# Patient Record
Sex: MALE | Race: WHITE | Employment: OTHER | URBAN - METROPOLITAN AREA
[De-identification: names, ages, dates, MRNs, and addresses within clinical notes are randomized per-mention and may not be internally consistent; named-entity substitution may affect disease eponyms.]

---

## 2017-01-03 ENCOUNTER — TELEPHONE (OUTPATIENT)
Dept: CARDIOLOGY CLINIC | Age: 62
End: 2017-01-03

## 2017-01-27 ENCOUNTER — TELEPHONE (OUTPATIENT)
Dept: CARDIOLOGY CLINIC | Age: 62
End: 2017-01-27

## 2017-07-21 ENCOUNTER — TELEPHONE (OUTPATIENT)
Dept: CARDIOLOGY CLINIC | Age: 62
End: 2017-07-21

## 2017-08-30 ENCOUNTER — OFFICE VISIT (OUTPATIENT)
Dept: CARDIOLOGY CLINIC | Age: 62
End: 2017-08-30

## 2017-08-30 VITALS
DIASTOLIC BLOOD PRESSURE: 90 MMHG | HEART RATE: 80 BPM | SYSTOLIC BLOOD PRESSURE: 148 MMHG | BODY MASS INDEX: 32.52 KG/M2 | HEIGHT: 74 IN | WEIGHT: 253.4 LBS

## 2017-08-30 DIAGNOSIS — I42.9 PRIMARY CARDIOMYOPATHY (HCC): ICD-10-CM

## 2017-08-30 DIAGNOSIS — I10 ESSENTIAL HYPERTENSION, BENIGN: ICD-10-CM

## 2017-08-30 DIAGNOSIS — R06.09 DOE (DYSPNEA ON EXERTION): ICD-10-CM

## 2017-08-30 DIAGNOSIS — I25.10 ATHEROSCLEROSIS OF NATIVE CORONARY ARTERY OF NATIVE HEART WITHOUT ANGINA PECTORIS: Primary | ICD-10-CM

## 2017-08-30 DIAGNOSIS — E78.5 OTHER AND UNSPECIFIED HYPERLIPIDEMIA: ICD-10-CM

## 2017-08-30 PROCEDURE — 99214 OFFICE O/P EST MOD 30 MIN: CPT | Performed by: INTERNAL MEDICINE

## 2017-08-30 RX ORDER — ATORVASTATIN CALCIUM 40 MG/1
40 TABLET, FILM COATED ORAL DAILY
Qty: 30 TABLET | Refills: 11 | Status: SHIPPED | OUTPATIENT
Start: 2017-08-30 | End: 2018-08-24 | Stop reason: SDUPTHER

## 2017-08-30 RX ORDER — NITROGLYCERIN 0.4 MG/1
0.4 TABLET SUBLINGUAL EVERY 5 MIN PRN
Status: ON HOLD | COMMUNITY
End: 2020-08-14 | Stop reason: HOSPADM

## 2017-08-30 RX ORDER — ASPIRIN 325 MG
325 TABLET ORAL DAILY
Status: ON HOLD | COMMUNITY
End: 2020-08-14 | Stop reason: HOSPADM

## 2017-08-30 RX ORDER — AMLODIPINE BESYLATE 10 MG/1
10 TABLET ORAL DAILY
Qty: 30 TABLET | Refills: 11 | Status: SHIPPED | OUTPATIENT
Start: 2017-08-30 | End: 2018-08-24 | Stop reason: SDUPTHER

## 2017-08-30 RX ORDER — CARVEDILOL 6.25 MG/1
6.25 TABLET ORAL 2 TIMES DAILY WITH MEALS
Qty: 60 TABLET | Refills: 11 | Status: SHIPPED | OUTPATIENT
Start: 2017-08-30 | End: 2018-08-24 | Stop reason: SDUPTHER

## 2017-08-30 RX ORDER — OLMESARTAN MEDOXOMIL 40 MG/1
40 TABLET ORAL DAILY
Qty: 30 TABLET | Refills: 11 | Status: SHIPPED | OUTPATIENT
Start: 2017-08-30 | End: 2018-08-24 | Stop reason: SDUPTHER

## 2017-09-25 ENCOUNTER — TELEPHONE (OUTPATIENT)
Dept: CARDIOLOGY CLINIC | Age: 62
End: 2017-09-25

## 2017-09-25 NOTE — TELEPHONE ENCOUNTER
Returning call to Colby Lowe MA his date of injury was 10/28/2003, the codes that were used was 410.20 acute MI and 414.9 coronary heart disease.  [facility]

## 2017-09-25 NOTE — TELEPHONE ENCOUNTER
For workers comp claims, the patient's condition has to be directly linked to the patient's original injury. Does anyone know what his original injury was? I can send in a C-9 to the [employer] today, but I really need to know why it needs to be claimed through workers comp and not his commercial insurance.

## 2017-09-26 NOTE — TELEPHONE ENCOUNTER
I sent a C-9 in to his [employer] today. It isn't a matter of us just calling to get one. Each time a C-9 has to be submitted with the clinical notes. It can take up to two weeks for them to either authorize or deny it.

## 2017-10-04 ENCOUNTER — HOSPITAL ENCOUNTER (OUTPATIENT)
Dept: NON INVASIVE DIAGNOSTICS | Age: 62
Discharge: HOME | End: 2017-10-04
Attending: INTERNAL MEDICINE | Admitting: INTERNAL MEDICINE

## 2017-10-04 DIAGNOSIS — I25.10 ATHEROSCLEROTIC HEART DISEASE OF NATIVE CORONARY ARTERY WITHOUT ANGINA PECTORIS: ICD-10-CM

## 2017-10-06 ENCOUNTER — TELEPHONE (OUTPATIENT)
Dept: CARDIOLOGY CLINIC | Age: 62
End: 2017-10-06

## 2017-10-09 ENCOUNTER — TELEPHONE (OUTPATIENT)
Dept: CARDIOLOGY CLINIC | Age: 62
End: 2017-10-09

## 2017-10-10 ENCOUNTER — TELEPHONE (OUTPATIENT)
Dept: CARDIOLOGY CLINIC | Age: 62
End: 2017-10-10

## 2018-02-06 ENCOUNTER — TELEPHONE (OUTPATIENT)
Dept: CARDIOLOGY CLINIC | Age: 63
End: 2018-02-06

## 2018-02-09 ENCOUNTER — TELEPHONE (OUTPATIENT)
Dept: CARDIOLOGY CLINIC | Age: 63
End: 2018-02-09

## 2018-02-09 NOTE — TELEPHONE ENCOUNTER
Pt calling stating workers comp form was filled out incorrectly and need it to be done the correct way. States dx was wrong and the date. Please call to advise and refax ,Thank You!

## 2018-02-19 ENCOUNTER — OFFICE VISIT (OUTPATIENT)
Dept: CARDIOLOGY CLINIC | Age: 63
End: 2018-02-19

## 2018-02-19 VITALS
SYSTOLIC BLOOD PRESSURE: 134 MMHG | WEIGHT: 267 LBS | DIASTOLIC BLOOD PRESSURE: 96 MMHG | BODY MASS INDEX: 34.27 KG/M2 | HEART RATE: 88 BPM | HEIGHT: 74 IN | OXYGEN SATURATION: 96 %

## 2018-02-19 DIAGNOSIS — E78.49 OTHER HYPERLIPIDEMIA: ICD-10-CM

## 2018-02-19 DIAGNOSIS — I35.1 NONRHEUMATIC AORTIC VALVE INSUFFICIENCY: Primary | ICD-10-CM

## 2018-02-19 DIAGNOSIS — I10 ESSENTIAL HYPERTENSION, BENIGN: ICD-10-CM

## 2018-02-19 DIAGNOSIS — I25.10 ATHEROSCLEROSIS OF NATIVE CORONARY ARTERY OF NATIVE HEART WITHOUT ANGINA PECTORIS: ICD-10-CM

## 2018-02-19 PROCEDURE — 99214 OFFICE O/P EST MOD 30 MIN: CPT | Performed by: INTERNAL MEDICINE

## 2018-02-19 NOTE — LETTER
· Eyes: No visual changes or diplopia. No scleral icterus. · ENT: No Headaches, hearing loss or vertigo. No mouth sores or sore throat. · Cardiovascular: Reviewed in HPI  · Respiratory: No cough or wheezing, no sputum production. No hematemesis. · Gastrointestinal: No abdominal pain, appetite loss, blood in stools. No change in bowel or bladder habits. · Genitourinary: No dysuria, trouble voiding, or hematuria. · Musculoskeletal:  No gait disturbance, weakness or joint complaints. · Integumentary: No rash or pruritis. · Neurological: No headache, diplopia, change in muscle strength, numbness or tingling. No change in gait, balance, coordination, mood, affect, memory, mentation, behavior. · Psychiatric: No anxiety, no depression. · Endocrine: No malaise, fatigue or temperature intolerance. No excessive thirst, fluid intake, or urination. No tremor. · Hematologic/Lymphatic: No abnormal bruising or bleeding, blood clots or swollen lymph nodes. · Allergic/Immunologic: No nasal congestion or hives. Physical Examination:    Vitals:    02/19/18 1315   BP: (!) 134/96   Pulse:    SpO2:         Constitutional and General Appearance: NAD  Skin:good turgor,intact without lesions  HEENT: EOMI ,normal  Neck:no JVD  Respiratory:  · Normal excursion and expansion without use of accessory muscles  · Resp Auscultation: Normal breath sounds without dullness  Cardiovascular:  · The apical impulses not displaced  · Heart tones are crisp and normal  · Cervical veins are not engorged  · The carotid upstroke is normal in amplitude and contour without delay or bruit  · Peripheral pulses are symmetrical and full  · There is no clubbing, cyanosis of the extremities.   · No edema  · Femoral Arteries: 2+ and equal  · Pedal Pulses: 2+ and equal   Abdomen:  · No masses or tenderness  · Liver/Spleen: No Abnormalities Noted  Neurological/Psychiatric:  · Alert and oriented in all spheres  · Moves all extremities well · Exhibits normal gait balance and coordination  · No abnormalities of mood, affect, memory, mentation, or behavior are noted    Assessment/Plan:    1. Coronary artery Disease: Stable, no anginal symptoms. Some SOB with activity at times, chronic.  --10/28/2003 Cath/PCI ([facility])> 85% LAD, 70% LCX, 30% RCA-PCI to LAD with 3.5x18mm Cypher TEJAS.   --1/02/2009 Cath/PCI> Stents in LAD and LCX patent. 85% pRCA-PCI 3.0x12mm Xience TEJAS.   --1/25/2011 Cath> Stents in LAD, LCX, and RCA are all patent. LVEF 40%. --Stress echo 10/4/17> Normal stress. -Global left ventricular function is borderline with ejection fraction estimated at 50 %.  -No obvious resting wall motion abnormalities noted.   -The aortic root is mildly dilated.   -The right ventricle is mildly dilated. Normal RV systolic function. TAPSE is estimated at 2.0 cm.   -The left atrium is mildly dilated.   -Aortic valve appears sclerotic but opens adequately.   -Mild-to-moderate aortic regurgitation is present.  Tereasa Gulshan thickened mitral valve without evidence of stenosis. Trace mitral regurgitation.    2. Essential hypertension, benign: DBP slightly elevated. Will continue to monitor. Encouraged weight loss. BP (!) 134/96   Pulse 88   Ht 6' 2\" (1.88 m)   Wt 267 lb (121.1 kg)   SpO2 96%   BMI 34.28 kg/m²     3. Other and unspecified hyperlipidemia: Managed per PCP. July 2017 labs very stable. 4. Other primary cardiomyopathies: Stable, no SOB/edema. 02/12/2015 Echo ([facility])> EF 45%, mild global hypokinesis, mild MR   5. Diabetes mellitus: PCP follows. Recent A1c 7.8. Mr. Amina Green has a stable cardiac status. 1. No med changes. 2. Will continue with risk factor modifications including weight loss measures. 3. Return for regular follow up in 6 months with ECHO same day (PCP will do labs). He states his meds are covered through worker's comp. I appreciate the opportunity of cooperating in the care of this individual.    Lauro Nunn.  Jenny Nunn M.D., [facility] If you have questions, please do not hesitate to call me. I look forward to following [employer]man along with you.     Sincerely,        Mook Rosas MD

## 2018-02-19 NOTE — PROGRESS NOTES
pruritis. · Neurological: No headache, diplopia, change in muscle strength, numbness or tingling. No change in gait, balance, coordination, mood, affect, memory, mentation, behavior. · Psychiatric: No anxiety, no depression. · Endocrine: No malaise, fatigue or temperature intolerance. No excessive thirst, fluid intake, or urination. No tremor. · Hematologic/Lymphatic: No abnormal bruising or bleeding, blood clots or swollen lymph nodes. · Allergic/Immunologic: No nasal congestion or hives. Physical Examination:    Vitals:    02/19/18 1315   BP: (!) 134/96   Pulse:    SpO2:         Constitutional and General Appearance: NAD  Skin:good turgor,intact without lesions  HEENT: EOMI ,normal  Neck:no JVD  Respiratory:  · Normal excursion and expansion without use of accessory muscles  · Resp Auscultation: Normal breath sounds without dullness  Cardiovascular:  · The apical impulses not displaced  · Heart tones are crisp and normal  · Cervical veins are not engorged  · The carotid upstroke is normal in amplitude and contour without delay or bruit  · Peripheral pulses are symmetrical and full  · There is no clubbing, cyanosis of the extremities. · No edema  · Femoral Arteries: 2+ and equal  · Pedal Pulses: 2+ and equal   Abdomen:  · No masses or tenderness  · Liver/Spleen: No Abnormalities Noted  Neurological/Psychiatric:  · Alert and oriented in all spheres  · Moves all extremities well  · Exhibits normal gait balance and coordination  · No abnormalities of mood, affect, memory, mentation, or behavior are noted    Assessment/Plan:    1. Coronary artery Disease: Stable, no anginal symptoms. Some SOB with activity at times, chronic.  --10/28/2003 Cath/PCI ([facility])> 85% LAD, 70% LCX, 30% RCA-PCI to LAD with 3.5x18mm Cypher TEJAS.   --1/02/2009 Cath/PCI> Stents in LAD and LCX patent. 85% pRCA-PCI 3.0x12mm Xience TEJAS.   --1/25/2011 Cath> Stents in LAD, LCX, and RCA are all patent. LVEF 40%.   --Stress echo 10/4/17>

## 2018-03-07 NOTE — COMMUNICATION BODY
[address]   Cardiac Consultation    Referring Provider:  Lolly Wright CNP     Chief Complaint   Patient presents with    6 Month Follow-Up     no cardiac complaints at this time    Coronary Artery Disease    Hypertension    Hyperlipidemia    Cardiomyopathy      History of Present Illness:   Mr. Minnie Adorno is here for a routine follow-up visit; he has a history of CAD, HTN, Hyperlipidemia, and Cardiomyopathy. He comes from [facility] for his OV (was seeing cardiologist at [facility]). He states that over he feels well, has no complaints other than chronic back issues. He does have CARR at times with exertional activities that is unchanged. He is frustrated by recent weight gains. He denies exertional chest pain, PND, palpitations, light-headedness, edema. His wife is with him for the visit. Past Medical History:   has a past medical history of Blood circulation, collateral; CAD (coronary artery disease); Chronic kidney disease; Diabetes mellitus ([address] Enlarged prostate; Hyperlipidemia; and Hypertension. Surgical History:   has a past surgical history that includes back surgery; Cardiac surgery; Tonsillectomy; Coronary angioplasty with stent (10/2003); Total knee arthroplasty (Right, 2014); and joint replacement. Social History:   reports that he quit smoking about 34 years ago. He smoked 1.00 pack per day. He has never used smokeless tobacco. He reports that he does not drink alcohol or use drugs. Family History:  family history includes Birth Defects in his brother; Diabetes in his mother; Heart Attack in his father; Heart Disease in his father and sister; High Blood Pressure in his brother, brother, father, sister, sister, and sister; High Cholesterol in his father. Home Medications:  Prior to Admission medications    Medication Sig Start Date End Date Taking? Authorizing Provider   Multiple Vitamins-Minerals (CENTRUM SILVER) TABS Take 1 tablet by mouth daily.      Yes Historical Provider, MD   zolpidem (AMBIEN) 5 MG tablet Take 5 mg by mouth nightly as needed. Yes Historical Provider, MD   meloxicam (MOBIC) 15 MG tablet Take 15 mg by mouth daily. Yes Historical Provider, MD   olmesartan (BENICAR) 40 MG tablet Take 40 mg by mouth daily. Yes Historical Provider, MD   metformin (GLUCOPHAGE) 1000 MG tablet Take 1,000 mg by mouth 2 times daily. Yes Historical Provider, MD   amlodipine (NORVASC) 10 MG tablet Take 1 tablet by mouth daily. 7/28/11 7/27/12 Yes Angel Peres MD   atorvastatin (LIPITOR) 40 MG tablet Take 1 tablet by mouth nightly. 5/27/11 5/26/12 Yes Angel Peres MD   aspirin 325 MG EC tablet Take 1 tablet by mouth daily. 1/25/11 1/25/12 Yes Angel Peres MD   carvedilol (COREG) 6.25 MG tablet Take 1 tablet by mouth 2 times daily (with meals). 1/25/11 1/25/12 Yes Angel Peres MD   nitroGLYCERIN (NITROSTAT) 0.4 MG SL tablet Place 1 tablet under the tongue every 5 minutes as needed for Chest pain. 1/25/11 1/25/12 Yes Angel Peres MD   tamsulosin (FLOMAX) 0.4 MG capsule Take 1 capsule by mouth daily. 1/25/11 1/25/12 Yes Angel Peres MD   lansoprazole (PREVACID) 30 MG capsule Take 1 capsule by mouth daily. 1/25/11 1/25/12 Yes Angel Peres MD      Allergies:  Penicillins and Codeine     Review of Systems:   · Constitutional: there has been no unanticipated weight loss. There's been no change in energy level, sleep pattern, or activity level. · Eyes: No visual changes or diplopia. No scleral icterus. · ENT: No Headaches, hearing loss or vertigo. No mouth sores or sore throat. · Cardiovascular: Reviewed in HPI  · Respiratory: No cough or wheezing, no sputum production. No hematemesis. · Gastrointestinal: No abdominal pain, appetite loss, blood in stools. No change in bowel or bladder habits. · Genitourinary: No dysuria, trouble voiding, or hematuria. · Musculoskeletal:  No gait disturbance, weakness or joint complaints.   · Integumentary: No rash or Normal stress. -Global left ventricular function is borderline with ejection fraction estimated at 50 %.  -No obvious resting wall motion abnormalities noted.   -The aortic root is mildly dilated.   -The right ventricle is mildly dilated. Normal RV systolic function. TAPSE is estimated at 2.0 cm.   -The left atrium is mildly dilated.   -Aortic valve appears sclerotic but opens adequately.   -Mild-to-moderate aortic regurgitation is present.  Delma Montague thickened mitral valve without evidence of stenosis. Trace mitral regurgitation.    2. Essential hypertension, benign: DBP slightly elevated. Will continue to monitor. Encouraged weight loss. BP (!) 134/96   Pulse 88   Ht 6' 2\" (1.88 m)   Wt 267 lb (121.1 kg)   SpO2 96%   BMI 34.28 kg/m²     3. Other and unspecified hyperlipidemia: Managed per PCP. July 2017 labs very stable. 4. Other primary cardiomyopathies: Stable, no SOB/edema. 02/12/2015 Echo ([facility])> EF 45%, mild global hypokinesis, mild MR   5. Diabetes mellitus: PCP follows. Recent A1c 7.8. Mr. Nilson Burton has a stable cardiac status. 1. No med changes. 2. Will continue with risk factor modifications including weight loss measures. 3. Return for regular follow up in 6 months with ECHO same day (PCP will do labs). He states his meds are covered through worker's comp. I appreciate the opportunity of cooperating in the care of this individual.    Zachery Barrera.  Fab Urrutia M.D., [facility]

## 2018-08-23 ENCOUNTER — TELEPHONE (OUTPATIENT)
Dept: CARDIOLOGY CLINIC | Age: 63
End: 2018-08-23

## 2018-08-24 ENCOUNTER — OFFICE VISIT (OUTPATIENT)
Dept: CARDIOLOGY CLINIC | Age: 63
End: 2018-08-24

## 2018-08-24 VITALS
HEIGHT: 74 IN | SYSTOLIC BLOOD PRESSURE: 144 MMHG | BODY MASS INDEX: 32.14 KG/M2 | DIASTOLIC BLOOD PRESSURE: 82 MMHG | OXYGEN SATURATION: 96 % | WEIGHT: 250.4 LBS | HEART RATE: 84 BPM

## 2018-08-24 DIAGNOSIS — I42.9 PRIMARY CARDIOMYOPATHY (HCC): ICD-10-CM

## 2018-08-24 DIAGNOSIS — E78.49 OTHER HYPERLIPIDEMIA: ICD-10-CM

## 2018-08-24 DIAGNOSIS — I10 ESSENTIAL HYPERTENSION, BENIGN: Primary | ICD-10-CM

## 2018-08-24 DIAGNOSIS — I25.10 ATHEROSCLEROSIS OF NATIVE CORONARY ARTERY OF NATIVE HEART WITHOUT ANGINA PECTORIS: ICD-10-CM

## 2018-08-24 PROCEDURE — 99214 OFFICE O/P EST MOD 30 MIN: CPT | Performed by: INTERNAL MEDICINE

## 2018-08-24 RX ORDER — AMLODIPINE BESYLATE 10 MG/1
10 TABLET ORAL DAILY
Qty: 90 TABLET | Refills: 11 | Status: SHIPPED | OUTPATIENT
Start: 2018-08-24 | End: 2019-09-05 | Stop reason: SDUPTHER

## 2018-08-24 RX ORDER — OLMESARTAN MEDOXOMIL 40 MG/1
40 TABLET ORAL DAILY
Qty: 90 TABLET | Refills: 3 | Status: SHIPPED | OUTPATIENT
Start: 2018-08-24 | End: 2019-09-05 | Stop reason: SDUPTHER

## 2018-08-24 RX ORDER — ATORVASTATIN CALCIUM 40 MG/1
40 TABLET, FILM COATED ORAL DAILY
Qty: 90 TABLET | Refills: 3 | Status: SHIPPED | OUTPATIENT
Start: 2018-08-24 | End: 2019-07-15 | Stop reason: SDUPTHER

## 2018-08-24 RX ORDER — CARVEDILOL 6.25 MG/1
6.25 TABLET ORAL 2 TIMES DAILY WITH MEALS
Qty: 180 TABLET | Refills: 3 | Status: SHIPPED | OUTPATIENT
Start: 2018-08-24 | End: 2019-07-15 | Stop reason: SDUPTHER

## 2018-08-24 NOTE — PROGRESS NOTES
fraction estimated at 50 %.  -No obvious resting wall motion abnormalities noted.   -The aortic root is mildly dilated.   -The right ventricle is mildly dilated. Normal RV systolic function. TAPSE is estimated at 2.0 cm.   -The left atrium is mildly dilated.   -Aortic valve appears sclerotic but opens adequately.   -Mild-to-moderate aortic regurgitation is present.  Sophia April thickened mitral valve without evidence of stenosis. Trace mitral regurgitation.    2. Essential hypertension, benign: DBP slightly elevated. Will continue to monitor. Encouraged weight loss. BP (!) 144/82 (Site: Right Arm, Position: Sitting, Cuff Size: Large Adult)   Pulse 84   Ht 6' 2\" (1.88 m)   Wt 250 lb 6.4 oz (113.6 kg)   SpO2 96%   BMI 32.15 kg/m²    On recheck by me 144/78   3. Other and unspecified hyperlipidemia: Managed per PCP. July 2017 HDL 45 LDL 84   4. Other primary cardiomyopathies: Stable, no SOB/edema. 02/12/2015 Echo ([facility])> EF 45%, mild global hypokinesis, mild MR   5. Diabetes mellitus: PCP follows. Recent A1c 7.8. Mr. Kristi Aquino has a stable cardiac status. 1. No med changes. 2. Will continue with risk factor modifications including weight loss measures. 3. Echo same day as next appointment. All tests go through workers comp. A c9 form will need to completed  4. Return for regular follow up in 6 months     He states his meds are covered through worker's comp. I appreciate the opportunity of cooperating in the care of this individual.    Terrie Carrel. Anuj Cantu M.D., [address] [place] [address]: This note was scribed in the presence of Dr. Anuj Cantu MD by SRINI Gorman. The scribe's documentation has been prepared under my direction and personally reviewed by me in its entirety. I confirm that the note above accurately reflects all work, treatment, procedures, and medical decision making performed by me.

## 2018-08-27 ENCOUNTER — TELEPHONE (OUTPATIENT)
Dept: CARDIOLOGY CLINIC | Age: 63
End: 2018-08-27

## 2018-08-29 ENCOUNTER — TELEPHONE (OUTPATIENT)
Dept: CARDIOLOGY CLINIC | Age: 63
End: 2018-08-29

## 2018-08-29 NOTE — TELEPHONE ENCOUNTER
Arash Barroso was calling to say we need C-9 to pre-cert for Echo that was done 8/24/18. I spoke with Tatum Ruiz and she said she would take care of it.

## 2018-09-10 RX ORDER — OLMESARTAN MEDOXOMIL 40 MG/1
TABLET ORAL
Qty: 30 TABLET | Refills: 11 | OUTPATIENT
Start: 2018-09-10

## 2018-09-24 ENCOUNTER — TELEPHONE (OUTPATIENT)
Dept: CARDIOLOGY CLINIC | Age: 63
End: 2018-09-24

## 2018-09-24 NOTE — TELEPHONE ENCOUNTER
Pt calling and said that worker comp said they approved the Echo stress test and pt could have it at the same time he comes back in February for his f/u. Said that [facility]  office needs to call and reschedule the appt through workers comp. Please call to advise.  Thank youl

## 2018-09-24 NOTE — TELEPHONE ENCOUNTER
Message added to recall reminding to put ov and echo thru W/C.  Tried to call patient but no answer and no VM

## 2018-12-05 ENCOUNTER — TELEPHONE (OUTPATIENT)
Dept: CARDIOLOGY CLINIC | Age: 63
End: 2018-12-05

## 2018-12-17 ENCOUNTER — HOSPITAL ENCOUNTER (OUTPATIENT)
Age: 63
Setting detail: OBSERVATION
Discharge: HOME | End: 2018-12-18
Attending: INTERNAL MEDICINE | Admitting: INTERNAL MEDICINE
Payer: COMMERCIAL

## 2018-12-17 ENCOUNTER — APPOINTMENT (OUTPATIENT)
Dept: ULTRASOUND IMAGING | Age: 63
End: 2018-12-17
Attending: INTERNAL MEDICINE
Payer: COMMERCIAL

## 2018-12-17 PROBLEM — K21.00 GERD WITH ESOPHAGITIS: Status: ACTIVE | Noted: 2018-12-17

## 2018-12-17 PROBLEM — N40.0 BPH (BENIGN PROSTATIC HYPERPLASIA): Status: ACTIVE | Noted: 2018-12-17

## 2018-12-17 PROBLEM — E66.9 OBESITY (BMI 30-39.9): Status: ACTIVE | Noted: 2018-12-17

## 2018-12-17 PROBLEM — N18.30 CKD (CHRONIC KIDNEY DISEASE) STAGE 3, GFR 30-59 ML/MIN (HCC): Status: ACTIVE | Noted: 2018-12-17

## 2018-12-17 PROBLEM — E11.9 DM2 (DIABETES MELLITUS, TYPE 2) (HCC): Status: ACTIVE | Noted: 2018-12-17

## 2018-12-17 LAB
ANION GAP SERPL CALCULATED.3IONS-SCNC: 10 MMOL/L (ref 3–16)
BASOPHILS ABSOLUTE: 0 K/UL (ref 0–0.2)
BASOPHILS RELATIVE PERCENT: 0.4 %
BUN BLDV-MCNC: 22 MG/DL (ref 7–20)
CALCIUM SERPL-MCNC: 9.4 MG/DL (ref 8.3–10.6)
CHLORIDE BLD-SCNC: 105 MMOL/L (ref 99–110)
CO2: 27 MMOL/L (ref 21–32)
CREAT SERPL-MCNC: 1.2 MG/DL (ref 0.8–1.3)
EOSINOPHILS ABSOLUTE: 0.1 K/UL (ref 0–0.6)
EOSINOPHILS RELATIVE PERCENT: 1.9 %
GFR AFRICAN AMERICAN: >60
GFR NON-AFRICAN AMERICAN: >60
GLUCOSE BLD-MCNC: 119 MG/DL (ref 70–99)
GLUCOSE BLD-MCNC: 125 MG/DL (ref 70–99)
GLUCOSE BLD-MCNC: 127 MG/DL (ref 70–99)
GLUCOSE BLD-MCNC: 160 MG/DL (ref 70–99)
HCT VFR BLD CALC: 39.7 % (ref 40.5–52.5)
HEMOGLOBIN: 13.4 G/DL (ref 13.5–17.5)
LYMPHOCYTES ABSOLUTE: 1.6 K/UL (ref 1–5.1)
LYMPHOCYTES RELATIVE PERCENT: 24.3 %
MCH RBC QN AUTO: 30.7 PG (ref 26–34)
MCHC RBC AUTO-ENTMCNC: 33.7 G/DL (ref 31–36)
MCV RBC AUTO: 91.3 FL (ref 80–100)
MONOCYTES ABSOLUTE: 0.5 K/UL (ref 0–1.3)
MONOCYTES RELATIVE PERCENT: 8.2 %
NEUTROPHILS ABSOLUTE: 4.3 K/UL (ref 1.7–7.7)
NEUTROPHILS RELATIVE PERCENT: 65.2 %
PDW BLD-RTO: 14.3 % (ref 12.4–15.4)
PERFORMED ON: ABNORMAL
PLATELET # BLD: 204 K/UL (ref 135–450)
PMV BLD AUTO: 7.8 FL (ref 5–10.5)
POTASSIUM REFLEX MAGNESIUM: 4.3 MMOL/L (ref 3.5–5.1)
RBC # BLD: 4.34 M/UL (ref 4.2–5.9)
SODIUM BLD-SCNC: 142 MMOL/L (ref 136–145)
TROPONIN: <0.01 NG/ML
WBC # BLD: 6.6 K/UL (ref 4–11)

## 2018-12-17 PROCEDURE — 85025 COMPLETE CBC W/AUTO DIFF WBC: CPT

## 2018-12-17 PROCEDURE — 76705 ECHO EXAM OF ABDOMEN: CPT

## 2018-12-17 PROCEDURE — 83036 HEMOGLOBIN GLYCOSYLATED A1C: CPT

## 2018-12-17 PROCEDURE — 80048 BASIC METABOLIC PNL TOTAL CA: CPT

## 2018-12-17 PROCEDURE — 2580000003 HC RX 258: Performed by: INTERNAL MEDICINE

## 2018-12-17 PROCEDURE — G0379 DIRECT REFER HOSPITAL OBSERV: HCPCS

## 2018-12-17 PROCEDURE — 99245 OFF/OP CONSLTJ NEW/EST HI 55: CPT | Performed by: INTERNAL MEDICINE

## 2018-12-17 PROCEDURE — 36415 COLL VENOUS BLD VENIPUNCTURE: CPT

## 2018-12-17 PROCEDURE — 84484 ASSAY OF TROPONIN QUANT: CPT

## 2018-12-17 PROCEDURE — 6360000002 HC RX W HCPCS: Performed by: INTERNAL MEDICINE

## 2018-12-17 PROCEDURE — G0378 HOSPITAL OBSERVATION PER HR: HCPCS

## 2018-12-17 PROCEDURE — 6370000000 HC RX 637 (ALT 250 FOR IP): Performed by: INTERNAL MEDICINE

## 2018-12-17 PROCEDURE — 96374 THER/PROPH/DIAG INJ IV PUSH: CPT

## 2018-12-17 RX ORDER — KETOROLAC TROMETHAMINE 15 MG/ML
15 INJECTION, SOLUTION INTRAMUSCULAR; INTRAVENOUS ONCE
Status: COMPLETED | OUTPATIENT
Start: 2018-12-17 | End: 2018-12-17

## 2018-12-17 RX ORDER — ACETAMINOPHEN 325 MG/1
650 TABLET ORAL EVERY 4 HOURS PRN
Status: DISCONTINUED | OUTPATIENT
Start: 2018-12-17 | End: 2018-12-18 | Stop reason: HOSPADM

## 2018-12-17 RX ORDER — ASPIRIN 325 MG
325 TABLET ORAL DAILY
Status: DISCONTINUED | OUTPATIENT
Start: 2018-12-17 | End: 2018-12-18 | Stop reason: HOSPADM

## 2018-12-17 RX ORDER — CARVEDILOL 6.25 MG/1
6.25 TABLET ORAL 2 TIMES DAILY WITH MEALS
Status: DISCONTINUED | OUTPATIENT
Start: 2018-12-17 | End: 2018-12-18 | Stop reason: HOSPADM

## 2018-12-17 RX ORDER — MORPHINE SULFATE 2 MG/ML
2 INJECTION, SOLUTION INTRAMUSCULAR; INTRAVENOUS EVERY 4 HOURS PRN
Status: DISCONTINUED | OUTPATIENT
Start: 2018-12-17 | End: 2018-12-18 | Stop reason: HOSPADM

## 2018-12-17 RX ORDER — ONDANSETRON 2 MG/ML
4 INJECTION INTRAMUSCULAR; INTRAVENOUS EVERY 6 HOURS PRN
Status: DISCONTINUED | OUTPATIENT
Start: 2018-12-17 | End: 2018-12-18 | Stop reason: HOSPADM

## 2018-12-17 RX ORDER — SODIUM CHLORIDE 0.9 % (FLUSH) 0.9 %
10 SYRINGE (ML) INJECTION EVERY 12 HOURS SCHEDULED
Status: DISCONTINUED | OUTPATIENT
Start: 2018-12-17 | End: 2018-12-18 | Stop reason: HOSPADM

## 2018-12-17 RX ORDER — SODIUM CHLORIDE 9 MG/ML
INJECTION, SOLUTION INTRAVENOUS CONTINUOUS
Status: DISPENSED | OUTPATIENT
Start: 2018-12-17 | End: 2018-12-17

## 2018-12-17 RX ORDER — DEXTROSE MONOHYDRATE 50 MG/ML
100 INJECTION, SOLUTION INTRAVENOUS PRN
Status: DISCONTINUED | OUTPATIENT
Start: 2018-12-17 | End: 2018-12-18 | Stop reason: HOSPADM

## 2018-12-17 RX ORDER — SODIUM CHLORIDE 0.9 % (FLUSH) 0.9 %
10 SYRINGE (ML) INJECTION PRN
Status: DISCONTINUED | OUTPATIENT
Start: 2018-12-17 | End: 2018-12-18 | Stop reason: HOSPADM

## 2018-12-17 RX ORDER — TAMSULOSIN HYDROCHLORIDE 0.4 MG/1
0.8 CAPSULE ORAL NIGHTLY
Status: DISCONTINUED | OUTPATIENT
Start: 2018-12-17 | End: 2018-12-18 | Stop reason: HOSPADM

## 2018-12-17 RX ORDER — LOSARTAN POTASSIUM 100 MG/1
100 TABLET ORAL DAILY
Status: DISCONTINUED | OUTPATIENT
Start: 2018-12-17 | End: 2018-12-18 | Stop reason: HOSPADM

## 2018-12-17 RX ORDER — ATORVASTATIN CALCIUM 40 MG/1
40 TABLET, FILM COATED ORAL NIGHTLY
Status: DISCONTINUED | OUTPATIENT
Start: 2018-12-17 | End: 2018-12-18 | Stop reason: HOSPADM

## 2018-12-17 RX ORDER — NITROGLYCERIN 0.4 MG/1
0.4 TABLET SUBLINGUAL EVERY 5 MIN PRN
Status: DISCONTINUED | OUTPATIENT
Start: 2018-12-17 | End: 2018-12-18 | Stop reason: HOSPADM

## 2018-12-17 RX ORDER — NICOTINE POLACRILEX 4 MG
15 LOZENGE BUCCAL PRN
Status: DISCONTINUED | OUTPATIENT
Start: 2018-12-17 | End: 2018-12-18 | Stop reason: HOSPADM

## 2018-12-17 RX ORDER — PANTOPRAZOLE SODIUM 40 MG/1
40 TABLET, DELAYED RELEASE ORAL
Status: DISCONTINUED | OUTPATIENT
Start: 2018-12-17 | End: 2018-12-18 | Stop reason: HOSPADM

## 2018-12-17 RX ORDER — NITROGLYCERIN 0.4 MG/1
0.4 TABLET SUBLINGUAL EVERY 5 MIN PRN
Status: DISCONTINUED | OUTPATIENT
Start: 2018-12-17 | End: 2018-12-17 | Stop reason: SDUPTHER

## 2018-12-17 RX ORDER — DEXTROSE MONOHYDRATE 25 G/50ML
12.5 INJECTION, SOLUTION INTRAVENOUS PRN
Status: DISCONTINUED | OUTPATIENT
Start: 2018-12-17 | End: 2018-12-18 | Stop reason: HOSPADM

## 2018-12-17 RX ORDER — AMLODIPINE BESYLATE 5 MG/1
10 TABLET ORAL DAILY
Status: DISCONTINUED | OUTPATIENT
Start: 2018-12-17 | End: 2018-12-18 | Stop reason: HOSPADM

## 2018-12-17 RX ORDER — SUCRALFATE 1 G/1
1 TABLET ORAL EVERY 6 HOURS SCHEDULED
Status: DISCONTINUED | OUTPATIENT
Start: 2018-12-17 | End: 2018-12-18 | Stop reason: HOSPADM

## 2018-12-17 RX ORDER — M-VIT,TX,IRON,MINS/CALC/FOLIC 27MG-0.4MG
1 TABLET ORAL
Status: DISCONTINUED | OUTPATIENT
Start: 2018-12-17 | End: 2018-12-18 | Stop reason: HOSPADM

## 2018-12-17 RX ADMIN — TAMSULOSIN HYDROCHLORIDE 0.8 MG: 0.4 CAPSULE ORAL at 21:03

## 2018-12-17 RX ADMIN — Medication 10 ML: at 12:45

## 2018-12-17 RX ADMIN — MULTIPLE VITAMINS W/ MINERALS TAB 1 TABLET: TAB at 12:44

## 2018-12-17 RX ADMIN — CARVEDILOL 6.25 MG: 6.25 TABLET, FILM COATED ORAL at 17:26

## 2018-12-17 RX ADMIN — AMLODIPINE BESYLATE 10 MG: 5 TABLET ORAL at 12:44

## 2018-12-17 RX ADMIN — LOSARTAN POTASSIUM 100 MG: 100 TABLET, FILM COATED ORAL at 12:44

## 2018-12-17 RX ADMIN — METFORMIN HYDROCHLORIDE 1000 MG: 500 TABLET ORAL at 21:04

## 2018-12-17 RX ADMIN — DESMOPRESSIN ACETATE 40 MG: 0.2 TABLET ORAL at 21:04

## 2018-12-17 RX ADMIN — SODIUM CHLORIDE: 9 INJECTION, SOLUTION INTRAVENOUS at 12:49

## 2018-12-17 RX ADMIN — SUCRALFATE 1 G: 1 TABLET ORAL at 12:44

## 2018-12-17 RX ADMIN — INSULIN LISPRO 2 UNITS: 100 INJECTION, SOLUTION INTRAVENOUS; SUBCUTANEOUS at 18:07

## 2018-12-17 RX ADMIN — SUCRALFATE 1 G: 1 TABLET ORAL at 17:26

## 2018-12-17 RX ADMIN — CARVEDILOL 6.25 MG: 6.25 TABLET, FILM COATED ORAL at 12:44

## 2018-12-17 RX ADMIN — PANTOPRAZOLE SODIUM 40 MG: 40 TABLET, DELAYED RELEASE ORAL at 17:26

## 2018-12-17 RX ADMIN — ASPIRIN 325 MG: 325 TABLET ORAL at 12:44

## 2018-12-17 RX ADMIN — KETOROLAC TROMETHAMINE 15 MG: 15 INJECTION, SOLUTION INTRAMUSCULAR; INTRAVENOUS at 12:46

## 2018-12-17 ASSESSMENT — PAIN DESCRIPTION - FREQUENCY: FREQUENCY: CONTINUOUS

## 2018-12-17 ASSESSMENT — PAIN DESCRIPTION - PAIN TYPE
TYPE: ACUTE PAIN

## 2018-12-17 ASSESSMENT — PAIN SCALES - GENERAL
PAINLEVEL_OUTOF10: 0
PAINLEVEL_OUTOF10: 0
PAINLEVEL_OUTOF10: 2
PAINLEVEL_OUTOF10: 0
PAINLEVEL_OUTOF10: 2
PAINLEVEL_OUTOF10: 2
PAINLEVEL_OUTOF10: 0

## 2018-12-17 ASSESSMENT — PAIN DESCRIPTION - DESCRIPTORS: DESCRIPTORS: DISCOMFORT;DULL

## 2018-12-17 ASSESSMENT — PAIN DESCRIPTION - DIRECTION: RADIATING_TOWARDS: LEFT BREAST

## 2018-12-17 ASSESSMENT — PAIN DESCRIPTION - LOCATION
LOCATION: CHEST
LOCATION: CHEST

## 2018-12-17 ASSESSMENT — PAIN DESCRIPTION - ORIENTATION: ORIENTATION: MID

## 2018-12-18 VITALS
DIASTOLIC BLOOD PRESSURE: 86 MMHG | RESPIRATION RATE: 16 BRPM | HEART RATE: 84 BPM | TEMPERATURE: 97 F | SYSTOLIC BLOOD PRESSURE: 137 MMHG | BODY MASS INDEX: 31.57 KG/M2 | HEIGHT: 74 IN | OXYGEN SATURATION: 97 % | WEIGHT: 246 LBS

## 2018-12-18 LAB
ANION GAP SERPL CALCULATED.3IONS-SCNC: 10 MMOL/L (ref 3–16)
BASOPHILS ABSOLUTE: 0 K/UL (ref 0–0.2)
BASOPHILS RELATIVE PERCENT: 0.6 %
BUN BLDV-MCNC: 21 MG/DL (ref 7–20)
CALCIUM SERPL-MCNC: 9.1 MG/DL (ref 8.3–10.6)
CHLORIDE BLD-SCNC: 103 MMOL/L (ref 99–110)
CO2: 25 MMOL/L (ref 21–32)
CREAT SERPL-MCNC: 1.1 MG/DL (ref 0.8–1.3)
EOSINOPHILS ABSOLUTE: 0.2 K/UL (ref 0–0.6)
EOSINOPHILS RELATIVE PERCENT: 2.7 %
ESTIMATED AVERAGE GLUCOSE: 159.9 MG/DL
GFR AFRICAN AMERICAN: >60
GFR NON-AFRICAN AMERICAN: >60
GLUCOSE BLD-MCNC: 122 MG/DL (ref 70–99)
GLUCOSE BLD-MCNC: 132 MG/DL (ref 70–99)
GLUCOSE BLD-MCNC: 135 MG/DL (ref 70–99)
HBA1C MFR BLD: 7.2 %
HCT VFR BLD CALC: 39.9 % (ref 40.5–52.5)
HEMOGLOBIN: 13.3 G/DL (ref 13.5–17.5)
LEFT VENTRICULAR EJECTION FRACTION HIGH VALUE: 55 %
LEFT VENTRICULAR EJECTION FRACTION MODE: NORMAL
LYMPHOCYTES ABSOLUTE: 1.6 K/UL (ref 1–5.1)
LYMPHOCYTES RELATIVE PERCENT: 23.2 %
MCH RBC QN AUTO: 30.5 PG (ref 26–34)
MCHC RBC AUTO-ENTMCNC: 33.3 G/DL (ref 31–36)
MCV RBC AUTO: 91.7 FL (ref 80–100)
MONOCYTES ABSOLUTE: 0.7 K/UL (ref 0–1.3)
MONOCYTES RELATIVE PERCENT: 9.8 %
NEUTROPHILS ABSOLUTE: 4.4 K/UL (ref 1.7–7.7)
NEUTROPHILS RELATIVE PERCENT: 63.7 %
PDW BLD-RTO: 14.8 % (ref 12.4–15.4)
PERFORMED ON: ABNORMAL
PERFORMED ON: ABNORMAL
PLATELET # BLD: 191 K/UL (ref 135–450)
PMV BLD AUTO: 7.6 FL (ref 5–10.5)
POTASSIUM REFLEX MAGNESIUM: 4.5 MMOL/L (ref 3.5–5.1)
RBC # BLD: 4.35 M/UL (ref 4.2–5.9)
SODIUM BLD-SCNC: 138 MMOL/L (ref 136–145)
WBC # BLD: 7 K/UL (ref 4–11)

## 2018-12-18 PROCEDURE — 80048 BASIC METABOLIC PNL TOTAL CA: CPT

## 2018-12-18 PROCEDURE — C1760 CLOSURE DEV, VASC: HCPCS

## 2018-12-18 PROCEDURE — 2500000003 HC RX 250 WO HCPCS

## 2018-12-18 PROCEDURE — 36415 COLL VENOUS BLD VENIPUNCTURE: CPT

## 2018-12-18 PROCEDURE — G0378 HOSPITAL OBSERVATION PER HR: HCPCS

## 2018-12-18 PROCEDURE — 6360000004 HC RX CONTRAST MEDICATION: Performed by: INTERNAL MEDICINE

## 2018-12-18 PROCEDURE — 6360000002 HC RX W HCPCS

## 2018-12-18 PROCEDURE — C1894 INTRO/SHEATH, NON-LASER: HCPCS

## 2018-12-18 PROCEDURE — 2580000003 HC RX 258: Performed by: INTERNAL MEDICINE

## 2018-12-18 PROCEDURE — 99152 MOD SED SAME PHYS/QHP 5/>YRS: CPT | Performed by: INTERNAL MEDICINE

## 2018-12-18 PROCEDURE — 2580000003 HC RX 258

## 2018-12-18 PROCEDURE — 99153 MOD SED SAME PHYS/QHP EA: CPT | Performed by: INTERNAL MEDICINE

## 2018-12-18 PROCEDURE — 2709999900 HC NON-CHARGEABLE SUPPLY

## 2018-12-18 PROCEDURE — 85025 COMPLETE CBC W/AUTO DIFF WBC: CPT

## 2018-12-18 PROCEDURE — C1769 GUIDE WIRE: HCPCS

## 2018-12-18 PROCEDURE — 6370000000 HC RX 637 (ALT 250 FOR IP): Performed by: INTERNAL MEDICINE

## 2018-12-18 PROCEDURE — 93458 L HRT ARTERY/VENTRICLE ANGIO: CPT | Performed by: INTERNAL MEDICINE

## 2018-12-18 RX ORDER — SUCRALFATE 1 G/1
1 TABLET ORAL 4 TIMES DAILY
Qty: 28 TABLET | Refills: 0 | Status: SHIPPED | OUTPATIENT
Start: 2018-12-18 | End: 2018-12-28

## 2018-12-18 RX ORDER — PANTOPRAZOLE SODIUM 40 MG/1
40 TABLET, DELAYED RELEASE ORAL
Qty: 60 TABLET | Refills: 0 | Status: SHIPPED | OUTPATIENT
Start: 2018-12-18 | End: 2020-05-22 | Stop reason: CLARIF

## 2018-12-18 RX ADMIN — SUCRALFATE 1 G: 1 TABLET ORAL at 15:08

## 2018-12-18 RX ADMIN — AMLODIPINE BESYLATE 10 MG: 5 TABLET ORAL at 08:51

## 2018-12-18 RX ADMIN — PANTOPRAZOLE SODIUM 40 MG: 40 TABLET, DELAYED RELEASE ORAL at 15:08

## 2018-12-18 RX ADMIN — Medication 10 ML: at 08:52

## 2018-12-18 RX ADMIN — SUCRALFATE 1 G: 1 TABLET ORAL at 05:34

## 2018-12-18 RX ADMIN — CARVEDILOL 6.25 MG: 6.25 TABLET, FILM COATED ORAL at 08:51

## 2018-12-18 RX ADMIN — IOPAMIDOL 77 ML: 755 INJECTION, SOLUTION INTRAVENOUS at 13:19

## 2018-12-18 RX ADMIN — MULTIPLE VITAMINS W/ MINERALS TAB 1 TABLET: TAB at 15:08

## 2018-12-18 RX ADMIN — ASPIRIN 325 MG: 325 TABLET ORAL at 08:51

## 2018-12-18 RX ADMIN — PANTOPRAZOLE SODIUM 40 MG: 40 TABLET, DELAYED RELEASE ORAL at 05:34

## 2018-12-18 RX ADMIN — SUCRALFATE 1 G: 1 TABLET ORAL at 00:25

## 2018-12-18 RX ADMIN — LOSARTAN POTASSIUM 100 MG: 100 TABLET, FILM COATED ORAL at 08:51

## 2018-12-18 ASSESSMENT — PAIN DESCRIPTION - LOCATION
LOCATION: CHEST

## 2018-12-18 ASSESSMENT — PAIN DESCRIPTION - PAIN TYPE
TYPE: ACUTE PAIN

## 2018-12-18 ASSESSMENT — PAIN SCALES - GENERAL
PAINLEVEL_OUTOF10: 2
PAINLEVEL_OUTOF10: 2
PAINLEVEL_OUTOF10: 0
PAINLEVEL_OUTOF10: 2
PAINLEVEL_OUTOF10: 0
PAINLEVEL_OUTOF10: 2
PAINLEVEL_OUTOF10: 0

## 2018-12-28 ENCOUNTER — OFFICE VISIT (OUTPATIENT)
Dept: CARDIOLOGY CLINIC | Age: 63
End: 2018-12-28
Payer: COMMERCIAL

## 2018-12-28 VITALS
WEIGHT: 251.9 LBS | SYSTOLIC BLOOD PRESSURE: 142 MMHG | DIASTOLIC BLOOD PRESSURE: 90 MMHG | HEIGHT: 74 IN | BODY MASS INDEX: 32.33 KG/M2 | HEART RATE: 92 BPM

## 2018-12-28 DIAGNOSIS — I25.119 ATHEROSCLEROSIS OF NATIVE CORONARY ARTERY OF NATIVE HEART WITH ANGINA PECTORIS (HCC): ICD-10-CM

## 2018-12-28 DIAGNOSIS — I25.118 ATHEROSCLEROSIS OF NATIVE CORONARY ARTERY OF NATIVE HEART WITH STABLE ANGINA PECTORIS (HCC): ICD-10-CM

## 2018-12-28 DIAGNOSIS — E78.49 OTHER HYPERLIPIDEMIA: Primary | ICD-10-CM

## 2018-12-28 DIAGNOSIS — I42.9 PRIMARY CARDIOMYOPATHY (HCC): ICD-10-CM

## 2018-12-28 DIAGNOSIS — E11.9 TYPE 2 DIABETES MELLITUS WITHOUT COMPLICATION, WITHOUT LONG-TERM CURRENT USE OF INSULIN (HCC): ICD-10-CM

## 2018-12-28 DIAGNOSIS — I10 ESSENTIAL HYPERTENSION, BENIGN: ICD-10-CM

## 2018-12-28 PROCEDURE — 99214 OFFICE O/P EST MOD 30 MIN: CPT | Performed by: INTERNAL MEDICINE

## 2018-12-28 RX ORDER — ISOSORBIDE MONONITRATE 30 MG/1
30 TABLET, EXTENDED RELEASE ORAL DAILY
Qty: 30 TABLET | Refills: 0 | Status: SHIPPED | OUTPATIENT
Start: 2018-12-28 | End: 2019-01-22 | Stop reason: SDUPTHER

## 2019-01-22 ENCOUNTER — TELEPHONE (OUTPATIENT)
Dept: CARDIOLOGY CLINIC | Age: 64
End: 2019-01-22

## 2019-01-22 DIAGNOSIS — I25.119 ATHEROSCLEROSIS OF NATIVE CORONARY ARTERY OF NATIVE HEART WITH ANGINA PECTORIS (HCC): ICD-10-CM

## 2019-01-22 RX ORDER — ISOSORBIDE MONONITRATE 30 MG/1
30 TABLET, EXTENDED RELEASE ORAL DAILY
Qty: 90 TABLET | Refills: 3 | Status: SHIPPED | OUTPATIENT
Start: 2019-01-22 | End: 2019-09-05 | Stop reason: SDUPTHER

## 2019-02-05 ENCOUNTER — APPOINTMENT (OUTPATIENT)
Dept: NON INVASIVE DIAGNOSTICS | Age: 64
End: 2019-02-05
Attending: INTERNAL MEDICINE
Payer: COMMERCIAL

## 2019-02-15 ENCOUNTER — OFFICE VISIT (OUTPATIENT)
Dept: CARDIOLOGY CLINIC | Age: 64
End: 2019-02-15
Payer: MEDICARE

## 2019-02-15 VITALS
HEIGHT: 74 IN | WEIGHT: 256.5 LBS | BODY MASS INDEX: 32.92 KG/M2 | SYSTOLIC BLOOD PRESSURE: 126 MMHG | DIASTOLIC BLOOD PRESSURE: 80 MMHG

## 2019-02-15 DIAGNOSIS — I42.9 PRIMARY CARDIOMYOPATHY (HCC): ICD-10-CM

## 2019-02-15 DIAGNOSIS — E78.49 OTHER HYPERLIPIDEMIA: ICD-10-CM

## 2019-02-15 DIAGNOSIS — I25.118 ATHEROSCLEROSIS OF NATIVE CORONARY ARTERY OF NATIVE HEART WITH STABLE ANGINA PECTORIS (HCC): ICD-10-CM

## 2019-02-15 DIAGNOSIS — I25.119 ATHEROSCLEROSIS OF NATIVE CORONARY ARTERY OF NATIVE HEART WITH ANGINA PECTORIS (HCC): ICD-10-CM

## 2019-02-15 DIAGNOSIS — I10 ESSENTIAL HYPERTENSION, BENIGN: Primary | ICD-10-CM

## 2019-02-15 PROCEDURE — G8427 DOCREV CUR MEDS BY ELIG CLIN: HCPCS | Performed by: INTERNAL MEDICINE

## 2019-02-15 PROCEDURE — 4040F PNEUMOC VAC/ADMIN/RCVD: CPT | Performed by: INTERNAL MEDICINE

## 2019-02-15 PROCEDURE — 1036F TOBACCO NON-USER: CPT | Performed by: INTERNAL MEDICINE

## 2019-02-15 PROCEDURE — 99213 OFFICE O/P EST LOW 20 MIN: CPT | Performed by: INTERNAL MEDICINE

## 2019-02-15 PROCEDURE — 1123F ACP DISCUSS/DSCN MKR DOCD: CPT | Performed by: INTERNAL MEDICINE

## 2019-02-15 PROCEDURE — G8598 ASA/ANTIPLAT THER USED: HCPCS | Performed by: INTERNAL MEDICINE

## 2019-02-15 PROCEDURE — 3017F COLORECTAL CA SCREEN DOC REV: CPT | Performed by: INTERNAL MEDICINE

## 2019-02-15 PROCEDURE — 93000 ELECTROCARDIOGRAM COMPLETE: CPT | Performed by: INTERNAL MEDICINE

## 2019-02-15 PROCEDURE — 1101F PT FALLS ASSESS-DOCD LE1/YR: CPT | Performed by: INTERNAL MEDICINE

## 2019-02-15 PROCEDURE — G8484 FLU IMMUNIZE NO ADMIN: HCPCS | Performed by: INTERNAL MEDICINE

## 2019-02-15 PROCEDURE — G8417 CALC BMI ABV UP PARAM F/U: HCPCS | Performed by: INTERNAL MEDICINE

## 2019-02-15 RX ORDER — AMLODIPINE BESYLATE 10 MG/1
10 TABLET ORAL DAILY
Qty: 90 TABLET | Refills: 11 | Status: CANCELLED | OUTPATIENT
Start: 2019-02-15 | End: 2020-02-15

## 2019-02-15 RX ORDER — ATORVASTATIN CALCIUM 40 MG/1
40 TABLET, FILM COATED ORAL DAILY
Qty: 90 TABLET | Refills: 3 | Status: CANCELLED | OUTPATIENT
Start: 2019-02-15

## 2019-02-15 RX ORDER — CARVEDILOL 6.25 MG/1
6.25 TABLET ORAL 2 TIMES DAILY WITH MEALS
Qty: 180 TABLET | Refills: 3 | Status: CANCELLED | OUTPATIENT
Start: 2019-02-15

## 2019-02-15 RX ORDER — ISOSORBIDE MONONITRATE 30 MG/1
30 TABLET, EXTENDED RELEASE ORAL DAILY
Qty: 90 TABLET | Refills: 3 | Status: CANCELLED | OUTPATIENT
Start: 2019-02-15

## 2019-02-15 RX ORDER — OLMESARTAN MEDOXOMIL 40 MG/1
40 TABLET ORAL DAILY
Qty: 90 TABLET | Refills: 3 | Status: CANCELLED | OUTPATIENT
Start: 2019-02-15

## 2019-02-19 ENCOUNTER — TELEPHONE (OUTPATIENT)
Dept: CARDIOLOGY CLINIC | Age: 64
End: 2019-02-19

## 2019-04-03 ENCOUNTER — TELEPHONE (OUTPATIENT)
Dept: CARDIOLOGY CLINIC | Age: 64
End: 2019-04-03

## 2019-04-03 NOTE — TELEPHONE ENCOUNTER
Asking if pt can hold 325 aspirin 3-5 prior to prostate sx and if he is ok to have sx due to stents. They are trying to schedule sx this Monday 4/8/19. Please fax to [phone] or call [phone] for any questions.

## 2019-04-03 NOTE — LETTER
The [address] [place] [address] Cardiology - [place]  [address] [facility]  [address] [place] [address] [address] [id number]  Phone: [phone]  Fax: [phone]    Karen Goodson MD        April 3, 2019     Patient: Jocelynn Starkey   YOB: 1955   Date of Visit: 4/3/2019       To Whom It May Concern: It is my medical opinion that Merlin Matte can be cleared for surgery and ok to hold plavix 5 days prior to procedure. He is stable for this procedure. There are no apparent cardiac contraindications to the proposed procedure using standard anesthetic technique. There are no apparent interventions to ameliorate the cardiac risk. This clinical assessment assumes a full Anesthesia evaluation for overall risk of airway management, type and route of anesthetic, and other relevant anesthesia-specific considerations. If you have any questions or concerns, please don't hesitate to call.     Sincerely,        Karen Goodson MD

## 2019-06-27 ENCOUNTER — OFFICE VISIT (OUTPATIENT)
Dept: ORTHOPEDIC SURGERY | Age: 64
End: 2019-06-27
Payer: MEDICARE

## 2019-06-27 VITALS
DIASTOLIC BLOOD PRESSURE: 86 MMHG | WEIGHT: 256.39 LBS | SYSTOLIC BLOOD PRESSURE: 149 MMHG | HEIGHT: 74 IN | HEART RATE: 74 BPM | BODY MASS INDEX: 32.91 KG/M2

## 2019-06-27 DIAGNOSIS — M54.16 LUMBAR RADICULITIS: ICD-10-CM

## 2019-06-27 DIAGNOSIS — Z98.1 HISTORY OF LUMBAR FUSION: ICD-10-CM

## 2019-06-27 DIAGNOSIS — M54.5 LOW BACK PAIN, UNSPECIFIED BACK PAIN LATERALITY, UNSPECIFIED CHRONICITY, WITH SCIATICA PRESENCE UNSPECIFIED: Primary | ICD-10-CM

## 2019-06-27 DIAGNOSIS — M25.552 LEFT HIP PAIN: ICD-10-CM

## 2019-06-27 PROCEDURE — 4040F PNEUMOC VAC/ADMIN/RCVD: CPT | Performed by: PHYSICIAN ASSISTANT

## 2019-06-27 PROCEDURE — G8427 DOCREV CUR MEDS BY ELIG CLIN: HCPCS | Performed by: PHYSICIAN ASSISTANT

## 2019-06-27 PROCEDURE — G8417 CALC BMI ABV UP PARAM F/U: HCPCS | Performed by: PHYSICIAN ASSISTANT

## 2019-06-27 PROCEDURE — G8598 ASA/ANTIPLAT THER USED: HCPCS | Performed by: PHYSICIAN ASSISTANT

## 2019-06-27 PROCEDURE — 3017F COLORECTAL CA SCREEN DOC REV: CPT | Performed by: PHYSICIAN ASSISTANT

## 2019-06-27 PROCEDURE — 1123F ACP DISCUSS/DSCN MKR DOCD: CPT | Performed by: PHYSICIAN ASSISTANT

## 2019-06-27 PROCEDURE — 99203 OFFICE O/P NEW LOW 30 MIN: CPT | Performed by: PHYSICIAN ASSISTANT

## 2019-06-27 PROCEDURE — 1036F TOBACCO NON-USER: CPT | Performed by: PHYSICIAN ASSISTANT

## 2019-06-27 RX ORDER — METHYLPREDNISOLONE 4 MG/1
TABLET ORAL
Qty: 1 KIT | Refills: 0 | Status: ON HOLD | OUTPATIENT
Start: 2019-06-27 | End: 2019-07-23 | Stop reason: ALTCHOICE

## 2019-06-27 NOTE — PROGRESS NOTES
New Patient: SPINE    CHIEF COMPLAINT:    Chief Complaint   Patient presents with    Back Pain       HISTORY OF PRESENT ILLNESS:                The patient is a 75 y.o. male history of posterior fusion L4-S1 with Dr. Ralf Arora last seen in 2016 here for a 6-month history of worsening chronic aching left low back pain at times extending into the left buttock and groin. His back pain is most bothersome. Symptoms are increased with any bending lifting or transition. Relief with rest and ice. Conservative care includes Tylenol, chiropractics, NSAIDs, prior PT. He did have a left L3-4 TX REENA in 2016 notes dictated report 85% improvement with this injection. He denies any distal radiating pain, no progressive numbness tingling weakness. No recent trauma. No recent fevers chills infections. Pain Assessment  Location of Pain: Back  Severity of Pain: 3  Quality of Pain: Sharp, Dull, Aching  Duration of Pain: Persistent  Frequency of Pain: Constant  Aggravating Factors: Stairs, Walking, Standing, Squatting, Kneeling, Exercise, Straightening, Stretching, Bending  Limiting Behavior: Yes  Relieving Factors: Rest  Result of Injury: No  Work-Related Injury: No  Are there other pain locations you wish to document?: No    The pain assessment was noted & reviewed in the medical record today.      Current/Past Treatment:   · Physical Therapy: yes  · Chiropractic:   yes  · Injection:   10/4/16: Lt L3-4 TX REENA--85% relief  Medications:            NSAIDS: Mobic            Muscle relaxer:              Steriods:              Neuropathic medications:              Opioids:            Other:   · Surgery/Consult: S/p L4S1 PSF Dr. Ralf Arora    Past Medical History: Medical history form was reviewed today & scanned into the media tab  Past Medical History:   Diagnosis Date    Blood circulation, collateral     CAD (coronary artery disease)     Chronic kidney disease     [facility] mellitus [address] [address]     Enlarged prostate     Hyperlipidemia  Hypertension       Past Surgical History:     Past Surgical History:   Procedure Laterality Date    BACK SURGERY      laminectomy & rods    CARDIAC SURGERY      stents    CORONARY ANGIOPLASTY WITH STENT PLACEMENT  10/2003    JOINT REPLACEMENT      Repaired torn MCL TOTAL RIGHT    TONSILLECTOMY      TOTAL KNEE ARTHROPLASTY Right 2014     Current Medications:     Current Outpatient Medications:     isosorbide mononitrate (IMDUR) 30 MG extended release tablet, Take 1 tablet by mouth daily, Disp: 90 tablet, Rfl: 3    atorvastatin (LIPITOR) 40 MG tablet, Take 1 tablet by mouth daily, Disp: 90 tablet, Rfl: 3    amLODIPine (NORVASC) 10 MG tablet, Take 1 tablet by mouth daily, Disp: 90 tablet, Rfl: 11    carvedilol (COREG) 6.25 MG tablet, Take 1 tablet by mouth 2 times daily (with meals), Disp: 180 tablet, Rfl: 3    olmesartan (BENICAR) 40 MG tablet, Take 1 tablet by mouth daily, Disp: 90 tablet, Rfl: 3    aspirin 325 MG tablet, Take 325 mg by mouth daily, Disp: , Rfl:     nitroGLYCERIN (NITROSTAT) 0.4 MG SL tablet, Place 0.4 mg under the tongue every 5 minutes as needed for Chest pain up to max of 3 total doses. If no relief after 1 dose, call 911., Disp: , Rfl:     tamsulosin (FLOMAX) 0.4 MG capsule, Take 0.8 mg by mouth nightly, Disp: , Rfl:     Multiple Vitamins-Minerals (CENTRUM SILVER) TABS, Take 1 tablet by mouth daily. , Disp: , Rfl:     meloxicam (MOBIC) 15 MG tablet, Take 15 mg by mouth daily. , Disp: , Rfl:     metformin (GLUCOPHAGE) 1000 MG tablet, Take 1,000 mg by mouth 2 times daily. , Disp: , Rfl:     pantoprazole (PROTONIX) 40 MG tablet, Take 1 tablet by mouth 2 times daily (before meals), Disp: 60 tablet, Rfl: 0  Allergies:  Penicillins and Codeine  Social History:    reports that he quit smoking about 35 years ago. He smoked 1.00 pack per day. He has never used smokeless tobacco. He reports that he does not drink alcohol or use drugs.   Family History:   Family History   Problem crossed SLR negative. Leg length and pelvis level.  0 out of 5 Raul's signs. · Skin: There are no rashes, ulcerations or lesions. · Reflexes: Reflexes are symmetrically 1-2+ at the patellar and ankle tendons. Clonus absent bilaterally at the feet. · Gait & station: Slightly forward flexed unassisted  · Additional Examinations: Left hip: He has some left back and buttock pain with external rotation but no groin pain  · RIGHT LOWER EXTREMITY: Inspection/examination of the right lower extremity does not show any tenderness, deformity or injury. Range of motion is full. There is no gross instability. There are no rashes, ulcerations or lesions. Strength and tone are normal.  ·   · LEFT LOWER EXTREMITY:  Inspection/examination of the left lower extremity does not show any tenderness, deformity or injury. Range of motion is full. There is no gross instability. There are no rashes, ulcerations or lesions. Strength and tone are normal.    Diagnostic Testin views left hip 2019 mild to moderate hip OA    4 views lumbar spine 2018 show posterior spinal fusion L4-S1 with severe DDD L3-4 with anterior spurring. No high-grade instability on flexion-extension. Mild scoliosis    2016 MRI films and report reviewed show solid fusion with hardware posteriorly L4-S1; moderate left L3 foraminal stenosis from disc osteophyte complex        Impression:  1) Chronic left LB/buttock/groin pain--worse x6mo  2) L4-S1 PSF, moderate left L3 foraminal stenosis, severe DDD L3-4  3) H/o REENA w/benefit   4) DM--well controlled      Plan:   1) MDP  2) Updated L MRI W & WO  3) F/u to review. If no improvement left L3-4 TFESI #1 new series.  Should his groin/hip symptoms progress discussed referral for hip        [facility]

## 2019-07-03 ENCOUNTER — HOSPITAL ENCOUNTER (OUTPATIENT)
Dept: MRI IMAGING | Age: 64
Discharge: HOME | End: 2019-07-03
Payer: MEDICARE

## 2019-07-03 ENCOUNTER — HOSPITAL ENCOUNTER (OUTPATIENT)
Age: 64
Discharge: HOME | End: 2019-07-03
Payer: MEDICARE

## 2019-07-03 DIAGNOSIS — M25.552 LEFT HIP PAIN: ICD-10-CM

## 2019-07-03 DIAGNOSIS — M54.5 LOW BACK PAIN, UNSPECIFIED BACK PAIN LATERALITY, UNSPECIFIED CHRONICITY, WITH SCIATICA PRESENCE UNSPECIFIED: ICD-10-CM

## 2019-07-03 DIAGNOSIS — M54.16 LUMBAR RADICULITIS: ICD-10-CM

## 2019-07-03 DIAGNOSIS — Z98.1 HISTORY OF LUMBAR FUSION: ICD-10-CM

## 2019-07-03 LAB
CREAT SERPL-MCNC: 1.3 MG/DL (ref 0.8–1.3)
GFR AFRICAN AMERICAN: >60
GFR NON-AFRICAN AMERICAN: 55

## 2019-07-03 PROCEDURE — 82565 ASSAY OF CREATININE: CPT

## 2019-07-03 PROCEDURE — 72158 MRI LUMBAR SPINE W/O & W/DYE: CPT

## 2019-07-03 PROCEDURE — 6360000004 HC RX CONTRAST MEDICATION: Performed by: PHYSICIAN ASSISTANT

## 2019-07-03 PROCEDURE — 36415 COLL VENOUS BLD VENIPUNCTURE: CPT

## 2019-07-03 PROCEDURE — A9579 GAD-BASE MR CONTRAST NOS,1ML: HCPCS | Performed by: PHYSICIAN ASSISTANT

## 2019-07-03 RX ADMIN — GADOTERIDOL 23 ML: 279.3 INJECTION, SOLUTION INTRAVENOUS at 15:56

## 2019-07-12 ENCOUNTER — OFFICE VISIT (OUTPATIENT)
Dept: ORTHOPEDIC SURGERY | Age: 64
End: 2019-07-12
Payer: MEDICARE

## 2019-07-12 VITALS
HEIGHT: 74 IN | HEART RATE: 85 BPM | BODY MASS INDEX: 32.91 KG/M2 | SYSTOLIC BLOOD PRESSURE: 128 MMHG | DIASTOLIC BLOOD PRESSURE: 82 MMHG | WEIGHT: 256.39 LBS

## 2019-07-12 DIAGNOSIS — M51.36 DDD (DEGENERATIVE DISC DISEASE), LUMBAR: ICD-10-CM

## 2019-07-12 DIAGNOSIS — M54.16 LUMBAR RADICULITIS: ICD-10-CM

## 2019-07-12 DIAGNOSIS — Z98.1 HISTORY OF LUMBAR FUSION: Primary | ICD-10-CM

## 2019-07-12 PROCEDURE — G8417 CALC BMI ABV UP PARAM F/U: HCPCS | Performed by: PHYSICIAN ASSISTANT

## 2019-07-12 PROCEDURE — 99214 OFFICE O/P EST MOD 30 MIN: CPT | Performed by: PHYSICIAN ASSISTANT

## 2019-07-12 PROCEDURE — G8598 ASA/ANTIPLAT THER USED: HCPCS | Performed by: PHYSICIAN ASSISTANT

## 2019-07-12 PROCEDURE — 4040F PNEUMOC VAC/ADMIN/RCVD: CPT | Performed by: PHYSICIAN ASSISTANT

## 2019-07-12 PROCEDURE — 1036F TOBACCO NON-USER: CPT | Performed by: PHYSICIAN ASSISTANT

## 2019-07-12 PROCEDURE — 1123F ACP DISCUSS/DSCN MKR DOCD: CPT | Performed by: PHYSICIAN ASSISTANT

## 2019-07-12 PROCEDURE — 3017F COLORECTAL CA SCREEN DOC REV: CPT | Performed by: PHYSICIAN ASSISTANT

## 2019-07-12 PROCEDURE — G8427 DOCREV CUR MEDS BY ELIG CLIN: HCPCS | Performed by: PHYSICIAN ASSISTANT

## 2019-07-12 NOTE — PROGRESS NOTES
Enlarged prostate     Hyperlipidemia     Hypertension       Past Surgical History:     Past Surgical History:   Procedure Laterality Date    BACK SURGERY      laminectomy & rods    CARDIAC SURGERY      stents    CORONARY ANGIOPLASTY WITH STENT PLACEMENT  10/2003    JOINT REPLACEMENT      Repaired torn MCL TOTAL RIGHT    TONSILLECTOMY      TOTAL KNEE ARTHROPLASTY Right 2014     Current Medications:     Current Outpatient Medications:     methylPREDNISolone (MEDROL, CEZAR,) 4 MG tablet, Take by mouth., Disp: 1 kit, Rfl: 0    isosorbide mononitrate (IMDUR) 30 MG extended release tablet, Take 1 tablet by mouth daily, Disp: 90 tablet, Rfl: 3    atorvastatin (LIPITOR) 40 MG tablet, Take 1 tablet by mouth daily, Disp: 90 tablet, Rfl: 3    amLODIPine (NORVASC) 10 MG tablet, Take 1 tablet by mouth daily, Disp: 90 tablet, Rfl: 11    carvedilol (COREG) 6.25 MG tablet, Take 1 tablet by mouth 2 times daily (with meals), Disp: 180 tablet, Rfl: 3    olmesartan (BENICAR) 40 MG tablet, Take 1 tablet by mouth daily, Disp: 90 tablet, Rfl: 3    aspirin 325 MG tablet, Take 325 mg by mouth daily, Disp: , Rfl:     nitroGLYCERIN (NITROSTAT) 0.4 MG SL tablet, Place 0.4 mg under the tongue every 5 minutes as needed for Chest pain up to max of 3 total doses. If no relief after 1 dose, call 911., Disp: , Rfl:     tamsulosin (FLOMAX) 0.4 MG capsule, Take 0.8 mg by mouth nightly, Disp: , Rfl:     Multiple Vitamins-Minerals (CENTRUM SILVER) TABS, Take 1 tablet by mouth daily. , Disp: , Rfl:     meloxicam (MOBIC) 15 MG tablet, Take 15 mg by mouth daily. , Disp: , Rfl:     metformin (GLUCOPHAGE) 1000 MG tablet, Take 1,000 mg by mouth 2 times daily. , Disp: , Rfl:     pantoprazole (PROTONIX) 40 MG tablet, Take 1 tablet by mouth 2 times daily (before meals), Disp: 60 tablet, Rfl: 0  Allergies:  Penicillins and Codeine  Social History:    reports that he quit smoking about 35 years ago. He smoked 1.00 pack per day.  He has never station: Slightly forward flexed unassisted  · Additional Examinations: Left hip: He has some left back and buttock pain with external rotation but no groin pain  · RIGHT LOWER EXTREMITY: Inspection/examination of the right lower extremity does not show any tenderness, deformity or injury. Range of motion is full. There is no gross instability. There are no rashes, ulcerations or lesions. Strength and tone are normal.  ·   · LEFT LOWER EXTREMITY:  Inspection/examination of the left lower extremity does not show any tenderness, deformity or injury. Range of motion is full. There is no gross instability. There are no rashes, ulcerations or lesions. Strength and tone are normal.    Diagnostic Testing:    Lumbar MRI scan report independently reviewed 7/3/2019 showing L4-S1 posterior fusion, disc bulging with mild to moderate left foraminal stenosis L3-4, multilevel DDD/spondylosis    2 views left hip 6/27/2019 mild to moderate hip OA    4 views lumbar spine 6/27/2018 show posterior spinal fusion L4-S1 with severe DDD L3-4 with anterior spurring. No high-grade instability on flexion-extension. Mild scoliosis    January 2016 MRI films and report reviewed show solid fusion with hardware posteriorly L4-S1; moderate left L3 foraminal stenosis from disc osteophyte complex        Impression:  1) Chronic left LB/buttock/groin pain--worse x6-7mo  2) L4-S1 PSF, moderate left L3 foraminal stenosis, severe DDD  3) H/o REENA w/benefit 2016  4) DM--well controlled      Plan:   1) We reviewed his updated lumbar MRI. He wishes to proceed with repeat left L3-4 TX REENA #1 new series. Procedure risk and benefits discussed. 2) F/u after REENA. We discussed at some point he may benefit from seeing a hip specialist.  Currently his back pain is most bothersome.       [facility]

## 2019-07-12 NOTE — LETTER
[address] [facility] [address] and Sports Medicine    Please Schedule the following with: Dr. Amalia Reyes    Date:  7/12/19     Patient: Milka García     YOB: 1955    Patient Home Phone: [phone] (home)    Diagnosis: Posterior fusion L4-S1, left lumbar radiculitis, left disc protrusion with foraminal stenosis L3-4    [x]LT     []RT     []HEATHER     []Midline    Levels: L3-4 #1 new series    []Cervical REENA 07022, 73248  []L-MBB 23278, 37940  []SI Joint 94517   []C-FACET 02655, 14067, 29018  []L-FACET 89819, 87083  []Interlaminar REENA 68698     []HIP 34300    []C-MBB  [x]Transforaminal REENA 84338  []Neurotomy 60787, 54283, 09134    Attending Physician: Tahira Du    Injection Schedule for:  7/23/19 AT 2PM     At: [facility]    First Insurance:MEDICARE                                    Pre-cert #: Marisabel Wolf  Second Insurance:                 Pre-cert #:    Comments:    10/4/16: Lt L3-4 TX REENA--85% relief      [x] Blood Thinner: ASA                [x]Diabetic           []Antibiotic:               []Glaucoma:    [] Pacemaker/defib       [] Current Open Wounds, Lacerations or Sores     Allergies: Allergies   Allergen Reactions    Penicillins     Codeine Nausea And Vomiting       Past Medical History:   Diagnosis Date    Blood circulation, collateral     CAD (coronary artery disease)     Chronic kidney disease     Diabetes mellitus (HCC)     Enlarged prostate     Hyperlipidemia     Hypertension         Current Outpatient Medications   Medication Sig Dispense Refill    methylPREDNISolone (MEDROL, CEZAR,) 4 MG tablet Take by mouth.  1 kit 0    isosorbide mononitrate (IMDUR) 30 MG extended release tablet Take 1 tablet by mouth daily 90 tablet 3    atorvastatin (LIPITOR) 40 MG tablet Take 1 tablet by mouth daily 90 tablet 3    amLODIPine (NORVASC) 10 MG tablet Take 1 tablet by mouth daily 90 tablet 11    carvedilol (COREG) 6.25 MG tablet Take 1 tablet by mouth 2 times daily

## 2019-07-15 RX ORDER — CARVEDILOL 6.25 MG/1
6.25 TABLET ORAL 2 TIMES DAILY WITH MEALS
Qty: 180 TABLET | Refills: 1 | Status: SHIPPED | OUTPATIENT
Start: 2019-07-15 | End: 2020-01-02

## 2019-07-15 RX ORDER — ATORVASTATIN CALCIUM 40 MG/1
40 TABLET, FILM COATED ORAL DAILY
Qty: 90 TABLET | Refills: 1 | Status: SHIPPED | OUTPATIENT
Start: 2019-07-15 | End: 2019-12-31

## 2019-07-17 ENCOUNTER — TELEPHONE (OUTPATIENT)
Dept: ORTHOPEDIC SURGERY | Age: 64
End: 2019-07-17

## 2019-07-22 NOTE — H&P
HISTORY AND PHYSICAL/PRE-SEDATION ASSESSMENT    Patient:  Walter Abreu   :  1955  Medical Record No.:  [id number]   Date:  19  Physician:  Monica Hernandez M.D. Facility: [facility]     Nursing History and Physical reviewed and agreed upon. Additional findings:    Allergies:  Penicillins and Codeine    Home Medications:    Prior to Admission medications    Medication Sig Start Date End Date Taking? Authorizing Provider   atorvastatin (LIPITOR) 40 MG tablet Take 1 tablet by mouth daily 7/15/19   Kasie Gallardo MD   carvedilol (COREG) 6.25 MG tablet Take 1 tablet by mouth 2 times daily (with meals) 7/15/19   Kasie Gallardo MD   methylPREDNISolone (MEDROL, CEZAR,) 4 MG tablet Take by mouth. 19   Noris Benton PA-C   isosorbide mononitrate (IMDUR) 30 MG extended release tablet Take 1 tablet by mouth daily 19   Kasie Gallardo MD   pantoprazole (PROTONIX) 40 MG tablet Take 1 tablet by mouth 2 times daily (before meals) 18  Dai Mcpherson MD   amLODIPine (NORVASC) 10 MG tablet Take 1 tablet by mouth daily 18  Kasie Gallardo MD   olmesartan (BENICAR) 40 MG tablet Take 1 tablet by mouth daily 18   Kasie Gallardo MD   aspirin 325 MG tablet Take 325 mg by mouth daily    Historical Provider, MD   nitroGLYCERIN (NITROSTAT) 0.4 MG SL tablet Place 0.4 mg under the tongue every 5 minutes as needed for Chest pain up to max of 3 total doses. If no relief after 1 dose, call 911. Historical Provider, MD   tamsulosin (FLOMAX) 0.4 MG capsule Take 0.8 mg by mouth nightly    Historical Provider, MD   Multiple Vitamins-Minerals (CENTRUM SILVER) TABS Take 1 tablet by mouth daily. Historical Provider, MD   meloxicam (MOBIC) 15 MG tablet Take 15 mg by mouth daily. Historical Provider, MD   metformin (GLUCOPHAGE) 1000 MG tablet Take 1,000 mg by mouth 2 times daily.       Historical Provider, MD       Vitals: Stable     PHYSICAL EXAM:  HENT: Airway patent and reviewed  Cardiovascular: Normal rate, regular rhythm, normal heart sounds. Pulmonary/Chest: No wheezes. No rhonchi. No rales. Abdominal: Soft. Bowel sounds are normal. No distension. MALLAMPATI:           []   I. Complete visualization of the soft palate           [x]   II. Complete visualization of the uvula            []   III. Visualization of only the base of the uvula           []   IV. Soft palate is not visible     ASA CLASS:         []   I. Normal, healthy adult           [x]   II.  Mild systemic disease            []   III. Severe systemic disease      Sedation plan:   [x]  Local              []  Minimal                  []  General anesthesia    Patient's condition acceptable for planned procedure/sedation. Post Procedure Plan   Return to same level of care   ______________________     The risks and benefits as well as alternatives to the procedure have been discussed with the patient and or family. The patient and or next of kin understands and agrees to proceed.     Elen Ackerman M.D.

## 2019-07-23 ENCOUNTER — HOSPITAL ENCOUNTER (OUTPATIENT)
Age: 64
Setting detail: OUTPATIENT SURGERY
Discharge: HOME | End: 2019-07-23
Attending: PHYSICAL MEDICINE & REHABILITATION | Admitting: PHYSICAL MEDICINE & REHABILITATION
Payer: MEDICARE

## 2019-07-23 VITALS
BODY MASS INDEX: 32.08 KG/M2 | TEMPERATURE: 98.2 F | RESPIRATION RATE: 16 BRPM | WEIGHT: 250 LBS | OXYGEN SATURATION: 96 % | DIASTOLIC BLOOD PRESSURE: 87 MMHG | SYSTOLIC BLOOD PRESSURE: 147 MMHG | HEART RATE: 76 BPM | HEIGHT: 74 IN

## 2019-07-23 LAB
GLUCOSE BLD-MCNC: 122 MG/DL (ref 70–99)
PERFORMED ON: ABNORMAL

## 2019-07-23 PROCEDURE — 3600000002 HC SURGERY LEVEL 2 BASE: Performed by: PHYSICAL MEDICINE & REHABILITATION

## 2019-07-23 PROCEDURE — 7100000010 HC PHASE II RECOVERY - FIRST 15 MIN: Performed by: PHYSICAL MEDICINE & REHABILITATION

## 2019-07-23 PROCEDURE — 6360000002 HC RX W HCPCS: Performed by: PHYSICAL MEDICINE & REHABILITATION

## 2019-07-23 PROCEDURE — 2500000003 HC RX 250 WO HCPCS: Performed by: PHYSICAL MEDICINE & REHABILITATION

## 2019-07-23 PROCEDURE — 2709999900 HC NON-CHARGEABLE SUPPLY: Performed by: PHYSICAL MEDICINE & REHABILITATION

## 2019-07-23 PROCEDURE — 7100000011 HC PHASE II RECOVERY - ADDTL 15 MIN: Performed by: PHYSICAL MEDICINE & REHABILITATION

## 2019-07-23 PROCEDURE — 6360000004 HC RX CONTRAST MEDICATION: Performed by: PHYSICAL MEDICINE & REHABILITATION

## 2019-07-23 RX ORDER — LIDOCAINE HYDROCHLORIDE 10 MG/ML
INJECTION, SOLUTION EPIDURAL; INFILTRATION; INTRACAUDAL; PERINEURAL PRN
Status: DISCONTINUED | OUTPATIENT
Start: 2019-07-23 | End: 2019-07-23 | Stop reason: ALTCHOICE

## 2019-07-23 RX ORDER — DEXAMETHASONE SODIUM PHOSPHATE 10 MG/ML
INJECTION, SOLUTION INTRAMUSCULAR; INTRAVENOUS PRN
Status: DISCONTINUED | OUTPATIENT
Start: 2019-07-23 | End: 2019-07-23 | Stop reason: ALTCHOICE

## 2019-07-23 ASSESSMENT — PAIN SCALES - GENERAL
PAINLEVEL_OUTOF10: 2
PAINLEVEL_OUTOF10: 2

## 2019-07-23 ASSESSMENT — PAIN - FUNCTIONAL ASSESSMENT
PAIN_FUNCTIONAL_ASSESSMENT: PREVENTS OR INTERFERES SOME ACTIVE ACTIVITIES AND ADLS
PAIN_FUNCTIONAL_ASSESSMENT: 0-10

## 2019-07-23 ASSESSMENT — PAIN DESCRIPTION - DESCRIPTORS
DESCRIPTORS: SHARP;ACHING
DESCRIPTORS: ACHING

## 2019-07-23 ASSESSMENT — PAIN DESCRIPTION - PAIN TYPE: TYPE: CHRONIC PAIN

## 2019-07-23 NOTE — PROGRESS NOTES
Patient arrived in Post op phase 2 on cart. Report from Jessica Berumen RN. On arrival reports feeling nauseated after the injection. Cool cloths provided to neck and vitals assessed. Already feeling better within first 5 minutes of arrival. Site of injection without redness, drainage or bleeding. Patient denies numbness, tingling or weakness. Moves extremities x 4. Beverage provided and allowed to rest. VSS throughout.

## 2019-07-23 NOTE — PROGRESS NOTES
Ambulated slowly (\"I always walk this way\")to discharge area with wife driving. Was strong on transfers. Denied lightheadedness. This RN informed wife of his nausea that was experienced immediately for a brief period after the procedure. Advised to be slow with position transitions and to continue to monitor for any subsequent experiences of this nature as this would not be normal and would require her to obtain medical attention.

## 2019-07-24 NOTE — OP NOTE
Patient:  Dev Traylor  Record #:  [id number]   Date:   7-23-19  Physician:  Sheila Brooks M.D. Facility: [facility]       Pre-op diagnosis: Lumbar radiculitis, lumbar spondylosis, lumbar fusion  Post-op diagnosis:  same  Procedure: Left L3-4 transforaminal epidural injection #1 with flouroscopic guidance    Procedure Note:    The patient was admitted through pre-op and written consent was obtained. The patient was advised of the risks and benefits of the procedure, including but not limited to the following: bleeding, pain, infection, temporary paralysis, nerve damage and spinal headache. The patient was given the opportunity to ask questions. There were no contraindications for this procedure. The appropriate area was prepped and draped in a sterile fashion. Landmarks were identified and marked. A 23G spinal needle was advanced to the left L3 neural foramen using fluoroscopic guidance with ideal needle tip placement confirmed by multiple views. Injection of contrast showed epidural flow. There were no signs of intravascular or intrathecal injection. 10 mg Dexamethasone and 2 mL lidocaine were then injected. There were no complications and the patient tolerated the procedure well. The patient was transferred to the recovery area and monitored. Discharge instructions were given. The patient is to contact me for any post-procedure concerns. The patient is to follow up as scheduled.     Sheila Brooks MD

## 2019-08-08 ENCOUNTER — OFFICE VISIT (OUTPATIENT)
Dept: ORTHOPEDIC SURGERY | Age: 64
End: 2019-08-08
Payer: MEDICARE

## 2019-08-08 VITALS
WEIGHT: 250 LBS | BODY MASS INDEX: 32.08 KG/M2 | SYSTOLIC BLOOD PRESSURE: 142 MMHG | DIASTOLIC BLOOD PRESSURE: 89 MMHG | HEIGHT: 74 IN | HEART RATE: 95 BPM

## 2019-08-08 DIAGNOSIS — M54.16 LUMBAR RADICULITIS: ICD-10-CM

## 2019-08-08 DIAGNOSIS — Z98.1 HISTORY OF LUMBAR FUSION: Primary | ICD-10-CM

## 2019-08-08 DIAGNOSIS — M51.36 DDD (DEGENERATIVE DISC DISEASE), LUMBAR: ICD-10-CM

## 2019-08-08 PROCEDURE — 3017F COLORECTAL CA SCREEN DOC REV: CPT | Performed by: PHYSICIAN ASSISTANT

## 2019-08-08 PROCEDURE — 1036F TOBACCO NON-USER: CPT | Performed by: PHYSICIAN ASSISTANT

## 2019-08-08 PROCEDURE — 4040F PNEUMOC VAC/ADMIN/RCVD: CPT | Performed by: PHYSICIAN ASSISTANT

## 2019-08-08 PROCEDURE — 1123F ACP DISCUSS/DSCN MKR DOCD: CPT | Performed by: PHYSICIAN ASSISTANT

## 2019-08-08 PROCEDURE — G8427 DOCREV CUR MEDS BY ELIG CLIN: HCPCS | Performed by: PHYSICIAN ASSISTANT

## 2019-08-08 PROCEDURE — 99214 OFFICE O/P EST MOD 30 MIN: CPT | Performed by: PHYSICIAN ASSISTANT

## 2019-08-08 PROCEDURE — G8598 ASA/ANTIPLAT THER USED: HCPCS | Performed by: PHYSICIAN ASSISTANT

## 2019-08-08 PROCEDURE — G8417 CALC BMI ABV UP PARAM F/U: HCPCS | Performed by: PHYSICIAN ASSISTANT

## 2019-08-08 RX ORDER — CYCLOBENZAPRINE HCL 10 MG
TABLET ORAL
Qty: 60 TABLET | Refills: 0 | Status: SHIPPED | OUTPATIENT
Start: 2019-08-08 | End: 2020-03-12

## 2019-08-08 RX ORDER — CYCLOBENZAPRINE HCL 10 MG
10 TABLET ORAL 3 TIMES DAILY PRN
COMMUNITY
End: 2020-02-03

## 2019-09-05 ENCOUNTER — OFFICE VISIT (OUTPATIENT)
Dept: ORTHOPEDIC SURGERY | Age: 64
End: 2019-09-05
Payer: MEDICARE

## 2019-09-05 ENCOUNTER — OFFICE VISIT (OUTPATIENT)
Dept: CARDIOLOGY CLINIC | Age: 64
End: 2019-09-05
Payer: MEDICARE

## 2019-09-05 VITALS
HEART RATE: 101 BPM | WEIGHT: 252.65 LBS | SYSTOLIC BLOOD PRESSURE: 140 MMHG | HEIGHT: 74 IN | BODY MASS INDEX: 32.42 KG/M2 | DIASTOLIC BLOOD PRESSURE: 86 MMHG

## 2019-09-05 VITALS
HEIGHT: 74 IN | BODY MASS INDEX: 32.42 KG/M2 | HEART RATE: 92 BPM | WEIGHT: 252.6 LBS | SYSTOLIC BLOOD PRESSURE: 146 MMHG | DIASTOLIC BLOOD PRESSURE: 78 MMHG

## 2019-09-05 DIAGNOSIS — E78.5 HYPERLIPIDEMIA, UNSPECIFIED HYPERLIPIDEMIA TYPE: ICD-10-CM

## 2019-09-05 DIAGNOSIS — I42.9 PRIMARY CARDIOMYOPATHY (HCC): ICD-10-CM

## 2019-09-05 DIAGNOSIS — I25.119 ATHEROSCLEROSIS OF NATIVE CORONARY ARTERY OF NATIVE HEART WITH ANGINA PECTORIS (HCC): ICD-10-CM

## 2019-09-05 DIAGNOSIS — M51.36 DDD (DEGENERATIVE DISC DISEASE), LUMBAR: Primary | ICD-10-CM

## 2019-09-05 DIAGNOSIS — I25.10 CORONARY ARTERY DISEASE INVOLVING NATIVE CORONARY ARTERY OF NATIVE HEART WITHOUT ANGINA PECTORIS: Primary | ICD-10-CM

## 2019-09-05 DIAGNOSIS — I10 ESSENTIAL HYPERTENSION: ICD-10-CM

## 2019-09-05 PROCEDURE — G8427 DOCREV CUR MEDS BY ELIG CLIN: HCPCS | Performed by: INTERNAL MEDICINE

## 2019-09-05 PROCEDURE — 99214 OFFICE O/P EST MOD 30 MIN: CPT | Performed by: INTERNAL MEDICINE

## 2019-09-05 PROCEDURE — 1036F TOBACCO NON-USER: CPT | Performed by: PHYSICAL MEDICINE & REHABILITATION

## 2019-09-05 PROCEDURE — G8427 DOCREV CUR MEDS BY ELIG CLIN: HCPCS | Performed by: PHYSICAL MEDICINE & REHABILITATION

## 2019-09-05 PROCEDURE — 4040F PNEUMOC VAC/ADMIN/RCVD: CPT | Performed by: PHYSICAL MEDICINE & REHABILITATION

## 2019-09-05 PROCEDURE — 3017F COLORECTAL CA SCREEN DOC REV: CPT | Performed by: PHYSICAL MEDICINE & REHABILITATION

## 2019-09-05 PROCEDURE — 4040F PNEUMOC VAC/ADMIN/RCVD: CPT | Performed by: INTERNAL MEDICINE

## 2019-09-05 PROCEDURE — 3017F COLORECTAL CA SCREEN DOC REV: CPT | Performed by: INTERNAL MEDICINE

## 2019-09-05 PROCEDURE — G8417 CALC BMI ABV UP PARAM F/U: HCPCS | Performed by: PHYSICAL MEDICINE & REHABILITATION

## 2019-09-05 PROCEDURE — G8417 CALC BMI ABV UP PARAM F/U: HCPCS | Performed by: INTERNAL MEDICINE

## 2019-09-05 PROCEDURE — 99214 OFFICE O/P EST MOD 30 MIN: CPT | Performed by: PHYSICAL MEDICINE & REHABILITATION

## 2019-09-05 PROCEDURE — 1123F ACP DISCUSS/DSCN MKR DOCD: CPT | Performed by: PHYSICAL MEDICINE & REHABILITATION

## 2019-09-05 PROCEDURE — G8598 ASA/ANTIPLAT THER USED: HCPCS | Performed by: INTERNAL MEDICINE

## 2019-09-05 PROCEDURE — 1123F ACP DISCUSS/DSCN MKR DOCD: CPT | Performed by: INTERNAL MEDICINE

## 2019-09-05 PROCEDURE — G8598 ASA/ANTIPLAT THER USED: HCPCS | Performed by: PHYSICAL MEDICINE & REHABILITATION

## 2019-09-05 PROCEDURE — 1036F TOBACCO NON-USER: CPT | Performed by: INTERNAL MEDICINE

## 2019-09-05 RX ORDER — OLMESARTAN MEDOXOMIL 40 MG/1
40 TABLET ORAL DAILY
Qty: 90 TABLET | Refills: 3 | Status: ON HOLD | OUTPATIENT
Start: 2019-09-05 | End: 2020-08-14 | Stop reason: HOSPADM

## 2019-09-05 RX ORDER — ISOSORBIDE MONONITRATE 30 MG/1
30 TABLET, EXTENDED RELEASE ORAL DAILY
Qty: 90 TABLET | Refills: 3 | Status: SHIPPED | OUTPATIENT
Start: 2019-09-05 | End: 2020-01-06 | Stop reason: SDUPTHER

## 2019-09-05 RX ORDER — TRAZODONE HYDROCHLORIDE 50 MG/1
150 TABLET ORAL NIGHTLY
COMMUNITY

## 2019-09-05 RX ORDER — CARVEDILOL 6.25 MG/1
6.25 TABLET ORAL 2 TIMES DAILY WITH MEALS
Qty: 180 TABLET | Refills: 3 | Status: CANCELLED | OUTPATIENT
Start: 2019-09-05

## 2019-09-05 RX ORDER — ATORVASTATIN CALCIUM 40 MG/1
40 TABLET, FILM COATED ORAL DAILY
Qty: 90 TABLET | Refills: 3 | Status: CANCELLED | OUTPATIENT
Start: 2019-09-05

## 2019-09-05 RX ORDER — CYCLOBENZAPRINE HCL 10 MG
10 TABLET ORAL 2 TIMES DAILY
Qty: 60 TABLET | Refills: 0 | Status: SHIPPED | OUTPATIENT
Start: 2019-09-05 | End: 2019-10-05

## 2019-09-05 RX ORDER — AMLODIPINE BESYLATE 10 MG/1
10 TABLET ORAL DAILY
Qty: 90 TABLET | Refills: 3 | Status: ON HOLD | OUTPATIENT
Start: 2019-09-05 | End: 2020-08-14 | Stop reason: HOSPADM

## 2019-09-05 NOTE — LETTER
[address] [facility]  Surgery Precert & Billing Form:    DEMOGRAPHICS:                                                                                                       Patient Name:  Kaushik Lyle  Patient :  1955   Patient SS#:      Patient Phone:  [phone] (home)  Alt.  Patient Phone:    Patient Address:  [place] [address]    PCP:  ETTA Cazares CNP  Insurance: MEDICARE    DIAGNOSIS & PROCEDURE:                                                                                      Diagnosis: M46.1  Operation: left SIJ INJ     SURGERY  INFORMATION  Date of Surgery:   9/10/19  Location:   [facility]  Type:    OUTPATIENT  23 hour hold:  NO  Surgeon:          Sammy Valdes MD  19     BILLING INFORMATION:                                                                                                Physician Procedure                                            CPT Codes        Left sacroiliac joint injection     47981                  PA, or Fellow Procedure                                      CPT Codes
 cyclobenzaprine (FLEXERIL) 10 MG tablet Take 10 mg by mouth 3 times daily as needed for Muscle spasms      cyclobenzaprine (FLEXERIL) 10 MG tablet I po BID PRN 60 tablet 0    atorvastatin (LIPITOR) 40 MG tablet Take 1 tablet by mouth daily 90 tablet 1    carvedilol (COREG) 6.25 MG tablet Take 1 tablet by mouth 2 times daily (with meals) 180 tablet 1    aspirin 325 MG tablet Take 325 mg by mouth daily      nitroGLYCERIN (NITROSTAT) 0.4 MG SL tablet Place 0.4 mg under the tongue every 5 minutes as needed for Chest pain up to max of 3 total doses. If no relief after 1 dose, call 911.  tamsulosin (FLOMAX) 0.4 MG capsule Take 0.8 mg by mouth nightly      Multiple Vitamins-Minerals (CENTRUM SILVER) TABS Take 1 tablet by mouth daily.  meloxicam (MOBIC) 15 MG tablet Take 15 mg by mouth daily.  metformin (GLUCOPHAGE) 1000 MG tablet Take 1,000 mg by mouth 2 times daily.  pantoprazole (PROTONIX) 40 MG tablet Take 1 tablet by mouth 2 times daily (before meals) 60 tablet 0     No current facility-administered medications for this visit. [address]                     ______________________________________________________________________      [address] [facility] [place]      1. Admit to preop. 2. Start IV 1000 ml LR at [facility] or _____ml/hr for planned conscious sedation     3. May inject 1 % Lidocaine 0.1 ml Intradermal to numb IV site     4. Protime/INR if patient is on Coumadin     5. Urine Pregnancy Test (females only) - 12 -50 years     6. Accu Check Glucose if diabetic. Notify physician if <80 or >250.      7. Sedate all neurotomies          ______________________________________________________________________    POST-OPERATIVE ORDERS - DR. SHERWOOD      1. Admit to Post Op Phase 2     2. Implement Standards of Care for Phase 2 Post Op     3.  Check Site - May discharge when site is free of bleeding

## 2019-09-05 NOTE — PROGRESS NOTES
smoking about 35 years ago. He smoked 1.00 pack per day. He has never used smokeless tobacco. He reports that he does not drink alcohol or use drugs. Family History:   Family History   Problem Relation Age of Onset    Diabetes Mother     Heart Disease Father     High Blood Pressure Father     High Cholesterol Father     Heart Attack Father     High Blood Pressure Sister     High Blood Pressure Brother     Birth Defects Brother     High Blood Pressure Brother     Heart Disease Sister     High Blood Pressure Sister     High Blood Pressure Sister        REVIEW OF SYSTEMS: Full ROS noted & scanned   CONSTITUTIONAL: Denies unexplained weight loss, fevers, chills or fatigue  NEUROLOGICAL: Denies unsteady gait or progressive weakness       PHYSICAL EXAM:    Vitals: Blood pressure (!) 140/86, pulse 101, height 6' 2.02\" (1.88 m), weight 252 lb 10.4 oz (114.6 kg). GENERAL EXAM:  · General Apparence: Patient is adequately groomed with no evidence of malnutrition. · Orientation: The patient is oriented to time, place and person. · Mood & Affect:The patient's mood and affect are appropriate   · Lymphatic: The lymphatic examination bilaterally reveals all areas to be without enlargement or induration  · Sensation: Sensation is intact without deficit    LUMBAR/SACRAL EXAMINATION:  · Inspection: Local inspection shows no step-off or bruising. · Palpation: Tender over the left sacral leg joint range of Motion: 30 degrees of flexion, 10 degrees extension more pain with extension  · Strength:   Strength testing is 5/5 in all muscle groups tested. · Special Tests:   Straight leg raise and crossed SLR negative. Leg length and pelvis level.  0 out of 5 Raul's signs. Jay's test positive on the left        · Skin: There are no rashes, ulcerations or lesions. · Reflexes: Reflexes are symmetrically 1-2+ at the patellar and ankle tendons. Clonus absent bilaterally at the feet.   · Gait & station: Slightly forward flexed unassisted  · Additional Examinations: Left hip: No pain with left hip range of motion RIGHT LOWER EXTREMITY: Inspection/examination of the right lower extremity does not show any tenderness, deformity or injury. Range of motion is full. There is no gross instability. There are no rashes, ulcerations or lesions. Strength and tone are normal.  ·   · LEFT LOWER EXTREMITY:  Inspection/examination of the left lower extremity does not show any tenderness, deformity or injury. Range of motion is full. There is no gross instability. There are no rashes, ulcerations or lesions. Strength and tone are normal.    Diagnostic Testin/3/2019  3:06 PM - [facility] Incoming Lab Results From Soft (Epic Adt)     Component Value Ref Range & Units Status Collected Lab   CREATININE 1.3  0.8 - 1.3 mg/dL Final 2019  2:50 PM [address] [place] [address] Lab   GFR Non- 55Abnormal   >60 Final 2019  2:50 PM [address] [place] [address] Lab         Lumbar MRI scan report independently reviewed 7/3/2019 showing L4-S1 posterior fusion, disc bulging with mild to moderate left foraminal stenosis L3-4, multilevel DDD/spondylosis    2 views left hip 2019 mild to moderate metric hip OA. Reviewed today    4 views lumbar spine 2018 panel reviewed today show posterior spinal fusion L4-S1 with severe DDD L3-4 with anterior spurring. No high-grade instability on flexion-extension.   Mild scoliosis, diffuse symmetric sacroiliac arthritis    2016 MRI films and report reviewed show solid fusion with hardware posteriorly L4-S1; moderate left L3 foraminal stenosis from disc osteophyte complex        Impression: Clinical course not improved  1) Chronic left LB/buttock pain--worse times a month  2) L4-S1 PSF, moderate left L3 foraminal stenosis, no relief with left L3 transforaminal epidural   3) symptomatic left sacroiliac pain  4) DM--well controlled        Plan:     Pain appears to be generating more from

## 2019-09-06 ENCOUNTER — TELEPHONE (OUTPATIENT)
Dept: ORTHOPEDIC SURGERY | Age: 64
End: 2019-09-06

## 2019-09-10 ENCOUNTER — HOSPITAL ENCOUNTER (OUTPATIENT)
Age: 64
Setting detail: OUTPATIENT SURGERY
Discharge: HOME | End: 2019-09-10
Attending: PHYSICAL MEDICINE & REHABILITATION | Admitting: PHYSICAL MEDICINE & REHABILITATION
Payer: MEDICARE

## 2019-09-10 VITALS
TEMPERATURE: 98 F | SYSTOLIC BLOOD PRESSURE: 148 MMHG | HEIGHT: 74 IN | WEIGHT: 260 LBS | DIASTOLIC BLOOD PRESSURE: 89 MMHG | RESPIRATION RATE: 48 BRPM | OXYGEN SATURATION: 95 % | HEART RATE: 100 BPM | BODY MASS INDEX: 33.37 KG/M2

## 2019-09-10 LAB
GLUCOSE BLD-MCNC: 138 MG/DL (ref 70–99)
PERFORMED ON: ABNORMAL

## 2019-09-10 PROCEDURE — 6360000004 HC RX CONTRAST MEDICATION: Performed by: PHYSICAL MEDICINE & REHABILITATION

## 2019-09-10 PROCEDURE — 2500000003 HC RX 250 WO HCPCS: Performed by: PHYSICAL MEDICINE & REHABILITATION

## 2019-09-10 PROCEDURE — 7100000010 HC PHASE II RECOVERY - FIRST 15 MIN: Performed by: PHYSICAL MEDICINE & REHABILITATION

## 2019-09-10 PROCEDURE — 3600000002 HC SURGERY LEVEL 2 BASE: Performed by: PHYSICAL MEDICINE & REHABILITATION

## 2019-09-10 PROCEDURE — 6360000002 HC RX W HCPCS: Performed by: PHYSICAL MEDICINE & REHABILITATION

## 2019-09-10 PROCEDURE — 2709999900 HC NON-CHARGEABLE SUPPLY: Performed by: PHYSICAL MEDICINE & REHABILITATION

## 2019-09-10 RX ORDER — LIDOCAINE HYDROCHLORIDE 10 MG/ML
INJECTION, SOLUTION EPIDURAL; INFILTRATION; INTRACAUDAL; PERINEURAL PRN
Status: DISCONTINUED | OUTPATIENT
Start: 2019-09-10 | End: 2019-09-10 | Stop reason: ALTCHOICE

## 2019-09-10 ASSESSMENT — PAIN - FUNCTIONAL ASSESSMENT
PAIN_FUNCTIONAL_ASSESSMENT: PREVENTS OR INTERFERES WITH MANY ACTIVE NOT PASSIVE ACTIVITIES
PAIN_FUNCTIONAL_ASSESSMENT: 0-10

## 2019-09-10 ASSESSMENT — PAIN DESCRIPTION - DESCRIPTORS: DESCRIPTORS: ACHING;SPASM;STABBING

## 2019-09-10 ASSESSMENT — PAIN SCALES - GENERAL: PAINLEVEL_OUTOF10: 0

## 2019-09-10 NOTE — H&P
Vitamins-Minerals (CENTRUM SILVER) TABS Take 1 tablet by mouth daily. Historical Provider, MD   meloxicam (MOBIC) 15 MG tablet Take 15 mg by mouth daily. Historical Provider, MD   metformin (GLUCOPHAGE) 1000 MG tablet Take 1,000 mg by mouth 2 times daily. Historical Provider, MD       Vitals: Stable     PHYSICAL EXAM:  HENT: Airway patent and reviewed  Cardiovascular: Normal rate, regular rhythm, normal heart sounds. Pulmonary/Chest: No wheezes. No rhonchi. No rales. Abdominal: Soft. Bowel sounds are normal. No distension. MALLAMPATI:           []   I. Complete visualization of the soft palate           [x]   II. Complete visualization of the uvula            []   III. Visualization of only the base of the uvula           []   IV. Soft palate is not visible     ASA CLASS:         []   I. Normal, healthy adult           [x]   II.  Mild systemic disease            []   III. Severe systemic disease      Sedation plan:   [x]  Local              []  Minimal                  []  General anesthesia    Patient's condition acceptable for planned procedure/sedation. Post Procedure Plan   Return to same level of care   ______________________     The risks and benefits as well as alternatives to the procedure have been discussed with the patient and or family. The patient and or next of kin understands and agrees to proceed.     MariaE Eduardo M.D.

## 2019-09-11 NOTE — OP NOTE
Patient:  Good Mcdermott Record #:  [id number]   Date:  9-10-19  Physician:  Jose Antonio Ramires M.D. Facility: [facility]     Pre-op diagnosis: Left sacroiliitis, sacroiliac synovitis  Post-op diagnosis:  same  Procedure: Left sacroilitic intra articular injection #1 with fluorscopic guidance  Anesthesia: Local  Procedure Note:    The patient was admitted through pre-op and written consent was obtained. The patient was advised of the risks and benefits of the procedure, including but not limited to the following: bleeding, pain, infection, temporary paralysis, nerve damage and spinal headache. The patient was given the opportunity to ask questions. There were no contraindications for this procedure. The appropriate area was prepped and draped in a sterile fashion. Landmarks were identified and marked. The skin and soft tissues were anesthetized with 1% lidocaine. A 23G spinal needle was advanced to the left sacroiliac joint using fluoroscopic guidance with ideal needle tip confirmed by multiple views. Injection of contrast showed appropriate needle placement. There were no signs of intravascular or intrathecal injection. 80mg Kenalog and  1cc 1% lidocaine were then injected. There were no complications and the patient tolerated the procedure well. The patient was transferred to the recovery area and monitored. Discharge instructions were given. The patient is to contact me for any post-procedure concerns. The patient is to follow up as scheduled.     Estimated blood loss: none    F Lisa Love MD

## 2019-09-17 RX ORDER — OLMESARTAN MEDOXOMIL 20 MG/1
TABLET ORAL
Qty: 60 TABLET | Refills: 6 | OUTPATIENT
Start: 2019-09-17

## 2019-09-26 ENCOUNTER — OFFICE VISIT (OUTPATIENT)
Dept: ORTHOPEDIC SURGERY | Age: 64
End: 2019-09-26
Payer: MEDICARE

## 2019-09-26 VITALS — BODY MASS INDEX: 33.36 KG/M2 | WEIGHT: 259.92 LBS | HEIGHT: 74 IN

## 2019-09-26 DIAGNOSIS — M54.16 LUMBAR RADICULITIS: ICD-10-CM

## 2019-09-26 DIAGNOSIS — M51.36 DDD (DEGENERATIVE DISC DISEASE), LUMBAR: Primary | ICD-10-CM

## 2019-09-26 PROCEDURE — G8427 DOCREV CUR MEDS BY ELIG CLIN: HCPCS | Performed by: PHYSICAL MEDICINE & REHABILITATION

## 2019-09-26 PROCEDURE — 99213 OFFICE O/P EST LOW 20 MIN: CPT | Performed by: PHYSICAL MEDICINE & REHABILITATION

## 2019-09-26 PROCEDURE — G8417 CALC BMI ABV UP PARAM F/U: HCPCS | Performed by: PHYSICAL MEDICINE & REHABILITATION

## 2019-09-26 PROCEDURE — G8598 ASA/ANTIPLAT THER USED: HCPCS | Performed by: PHYSICAL MEDICINE & REHABILITATION

## 2019-09-26 PROCEDURE — 4040F PNEUMOC VAC/ADMIN/RCVD: CPT | Performed by: PHYSICAL MEDICINE & REHABILITATION

## 2019-09-26 PROCEDURE — 3017F COLORECTAL CA SCREEN DOC REV: CPT | Performed by: PHYSICAL MEDICINE & REHABILITATION

## 2019-09-26 PROCEDURE — 1123F ACP DISCUSS/DSCN MKR DOCD: CPT | Performed by: PHYSICAL MEDICINE & REHABILITATION

## 2019-09-26 PROCEDURE — 1036F TOBACCO NON-USER: CPT | Performed by: PHYSICAL MEDICINE & REHABILITATION

## 2019-09-26 RX ORDER — GABAPENTIN 300 MG/1
300 CAPSULE ORAL 3 TIMES DAILY
Qty: 90 CAPSULE | Refills: 2 | Status: SHIPPED | OUTPATIENT
Start: 2019-09-26 | End: 2020-02-03

## 2019-09-26 RX ORDER — METHOCARBAMOL 750 MG/1
750 TABLET, FILM COATED ORAL 3 TIMES DAILY
Qty: 60 TABLET | Refills: 0 | Status: SHIPPED | OUTPATIENT
Start: 2019-09-26 | End: 2019-10-26

## 2019-09-26 NOTE — PROGRESS NOTES
laminectomy & rods    CARDIAC SURGERY      stents    CORONARY ANGIOPLASTY WITH STENT PLACEMENT  10/2003    EPIDURAL STEROID INJECTION Left 7/23/2019    LEFT LUMBAR THREE LUMBAR FOUR EPIDURAL STEROID INJECTION SITE CONFIRMED BY FLUOROSCOPY performed by Elen Ackerman MD at [facility] Left 9/10/2019    LEFT SACROILIAC JOINT INJECTION SITE CONFIRMED BY FLUOROSCOPY performed by Elen Ackerman MD at [facility] 173      Repaired torn MCL TOTAL RIGHT    TONSILLECTOMY      TOTAL KNEE ARTHROPLASTY Right 2014     Current Medications:     Current Outpatient Medications:     traZODone (DESYREL) 50 MG tablet, Take 50 mg by mouth nightly, Disp: , Rfl:     isosorbide mononitrate (IMDUR) 30 MG extended release tablet, Take 1 tablet by mouth daily, Disp: 90 tablet, Rfl: 3    amLODIPine (NORVASC) 10 MG tablet, Take 1 tablet by mouth daily, Disp: 90 tablet, Rfl: 3    olmesartan (BENICAR) 40 MG tablet, Take 1 tablet by mouth daily, Disp: 90 tablet, Rfl: 3    cyclobenzaprine (FLEXERIL) 10 MG tablet, Take 1 tablet by mouth 2 times daily, Disp: 60 tablet, Rfl: 0    cyclobenzaprine (FLEXERIL) 10 MG tablet, Take 10 mg by mouth 3 times daily as needed for Muscle spasms, Disp: , Rfl:     cyclobenzaprine (FLEXERIL) 10 MG tablet, I po BID PRN, Disp: 60 tablet, Rfl: 0    atorvastatin (LIPITOR) 40 MG tablet, Take 1 tablet by mouth daily, Disp: 90 tablet, Rfl: 1    carvedilol (COREG) 6.25 MG tablet, Take 1 tablet by mouth 2 times daily (with meals), Disp: 180 tablet, Rfl: 1    aspirin 325 MG tablet, Take 325 mg by mouth daily, Disp: , Rfl:     nitroGLYCERIN (NITROSTAT) 0.4 MG SL tablet, Place 0.4 mg under the tongue every 5 minutes as needed for Chest pain up to max of 3 total doses.  If no relief after 1 dose, call 911., Disp: , Rfl:     tamsulosin (FLOMAX) 0.4 MG capsule, Take 0.8 mg by mouth nightly, Disp: , Rfl:     Multiple Vitamins-Minerals (CENTRUM

## 2019-10-17 ENCOUNTER — OFFICE VISIT (OUTPATIENT)
Dept: ORTHOPEDIC SURGERY | Age: 64
End: 2019-10-17
Payer: MEDICARE

## 2019-10-17 DIAGNOSIS — M54.16 LUMBAR RADICULOPATHY: ICD-10-CM

## 2019-10-17 DIAGNOSIS — M51.36 DDD (DEGENERATIVE DISC DISEASE), LUMBAR: Primary | ICD-10-CM

## 2019-10-17 PROCEDURE — 95908 NRV CNDJ TST 3-4 STUDIES: CPT | Performed by: PHYSICAL MEDICINE & REHABILITATION

## 2019-10-17 PROCEDURE — 95886 MUSC TEST DONE W/N TEST COMP: CPT | Performed by: PHYSICAL MEDICINE & REHABILITATION

## 2019-10-17 RX ORDER — GABAPENTIN 300 MG/1
CAPSULE ORAL
Qty: 240 CAPSULE | Refills: 2 | Status: SHIPPED | OUTPATIENT
Start: 2019-10-17 | End: 2019-11-15

## 2019-10-22 ENCOUNTER — TELEPHONE (OUTPATIENT)
Dept: CARDIOLOGY CLINIC | Age: 64
End: 2019-10-22

## 2019-12-12 ENCOUNTER — TELEPHONE (OUTPATIENT)
Dept: CARDIOLOGY CLINIC | Age: 64
End: 2019-12-12

## 2019-12-31 RX ORDER — ATORVASTATIN CALCIUM 40 MG/1
TABLET, FILM COATED ORAL
Qty: 90 TABLET | Refills: 1 | Status: SHIPPED | OUTPATIENT
Start: 2019-12-31 | End: 2020-07-07

## 2020-01-02 RX ORDER — CARVEDILOL 6.25 MG/1
TABLET ORAL
Qty: 180 TABLET | Refills: 1 | Status: SHIPPED | OUTPATIENT
Start: 2020-01-02 | End: 2020-07-07

## 2020-01-06 ENCOUNTER — TELEPHONE (OUTPATIENT)
Dept: CARDIOLOGY CLINIC | Age: 65
End: 2020-01-06

## 2020-01-06 RX ORDER — ISOSORBIDE MONONITRATE 30 MG/1
30 TABLET, EXTENDED RELEASE ORAL DAILY
Qty: 90 TABLET | Refills: 1 | Status: SHIPPED | OUTPATIENT
Start: 2020-01-06 | End: 2020-07-07

## 2020-01-06 NOTE — TELEPHONE ENCOUNTER
Wants bella to know that GHASSAN Cao is faxing her papers to get PA on his carvedilol and isosorbide .

## 2020-01-06 NOTE — TELEPHONE ENCOUNTER
RX APPROVAL:      Refill:   Requested Prescriptions      No prescriptions requested or ordered in this encounter      Last OV: 9/5/2019   Last EKG:   Last Labs:   Lab Results   Component Value Date    GLUCOSE 132 12/18/2018    BUN 21 12/18/2018    CREATININE 1.3 07/03/2019    LABGLOM 55 07/03/2019     12/18/2018    K 4.5 12/18/2018     12/18/2018    CO2 25 12/18/2018    CALCIUM 9.1 12/18/2018     Lab Results   Component Value Date     12/18/2018     12/18/2018    CO2 25 12/18/2018    ANIONGAP 10 12/18/2018    GLUCOSE 132 12/18/2018    BUN 21 12/18/2018    CREATININE 1.3 07/03/2019    LABGLOM 55 07/03/2019    GFRAA >60 07/03/2019    GFRAA >60 01/25/2011    CALCIUM 9.1 12/18/2018     No results found for: ALT, AST  Lab Results   Component Value Date    K 4.5 12/18/2018       Plan and labs reviewed

## 2020-01-13 ENCOUNTER — TELEPHONE (OUTPATIENT)
Dept: CARDIOLOGY CLINIC | Age: 65
End: 2020-01-13

## 2020-01-13 NOTE — TELEPHONE ENCOUNTER
Jeana Sites returning  Kimberly's call - the number to the PA for [facility] is [phone]. Thank you.

## 2020-01-15 ENCOUNTER — TELEPHONE (OUTPATIENT)
Dept: ORTHOPEDIC SURGERY | Age: 65
End: 2020-01-15

## 2020-02-03 ENCOUNTER — OFFICE VISIT (OUTPATIENT)
Dept: ORTHOPEDIC SURGERY | Age: 65
End: 2020-02-03
Payer: MEDICARE

## 2020-02-03 VITALS — WEIGHT: 259.92 LBS | HEIGHT: 74 IN | BODY MASS INDEX: 33.36 KG/M2

## 2020-02-03 PROCEDURE — 1123F ACP DISCUSS/DSCN MKR DOCD: CPT | Performed by: PHYSICAL MEDICINE & REHABILITATION

## 2020-02-03 PROCEDURE — 1036F TOBACCO NON-USER: CPT | Performed by: PHYSICAL MEDICINE & REHABILITATION

## 2020-02-03 PROCEDURE — 4040F PNEUMOC VAC/ADMIN/RCVD: CPT | Performed by: PHYSICAL MEDICINE & REHABILITATION

## 2020-02-03 PROCEDURE — 99214 OFFICE O/P EST MOD 30 MIN: CPT | Performed by: PHYSICAL MEDICINE & REHABILITATION

## 2020-02-03 PROCEDURE — G8427 DOCREV CUR MEDS BY ELIG CLIN: HCPCS | Performed by: PHYSICAL MEDICINE & REHABILITATION

## 2020-02-03 PROCEDURE — 96372 THER/PROPH/DIAG INJ SC/IM: CPT | Performed by: PHYSICAL MEDICINE & REHABILITATION

## 2020-02-03 PROCEDURE — 3017F COLORECTAL CA SCREEN DOC REV: CPT | Performed by: PHYSICAL MEDICINE & REHABILITATION

## 2020-02-03 PROCEDURE — G8417 CALC BMI ABV UP PARAM F/U: HCPCS | Performed by: PHYSICAL MEDICINE & REHABILITATION

## 2020-02-03 PROCEDURE — G8484 FLU IMMUNIZE NO ADMIN: HCPCS | Performed by: PHYSICAL MEDICINE & REHABILITATION

## 2020-02-03 RX ORDER — GLIPIZIDE 5 MG/1
TABLET ORAL
Status: ON HOLD | COMMUNITY
Start: 2020-01-15 | End: 2020-08-17 | Stop reason: HOSPADM

## 2020-02-03 RX ORDER — GABAPENTIN 300 MG/1
CAPSULE ORAL
COMMUNITY
Start: 2019-12-16 | End: 2020-03-12

## 2020-02-03 RX ORDER — KETOROLAC TROMETHAMINE 30 MG/ML
60 INJECTION, SOLUTION INTRAMUSCULAR; INTRAVENOUS ONCE
Status: COMPLETED | OUTPATIENT
Start: 2020-02-03 | End: 2020-02-03

## 2020-02-03 RX ORDER — OXYCODONE HYDROCHLORIDE AND ACETAMINOPHEN 5; 325 MG/1; MG/1
1 TABLET ORAL 4 TIMES DAILY PRN
Qty: 28 TABLET | Refills: 0 | Status: SHIPPED | OUTPATIENT
Start: 2020-02-03 | End: 2020-02-10

## 2020-02-03 RX ADMIN — KETOROLAC TROMETHAMINE 60 MG: 30 INJECTION, SOLUTION INTRAMUSCULAR; INTRAVENOUS at 14:50

## 2020-02-03 NOTE — PROGRESS NOTES
Enlarged prostate     Hyperlipidemia     Hypertension       Past Surgical History:     Past Surgical History:   Procedure Laterality Date    BACK SURGERY      laminectomy & rods    CARDIAC SURGERY      stents    CORONARY ANGIOPLASTY WITH STENT PLACEMENT  10/2003    EPIDURAL STEROID INJECTION Left 7/23/2019    LEFT LUMBAR THREE LUMBAR FOUR EPIDURAL STEROID INJECTION SITE CONFIRMED BY FLUOROSCOPY performed by Sarthak Goldsmith MD at [facility] Left 9/10/2019    LEFT SACROILIAC JOINT INJECTION SITE CONFIRMED BY FLUOROSCOPY performed by Sarthak Goldsmith MD at [facility]      Repaired torn MCL TOTAL RIGHT    TONSILLECTOMY      TOTAL KNEE ARTHROPLASTY Right 2014     Current Medications:     Current Outpatient Medications:     gabapentin (NEURONTIN) 300 MG capsule, TK 1 TO 2 CS PO QID, Disp: , Rfl:     glipiZIDE (GLUCOTROL) 5 MG tablet, , Disp: , Rfl:     isosorbide mononitrate (IMDUR) 30 MG extended release tablet, Take 1 tablet by mouth daily, Disp: 90 tablet, Rfl: 1    carvedilol (COREG) 6.25 MG tablet, take 1 tablet by mouth twice a day with meals, Disp: 180 tablet, Rfl: 1    atorvastatin (LIPITOR) 40 MG tablet, take 1 tablet by mouth once daily, Disp: 90 tablet, Rfl: 1    traZODone (DESYREL) 50 MG tablet, Take 50 mg by mouth nightly, Disp: , Rfl:     amLODIPine (NORVASC) 10 MG tablet, Take 1 tablet by mouth daily, Disp: 90 tablet, Rfl: 3    olmesartan (BENICAR) 40 MG tablet, Take 1 tablet by mouth daily, Disp: 90 tablet, Rfl: 3    cyclobenzaprine (FLEXERIL) 10 MG tablet, I po BID PRN, Disp: 60 tablet, Rfl: 0    pantoprazole (PROTONIX) 40 MG tablet, Take 1 tablet by mouth 2 times daily (before meals), Disp: 60 tablet, Rfl: 0    aspirin 325 MG tablet, Take 325 mg by mouth daily, Disp: , Rfl:     nitroGLYCERIN (NITROSTAT) 0.4 MG SL tablet, Place 0.4 mg under the tongue every 5 minutes as needed for Chest pain up to max of 3 total Strength testing is 4+/5 in all muscle groups tested. · Special Tests:   Straight leg raise and crossed SLR negative. Leg length and pelvis level.  0 out of 5 Raul's signs. Jay's test positive on the left        · Skin: There are no rashes, ulcerations or lesions. · Reflexes: Reflexes are symmetrically 1-2+ at the patellar and ankle tendons. Clonus absent bilaterally at the feet. · Gait & station: He is in a wheelchair. He has pain with any attempts at transferring additional Examinations: Left hip: No pain with left hip range of motion   · RIGHT LOWER EXTREMITY: Inspection/examination of the right lower extremity does not show any tenderness, deformity or injury. Range of motion is full. There is no gross instability. There are no rashes, ulcerations or lesions. Strength and tone are normal.  ·   · LEFT LOWER EXTREMITY:  Inspection/examination of the left lower extremity does not show any tenderness, deformity or injury. Range of motion is full. There is no gross instability. There are no rashes, ulcerations or lesions. Strength and tone are normal.    Diagnostic Testing:      Last hemoglobin A1c was 8    7/3/2019  3:06 PM - [facility] Incoming Lab Results From Soft (Epic Adt)     Component Value Ref Range & Units Status Collected Lab   CREATININE 1.3  0.8 - 1.3 mg/dL Final 07/03/2019  2:50 PM [address] [place] [address] Lab   GFR Non- 55Abnormal   >60 Final 07/03/2019  2:50 PM [address] [place] [address] Lab         Lumbar MRI scan report independently reviewed 7/3/2019 showing L4-S1 posterior fusion, disc bulging with mild to moderate left foraminal stenosis L3-4, multilevel DDD/spondylosis    2 views left hip 6/27/2019 mild to moderate metric hip OA. Reviewed today    4 views lumbar spine 6/27/2018 panel reviewed today show posterior spinal fusion L4-S1 with severe DDD L3-4 with anterior spurring. No high-grade instability on flexion-extension.   Mild scoliosis, diffuse symmetric sacroiliac

## 2020-02-06 ENCOUNTER — HOSPITAL ENCOUNTER (OUTPATIENT)
Age: 65
Discharge: HOME | End: 2020-02-06
Payer: MEDICARE

## 2020-02-06 ENCOUNTER — OFFICE VISIT (OUTPATIENT)
Dept: ORTHOPEDIC SURGERY | Age: 65
End: 2020-02-06
Payer: MEDICARE

## 2020-02-06 VITALS — BODY MASS INDEX: 33.36 KG/M2 | WEIGHT: 259.92 LBS | HEIGHT: 74 IN

## 2020-02-06 PROCEDURE — 85652 RBC SED RATE AUTOMATED: CPT

## 2020-02-06 PROCEDURE — 85025 COMPLETE CBC W/AUTO DIFF WBC: CPT

## 2020-02-06 PROCEDURE — 36415 COLL VENOUS BLD VENIPUNCTURE: CPT

## 2020-02-06 PROCEDURE — G8417 CALC BMI ABV UP PARAM F/U: HCPCS | Performed by: PHYSICIAN ASSISTANT

## 2020-02-06 PROCEDURE — 99213 OFFICE O/P EST LOW 20 MIN: CPT | Performed by: PHYSICIAN ASSISTANT

## 2020-02-06 PROCEDURE — G8484 FLU IMMUNIZE NO ADMIN: HCPCS | Performed by: PHYSICIAN ASSISTANT

## 2020-02-06 PROCEDURE — G8427 DOCREV CUR MEDS BY ELIG CLIN: HCPCS | Performed by: PHYSICIAN ASSISTANT

## 2020-02-06 RX ORDER — PREDNISONE 10 MG/1
TABLET ORAL
COMMUNITY
Start: 2020-01-28 | End: 2020-03-12 | Stop reason: ALTCHOICE

## 2020-02-06 RX ORDER — ALPRAZOLAM 1 MG/1
TABLET ORAL
Qty: 2 TABLET | Refills: 0 | Status: SHIPPED | OUTPATIENT
Start: 2020-02-06 | End: 2020-02-13

## 2020-02-06 NOTE — PROGRESS NOTES
examination:  Mr. Hansel Jiang's most recent vitals:  Vitals  Height: 6' 2.02\" (188 cm)  Weight: 259 lb 14.8 oz (117.9 kg)  Body mass index is 33.36 kg/m². General exam:  He is well-developed and well-nourished, is in obvious pain and alert and oriented to person, place, and time. He demonstrates appropriate mood and affect. His skin is warm and dry. Patient arrives today in a wheelchair. He was only able to ambulate 1-2 steps for me with rolling walker due to severe back pain. Back:  He stands with slight lumbar flexion. His lumbar flexion, extension and lateral bending are moderately reduced with pain. He has moderate tenderness over his lumbar spine without obvious muscle spasm. The skin over his lumbar spine is normal without a surgical scar. Lower extremities:  He has 4/5 bilateral hip flexors, otherwise 5/5 motor strength of bilateral lower extremities. He has a negative straight leg raise, bilaterally. Deep tendon reflexes at knees and achilles are 1+. Sensation is intact to light touch L3 to S1 bilaterally. He has no clonus. However, testing for clonus resulted in severe low back pain. Hip range of motion painless. Imaging:  I reviewed MRI images of his lumbar spine from 7/3/19. They note s/p L4-S1 posterior instrumented fusion with severe DDD L3-4. I reviewed AP and lateral xray images of his lumbar spine from 2/3/20. They show post surgical changes L4-S1 with moderate to severe DDD L3-4 and L1-2, similar to xray images from 2019. Assessment:  S/P L4-S1 posterior fusion 2011    Plan:  I recommend he have a new MRI of his lumbar spine with and without gadolinium, as well as ESR and CBC. His pain is out of proportion to his previous MRI from last year. We will call him with his results. Note dictated by Nicolette Woodard PA-C, patient also seen and examined by Dr. Reynaldo Chen.

## 2020-02-07 ENCOUNTER — TELEPHONE (OUTPATIENT)
Dept: ORTHOPEDIC SURGERY | Age: 65
End: 2020-02-07

## 2020-02-07 LAB
BASOPHILS ABSOLUTE: 0.1 K/UL (ref 0–0.2)
BASOPHILS RELATIVE PERCENT: 0.8 %
EOSINOPHILS ABSOLUTE: 0.2 K/UL (ref 0–0.6)
EOSINOPHILS RELATIVE PERCENT: 2.8 %
HCT VFR BLD CALC: 45 % (ref 40.5–52.5)
HEMOGLOBIN: 15.1 G/DL (ref 13.5–17.5)
LYMPHOCYTES ABSOLUTE: 1.6 K/UL (ref 1–5.1)
LYMPHOCYTES RELATIVE PERCENT: 21.5 %
MCH RBC QN AUTO: 31 PG (ref 26–34)
MCHC RBC AUTO-ENTMCNC: 33.4 G/DL (ref 31–36)
MCV RBC AUTO: 92.6 FL (ref 80–100)
MONOCYTES ABSOLUTE: 0.8 K/UL (ref 0–1.3)
MONOCYTES RELATIVE PERCENT: 10.8 %
NEUTROPHILS ABSOLUTE: 4.6 K/UL (ref 1.7–7.7)
NEUTROPHILS RELATIVE PERCENT: 64.1 %
PDW BLD-RTO: 13.4 % (ref 12.4–15.4)
PLATELET # BLD: 235 K/UL (ref 135–450)
PMV BLD AUTO: 8.2 FL (ref 5–10.5)
RBC # BLD: 4.86 M/UL (ref 4.2–5.9)
SEDIMENTATION RATE, ERYTHROCYTE: 15 MM/HR (ref 0–20)
WBC # BLD: 7.2 K/UL (ref 4–11)

## 2020-02-20 ENCOUNTER — TELEPHONE (OUTPATIENT)
Dept: ORTHOPEDIC SURGERY | Age: 65
End: 2020-02-20

## 2020-02-21 NOTE — TELEPHONE ENCOUNTER
Spoke with patient and reviewed results. Patient would like to come in and discuss options. Scheduled patient to follow up.

## 2020-02-21 NOTE — TELEPHONE ENCOUNTER
Dr. Antony Plasencia reviewed MRI and said infection seems unlikely. Overall looks similar to MRI from last year. He said he could come back in to the office to reassess and discuss possible surgical options.

## 2020-02-25 ENCOUNTER — OFFICE VISIT (OUTPATIENT)
Dept: ORTHOPEDIC SURGERY | Age: 65
End: 2020-02-25
Payer: MEDICARE

## 2020-02-25 VITALS — HEIGHT: 74 IN | BODY MASS INDEX: 33.36 KG/M2 | WEIGHT: 259.92 LBS

## 2020-02-25 PROCEDURE — 99213 OFFICE O/P EST LOW 20 MIN: CPT | Performed by: ORTHOPAEDIC SURGERY

## 2020-02-25 PROCEDURE — G8417 CALC BMI ABV UP PARAM F/U: HCPCS | Performed by: ORTHOPAEDIC SURGERY

## 2020-02-25 PROCEDURE — 1036F TOBACCO NON-USER: CPT | Performed by: ORTHOPAEDIC SURGERY

## 2020-02-25 PROCEDURE — G8427 DOCREV CUR MEDS BY ELIG CLIN: HCPCS | Performed by: ORTHOPAEDIC SURGERY

## 2020-02-25 PROCEDURE — 3017F COLORECTAL CA SCREEN DOC REV: CPT | Performed by: ORTHOPAEDIC SURGERY

## 2020-02-25 PROCEDURE — 1123F ACP DISCUSS/DSCN MKR DOCD: CPT | Performed by: ORTHOPAEDIC SURGERY

## 2020-02-25 PROCEDURE — 4040F PNEUMOC VAC/ADMIN/RCVD: CPT | Performed by: ORTHOPAEDIC SURGERY

## 2020-02-25 PROCEDURE — G8484 FLU IMMUNIZE NO ADMIN: HCPCS | Performed by: ORTHOPAEDIC SURGERY

## 2020-02-25 NOTE — PROGRESS NOTES
examination:  Mr. Jaylen Jiang's most recent vitals:  Vitals  Height: 6' 2.02\" (188 cm)  Weight: 259 lb 14.8 oz (117.9 kg)  Body mass index is 33.36 kg/m². General exam:  He is well-developed and well-nourished, is in obvious pain and alert and oriented to person, place, and time. He demonstrates appropriate mood and affect. His skin is warm and dry. Patient arrives today in a wheelchair. He was only able to ambulate 1-2 steps for me with rolling walker due to severe back pain. Back:  He stands with slight lumbar flexion. His lumbar flexion, extension and lateral bending are moderately reduced with pain. He has moderate tenderness over his lumbar spine without obvious muscle spasm. The skin over his lumbar spine is normal without a surgical scar. Lower extremities:  He has 4/5 bilateral hip flexors, otherwise 5/5 motor strength of bilateral lower extremities. He has a negative straight leg raise, bilaterally. Deep tendon reflexes at knees and achilles are 1+. Sensation is intact to light touch L3 to S1 bilaterally. He has no clonus. However, testing for clonus resulted in severe low back pain. Hip range of motion painless. Imaging:  I reviewed MRI images of his lumbar spine from 7/3/19 and 2/2020. They note s/p L4-S1 posterior instrumented fusion with severe DDD L3-4 and with multilevel degenerative scoliosis. I reviewed AP and lateral xray images of his lumbar spine from 2/3/20. They show post surgical changes L4-S1 with moderate to severe DDD L3-4 and L1-2, similar to xray images from 2019. ESR 15  WBC 7.2    Assessment:  S/P L4-S1 posterior fusion 2011    Plan:  We discussed treatment options including observation, injections, chiropractor, brace and multilevel spinal fusion. I recommended he see Dr. Pelon Lopez for possible medial branch blocks or dorsal column stimulator trial.  Spinal fusion would likely require extension into the thoracic spine.

## 2020-03-03 ENCOUNTER — TELEPHONE (OUTPATIENT)
Dept: ORTHOPEDIC SURGERY | Age: 65
End: 2020-03-03

## 2020-03-03 ENCOUNTER — OFFICE VISIT (OUTPATIENT)
Dept: ORTHOPEDIC SURGERY | Age: 65
End: 2020-03-03
Payer: MEDICARE

## 2020-03-03 VITALS
WEIGHT: 259.92 LBS | SYSTOLIC BLOOD PRESSURE: 156 MMHG | DIASTOLIC BLOOD PRESSURE: 97 MMHG | HEIGHT: 74 IN | BODY MASS INDEX: 33.36 KG/M2 | HEART RATE: 103 BPM

## 2020-03-03 PROCEDURE — 1036F TOBACCO NON-USER: CPT | Performed by: PHYSICAL MEDICINE & REHABILITATION

## 2020-03-03 PROCEDURE — G8417 CALC BMI ABV UP PARAM F/U: HCPCS | Performed by: PHYSICAL MEDICINE & REHABILITATION

## 2020-03-03 PROCEDURE — 1123F ACP DISCUSS/DSCN MKR DOCD: CPT | Performed by: PHYSICAL MEDICINE & REHABILITATION

## 2020-03-03 PROCEDURE — 4040F PNEUMOC VAC/ADMIN/RCVD: CPT | Performed by: PHYSICAL MEDICINE & REHABILITATION

## 2020-03-03 PROCEDURE — 99214 OFFICE O/P EST MOD 30 MIN: CPT | Performed by: PHYSICAL MEDICINE & REHABILITATION

## 2020-03-03 PROCEDURE — G8484 FLU IMMUNIZE NO ADMIN: HCPCS | Performed by: PHYSICAL MEDICINE & REHABILITATION

## 2020-03-03 PROCEDURE — G8427 DOCREV CUR MEDS BY ELIG CLIN: HCPCS | Performed by: PHYSICAL MEDICINE & REHABILITATION

## 2020-03-03 PROCEDURE — 3017F COLORECTAL CA SCREEN DOC REV: CPT | Performed by: PHYSICAL MEDICINE & REHABILITATION

## 2020-03-03 RX ORDER — DIAZEPAM 5 MG/1
5 TABLET ORAL ONCE
Qty: 1 TABLET | Refills: 0 | Status: SHIPPED | OUTPATIENT
Start: 2020-03-03 | End: 2020-03-03

## 2020-03-03 NOTE — TELEPHONE ENCOUNTER
Patient needs scheduled for an injection. Pre-Procedure sheet was given to the patient. x1 (SCS TRIAL W/ [employer])    Diabetes: YES  Glaucoma: NO  Blood Thinner: ASA  Bleeding D/O: NO  Current infx/antibiotic: NO  MRSA/MSSA/VRE: NO    MRI TSP ordered today in the office. Patient completed SCS Fran Tapia in the office today as well. He is aware he will be contacted to schedule once approved.

## 2020-03-03 NOTE — PROGRESS NOTES
be pushed in a wheelchair to the exam room today as his legs gave out on him attempting to stand up and walk. Pain Assessment  Location of Pain: Back(LSP)  Location Modifiers: Left, Posterior(Hip/Leg)  Severity of Pain: 8  Quality of Pain: Sharp, Aching, Other (Comment)(Stabbing)  Duration of Pain: Persistent  Frequency of Pain: Constant  Aggravating Factors: Walking, Bending, Standing, Other (Comment)(Sitting to standing transition)  Limiting Behavior: Yes  Relieving Factors: Rest, Other (Comment)(Lying down)  Result of Injury: No  Work-Related Injury: No  Are there other pain locations you wish to document?: No      Associated signs and symptoms:   Neurogenic bowel or bladder symptoms:  no   Perceived weakness:  yes   Difficulty walking:  yes    Recent Imaging (within past one year)   Xrays: yes   MRI or CT of spine: yes    Current/Past Treatment:   · Physical Therapy:  yes  · Chiropractic:  yes  · Injection:  yes  · Medications:   NSAIDS:  yes   Muscle relaxer:  yes   Steriods:  yes   Neuropathic medications:  Gabapentin   Opioids:  Percocet  · Previous surgery:  yes  · Previous surgical consult:  yes  · Other:  · Infection control  · Tested positive for MRSA in past 12 months:  no  · Tested positive for MSSA \"staph infection\" in past 12 months: no  · Tested positive for VRE (Vancomycin Resistant Enterococci) in past 12 months:   no  · Currently on any antibiotics for an infection: no  · Anticoagulants:  · On a blood thinner:  yes ASA  · Any history of bleeding disorder: no   · MRI Contraindication: no   · Previous Pain Management: yes   · Goal for treatment : Return to normal ADL's and fishing  · How long can you stand? 15 - 20 minutes      Sit?  2 hours       Walk?   Short distances                  Past Medical History:   Past Medical History:   Diagnosis Date    Blood circulation, collateral     CAD (coronary artery disease)     Chronic kidney disease     Diabetes mellitus ([facility] [address]     Enlarged prostate of immune deficiency or immunomodulating drugs         PHYSICAL EXAM:    Vitals: Blood pressure (!) 156/97, pulse 103, height 6' 2.02\" (1.88 m), weight 259 lb 14.8 oz (117.9 kg). GENERAL EXAM:  · General Apparence: Patient is adequately groomed with no evidence of malnutrition. · Psychiatric: Orientation: The patient is oriented to time, place and person. The patient's mood and affect are appropriate   · Vascular: Examination reveals no swelling and palpation reveals no tenderness in upper or lower extremities. Good capillary refill. · The lymphatic examination of the neck, axillae and groin reveals all areas to be without enlargement or induration   Sensation is intact without deficit in the upper and lower extremities to light touch and pinprick  · Coordination of the upper and lower extremities are normal.    CERVICAL EXAMINATION:  · Inspection: Local inspection shows no step-off or bruising. Cervical alignment is normal. No instability is noted. · Palpation and Percussion: No evidence of tenderness at the midline. Paraspinal tenderness is not present. There is no paraspinal spasm. · Range of Motion:  limited by 25% in all planes due to pain   · Strength: 5/5 bilateral upper extremities  · Special Tests:   Spurling's and Evans's are negative bilaterally. Pozo and Impingement tests are negative bilaterally. · Skin:There are no rashes, ulcerations or lesions. · Reflexes: Bilaterally triceps, biceps and brachioradialis are 2+. Clonus absent bilaterally at the feet. No pathological reflexes are noted. · Gait & station:  in a Wheelchair today,   · Additional Examinations:  · RIGHT UPPER EXTREMITY:  Inspection/examination of the right upper extremity does not show any tenderness, deformity or injury. Range of motion is normal and pain-free. There is no gross instability. There are no rashes, ulcerations or lesions. Strength and tone are normal. No atrophy or abnormal movements are noted.   · LEFT yourself having a poor appetite or overeating?  Not at all  6: How often do you find yourself feeling bad about yourself, or that you are a failure, or that you have let yourself or your family down?  Not at all  7: How often do you have trouble concentrating on things, such as reading or watching television?  Not at all  8: How often do you find yourself either moving so slowly that other people have noticed, or being so fidgety or restless that you have been moving around a lot more than usual?   Not at all  9: How often do you find yourself thinking that you would be better off dead or of hurting yourself in some way (please alert your doctor today if you feel that this is an issue for you)?  Not at all    PSEQ  1: I can enjoy things even though I have pain:   Mostly True  2: I can do most of the household chores despite my pain:   Mostly False  3: I can socialize with my friends or family members as often as I used to do, despite my pain:   Mostly True  4: I can cope with my pain in most situations:   Mostly True  5: I can do some form of productive work or household chores despite my pain:   Mostly False  6: I can still do many of the things I enjoy doing, such as hobbies or leisure activity, despite pain:   Completely False  7: I can cope with my pain without medication:   Fairly True  8: I can still accomplish most of my goals in life despite my pain:   Slightly False  9: I can live a normal lifestyle, despite my pain:   Mostly True  10: I can gradually become more active over the course of the day despite my pain:   Mostly False    Oswestry  1: Part 1: What is you typical pain intensity?  The pain is moderate at the moment  2: Part 2: What about how you are able to care for yourself?  I need some help but manage most of my personal care  3: Part 3: How well do you lift objects?  I cannot lift or carry anything at all  4: Part 4: How well can you walk?    I can only walk using a cane or crutches. 5: Part 5: How well can you sit?  Pain prevents me from sitting more than one hour. 6: Part 6: How well can you stand?  Pain prevents me from standing for more than 1 hour. 7: Part 7: How well can you sleep?  My sleep is occasionally disturbed by pain. 8: Part 8: How is your social life affected?  Pain has no significant effect on my social life, but limits more energetic interests (eg sports). 9: Part 9: How is you ability to travel?  Pain is bad but I manage journeys over two hours. Diagnostic Testing:    Xrays:   I personally reviewed images of the AP and lateral of the lumbar spine from February 3, 2020 showing L4-S1 fusion and upper lumbar scoliosis  MRI or CT:  MR Lumbar spine -    1. L2-3 shallow central and left lateral disc protrusion with superior migration; annular    fissure; encroaching upon left ventral dural sac and left L3 nerve root.  Mild left lateral    recess stenosis. 2. L3-4 moderate disc bulge; annular fissure; disc contacts foraminal left L3 nerve root.  Mild    left foraminal narrowing. 3. L4-5 laminectomy and posterior fusion; fusion hardware.  Central disc protrusion deforming    ventral dural sac near to left greater than right L5 nerve roots.  Contacting foraminal L4    nerve roots. 4.  L5-S1 laminectomy, posterior fusion; fusion hardware.  Low-grade retrolisthesis; disc bulge;    near to S1 nerve roots; contacts foraminal L5 nerve roots.         EMG:  None  Results for orders placed or performed during the hospital encounter of 02/06/20   CBC WITH AUTO DIFFERENTIAL   Result Value Ref Range    WBC 7.2 4.0 - 11.0 K/uL    RBC 4.86 4.20 - 5.90 M/uL    Hemoglobin 15.1 13.5 - 17.5 g/dL    Hematocrit 45.0 40.5 - 52.5 %    MCV 92.6 80.0 - 100.0 fL    MCH 31.0 26.0 - 34.0 pg    MCHC 33.4 31.0 - 36.0 g/dL    RDW 13.4 12.4 - 15.4 %    Platelets 274 679 - 011 K/uL    MPV 8.2 5.0 - 10.5 fL    Neutrophils % 64.1 %    Lymphocytes % 21.5 %    Monocytes % 10.8 % Eosinophils % 2.8 %    Basophils % 0.8 %    Neutrophils Absolute 4.6 1.7 - 7.7 K/uL    Lymphocytes Absolute 1.6 1.0 - 5.1 K/uL    Monocytes Absolute 0.8 0.0 - 1.3 K/uL    Eosinophils Absolute 0.2 0.0 - 0.6 K/uL    Basophils Absolute 0.1 0.0 - 0.2 K/uL   SEDIMENTATION RATE   Result Value Ref Range    Sed Rate 15 0 - 20 mm/Hr       Impression (Medical Decision Making):       1. Lumbar post-laminectomy syndrome    2. Lumbar radiculopathy    3. Chronic pain syndrome    4. Claustrophobia        Plan (Medical Decision Making):    I discussed the diagnosis and the treatment options with Aileen King today. In Summary:  The various treatment options were outlined and discussed with Aileen King including:  Conservative care options: physical therapy, ice, medications, bracing, and activity modification. The indications for therapeutic injections. The indications for additional imaging/laboratory studies. The indications for (possible future) interventions. After considering the various options discussed, Aileen King elected to pursue a course of treatment that includes the followin. Medications: Valium 5 mg po prior to MRI. OARRS/BURTON reviewed    2. PT:  Encouraged to continue with Home exercise program.    3. Further studies: MR Thoracic spine to evaluate for thoracic stenosis      4. Interventional:  We have discussed risks benefits alternatives of a spinal cord stimulator trial.  Risks include but limited to bleeding, infection, epidural hematoma, epidural abscess, spinal cord injury, nerve injury, increasing pain, lack of pain relief. This does not include all possible risks. The patient verbalized understanding and would like to proceed.     5. Healthy Lifestyle Measures:  Patient education material reviewing the following was distributed to Aileen King  Anatomic drawings  Healthy lifestyle education  Osteoporosis prevention,   Back and neck pain educational information   Advanced

## 2020-03-05 ENCOUNTER — HOSPITAL ENCOUNTER (OUTPATIENT)
Dept: MRI IMAGING | Age: 65
Discharge: HOME | End: 2020-03-05
Payer: MEDICARE

## 2020-03-05 PROCEDURE — 72146 MRI CHEST SPINE W/O DYE: CPT

## 2020-03-06 ENCOUNTER — TELEPHONE (OUTPATIENT)
Dept: ORTHOPEDIC SURGERY | Age: 65
End: 2020-03-06

## 2020-03-10 ENCOUNTER — TELEPHONE (OUTPATIENT)
Dept: ORTHOPEDIC SURGERY | Age: 65
End: 2020-03-10

## 2020-03-12 ENCOUNTER — OFFICE VISIT (OUTPATIENT)
Dept: CARDIOLOGY CLINIC | Age: 65
End: 2020-03-12
Payer: MEDICARE

## 2020-03-12 VITALS
HEART RATE: 92 BPM | SYSTOLIC BLOOD PRESSURE: 138 MMHG | HEIGHT: 74 IN | DIASTOLIC BLOOD PRESSURE: 80 MMHG | WEIGHT: 264.4 LBS | BODY MASS INDEX: 33.93 KG/M2

## 2020-03-12 PROBLEM — M54.9 CHRONIC BACK PAIN: Status: ACTIVE | Noted: 2020-03-12

## 2020-03-12 PROBLEM — G89.29 CHRONIC BACK PAIN: Status: ACTIVE | Noted: 2020-03-12

## 2020-03-12 PROCEDURE — 99214 OFFICE O/P EST MOD 30 MIN: CPT | Performed by: INTERNAL MEDICINE

## 2020-03-12 NOTE — PROGRESS NOTES
[facility]   Cardiac Follow up    Referring Provider:  ETTA Alexandre CNP     Chief Complaint   Patient presents with    Coronary Artery Disease    Hypertension      History of Present Illness:  Mr. Alfa Brizuela is a 75 y.o. male here today for ongoing follow up. He was a direct admit on 12/17/18 from [facility] with chest pain and fatigue. He is a retired  who is on medical disability for his coronary artery disease. He first presented to our care in 2003 with an acute myocardial infarction. At that time, he had PCI to LAD and left circumflex with Cypher drug-eluting stents. He has had a subsequent PCI of his right coronary artery with a Xience drug-eluting stent in 2009. Last cardiac catheterization in 2011, he had ejection fraction of 40%, stents were patent. Stress echo was normal in 10/2017 with mild left ventricular dysfunction, EF 50%. LHC done 12/18/18 showed patent LAD,CFX stent,RCA stent. 40% prox CFX in stent 50% RCA before and after stent, Normal EF 60%, LVEDP at 20. Today, he continues struggling with back pains he is in a wheelchair due to distance from the parking lot. He states he is to have a new nerve stimulater soon. He denies exertional chest pain, CARR/PND, palpitations, light-headedness, edema. His wife is with him for the visit. Past Medical History:   has a past medical history of Blood circulation, collateral, CAD (coronary artery disease), Chronic kidney disease, Diabetes mellitus ([address] Enlarged prostate, Hyperlipidemia, and Hypertension. Surgical History:   has a past surgical history that includes back surgery; Cardiac surgery; Tonsillectomy; Coronary angioplasty with stent (10/2003); Total knee arthroplasty (Right, 2014); joint replacement; epidural steroid injection (Left, 7/23/2019); and epidural steroid injection (Left, 9/10/2019). Social History:   reports that he quit smoking about 36 years ago. He smoked 1.00 pack per day.  He has never used smokeless tobacco. He reports that he does not drink alcohol or use drugs. Family History:  family history includes Birth Defects in his brother; Diabetes in his mother; Heart Attack in his father; Heart Disease in his father and sister; High Blood Pressure in his brother, brother, father, sister, sister, and sister; High Cholesterol in his father. Home Medications:  Prior to Admission medications    Medication Sig Start Date End Date Taking? Authorizing Provider   Multiple Vitamins-Minerals (CENTRUM SILVER) TABS Take 1 tablet by mouth daily. Yes Historical Provider, MD   zolpidem (AMBIEN) 5 MG tablet Take 5 mg by mouth nightly as needed. Yes Historical Provider, MD   meloxicam (MOBIC) 15 MG tablet Take 15 mg by mouth daily. Yes Historical Provider, MD   olmesartan (BENICAR) 40 MG tablet Take 40 mg by mouth daily. Yes Historical Provider, MD   metformin (GLUCOPHAGE) 1000 MG tablet Take 1,000 mg by mouth 2 times daily. Yes Historical Provider, MD   amlodipine (NORVASC) 10 MG tablet Take 1 tablet by mouth daily. 7/28/11 7/27/12 Yes Preston Longoria MD   atorvastatin (LIPITOR) 40 MG tablet Take 1 tablet by mouth nightly. 5/27/11 5/26/12 Yes Preston Longoria MD   aspirin 325 MG EC tablet Take 1 tablet by mouth daily. 1/25/11 1/25/12 Yes Preston Longoria MD   carvedilol (COREG) 6.25 MG tablet Take 1 tablet by mouth 2 times daily (with meals). 1/25/11 1/25/12 Yes Preston Longoria MD   nitroGLYCERIN (NITROSTAT) 0.4 MG SL tablet Place 1 tablet under the tongue every 5 minutes as needed for Chest pain. 1/25/11 1/25/12 Yes Preston Longoria MD   tamsulosin (FLOMAX) 0.4 MG capsule Take 1 capsule by mouth daily. 1/25/11 1/25/12 Yes Preston Longoria MD   lansoprazole (PREVACID) 30 MG capsule Take 1 capsule by mouth daily. 1/25/11 1/25/12 Yes Preston Longoria MD      Allergies:  Penicillins;  Percocet [oxycodone-acetaminophen]; and Codeine     Review of Systems:   · Constitutional: there has been no unanticipated weight loss. There's been no change in energy level, sleep pattern, or activity level. · Eyes: No visual changes or diplopia. No scleral icterus. · ENT: No Headaches, hearing loss or vertigo. No mouth sores or sore throat. · Cardiovascular: Reviewed in HPI  · Respiratory: No cough or wheezing, no sputum production. No hematemesis. · Gastrointestinal: No abdominal pain, appetite loss, blood in stools. No change in bowel or bladder habits. · Genitourinary: No dysuria, trouble voiding, or hematuria. · Musculoskeletal:  No gait disturbance, weakness or joint complaints. · Integumentary: No rash or pruritis. · Neurological: No headache, diplopia, change in muscle strength, numbness or tingling. No change in gait, balance, coordination, mood, affect, memory, mentation, behavior. · Psychiatric: No anxiety, no depression. · Endocrine: No malaise, fatigue or temperature intolerance. No excessive thirst, fluid intake, or urination. No tremor. · Hematologic/Lymphatic: No abnormal bruising or bleeding, blood clots or swollen lymph nodes. · Allergic/Immunologic: No nasal congestion or hives. Physical Examination:    Blood pressure 138/80, pulse 92, height 6' 2\" (1.88 m), weight 264 lb 6.4 oz (119.9 kg). Constitutional and General Appearance: NAD  Skin:good turgor,intact without lesions  HEENT: EOMI ,normal  Neck:no JVD  Respiratory:  · Normal excursion and expansion without use of accessory muscles  · Resp Auscultation: Normal breath sounds without dullness  Cardiovascular:  · The apical impulses not displaced  · Heart tones are crisp and normal  · Cervical veins are not engorged  · The carotid upstroke is normal in amplitude and contour without delay or bruit  · Peripheral pulses are symmetrical and full  · There is no clubbing, cyanosis of the extremities.   · No edema  · Femoral Arteries: 2+ and equal  · Pedal Pulses: 2+ and equal   Abdomen:  · No masses or tenderness  · Liver/Spleen: No Abnormalities Noted  Neurological/Psychiatric:  · Alert and oriented in all spheres  · Moves all extremities well  · +back pain, wheelchair due to distance   · No abnormalities of mood, affect, memory, mentation, or behavior are noted    Stress echo 10/2017  Normal stress ECHO.   Normal EKG response to exercise. -Global left ventricular function is borderline with ejection fraction estimated at 50 %.  -No obvious resting wall motion abnormalities noted.   -The aortic root is mildly dilated.   -The right ventricle is mildly dilated. Normal RV systolic function. TAPSE is estimated at 2.0 cm.   -The left atrium is mildly dilated.   -Aortic valve appears sclerotic but opens adequately.   -Mild-to-moderate aortic regurgitation is present.  Errol Hailstone thickened mitral valve without evidence of stenosis. Trace mitral regurgitation. [facility] 12/18/18  Cath with patent LAD,CFX stent,RCA stent. 40% prox CFX in stent 50% RCA before and after stent  Normal EF 60%, LVEDP at 20     Assessment:    1. Coronary artery Disease: Has chronic angina. -EKG today 2/15/19> NSR 87  -[facility] 12/18/18 showed mild disease, normal EF.   --10/28/2003 Cath/PCI ([facility])> 85% LAD, 70% LCX, 30% RCA-PCI to LAD with 3.5x18mm Cypher TEJAS.   --1/02/2009 Cath/PCI> Stents in LAD and LCX patent. 85% pRCA-PCI 3.0x12mm Xience TEJAS.   --1/25/2011 Cath> Stents in LAD, LCX, and RCA are all patent. LVEF 40%. --Stress echo 10/4/17> Normal stress. -Global left ventricular function is borderline with ejection fraction estimated at 50 %.  -No obvious resting wall motion abnormalities noted.   -The aortic root is mildly dilated.   -The right ventricle is mildly dilated. Normal RV systolic function. TAPSE is estimated at 2.0 cm.   -The left atrium is mildly dilated.   -Aortic valve appears sclerotic but opens adequately.   -Mild-to-moderate aortic regurgitation is present.  Errol Hailstone thickened mitral valve without evidence of stenosis.  Trace mitral regurgitation.    2. Essential hypertension, benign: Stable, he has not taken his medication this AM Recheck by me 146/78  /80 (Site: Right Upper Arm, Position: Sitting, Cuff Size: Large Adult)   Pulse 92   Ht 6' 2\" (1.88 m)   Wt 264 lb 6.4 oz (119.9 kg)   BMI 33.95 kg/m²     3. Other and unspecified hyperlipidemia: Managed per PCP. Takes Lipitor 40mg  1/3/19> , , HDL 41, LDL 69.   4. Other primary cardiomyopathies: Stable  02/12/2015 Echo ([facility])> EF 45%, mild global hypokinesis, mild MR  EF 55% per [facility] 12/18/18   5. Diabetes mellitus: PCP follows. Recent A1c 7.5.  6.   Chronic back pain- follows with Dr. Quentin Hodgson   Plan:  Dannie Ruiz has a stable cardiac status. Cardiac test and lab results personally reviewed by me during this office visit and discussed. Labs to be scanned to chart. No med changes. Continue risk factor modifications. Call for any change in symptoms. Return for regular follow up in 6 months. He states his meds are covered through worker's comp. I appreciate the opportunity of cooperating in the care of this individual.    Santi Castro. Mally Black M.D., [facility]    Patient's problem list, medications, allergies, past medical, surgical, social and family histories were reviewed and updated as appropriate. Scribe's attestation: This note was scribed in the presence of Dr. Mally Black MD, by Stefanie Cantrell RN. The scribe's documentation has been prepared under my direction and personally reviewed by me in its entirety. I confirm that the note above accurately reflects all work, treatment, procedures, and medical decision making performed by me.

## 2020-03-16 ENCOUNTER — TELEPHONE (OUTPATIENT)
Dept: ORTHOPEDIC SURGERY | Age: 65
End: 2020-03-16

## 2020-03-16 NOTE — TELEPHONE ENCOUNTER
S/w patient.   He wishes to proceed with SCS trial at [facility] for 3/18/20 @ 3:45pm.  He is aware to keep his appointment for 3/17/20 to go over the rest of the instructions for SCS trial.

## 2020-03-16 NOTE — TELEPHONE ENCOUNTER
DOS   03/23/2020  CPT   72044   08481.59     DX   M96.1   M54.16   G89.4  OP SX AUTH  NPR    2 LEAD SPINAL CORD STIMULATOR TRIAL    [address] [place] [address],[address]:   MEDICARE    CPT  93726    274126.64  [id number]   NPR  BENEFITS:  DED $1408.00 - $1408.00 REMAINING / $350 COPAY  /  2NDARY INSURANCE MAY  MEDICARE BALANCE

## 2020-03-17 ENCOUNTER — TELEPHONE (OUTPATIENT)
Dept: ORTHOPEDIC SURGERY | Age: 65
End: 2020-03-17

## 2020-03-17 ENCOUNTER — OFFICE VISIT (OUTPATIENT)
Dept: ORTHOPEDIC SURGERY | Age: 65
End: 2020-03-17
Payer: MEDICARE

## 2020-03-17 VITALS
WEIGHT: 264.33 LBS | DIASTOLIC BLOOD PRESSURE: 84 MMHG | SYSTOLIC BLOOD PRESSURE: 130 MMHG | HEART RATE: 76 BPM | BODY MASS INDEX: 33.92 KG/M2 | HEIGHT: 74 IN

## 2020-03-17 PROCEDURE — G8484 FLU IMMUNIZE NO ADMIN: HCPCS | Performed by: PHYSICAL MEDICINE & REHABILITATION

## 2020-03-17 PROCEDURE — G8417 CALC BMI ABV UP PARAM F/U: HCPCS | Performed by: PHYSICAL MEDICINE & REHABILITATION

## 2020-03-17 PROCEDURE — 1036F TOBACCO NON-USER: CPT | Performed by: PHYSICAL MEDICINE & REHABILITATION

## 2020-03-17 PROCEDURE — 3017F COLORECTAL CA SCREEN DOC REV: CPT | Performed by: PHYSICAL MEDICINE & REHABILITATION

## 2020-03-17 PROCEDURE — 4040F PNEUMOC VAC/ADMIN/RCVD: CPT | Performed by: PHYSICAL MEDICINE & REHABILITATION

## 2020-03-17 PROCEDURE — 1123F ACP DISCUSS/DSCN MKR DOCD: CPT | Performed by: PHYSICAL MEDICINE & REHABILITATION

## 2020-03-17 PROCEDURE — G8427 DOCREV CUR MEDS BY ELIG CLIN: HCPCS | Performed by: PHYSICAL MEDICINE & REHABILITATION

## 2020-03-17 PROCEDURE — 99213 OFFICE O/P EST LOW 20 MIN: CPT | Performed by: PHYSICAL MEDICINE & REHABILITATION

## 2020-03-17 RX ORDER — CLINDAMYCIN HYDROCHLORIDE 300 MG/1
300 CAPSULE ORAL 3 TIMES DAILY
Qty: 15 CAPSULE | Refills: 0 | Status: CANCELLED | OUTPATIENT
Start: 2020-03-18 | End: 2020-03-23

## 2020-03-17 NOTE — TELEPHONE ENCOUNTER
Patient scheduled yesterday for SCS trial to be completed on 3/18/20. Patient presents today in the office and reports he takes aspirin daily. Per Dr. Cm Jim, due to aspirin use, patient's SCS trial for 3/18/20 will need to be cancelled at this time.

## 2020-03-17 NOTE — PROGRESS NOTES
Hyperlipidemia     Hypertension       Past Surgical History:     Past Surgical History:   Procedure Laterality Date    BACK SURGERY      laminectomy & rods    CARDIAC SURGERY      stents    CORONARY ANGIOPLASTY WITH STENT PLACEMENT  10/2003    EPIDURAL STEROID INJECTION Left 7/23/2019    LEFT LUMBAR THREE LUMBAR FOUR EPIDURAL STEROID INJECTION SITE CONFIRMED BY FLUOROSCOPY performed by Lacey Jones MD at [facility] Left 9/10/2019    LEFT SACROILIAC JOINT INJECTION SITE CONFIRMED BY FLUOROSCOPY performed by Lacey Jones MD at [facility]      Repaired torn MCL TOTAL RIGHT    TONSILLECTOMY      TOTAL KNEE ARTHROPLASTY Right 2014     Current Medications:     Current Outpatient Medications:     glipiZIDE (GLUCOTROL) 5 MG tablet, , Disp: , Rfl:     isosorbide mononitrate (IMDUR) 30 MG extended release tablet, Take 1 tablet by mouth daily, Disp: 90 tablet, Rfl: 1    carvedilol (COREG) 6.25 MG tablet, take 1 tablet by mouth twice a day with meals, Disp: 180 tablet, Rfl: 1    atorvastatin (LIPITOR) 40 MG tablet, take 1 tablet by mouth once daily, Disp: 90 tablet, Rfl: 1    traZODone (DESYREL) 50 MG tablet, Take 50 mg by mouth nightly, Disp: , Rfl:     amLODIPine (NORVASC) 10 MG tablet, Take 1 tablet by mouth daily, Disp: 90 tablet, Rfl: 3    olmesartan (BENICAR) 40 MG tablet, Take 1 tablet by mouth daily, Disp: 90 tablet, Rfl: 3    aspirin 325 MG tablet, Take 325 mg by mouth daily, Disp: , Rfl:     nitroGLYCERIN (NITROSTAT) 0.4 MG SL tablet, Place 0.4 mg under the tongue every 5 minutes as needed for Chest pain up to max of 3 total doses. If no relief after 1 dose, call 911., Disp: , Rfl:     tamsulosin (FLOMAX) 0.4 MG capsule, Take 0.8 mg by mouth nightly, Disp: , Rfl:     Multiple Vitamins-Minerals (CENTRUM SILVER) TABS, Take 1 tablet by mouth daily.   , Disp: , Rfl:     meloxicam (MOBIC) 15 MG tablet, Take 15 mg by mouth daily.  , Disp: , Rfl:     metformin (GLUCOPHAGE) 1000 MG tablet, Take 1,000 mg by mouth 2 times daily. , Disp: , Rfl:     pantoprazole (PROTONIX) 40 MG tablet, Take 1 tablet by mouth 2 times daily (before meals), Disp: 60 tablet, Rfl: 0  Allergies:  Penicillins; Percocet [oxycodone-acetaminophen]; and Codeine  Social History:    reports that he quit smoking about 36 years ago. He smoked 1.00 pack per day. He has never used smokeless tobacco. He reports that he does not drink alcohol or use drugs. Family History:   Family History   Problem Relation Age of Onset    Diabetes Mother     Heart Disease Father     High Blood Pressure Father     High Cholesterol Father     Heart Attack Father     High Blood Pressure Sister     High Blood Pressure Brother     Birth Defects Brother     High Blood Pressure Brother     Heart Disease Sister     High Blood Pressure Sister     High Blood Pressure Sister        REVIEW OF SYSTEMS:   CONSTITUTIONAL: Denies unexplained weight loss, fevers, chills or fatigue  NEUROLOGICAL: Denies unsteady gait or progressive weakness  MUSCULOSKELETAL: Denies joint swelling or redness  GI: Denies nausea, vomiting, diarrhea   : Denies bowel or bladder issues       PHYSICAL EXAM:    Vitals: Blood pressure 130/84, pulse 76, height 6' 2.02\" (1.88 m), weight 264 lb 5.3 oz (119.9 kg). GENERAL EXAM:  · General Apparence: Patient is adequately groomed with no evidence of malnutrition. · Psychiatric: Orientation: The patient is oriented to time, place and person. The patient's mood and affect are appropriate   · Vascular: Examination reveals no swelling and palpation reveals no tenderness in upper or lower extremities. Good capillary refill.    · The lymphatic examination of the neck, axillae and groin reveals all areas to be without enlargement or induration  · Sensation is intact without deficit in the upper and lower extremities to light touch and pinprick  · Coordination of the upper and lower extremities are normal  · Additional Examinations:  · RIGHT UPPER EXTREMITY:  Inspection/examination of the right upper extremity does not show any tenderness, deformity or injury. Range of motion is unremarkable and pain-free. There is no gross instability. There are no rashes, ulcerations or lesions. Strength and tone are normal. No atrophy or abnormal movements are noted. · LEFT UPPER EXTREMITY: Inspection/examination of the left upper extremity does not show any tenderness, deformity or injury. Range of motion is unremarkable and pain-free. There is no gross instability. There are no rashes, ulcerations or lesions. Strength and tone are normal. No atrophy or abnormal movements are noted. LUMBAR/SACRAL EXAMINATION:  · Inspection: Local inspection shows no step-off or bruising. Lumbar alignment is normal. No instability is noted. · Palpation:   No evidence of tenderness at the midline. Lumbar paraspinal tenderness: Mild L4/5 and L5/S1 tenderness  Bursal tenderness No tenderness bilaterally  There is no paraspinal spasm. · Range of Motion: very limited due to pain  · Strength:   Strength testing is 4/5 in all muscle groups tested. · Special Tests:   Straight leg raise and crossed SLR negative. · Skin: There are no rashes, ulcerations or lesions. · Reflexes: Reflexes are symmetrically 1+ at the patellar and ankle tendons. Clonus absent bilaterally at the feet. · Gait & station: ambulates with a walker and no ataxia  · Additional Examinations:  · RIGHT LOWER EXTREMITY: Inspection/examination of the right lower extremity does not show any tenderness, deformity or injury. Range of motion is normal and pain-free. There is no gross instability. There are no rashes, ulcerations or lesions. Strength and tone are normal. No atrophy or abnormal movements are noted. · LEFT LOWER EXTREMITY:  Inspection/examination of the left lower extremity does not show any tenderness, deformity or injury.  Range of motion is normal and pain-free. There is no gross instability. There are no rashes, ulcerations or lesions. Strength and tone are normal. No atrophy or abnormal movements are noted. Diagnostic Testing:    MR Thoracic spine -    1. No acute abnormality identified of the unenhanced thoracic spine. 2. Multilevel degenerative changes of the thoracic spine predominately   contributing to neural foraminal narrowing as above. 3. No significant spinal canal stenosis. Results for orders placed or performed during the hospital encounter of 02/06/20   CBC WITH AUTO DIFFERENTIAL   Result Value Ref Range    WBC 7.2 4.0 - 11.0 K/uL    RBC 4.86 4.20 - 5.90 M/uL    Hemoglobin 15.1 13.5 - 17.5 g/dL    Hematocrit 45.0 40.5 - 52.5 %    MCV 92.6 80.0 - 100.0 fL    MCH 31.0 26.0 - 34.0 pg    MCHC 33.4 31.0 - 36.0 g/dL    RDW 13.4 12.4 - 15.4 %    Platelets 657 494 - 631 K/uL    MPV 8.2 5.0 - 10.5 fL    Neutrophils % 64.1 %    Lymphocytes % 21.5 %    Monocytes % 10.8 %    Eosinophils % 2.8 %    Basophils % 0.8 %    Neutrophils Absolute 4.6 1.7 - 7.7 K/uL    Lymphocytes Absolute 1.6 1.0 - 5.1 K/uL    Monocytes Absolute 0.8 0.0 - 1.3 K/uL    Eosinophils Absolute 0.2 0.0 - 0.6 K/uL    Basophils Absolute 0.1 0.0 - 0.2 K/uL   SEDIMENTATION RATE   Result Value Ref Range    Sed Rate 15 0 - 20 mm/Hr     Impression:       1. Lumbar post-laminectomy syndrome    2. Lumbar radiculopathy    3. Chronic pain syndrome        Plan:  Clinical Course: Above diagnoses are worsening    I discussed the diagnosis and the treatment options with Mariely Dsouza today. In Summary:  The various treatment options were outlined and discussed with Mariely Dsouza including:  Conservative care options: physical therapy, ice, medications, bracing, and activity modification. The indications for therapeutic injections. The indications for additional imaging/laboratory studies. The indications for (possible future) interventions.      After considering the various options discussed, Kj Winchester elected to pursue a course of treatment that includes the followin. Medications:  No further recommendations for new medications. 2. PT:  Encouraged to continue with Home exercise program.    3. Further studies: None at this time      4. Interventional:  We have discussed risks benefits alternatives of a spinal cord stimulator trial.  Risks include but limited to bleeding, infection, epidural hematoma, epidural abscess, spinal cord injury, nerve injury, increasing pain, lack of pain relief. This does not include all possible risks. The patient verbalized understanding and would like to proceed. He will be setup as soon as the [facility] ([facility]) mandate is lifted. 5. Follow up:  4-6 weeks      Kj Winchester was instructed to call the office if his symptoms worsen or if new symptoms appear prior to the next scheduled visit. He is specifically instructed to contact the office between now & his scheduled appointment if he has concerns related to his condition or if he needs assistance in scheduling the above tests. He is welcome to call for an appointment sooner if he has any additional concerns or questions. Juancarlos Reyes ATC, am scribing for and in the presence of Dr. Mary Duncan.   20 10:56 AM Lilliana Lincoln ATC    The physical examination was performed between the patient and Dr. Mary Duncan. All counseling during the appointment was performed between the patient and provider. I, Dr. Micki Martinez. Katia, personally performed the services described in this documentation as scribed by DEIRDRE Betts in my presence and it is both accurate and complete. Angel Ramirez.  Rolando Perdomo MD, SELAM, [facility]  Board Certified in [address] [place] [address]  Certified and Fellowship Trained in [facility] ([facility])             This dictation was performed with a verbal recognition program [facility]) and it was checked for errors. It is possible that there are still dictated errors within this office note. If so, please bring any errors to my attention for an addendum. All efforts were made to ensure that this office note is accurate.

## 2020-04-13 ENCOUNTER — TELEPHONE (OUTPATIENT)
Dept: CARDIOLOGY CLINIC | Age: 65
End: 2020-04-13

## 2020-04-13 NOTE — TELEPHONE ENCOUNTER
His pharmacy told him he needs a PA for his carvedilol before they can fill rx . Please call patient to let him know this has been done .

## 2020-05-11 ENCOUNTER — TELEPHONE (OUTPATIENT)
Dept: ORTHOPEDIC SURGERY | Age: 65
End: 2020-05-11

## 2020-05-11 RX ORDER — CLINDAMYCIN HYDROCHLORIDE 300 MG/1
300 CAPSULE ORAL 3 TIMES DAILY
Qty: 15 CAPSULE | Refills: 0 | Status: SHIPPED | OUTPATIENT
Start: 2020-05-18 | End: 2020-05-23

## 2020-05-13 ENCOUNTER — OFFICE VISIT (OUTPATIENT)
Dept: PRIMARY CARE CLINIC | Age: 65
End: 2020-05-13

## 2020-05-14 ENCOUNTER — VIRTUAL VISIT (OUTPATIENT)
Dept: ORTHOPEDIC SURGERY | Age: 65
End: 2020-05-14
Payer: MEDICARE

## 2020-05-14 VITALS — WEIGHT: 264.99 LBS | BODY MASS INDEX: 34.01 KG/M2 | HEIGHT: 74 IN

## 2020-05-14 LAB — SARS-COV-2: NOT DETECTED

## 2020-05-14 PROCEDURE — 99213 OFFICE O/P EST LOW 20 MIN: CPT | Performed by: PHYSICAL MEDICINE & REHABILITATION

## 2020-05-14 PROCEDURE — 1123F ACP DISCUSS/DSCN MKR DOCD: CPT | Performed by: PHYSICAL MEDICINE & REHABILITATION

## 2020-05-14 PROCEDURE — G8427 DOCREV CUR MEDS BY ELIG CLIN: HCPCS | Performed by: PHYSICAL MEDICINE & REHABILITATION

## 2020-05-14 PROCEDURE — 3017F COLORECTAL CA SCREEN DOC REV: CPT | Performed by: PHYSICAL MEDICINE & REHABILITATION

## 2020-05-14 PROCEDURE — 4040F PNEUMOC VAC/ADMIN/RCVD: CPT | Performed by: PHYSICAL MEDICINE & REHABILITATION

## 2020-05-14 NOTE — RESULT ENCOUNTER NOTE
Please contact patient with their testing results: Your test for COVID-19, also known as novel coronavirus, came back negative. No virus was detected from the sample collected. Until your symptoms are fully resolved, you may still be contagious. We recommend that you remain isolated for 7 days minimum or 72 hours after your symptoms have completely resolved, whichever is longer. Continually monitor symptoms. Contact a medical provider if symptoms are worsening. If you have any additional questions, contact your PCP.     For additional information, please visit the Centers for Disease Control and Prevention ([email or web address]

## 2020-05-14 NOTE — PROGRESS NOTES
like to proceed. 5. Follow up:  1-2 weeks      Areli Covarrubias was instructed to call the office if his symptoms worsen or if new symptoms appear prior to the next scheduled visit. He is specifically instructed to contact the office between now & his scheduled appointment if he has concerns related to his condition or if he needs assistance in scheduling the above tests. He is welcome to call for an appointment sooner if he has any additional concerns or questions. Galdino Hernandez. Loly Jay MD, SELAM, [facility]  Board Certified in [address] [place] [address] Certified and Fellowship Trained in [facility] ([facility])             This dictation was performed with a verbal recognition program [facility]) and it was checked for errors. It is possible that there are still dictated errors within this office note. If so, please bring any errors to my attention for an addendum. All efforts were made to ensure that this office note is accurate. Patient has verbally accepted the following: We confirm that, for purposes of billing, this is a virtual visit with the provider for which we will submit a claim for reimbursement with the insurance company. The patient accepts responsibility for any co-pays, coinsurance amounts or other amounts not covered by the insurance company. Patient location: Home  Physician location: Home   Time spent: 15 min  Present on the call: myself and patient    Pursuant to the emergency declaration under the [facility] waiver authority and the [employer] Act, this Virtual  Visit was conducted, with patient's consent, to reduce the patient's risk of exposure to COVID-19 and provide continuity of care for an established patient.     Services were provided through a synchronous discussion virtually to substitute for in-person clinic

## 2020-05-18 ENCOUNTER — APPOINTMENT (OUTPATIENT)
Dept: GENERAL RADIOLOGY | Age: 65
End: 2020-05-18
Attending: PHYSICAL MEDICINE & REHABILITATION
Payer: MEDICARE

## 2020-05-18 ENCOUNTER — HOSPITAL ENCOUNTER (OUTPATIENT)
Age: 65
Setting detail: OUTPATIENT SURGERY
Discharge: HOME | End: 2020-05-18
Attending: PHYSICAL MEDICINE & REHABILITATION | Admitting: PHYSICAL MEDICINE & REHABILITATION
Payer: MEDICARE

## 2020-05-18 VITALS
HEIGHT: 74 IN | BODY MASS INDEX: 34.01 KG/M2 | WEIGHT: 265 LBS | RESPIRATION RATE: 14 BRPM | DIASTOLIC BLOOD PRESSURE: 89 MMHG | TEMPERATURE: 98.2 F | SYSTOLIC BLOOD PRESSURE: 131 MMHG | HEART RATE: 88 BPM | OXYGEN SATURATION: 96 %

## 2020-05-18 LAB
APTT: 36.8 SEC (ref 24.2–36.2)
GLUCOSE BLD-MCNC: 96 MG/DL (ref 70–99)
HCT VFR BLD CALC: 44.9 % (ref 40.5–52.5)
HEMOGLOBIN: 14.8 G/DL (ref 13.5–17.5)
INR BLD: 0.92 (ref 0.86–1.14)
MCH RBC QN AUTO: 30.4 PG (ref 26–34)
MCHC RBC AUTO-ENTMCNC: 32.9 G/DL (ref 31–36)
MCV RBC AUTO: 92.2 FL (ref 80–100)
PDW BLD-RTO: 14.7 % (ref 12.4–15.4)
PERFORMED ON: NORMAL
PLATELET # BLD: 228 K/UL (ref 135–450)
PMV BLD AUTO: 8.3 FL (ref 5–10.5)
PROTHROMBIN TIME: 10.7 SEC (ref 10–13.2)
RBC # BLD: 4.86 M/UL (ref 4.2–5.9)
WBC # BLD: 7 K/UL (ref 4–11)

## 2020-05-18 PROCEDURE — 85027 COMPLETE CBC AUTOMATED: CPT

## 2020-05-18 PROCEDURE — 3610000058 HC PAIN LEVEL 5 BASE (NON-OR): Performed by: PHYSICAL MEDICINE & REHABILITATION

## 2020-05-18 PROCEDURE — 3209999900 FLUORO FOR SURGICAL PROCEDURES

## 2020-05-18 PROCEDURE — 2580000003 HC RX 258: Performed by: PHYSICAL MEDICINE & REHABILITATION

## 2020-05-18 PROCEDURE — 2500000003 HC RX 250 WO HCPCS: Performed by: PHYSICAL MEDICINE & REHABILITATION

## 2020-05-18 PROCEDURE — 72070 X-RAY EXAM THORAC SPINE 2VWS: CPT

## 2020-05-18 PROCEDURE — 2709999900 HC NON-CHARGEABLE SUPPLY: Performed by: PHYSICAL MEDICINE & REHABILITATION

## 2020-05-18 PROCEDURE — C1778 LEAD, NEUROSTIMULATOR: HCPCS | Performed by: PHYSICAL MEDICINE & REHABILITATION

## 2020-05-18 PROCEDURE — 6360000002 HC RX W HCPCS: Performed by: PHYSICAL MEDICINE & REHABILITATION

## 2020-05-18 PROCEDURE — 2580000003 HC RX 258: Performed by: PHYSICIAN ASSISTANT

## 2020-05-18 PROCEDURE — 36415 COLL VENOUS BLD VENIPUNCTURE: CPT

## 2020-05-18 PROCEDURE — 85610 PROTHROMBIN TIME: CPT

## 2020-05-18 PROCEDURE — 2500000003 HC RX 250 WO HCPCS: Performed by: PHYSICIAN ASSISTANT

## 2020-05-18 PROCEDURE — C1820 GENERATOR NEURO RECHG BAT SY: HCPCS | Performed by: PHYSICAL MEDICINE & REHABILITATION

## 2020-05-18 PROCEDURE — 99153 MOD SED SAME PHYS/QHP EA: CPT | Performed by: PHYSICAL MEDICINE & REHABILITATION

## 2020-05-18 PROCEDURE — 99152 MOD SED SAME PHYS/QHP 5/>YRS: CPT | Performed by: PHYSICAL MEDICINE & REHABILITATION

## 2020-05-18 PROCEDURE — 85730 THROMBOPLASTIN TIME PARTIAL: CPT

## 2020-05-18 PROCEDURE — 3610000059 HC PAIN LEVEL 5 ADDL 15 MIN (NON-OR): Performed by: PHYSICAL MEDICINE & REHABILITATION

## 2020-05-18 DEVICE — 50CM 16 CONTACT TRIAL LEAD KIT
Type: IMPLANTABLE DEVICE | Status: FUNCTIONAL
Brand: INFINION™  16

## 2020-05-18 RX ORDER — GABAPENTIN 300 MG/1
600 CAPSULE ORAL 2 TIMES DAILY
COMMUNITY

## 2020-05-18 RX ORDER — SODIUM CHLORIDE 9 MG/ML
INJECTION INTRAVENOUS
Status: COMPLETED | OUTPATIENT
Start: 2020-05-18 | End: 2020-05-18

## 2020-05-18 RX ORDER — MIDAZOLAM HYDROCHLORIDE 1 MG/ML
INJECTION INTRAMUSCULAR; INTRAVENOUS
Status: COMPLETED | OUTPATIENT
Start: 2020-05-18 | End: 2020-05-18

## 2020-05-18 RX ORDER — LIDOCAINE HYDROCHLORIDE 10 MG/ML
INJECTION, SOLUTION EPIDURAL; INFILTRATION; INTRACAUDAL; PERINEURAL
Status: COMPLETED | OUTPATIENT
Start: 2020-05-18 | End: 2020-05-18

## 2020-05-18 RX ORDER — CLINDAMYCIN PHOSPHATE 900 MG/50ML
900 INJECTION INTRAVENOUS ONCE
Status: COMPLETED | OUTPATIENT
Start: 2020-05-18 | End: 2020-05-18

## 2020-05-18 RX ORDER — SODIUM CHLORIDE 9 MG/ML
INJECTION, SOLUTION INTRAVENOUS CONTINUOUS
Status: DISCONTINUED | OUTPATIENT
Start: 2020-05-18 | End: 2020-05-18 | Stop reason: HOSPADM

## 2020-05-18 RX ADMIN — CLINDAMYCIN PHOSPHATE 900 MG: 900 INJECTION, SOLUTION INTRAVENOUS at 12:22

## 2020-05-18 RX ADMIN — SODIUM CHLORIDE: 9 INJECTION, SOLUTION INTRAVENOUS at 12:22

## 2020-05-18 ASSESSMENT — PAIN - FUNCTIONAL ASSESSMENT
PAIN_FUNCTIONAL_ASSESSMENT: 0-10
PAIN_FUNCTIONAL_ASSESSMENT: PREVENTS OR INTERFERES SOME ACTIVE ACTIVITIES AND ADLS

## 2020-05-18 ASSESSMENT — PAIN DESCRIPTION - DESCRIPTORS: DESCRIPTORS: ACHING;DULL

## 2020-05-18 ASSESSMENT — PAIN SCALES - GENERAL
PAINLEVEL_OUTOF10: 0
PAINLEVEL_OUTOF10: 0

## 2020-05-18 NOTE — H&P
Take 1 tablet by mouth daily 1/6/20  Yes Brooklynn Kearns MD   carvedilol (COREG) 6.25 MG tablet take 1 tablet by mouth twice a day with meals 1/2/20  Yes Brooklynn Kearns MD   atorvastatin (LIPITOR) 40 MG tablet take 1 tablet by mouth once daily 12/31/19  Yes Brooklynn Kaerns MD   traZODone (DESYREL) 50 MG tablet Take 150 mg by mouth nightly    Yes Historical Provider, MD   amLODIPine (NORVASC) 10 MG tablet Take 1 tablet by mouth daily 9/5/19 9/16/20 Yes Brooklynn Kearns MD   olmesartan (BENICAR) 40 MG tablet Take 1 tablet by mouth daily 9/5/19  Yes Brooklynn Kearns MD   aspirin 325 MG tablet Take 325 mg by mouth daily   Yes Historical Provider, MD   tamsulosin (FLOMAX) 0.4 MG capsule Take 0.8 mg by mouth nightly   Yes Historical Provider, MD   Multiple Vitamins-Minerals (CENTRUM SILVER) TABS Take 1 tablet by mouth daily. Yes Historical Provider, MD   meloxicam (MOBIC) 15 MG tablet Take 15 mg by mouth daily. Yes Historical Provider, MD   metformin (GLUCOPHAGE) 1000 MG tablet Take 1,000 mg by mouth 2 times daily. Yes Historical Provider, MD   clindamycin (CLEOCIN) 300 MG capsule Take 1 capsule by mouth 3 times daily for 5 days DO NOT BEGIN MEDICATION UNTIL 5/18/20 5/18/20 5/23/20  ROGELIO Perry   pantoprazole (PROTONIX) 40 MG tablet Take 1 tablet by mouth 2 times daily (before meals) 12/18/18 1/17/19  Nishant Byrd MD   nitroGLYCERIN (NITROSTAT) 0.4 MG SL tablet Place 0.4 mg under the tongue every 5 minutes as needed for Chest pain up to max of 3 total doses. If no relief after 1 dose, call 911. Historical Provider, MD     Allergies:  Penicillins; Percocet [oxycodone-acetaminophen]; and Codeine  Social History:    reports that he quit smoking about 36 years ago. He smoked 1.00 pack per day. He has never used smokeless tobacco. He reports that he does not drink alcohol or use drugs.   Family History:   Family History   Problem Relation Age of Onset    Diabetes Mother     Heart Disease Father     High Blood Pressure Father     High Cholesterol Father     Heart Attack Father     High Blood Pressure Sister     High Blood Pressure Brother     Birth Defects Brother     High Blood Pressure Brother     Heart Disease Sister     High Blood Pressure Sister     High Blood Pressure Sister        Vitals: Blood pressure (!) 144/89, pulse 98, temperature 98.2 °F (36.8 °C), temperature source Temporal, resp. rate 16, height 6' 2\" (1.88 m), weight 265 lb (120.2 kg), SpO2 97 %. PHYSICAL EXAM:including affected areas  HENT: Airway patent and reviewed  Cardiovascular: Normal rate, regular rhythm, normal heart sounds. Pulmonary/Chest: No wheezes. No rhonchi. No rales. Abdominal: Soft. Bowel sounds are normal. No distension. Extremities: Moves all extremities equally  Cervical and Lumbar Spine: Painful range of motion, no midline tenderness       Diagnosis:Lumbar post laminectomy syndrome; Lumbar radiculopathy  M96.1  M54.16  G89.4    Plan: Proceed with planned procedure      ASA CLASS:         []   I. Normal, healthy adult           [x]   II.  Mild systemic disease            []   III. Severe systemic disease      Mallampati: Mallampati Class II - (soft palate, fauces & uvula are visible)      Sedation plan:   [x]  Local              []  Minimal                  []  General anesthesia    Patient's condition acceptable for planned procedure/sedation. Post Procedure Plan   Return to same level of care   ______________________     The patient was counseled at length about the risks of neeraj Covid-19 in the joe-operative and post-operative states including the recovery window of their procedure. The patient was made aware that neeraj Covid-19 after a surgical procedure may worsen their prognosis for recovering from the virus and lend to a higher morbidity and or mortality risk. The patient was given the options of postponing their procedure. All of the risks, benefits, and alternatives were discussed.

## 2020-05-19 ENCOUNTER — TELEPHONE (OUTPATIENT)
Dept: ORTHOPEDIC SURGERY | Age: 65
End: 2020-05-19

## 2020-05-19 NOTE — TELEPHONE ENCOUNTER
Patient underwent SCS trial on 5/18/20. Today the patient reports low back pain level to be 1-2/10 and bilateral leg pain level to be 1-2/10. The patient reports that since the SCS trial, they are able to walk better. They are also able to stand better, along with sitting better. The patient notes that sleeping better since the trial began. The patient denies any new symptoms such as leg weakness/numbness/tingling. The patient also denies any fevers or chills. The patient reports taking antibiotic 3x daily since procedure. Overall, as of day 2, the patient notes 80% overall improvement.

## 2020-05-21 NOTE — TELEPHONE ENCOUNTER
Patient underwent SCS trial on 5/18/20. Today the patient reports low back pain level to be 2-3/10 and bilateral leg pain level to be 1/10. The patient reports that since the SCS trial, they are able to walk better. They are also able to stand better, along with sitting better. The patient notes that sleeping unchanged since the trial began. The patient denies any new symptoms such as leg weakness/numbness/tingling. The patient also taking any fevers or chills. The patient reports taking antibiotic 3x daily since procedure. Overall, as of day four, the patient notes 75-80% overall improvement. Comments: Patient was able to grill today which he was not able to previously do, his pain increased slightly to a 4/10. He was able to walk around [address] and was able to walk with no problem. The patient would like to proceed with the permanent implant at this time.

## 2020-05-22 ENCOUNTER — OFFICE VISIT (OUTPATIENT)
Dept: ORTHOPEDIC SURGERY | Age: 65
End: 2020-05-22

## 2020-05-22 VITALS
BODY MASS INDEX: 34.01 KG/M2 | SYSTOLIC BLOOD PRESSURE: 151 MMHG | DIASTOLIC BLOOD PRESSURE: 90 MMHG | HEIGHT: 74 IN | TEMPERATURE: 98.4 F | HEART RATE: 88 BPM | WEIGHT: 264.99 LBS

## 2020-05-22 PROCEDURE — 99024 POSTOP FOLLOW-UP VISIT: CPT | Performed by: PHYSICIAN ASSISTANT

## 2020-05-22 NOTE — PROGRESS NOTES
 GERD (gastroesophageal reflux disease)     Hyperlipidemia     Hypertension     Medical history reviewed with no changes       Past Surgical History:     Past Surgical History:   Procedure Laterality Date    BACK SURGERY      laminectomy & rods    CARDIAC SURGERY      stents    CORONARY ANGIOPLASTY WITH STENT PLACEMENT  10/2003    EPIDURAL STEROID INJECTION Left 7/23/2019    LEFT LUMBAR THREE LUMBAR FOUR EPIDURAL STEROID INJECTION SITE CONFIRMED BY FLUOROSCOPY performed by Wilfrid Serrano MD at [facility] Left 9/10/2019    LEFT SACROILIAC JOINT INJECTION SITE CONFIRMED BY FLUOROSCOPY performed by Wilfrid Serrano MD at [facility] 173      Repaired torn MCL TOTAL RIGHT   [facility] N/A 5/18/2020    SPINAL CORD STIMULATOR TRIAL WITH FLUOROSCOPY (53944, 18380, 52479) - [employer] performed by Yoanna Manzo MD at [address] Right 2014     Current Medications:     Current Outpatient Medications:     gabapentin (NEURONTIN) 300 MG capsule, Take 300 mg by mouth daily. , Disp: , Rfl:     clindamycin (CLEOCIN) 300 MG capsule, Take 1 capsule by mouth 3 times daily for 5 days DO NOT BEGIN MEDICATION UNTIL 5/18/20, Disp: 15 capsule, Rfl: 0    glipiZIDE (GLUCOTROL) 5 MG tablet, , Disp: , Rfl:     isosorbide mononitrate (IMDUR) 30 MG extended release tablet, Take 1 tablet by mouth daily, Disp: 90 tablet, Rfl: 1    carvedilol (COREG) 6.25 MG tablet, take 1 tablet by mouth twice a day with meals, Disp: 180 tablet, Rfl: 1    atorvastatin (LIPITOR) 40 MG tablet, take 1 tablet by mouth once daily, Disp: 90 tablet, Rfl: 1    traZODone (DESYREL) 50 MG tablet, Take 150 mg by mouth nightly , Disp: , Rfl:     amLODIPine (NORVASC) 10 MG tablet, Take 1 tablet by mouth daily, Disp: 90 tablet, Rfl: 3    olmesartan (BENICAR) 40 MG tablet, Take 1 tablet by mouth daily, Disp: 90 tablet, Rfl: 3    aspirin 325 MG tablet, Take 325 mg by mouth daily, Disp: , Rfl:     nitroGLYCERIN (NITROSTAT) 0.4 MG SL tablet, Place 0.4 mg under the tongue every 5 minutes as needed for Chest pain up to max of 3 total doses. If no relief after 1 dose, call 911., Disp: , Rfl:     tamsulosin (FLOMAX) 0.4 MG capsule, Take 0.8 mg by mouth nightly, Disp: , Rfl:     Multiple Vitamins-Minerals (CENTRUM SILVER) TABS, Take 1 tablet by mouth daily. , Disp: , Rfl:     meloxicam (MOBIC) 15 MG tablet, Take 15 mg by mouth daily. , Disp: , Rfl:     metformin (GLUCOPHAGE) 1000 MG tablet, Take 1,000 mg by mouth 2 times daily. , Disp: , Rfl:   Allergies:  Penicillins; Percocet [oxycodone-acetaminophen]; and Codeine  Social History:    reports that he quit smoking about 36 years ago. He smoked 1.00 pack per day. He has never used smokeless tobacco. He reports that he does not drink alcohol or use drugs. Family History:   Family History   Problem Relation Age of Onset    Diabetes Mother     Heart Disease Father     High Blood Pressure Father     High Cholesterol Father     Heart Attack Father     High Blood Pressure Sister     High Blood Pressure Brother     Birth Defects Brother     High Blood Pressure Brother     Heart Disease Sister     High Blood Pressure Sister     High Blood Pressure Sister        REVIEW OF SYSTEMS:   CONSTITUTIONAL: Denies unexplained weight loss, fevers, chills or fatigue  NEUROLOGICAL: Denies unsteady gait or progressive weakness  MUSCULOSKELETAL: Denies joint swelling or redness  GI: Denies nausea, vomiting, diarrhea   : Denies bowel or bladder issues       PHYSICAL EXAM:    Vitals: Blood pressure (!) 151/90, pulse 88, temperature 98.4 °F (36.9 °C), temperature source Temporal, height 6' 2.02\" (1.88 m), weight 264 lb 15.9 oz (120.2 kg). Patient was placed in the prone position. 2 leads were removed intact. There were no signs of any erythema warmth or drainage.   [employer]

## 2020-06-24 ENCOUNTER — TELEPHONE (OUTPATIENT)
Dept: CARDIOLOGY CLINIC | Age: 65
End: 2020-06-24

## 2020-06-24 NOTE — TELEPHONE ENCOUNTER
Called and spoke to the pt wife to let them know I have faxed over the information needed below. Conformation was successful.

## 2020-07-07 RX ORDER — CARVEDILOL 6.25 MG/1
TABLET ORAL
Qty: 180 TABLET | Refills: 1 | Status: ON HOLD | OUTPATIENT
Start: 2020-07-07 | End: 2020-08-17 | Stop reason: HOSPADM

## 2020-07-07 RX ORDER — ATORVASTATIN CALCIUM 40 MG/1
TABLET, FILM COATED ORAL
Qty: 90 TABLET | Refills: 3 | Status: SHIPPED | OUTPATIENT
Start: 2020-07-07 | End: 2020-11-12 | Stop reason: SDUPTHER

## 2020-07-07 RX ORDER — ISOSORBIDE MONONITRATE 30 MG/1
TABLET, EXTENDED RELEASE ORAL
Qty: 90 TABLET | Refills: 3 | Status: ON HOLD | OUTPATIENT
Start: 2020-07-07 | End: 2020-08-14 | Stop reason: HOSPADM

## 2020-07-07 NOTE — TELEPHONE ENCOUNTER
Requested Prescriptions     Pending Prescriptions Disp Refills    atorvastatin (LIPITOR) 40 MG tablet [Pharmacy Med Name: ATORVASTATIN 40 MG TABLET] 90 tablet 1     Sig: take 1 tablet by mouth once daily    carvedilol (COREG) 6.25 MG tablet [Pharmacy Med Name: CARVEDILOL 6.25 MG TABLET] 180 tablet 1     Sig: take 1 tablet by mouth twice a day with meals    isosorbide mononitrate (IMDUR) 30 MG extended release tablet [Pharmacy Med Name: ISOSORBIDE MONONIT ER 30 MG TB] 90 tablet 1     Sig: take 1 tablet by mouth once daily      Lipitor    Number: 90    Refills: 3    Last Office Visit: 3/12/2020     Next Office Visit: 9/14/2020     Coreg    Number: 180    Refills: 1    Last Office Visit: 3/12/2020     Next Office Visit: 9/14/2020     Isosorbide    Number: 90    Refills: 3    Last Office Visit: 3/12/2020     Next Office Visit: 9/14/2020

## 2020-08-04 ENCOUNTER — HOSPITAL ENCOUNTER (INPATIENT)
Age: 65
LOS: 13 days | Discharge: HOME HEALTH | DRG: 234 | End: 2020-08-17
Attending: FAMILY MEDICINE | Admitting: INTERNAL MEDICINE
Payer: MEDICARE

## 2020-08-04 ENCOUNTER — TELEPHONE (OUTPATIENT)
Dept: CARDIOLOGY CLINIC | Age: 65
End: 2020-08-04

## 2020-08-04 PROBLEM — I47.20 VENTRICULAR TACHYCARDIA (HCC): Status: ACTIVE | Noted: 2020-08-04

## 2020-08-04 LAB
A/G RATIO: 1.5 (ref 1.1–2.2)
ALBUMIN SERPL-MCNC: 4.8 G/DL (ref 3.4–5)
ALP BLD-CCNC: 127 U/L (ref 40–129)
ALT SERPL-CCNC: 15 U/L (ref 10–40)
ANION GAP SERPL CALCULATED.3IONS-SCNC: 13 MMOL/L (ref 3–16)
AST SERPL-CCNC: 11 U/L (ref 15–37)
BILIRUB SERPL-MCNC: 0.3 MG/DL (ref 0–1)
BUN BLDV-MCNC: 20 MG/DL (ref 7–20)
CALCIUM SERPL-MCNC: 9.9 MG/DL (ref 8.3–10.6)
CHLORIDE BLD-SCNC: 100 MMOL/L (ref 99–110)
CO2: 25 MMOL/L (ref 21–32)
CREAT SERPL-MCNC: 1.2 MG/DL (ref 0.8–1.3)
GFR AFRICAN AMERICAN: >60
GFR NON-AFRICAN AMERICAN: >60
GLOBULIN: 3.2 G/DL
GLUCOSE BLD-MCNC: 162 MG/DL (ref 70–99)
MAGNESIUM: 2 MG/DL (ref 1.8–2.4)
PHOSPHORUS: 4.9 MG/DL (ref 2.5–4.9)
POTASSIUM SERPL-SCNC: 4.2 MMOL/L (ref 3.5–5.1)
SODIUM BLD-SCNC: 138 MMOL/L (ref 136–145)
TOTAL PROTEIN: 8 G/DL (ref 6.4–8.2)
TROPONIN: <0.01 NG/ML

## 2020-08-04 PROCEDURE — 83735 ASSAY OF MAGNESIUM: CPT

## 2020-08-04 PROCEDURE — 80053 COMPREHEN METABOLIC PANEL: CPT

## 2020-08-04 PROCEDURE — 84443 ASSAY THYROID STIM HORMONE: CPT

## 2020-08-04 PROCEDURE — 6370000000 HC RX 637 (ALT 250 FOR IP): Performed by: INTERNAL MEDICINE

## 2020-08-04 PROCEDURE — 84100 ASSAY OF PHOSPHORUS: CPT

## 2020-08-04 PROCEDURE — 84484 ASSAY OF TROPONIN QUANT: CPT

## 2020-08-04 PROCEDURE — 2000000000 HC ICU R&B

## 2020-08-04 PROCEDURE — 6360000002 HC RX W HCPCS: Performed by: INTERNAL MEDICINE

## 2020-08-04 PROCEDURE — 2580000003 HC RX 258: Performed by: INTERNAL MEDICINE

## 2020-08-04 RX ORDER — NITROGLYCERIN 0.4 MG/1
0.4 TABLET SUBLINGUAL EVERY 5 MIN PRN
Status: DISCONTINUED | OUTPATIENT
Start: 2020-08-04 | End: 2020-08-12

## 2020-08-04 RX ORDER — LOSARTAN POTASSIUM 100 MG/1
100 TABLET ORAL DAILY
Status: DISCONTINUED | OUTPATIENT
Start: 2020-08-05 | End: 2020-08-11

## 2020-08-04 RX ORDER — GABAPENTIN 300 MG/1
600 CAPSULE ORAL 2 TIMES DAILY
Status: DISCONTINUED | OUTPATIENT
Start: 2020-08-04 | End: 2020-08-17 | Stop reason: HOSPADM

## 2020-08-04 RX ORDER — ONDANSETRON 2 MG/ML
4 INJECTION INTRAMUSCULAR; INTRAVENOUS EVERY 6 HOURS PRN
Status: DISCONTINUED | OUTPATIENT
Start: 2020-08-04 | End: 2020-08-06 | Stop reason: SDUPTHER

## 2020-08-04 RX ORDER — OLMESARTAN MEDOXOMIL 20 MG/1
40 TABLET ORAL DAILY
Status: DISCONTINUED | OUTPATIENT
Start: 2020-08-05 | End: 2020-08-04 | Stop reason: CLARIF

## 2020-08-04 RX ORDER — MAGNESIUM SULFATE 1 G/100ML
1 INJECTION INTRAVENOUS PRN
Status: DISCONTINUED | OUTPATIENT
Start: 2020-08-04 | End: 2020-08-12

## 2020-08-04 RX ORDER — ACETAMINOPHEN 325 MG/1
650 TABLET ORAL EVERY 6 HOURS PRN
Status: DISCONTINUED | OUTPATIENT
Start: 2020-08-04 | End: 2020-08-06 | Stop reason: DRUGHIGH

## 2020-08-04 RX ORDER — ISOSORBIDE MONONITRATE 30 MG/1
30 TABLET, EXTENDED RELEASE ORAL DAILY
Status: DISCONTINUED | OUTPATIENT
Start: 2020-08-05 | End: 2020-08-12

## 2020-08-04 RX ORDER — TRAZODONE HYDROCHLORIDE 50 MG/1
150 TABLET ORAL NIGHTLY
Status: DISCONTINUED | OUTPATIENT
Start: 2020-08-04 | End: 2020-08-07

## 2020-08-04 RX ORDER — TRAMADOL HYDROCHLORIDE 50 MG/1
50 TABLET ORAL EVERY 6 HOURS PRN
Status: DISCONTINUED | OUTPATIENT
Start: 2020-08-04 | End: 2020-08-12

## 2020-08-04 RX ORDER — POTASSIUM CHLORIDE 7.45 MG/ML
10 INJECTION INTRAVENOUS PRN
Status: DISCONTINUED | OUTPATIENT
Start: 2020-08-04 | End: 2020-08-12

## 2020-08-04 RX ORDER — PROMETHAZINE HYDROCHLORIDE 25 MG/1
12.5 TABLET ORAL EVERY 6 HOURS PRN
Status: DISCONTINUED | OUTPATIENT
Start: 2020-08-04 | End: 2020-08-12

## 2020-08-04 RX ORDER — SODIUM CHLORIDE 0.9 % (FLUSH) 0.9 %
10 SYRINGE (ML) INJECTION EVERY 12 HOURS SCHEDULED
Status: DISCONTINUED | OUTPATIENT
Start: 2020-08-04 | End: 2020-08-10 | Stop reason: SDUPTHER

## 2020-08-04 RX ORDER — CYCLOBENZAPRINE HCL 10 MG
10 TABLET ORAL 3 TIMES DAILY PRN
Status: DISCONTINUED | OUTPATIENT
Start: 2020-08-04 | End: 2020-08-04 | Stop reason: SDUPTHER

## 2020-08-04 RX ORDER — POLYETHYLENE GLYCOL 3350 17 G/17G
17 POWDER, FOR SOLUTION ORAL DAILY PRN
Status: DISCONTINUED | OUTPATIENT
Start: 2020-08-04 | End: 2020-08-12

## 2020-08-04 RX ORDER — CYCLOBENZAPRINE HCL 10 MG
10 TABLET ORAL 3 TIMES DAILY PRN
COMMUNITY

## 2020-08-04 RX ORDER — ACETAMINOPHEN 650 MG/1
650 SUPPOSITORY RECTAL EVERY 6 HOURS PRN
Status: DISCONTINUED | OUTPATIENT
Start: 2020-08-04 | End: 2020-08-06 | Stop reason: DRUGHIGH

## 2020-08-04 RX ORDER — GABAPENTIN 300 MG/1
300 CAPSULE ORAL DAILY
Status: DISCONTINUED | OUTPATIENT
Start: 2020-08-05 | End: 2020-08-04

## 2020-08-04 RX ORDER — M-VIT,TX,IRON,MINS/CALC/FOLIC 27MG-0.4MG
1 TABLET ORAL DAILY
Status: DISCONTINUED | OUTPATIENT
Start: 2020-08-05 | End: 2020-08-17 | Stop reason: HOSPADM

## 2020-08-04 RX ORDER — POTASSIUM CHLORIDE 20 MEQ/1
40 TABLET, EXTENDED RELEASE ORAL PRN
Status: DISCONTINUED | OUTPATIENT
Start: 2020-08-04 | End: 2020-08-12

## 2020-08-04 RX ORDER — CYCLOBENZAPRINE HCL 10 MG
10 TABLET ORAL 3 TIMES DAILY PRN
Status: DISCONTINUED | OUTPATIENT
Start: 2020-08-04 | End: 2020-08-08

## 2020-08-04 RX ORDER — AMLODIPINE BESYLATE 5 MG/1
10 TABLET ORAL DAILY
Status: DISCONTINUED | OUTPATIENT
Start: 2020-08-05 | End: 2020-08-12

## 2020-08-04 RX ORDER — ASPIRIN 325 MG
325 TABLET ORAL DAILY
Status: DISCONTINUED | OUTPATIENT
Start: 2020-08-05 | End: 2020-08-12

## 2020-08-04 RX ORDER — CARVEDILOL 6.25 MG/1
6.25 TABLET ORAL 2 TIMES DAILY WITH MEALS
Status: DISCONTINUED | OUTPATIENT
Start: 2020-08-04 | End: 2020-08-05

## 2020-08-04 RX ORDER — METOPROLOL TARTRATE 5 MG/5ML
5 INJECTION INTRAVENOUS EVERY 6 HOURS PRN
Status: DISCONTINUED | OUTPATIENT
Start: 2020-08-04 | End: 2020-08-12

## 2020-08-04 RX ORDER — ATORVASTATIN CALCIUM 40 MG/1
40 TABLET, FILM COATED ORAL NIGHTLY
Status: DISCONTINUED | OUTPATIENT
Start: 2020-08-04 | End: 2020-08-08

## 2020-08-04 RX ORDER — TAMSULOSIN HYDROCHLORIDE 0.4 MG/1
0.8 CAPSULE ORAL NIGHTLY
Status: DISCONTINUED | OUTPATIENT
Start: 2020-08-04 | End: 2020-08-17 | Stop reason: HOSPADM

## 2020-08-04 RX ORDER — SODIUM CHLORIDE 0.9 % (FLUSH) 0.9 %
10 SYRINGE (ML) INJECTION PRN
Status: DISCONTINUED | OUTPATIENT
Start: 2020-08-04 | End: 2020-08-10 | Stop reason: SDUPTHER

## 2020-08-04 RX ADMIN — Medication 10 ML: at 22:46

## 2020-08-04 RX ADMIN — TAMSULOSIN HYDROCHLORIDE 0.8 MG: 0.4 CAPSULE ORAL at 22:45

## 2020-08-04 RX ADMIN — GABAPENTIN 600 MG: 300 CAPSULE ORAL at 22:54

## 2020-08-04 RX ADMIN — CARVEDILOL 6.25 MG: 6.25 TABLET, FILM COATED ORAL at 22:44

## 2020-08-04 RX ADMIN — TRAZODONE HYDROCHLORIDE 150 MG: 50 TABLET ORAL at 22:50

## 2020-08-04 RX ADMIN — DESMOPRESSIN ACETATE 40 MG: 0.2 TABLET ORAL at 22:44

## 2020-08-04 RX ADMIN — AMIODARONE HYDROCHLORIDE 1 MG/MIN: 50 INJECTION, SOLUTION INTRAVENOUS at 22:30

## 2020-08-04 ASSESSMENT — PAIN DESCRIPTION - DESCRIPTORS: DESCRIPTORS: CONSTANT

## 2020-08-04 ASSESSMENT — PAIN SCALES - GENERAL: PAINLEVEL_OUTOF10: 4

## 2020-08-04 ASSESSMENT — PAIN DESCRIPTION - LOCATION: LOCATION: BACK

## 2020-08-04 ASSESSMENT — PAIN DESCRIPTION - PAIN TYPE: TYPE: CHRONIC PAIN

## 2020-08-04 NOTE — TELEPHONE ENCOUNTER
Patient urologist, Dr Ricardo Shearer, called stating was in his office with a heart rate of 222bpm. I advised Dr Ricardo Shearer that patient should be evaluated in an ER.

## 2020-08-05 LAB
A/G RATIO: 1.5 (ref 1.1–2.2)
ABO/RH: NORMAL
ALBUMIN SERPL-MCNC: 3.8 G/DL (ref 3.4–5)
ALP BLD-CCNC: 108 U/L (ref 40–129)
ALT SERPL-CCNC: 11 U/L (ref 10–40)
ANION GAP SERPL CALCULATED.3IONS-SCNC: 11 MMOL/L (ref 3–16)
ANTIBODY SCREEN: NORMAL
APTT: 203.7 SEC (ref 24.2–36.2)
AST SERPL-CCNC: 9 U/L (ref 15–37)
BASOPHILS ABSOLUTE: 0 K/UL (ref 0–0.2)
BASOPHILS ABSOLUTE: 0.1 K/UL (ref 0–0.2)
BASOPHILS RELATIVE PERCENT: 0.4 %
BASOPHILS RELATIVE PERCENT: 1.7 %
BILIRUB SERPL-MCNC: 0.6 MG/DL (ref 0–1)
BUN BLDV-MCNC: 19 MG/DL (ref 7–20)
CALCIUM SERPL-MCNC: 8.9 MG/DL (ref 8.3–10.6)
CHLORIDE BLD-SCNC: 102 MMOL/L (ref 99–110)
CO2: 23 MMOL/L (ref 21–32)
CREAT SERPL-MCNC: 1 MG/DL (ref 0.8–1.3)
EOSINOPHILS ABSOLUTE: 0.2 K/UL (ref 0–0.6)
EOSINOPHILS ABSOLUTE: 0.2 K/UL (ref 0–0.6)
EOSINOPHILS RELATIVE PERCENT: 2.1 %
EOSINOPHILS RELATIVE PERCENT: 2.5 %
GFR AFRICAN AMERICAN: >60
GFR NON-AFRICAN AMERICAN: >60
GLOBULIN: 2.6 G/DL
GLUCOSE BLD-MCNC: 158 MG/DL (ref 70–99)
HCT VFR BLD CALC: 39.8 % (ref 40.5–52.5)
HCT VFR BLD CALC: 40.4 % (ref 40.5–52.5)
HEMOGLOBIN: 13 G/DL (ref 13.5–17.5)
HEMOGLOBIN: 13.4 G/DL (ref 13.5–17.5)
INR BLD: 1.09 (ref 0.86–1.14)
LEFT VENTRICULAR EJECTION FRACTION MODE: NORMAL
LEFT VENTRICULAR EJECTION FRACTION MODE: NORMAL
LV EF: 45 %
LV EF: 45 %
LV EF: 60 %
LVEF MODALITY: NORMAL
LYMPHOCYTES ABSOLUTE: 1.2 K/UL (ref 1–5.1)
LYMPHOCYTES ABSOLUTE: 1.5 K/UL (ref 1–5.1)
LYMPHOCYTES RELATIVE PERCENT: 14.9 %
LYMPHOCYTES RELATIVE PERCENT: 20.8 %
MCH RBC QN AUTO: 29.9 PG (ref 26–34)
MCH RBC QN AUTO: 30.3 PG (ref 26–34)
MCHC RBC AUTO-ENTMCNC: 32.7 G/DL (ref 31–36)
MCHC RBC AUTO-ENTMCNC: 33.2 G/DL (ref 31–36)
MCV RBC AUTO: 91.3 FL (ref 80–100)
MCV RBC AUTO: 91.4 FL (ref 80–100)
MONOCYTES ABSOLUTE: 0.6 K/UL (ref 0–1.3)
MONOCYTES ABSOLUTE: 0.6 K/UL (ref 0–1.3)
MONOCYTES RELATIVE PERCENT: 7.2 %
MONOCYTES RELATIVE PERCENT: 8 %
NEUTROPHILS ABSOLUTE: 4.9 K/UL (ref 1.7–7.7)
NEUTROPHILS ABSOLUTE: 6.1 K/UL (ref 1.7–7.7)
NEUTROPHILS RELATIVE PERCENT: 68.3 %
NEUTROPHILS RELATIVE PERCENT: 74.1 %
PDW BLD-RTO: 14.4 % (ref 12.4–15.4)
PDW BLD-RTO: 14.4 % (ref 12.4–15.4)
PLATELET # BLD: 202 K/UL (ref 135–450)
PLATELET # BLD: 204 K/UL (ref 135–450)
PMV BLD AUTO: 7.9 FL (ref 5–10.5)
PMV BLD AUTO: 7.9 FL (ref 5–10.5)
POC ACT LR: 219 SEC
POTASSIUM SERPL-SCNC: 3.9 MMOL/L (ref 3.5–5.1)
PROTHROMBIN TIME: 12.6 SEC (ref 10–13.2)
RBC # BLD: 4.36 M/UL (ref 4.2–5.9)
RBC # BLD: 4.43 M/UL (ref 4.2–5.9)
SODIUM BLD-SCNC: 136 MMOL/L (ref 136–145)
TOTAL PROTEIN: 6.4 G/DL (ref 6.4–8.2)
TROPONIN: <0.01 NG/ML
TROPONIN: <0.01 NG/ML
TSH REFLEX: 2.22 UIU/ML (ref 0.27–4.2)
WBC # BLD: 7.2 K/UL (ref 4–11)
WBC # BLD: 8.3 K/UL (ref 4–11)

## 2020-08-05 PROCEDURE — 2580000003 HC RX 258: Performed by: INTERNAL MEDICINE

## 2020-08-05 PROCEDURE — 6370000000 HC RX 637 (ALT 250 FOR IP): Performed by: INTERNAL MEDICINE

## 2020-08-05 PROCEDURE — 93458 L HRT ARTERY/VENTRICLE ANGIO: CPT | Performed by: INTERNAL MEDICINE

## 2020-08-05 PROCEDURE — 2500000003 HC RX 250 WO HCPCS

## 2020-08-05 PROCEDURE — 92978 ENDOLUMINL IVUS OCT C 1ST: CPT

## 2020-08-05 PROCEDURE — B2151ZZ FLUOROSCOPY OF LEFT HEART USING LOW OSMOLAR CONTRAST: ICD-10-PCS | Performed by: INTERNAL MEDICINE

## 2020-08-05 PROCEDURE — 99223 1ST HOSP IP/OBS HIGH 75: CPT | Performed by: INTERNAL MEDICINE

## 2020-08-05 PROCEDURE — 85730 THROMBOPLASTIN TIME PARTIAL: CPT

## 2020-08-05 PROCEDURE — C1887 CATHETER, GUIDING: HCPCS

## 2020-08-05 PROCEDURE — 83036 HEMOGLOBIN GLYCOSYLATED A1C: CPT

## 2020-08-05 PROCEDURE — 86900 BLOOD TYPING SEROLOGIC ABO: CPT

## 2020-08-05 PROCEDURE — 2709999900 HC NON-CHARGEABLE SUPPLY

## 2020-08-05 PROCEDURE — 93458 L HRT ARTERY/VENTRICLE ANGIO: CPT

## 2020-08-05 PROCEDURE — 85025 COMPLETE CBC W/AUTO DIFF WBC: CPT

## 2020-08-05 PROCEDURE — 86901 BLOOD TYPING SEROLOGIC RH(D): CPT

## 2020-08-05 PROCEDURE — C1894 INTRO/SHEATH, NON-LASER: HCPCS

## 2020-08-05 PROCEDURE — C1753 CATH, INTRAVAS ULTRASOUND: HCPCS

## 2020-08-05 PROCEDURE — 84484 ASSAY OF TROPONIN QUANT: CPT

## 2020-08-05 PROCEDURE — 85347 COAGULATION TIME ACTIVATED: CPT

## 2020-08-05 PROCEDURE — 2000000000 HC ICU R&B

## 2020-08-05 PROCEDURE — 86850 RBC ANTIBODY SCREEN: CPT

## 2020-08-05 PROCEDURE — 2580000003 HC RX 258

## 2020-08-05 PROCEDURE — 80061 LIPID PANEL: CPT

## 2020-08-05 PROCEDURE — B2111ZZ FLUOROSCOPY OF MULTIPLE CORONARY ARTERIES USING LOW OSMOLAR CONTRAST: ICD-10-PCS | Performed by: INTERNAL MEDICINE

## 2020-08-05 PROCEDURE — 99153 MOD SED SAME PHYS/QHP EA: CPT

## 2020-08-05 PROCEDURE — 80053 COMPREHEN METABOLIC PANEL: CPT

## 2020-08-05 PROCEDURE — 4A023N7 MEASUREMENT OF CARDIAC SAMPLING AND PRESSURE, LEFT HEART, PERCUTANEOUS APPROACH: ICD-10-PCS | Performed by: INTERNAL MEDICINE

## 2020-08-05 PROCEDURE — 6360000002 HC RX W HCPCS: Performed by: INTERNAL MEDICINE

## 2020-08-05 PROCEDURE — 6360000002 HC RX W HCPCS

## 2020-08-05 PROCEDURE — 6360000004 HC RX CONTRAST MEDICATION: Performed by: INTERNAL MEDICINE

## 2020-08-05 PROCEDURE — C1769 GUIDE WIRE: HCPCS

## 2020-08-05 PROCEDURE — 85610 PROTHROMBIN TIME: CPT

## 2020-08-05 PROCEDURE — 36415 COLL VENOUS BLD VENIPUNCTURE: CPT

## 2020-08-05 PROCEDURE — B240ZZ3 ULTRASONOGRAPHY OF SINGLE CORONARY ARTERY, INTRAVASCULAR: ICD-10-PCS | Performed by: INTERNAL MEDICINE

## 2020-08-05 PROCEDURE — 99152 MOD SED SAME PHYS/QHP 5/>YRS: CPT

## 2020-08-05 PROCEDURE — 92978 ENDOLUMINL IVUS OCT C 1ST: CPT | Performed by: INTERNAL MEDICINE

## 2020-08-05 PROCEDURE — 93306 TTE W/DOPPLER COMPLETE: CPT

## 2020-08-05 RX ORDER — ONDANSETRON 2 MG/ML
4 INJECTION INTRAMUSCULAR; INTRAVENOUS EVERY 6 HOURS PRN
Status: DISCONTINUED | OUTPATIENT
Start: 2020-08-05 | End: 2020-08-12

## 2020-08-05 RX ORDER — OXYCODONE HYDROCHLORIDE AND ACETAMINOPHEN 5; 325 MG/1; MG/1
2 TABLET ORAL EVERY 4 HOURS PRN
Status: DISCONTINUED | OUTPATIENT
Start: 2020-08-05 | End: 2020-08-12

## 2020-08-05 RX ORDER — SODIUM CHLORIDE 0.9 % (FLUSH) 0.9 %
10 SYRINGE (ML) INJECTION EVERY 12 HOURS SCHEDULED
Status: DISCONTINUED | OUTPATIENT
Start: 2020-08-05 | End: 2020-08-12

## 2020-08-05 RX ORDER — CARVEDILOL 6.25 MG/1
12.5 TABLET ORAL 2 TIMES DAILY WITH MEALS
Status: DISCONTINUED | OUTPATIENT
Start: 2020-08-05 | End: 2020-08-07

## 2020-08-05 RX ORDER — OXYCODONE HYDROCHLORIDE AND ACETAMINOPHEN 5; 325 MG/1; MG/1
1 TABLET ORAL EVERY 4 HOURS PRN
Status: DISCONTINUED | OUTPATIENT
Start: 2020-08-05 | End: 2020-08-12

## 2020-08-05 RX ORDER — ACETAMINOPHEN 325 MG/1
650 TABLET ORAL EVERY 4 HOURS PRN
Status: DISCONTINUED | OUTPATIENT
Start: 2020-08-05 | End: 2020-08-12

## 2020-08-05 RX ORDER — SODIUM CHLORIDE 9 MG/ML
INJECTION, SOLUTION INTRAVENOUS CONTINUOUS
Status: ACTIVE | OUTPATIENT
Start: 2020-08-05 | End: 2020-08-06

## 2020-08-05 RX ORDER — SODIUM CHLORIDE 0.9 % (FLUSH) 0.9 %
10 SYRINGE (ML) INJECTION PRN
Status: DISCONTINUED | OUTPATIENT
Start: 2020-08-05 | End: 2020-08-12

## 2020-08-05 RX ADMIN — MULTIPLE VITAMINS W/ MINERALS TAB 1 TABLET: TAB at 08:46

## 2020-08-05 RX ADMIN — IOPAMIDOL 60 ML: 755 INJECTION, SOLUTION INTRAVENOUS at 18:14

## 2020-08-05 RX ADMIN — ASPIRIN 325 MG ORAL TABLET 325 MG: 325 PILL ORAL at 08:45

## 2020-08-05 RX ADMIN — SODIUM CHLORIDE: 9 INJECTION, SOLUTION INTRAVENOUS at 19:05

## 2020-08-05 RX ADMIN — AMLODIPINE BESYLATE 10 MG: 5 TABLET ORAL at 08:45

## 2020-08-05 RX ADMIN — AMIODARONE HYDROCHLORIDE 0.5 MG/MIN: 50 INJECTION, SOLUTION INTRAVENOUS at 07:19

## 2020-08-05 RX ADMIN — GABAPENTIN 600 MG: 300 CAPSULE ORAL at 20:20

## 2020-08-05 RX ADMIN — TAMSULOSIN HYDROCHLORIDE 0.8 MG: 0.4 CAPSULE ORAL at 20:20

## 2020-08-05 RX ADMIN — DESMOPRESSIN ACETATE 40 MG: 0.2 TABLET ORAL at 20:20

## 2020-08-05 RX ADMIN — LOSARTAN POTASSIUM 100 MG: 100 TABLET, FILM COATED ORAL at 08:46

## 2020-08-05 RX ADMIN — ISOSORBIDE MONONITRATE 30 MG: 30 TABLET, EXTENDED RELEASE ORAL at 08:46

## 2020-08-05 RX ADMIN — GABAPENTIN 600 MG: 300 CAPSULE ORAL at 08:46

## 2020-08-05 RX ADMIN — TRAZODONE HYDROCHLORIDE 150 MG: 50 TABLET ORAL at 20:20

## 2020-08-05 RX ADMIN — ENOXAPARIN SODIUM 40 MG: 40 INJECTION SUBCUTANEOUS at 08:46

## 2020-08-05 RX ADMIN — AMIODARONE HYDROCHLORIDE 0.5 MG/MIN: 50 INJECTION, SOLUTION INTRAVENOUS at 04:32

## 2020-08-05 RX ADMIN — TRAMADOL HYDROCHLORIDE 50 MG: 50 TABLET, FILM COATED ORAL at 06:31

## 2020-08-05 RX ADMIN — Medication 10 ML: at 08:46

## 2020-08-05 RX ADMIN — CARVEDILOL 12.5 MG: 6.25 TABLET, FILM COATED ORAL at 16:25

## 2020-08-05 RX ADMIN — CYCLOBENZAPRINE 10 MG: 10 TABLET, FILM COATED ORAL at 06:08

## 2020-08-05 RX ADMIN — CARVEDILOL 6.25 MG: 6.25 TABLET, FILM COATED ORAL at 08:45

## 2020-08-05 RX ADMIN — TRAMADOL HYDROCHLORIDE 50 MG: 50 TABLET, FILM COATED ORAL at 18:32

## 2020-08-05 ASSESSMENT — PAIN DESCRIPTION - LOCATION
LOCATION: BACK

## 2020-08-05 ASSESSMENT — PAIN SCALES - GENERAL
PAINLEVEL_OUTOF10: 4
PAINLEVEL_OUTOF10: 5
PAINLEVEL_OUTOF10: 0
PAINLEVEL_OUTOF10: 7
PAINLEVEL_OUTOF10: 3
PAINLEVEL_OUTOF10: 2

## 2020-08-05 ASSESSMENT — PAIN DESCRIPTION - PAIN TYPE
TYPE: CHRONIC PAIN

## 2020-08-05 ASSESSMENT — PAIN DESCRIPTION - DIRECTION
RADIATING_TOWARDS: LEGS

## 2020-08-05 ASSESSMENT — PAIN DESCRIPTION - ONSET
ONSET: ON-GOING

## 2020-08-05 ASSESSMENT — PAIN DESCRIPTION - FREQUENCY
FREQUENCY: CONTINUOUS

## 2020-08-05 ASSESSMENT — PAIN DESCRIPTION - ORIENTATION
ORIENTATION: LEFT;LOWER

## 2020-08-05 ASSESSMENT — PAIN DESCRIPTION - DESCRIPTORS
DESCRIPTORS: CONSTANT

## 2020-08-05 NOTE — FLOWSHEET NOTE
Pt remains in NSR, low ST, with no c/o CP, SOB or dizziness. Amiodarone gtt stopped as ordered per Dr. Meenakshi Goldstein. Will continue to monitor.    Electronically signed by Cesar Kingsley RN on 8/5/2020 at 10:53 AM

## 2020-08-05 NOTE — H&P
for cardiology evaluation. Past Medical History:      Diagnosis Date    Back pain     Blood circulation, collateral     CAD (coronary artery disease)     Chronic kidney disease     Diabetes mellitus ([facility] [address]     Enlarged prostate     GERD (gastroesophageal reflux disease)     Hyperlipidemia     Hypertension     Medical history reviewed with no changes        Past Surgical History:      Procedure Laterality Date    BACK SURGERY      laminectomy & rods    CARDIAC SURGERY      stents    CORONARY ANGIOPLASTY WITH STENT PLACEMENT  10/2003    EPIDURAL STEROID INJECTION Left 7/23/2019    LEFT LUMBAR THREE LUMBAR FOUR EPIDURAL STEROID INJECTION SITE CONFIRMED BY FLUOROSCOPY performed by Matthew Rodriguez MD at [facility] Left 9/10/2019    LEFT SACROILIAC JOINT INJECTION SITE CONFIRMED BY FLUOROSCOPY performed by Matthew Rodriguez MD at [facility] 173      Repaired torn MCL TOTAL RIGHT    [address] N/A 5/18/2020    [address] ([address], [address], [address]) - [employer] performed by Jaun Sims MD at [address] Right 2014       Medications (prior to admission):  Prior to Admission medications    Medication Sig Start Date End Date Taking? Authorizing Provider   gabapentin (NEURONTIN) 300 MG capsule Take 600 mg by mouth 2 times daily.     Yes Historical Provider, MD   atorvastatin (LIPITOR) 40 MG tablet take 1 tablet by mouth once daily 7/7/20   Jamie Galarza MD   carvedilol (COREG) 6.25 MG tablet take 1 tablet by mouth twice a day with meals 7/7/20   Jamie Galarza MD   isosorbide mononitrate (IMDUR) 30 MG extended release tablet take 1 tablet by mouth once daily 7/7/20   Jamie Galarza MD   glipiZIDE (GLUCOTROL) 5 MG tablet  1/15/20   Historical Provider, MD   traZODone (DESYREL) 50 MG tablet Take 150 mg by mouth nightly     Historical Provider, MD   amLODIPine (NORVASC) 10 MG tablet Take 1 tablet by mouth daily 9/5/19 9/16/20  Elina Cole MD   olmesartan [facility]) 40 MG tablet Take 1 tablet by mouth daily 9/5/19   Elina Cole MD   aspirin 325 MG tablet Take 325 mg by mouth daily    Historical Provider, MD   nitroGLYCERIN (NITROSTAT) 0.4 MG SL tablet Place 0.4 mg under the tongue every 5 minutes as needed for Chest pain up to max of 3 total doses. If no relief after 1 dose, call 911. Historical Provider, MD   tamsulosin (FLOMAX) 0.4 MG capsule Take 0.8 mg by mouth nightly    Historical Provider, MD   Multiple Vitamins-Minerals (CENTRUM SILVER) TABS Take 1 tablet by mouth daily. Historical Provider, MD   metformin (GLUCOPHAGE) 1000 MG tablet Take 1,000 mg by mouth 2 times daily. Historical Provider, MD       Allergy(ies):  Penicillins; Percocet [oxycodone-acetaminophen]; and Codeine    Social History:  TOBACCO:  reports that he quit smoking about 36 years ago. He smoked 1.00 pack per day. He has never used smokeless tobacco.  ETOH:  reports no history of alcohol use. Family History:      Problem Relation Age of Onset    Diabetes Mother     Heart Disease Father     High Blood Pressure Father     High Cholesterol Father     Heart Attack Father     High Blood Pressure Sister     High Blood Pressure Brother     Birth Defects Brother     High Blood Pressure Brother     Heart Disease Sister     High Blood Pressure Sister     High Blood Pressure Sister        Review of Systems:  Pertinent positives are listed in HPI. At least 10-point ROS reviewed and were negative. Vitals and physical examination:  BP (!) 157/99   Pulse 105   Temp 97.5 °F (36.4 °C) (Temporal)   Resp 16   Ht 6' 2\" (1.88 m)   Wt 270 lb 1 oz (122.5 kg)   SpO2 98%   BMI 34.67 kg/m²   Gen/overall appearance: Not in acute distress. Alert. Oriented x3.   Head: Normocephalic, atraumatic  Eyes: EOMI, good acuity  ENT: Oral mucosa moist  Neck: No JVD, thyromegaly  CVS: Nml S1S2, no MRG, RRR  Pulm: Clear bilaterally. No crackles/wheezes  Gastrointestinal: Soft, NT/ND, +BS  Musculoskeletal: No edema. Warm  Neuro: No focal deficit. Moves extremity spontaneously. Psychiatry: Appropriate affect. Not agitated. Skin: Warm, dry with normal turgor. No rash  Capillary refill: Brisk,< 3 seconds   Peripheral Pulses: +2 palpable, equal bilaterally       Labs/imaging/EKG:  Routine labs currently pending. EKG: Sinus tachycardia, rate 116 beats per minutes. No acute ST/T changes. I reviewed EKG. Discussed with ER provider.       Thank you ETTA Torres CNP for the opportunity to be involved in this patient's care.    -----------------------------  Keven Busch MD  [facility] hospitalist

## 2020-08-05 NOTE — PRE SEDATION
Brief Pre-Op Note/Sedation Assessment      Tenzin Sharma  1955  [id number]      [id number]  4:23 PM    Planned Procedure: Cardiac Catheterization Procedure    Post Procedure Plan: Return to same level of care    Consent: I have discussed with the patient and/or the patient representative the indication, alternatives, and the possible risks and/or complications of the planned procedure and the anesthesia methods. The patient and/or patient representative appear to understand and agree to proceed. Chief Complaint: Chest Pain/Pressure      Indications for Cath Procedure:  Cardiac Arrythmia and Cardiomyopathy  Anginal Classification within 2 weeks:  CCS II - Slight limitation, with angina only during vigorous physical activity  NYHA Heart Failure Class within 2 weeks: No symptoms  Is Cath Lab Visit Valve-related?: No  Surgical Risk: Intermediate  Functional Type: >= 4 METS with symptoms    Anti- Anginal Meds within 2 weeks:   Yes: Beta Blockers and Aspirin    Stress or Imaging Studies Performed:  None     Vital Signs:  BP (!) 151/90   Pulse 93   Temp 96.8 °F (36 °C) (Temporal)   Resp 18   Ht 6' 2\" (1.88 m)   Wt 270 lb 4.5 oz (122.6 kg)   SpO2 96%   BMI 34.70 kg/m²     Allergies:   Allergies   Allergen Reactions    Penicillins     Percocet [Oxycodone-Acetaminophen]     Codeine Nausea And Vomiting       Past Medical History:  Past Medical History:   Diagnosis Date    Back pain     Blood circulation, collateral     CAD (coronary artery disease)     Chronic kidney disease     Diabetes mellitus (HCC)     Enlarged prostate     GERD (gastroesophageal reflux disease)     Hyperlipidemia     Hypertension     Medical history reviewed with no changes          Surgical History:  Past Surgical History:   Procedure Laterality Date    BACK SURGERY      laminectomy & rods    CARDIAC SURGERY      stents    CORONARY ANGIOPLASTY WITH STENT PLACEMENT  10/2003    EPIDURAL STEROID INJECTION Left 7/23/2019 LEFT LUMBAR THREE LUMBAR FOUR EPIDURAL STEROID INJECTION SITE CONFIRMED BY FLUOROSCOPY [facility]10[facility]    LEFT SACROILIAC JOINT INJECTION SITE CONFIRMED BY FLUOROSCOPY performed by Matthew Rodriguez MD at [facility] 173      Repaired torn MCL TOTAL RIGHT    [address] N/A 5/18/2020    SPINAL CORD STIMULATOR TRIAL WITH FLUOROSCOPY (30887, 15161, 15003) - [employer] performed by Jaun Sims MD at [address] Right 2014         Medications:  Current Facility-Administered Medications   Medication Dose Route Frequency Provider Last Rate Last Dose    carvedilol (COREG) tablet 12.5 mg  12.5 mg Oral BID  Sincere Rodriguez MD        sodium chloride flush 0.9 % injection 10 mL  10 mL Intravenous 2 times per day Braeden Daly MD   10 mL at 08/05/20 0846    sodium chloride flush 0.9 % injection 10 mL  10 mL Intravenous PRN Braeden Daly MD        acetaminophen (TYLENOL) tablet 650 mg  650 mg Oral Q6H PRN Braeden Daly MD        Or    acetaminophen (TYLENOL) suppository 650 mg  650 mg Rectal Q6H PRN Braeden Daly MD        polyethylene glycol (GLYCOLAX) packet 17 g  17 g Oral Daily PRN Braeden Daly MD        promethazine (PHENERGAN) tablet 12.5 mg  12.5 mg Oral Q6H PRN Braeden Daly MD        Or    ondansetron (ZOFRAN) injection 4 mg  4 mg Intravenous Q6H PRN Braeden Daly MD        enoxaparin (LOVENOX) injection 40 mg  40 mg Subcutaneous Daily Braeden Daly MD   40 mg at 08/05/20 0846    potassium chloride (KLOR-CON M) extended release tablet 40 mEq  40 mEq Oral PRN Braeden Dayl MD        Or    potassium bicarb-citric acid (EFFER-K) effervescent tablet 40 mEq  40 mEq Oral PRN Braeden Daly MD        Or    potassium chloride 10 mEq/100 mL IVPB (Peripheral Line)  10 mEq Intravenous PRN Piedad Dougherty MD        magnesium sulfate 1 g in dextrose 5% 100 mL IVPB  1 g Intravenous PRN Piedad Dougherty MD        sodium phosphate 19.17 mmol in dextrose 5 % 250 mL IVPB  0.16 mmol/kg Intravenous PRN Piedad Dougherty MD        Or    sodium phosphate 38.34 mmol in dextrose 5 % 250 mL IVPB  0.32 mmol/kg Intravenous PRN Piedad Dougherty MD        amLODIPine (NORVASC) tablet 10 mg  10 mg Oral Daily Piedad Dougherty MD   10 mg at 08/05/20 0845    aspirin tablet 325 mg  325 mg Oral Daily Piedad Dougherty MD   325 mg at 08/05/20 0845    atorvastatin (LIPITOR) tablet 40 mg  40 mg Oral Nightly Piedad Dougherty MD   40 mg at 08/04/20 2244    isosorbide mononitrate (IMDUR) extended release tablet 30 mg  30 mg Oral Daily Piedad Dougherty MD   30 mg at 08/05/20 0846    therapeutic multivitamin-minerals 1 tablet  1 tablet Oral Daily Piedad Dougherty MD   1 tablet at 08/05/20 0846    nitroGLYCERIN (NITROSTAT) SL tablet 0.4 mg  0.4 mg Sublingual Q5 Min PRN Piedad Dougherty MD        tamsulosin (FLOMAX) capsule 0.8 mg  0.8 mg Oral Nightly Mira Dennis MD   0.8 mg at 08/04/20 2245    traZODone (DESYREL) tablet 150 mg  150 mg Oral Nightly Piedad Dougherty MD   150 mg at 08/04/20 2250    traMADol (ULTRAM) tablet 50 mg  50 mg Oral Q6H PRN Piedad Dougherty MD   50 mg at 08/05/20 0631    gabapentin (NEURONTIN) capsule 600 mg  600 mg Oral BID Piedad Dougherty MD   600 mg at 08/05/20 0846    cyclobenzaprine (FLEXERIL) tablet 10 mg  10 mg Oral TID PRN Piedad Dougherty MD   10 mg at 08/05/20 0608    metoprolol (LOPRESSOR) injection 5 mg  5 mg Intravenous Q6H PRN ETTA Fleming - CNP        losartan (COZAAR) tablet 100 mg  100 mg Oral Daily Piedad Dougherty MD   100 mg at 08/05/20 0846           Pre-Sedation:    Pre-Sedation Documentation and Exam:  I have assessed the patient and agree with the H&P present on the chart.     Prior History of Anesthesia Complications:   none    Modified Mallampati:  II (soft palate, uvula, fauces visible)    ASA Classification:  Class 2 - A normal healthy patient with mild systemic disease      Bertha Scale: Activity:  2 - Able to move 4 extremities voluntarily on command  Respiration:  2 - Able to breathe deeply and cough freely  Circulation:  2 - BP+/- 20mmHg of normal  Consciousness:  2 - Fully awake  Oxygen Saturation (color):  2 - Able to maintain oxygen saturation >92% on room air    Sedation/Anesthesia Plan:  Guard the patient's safety and welfare. Minimize physical discomfort and pain. Minimize negative psychological responses to treatment by providing sedation and analgesia and maximize the potential amnesia. Patient to meet pre-procedure discharge plan.     Medication Planned:  midazolam intravenously and fentanyl intravenously    Patient is an appropriate candidate for plan of sedation: yes      Electronically signed by Carlos Herrera MD on 8/5/2020 at 4:23 PM

## 2020-08-05 NOTE — PROGRESS NOTES
Patient's heart rate seen to jump up into the 120's (ST) when standing at bedside to use the urinal.  Will continue to monitor.

## 2020-08-05 NOTE — FLOWSHEET NOTE
Pt transferred back to CVU room 2917 from cath lab. Report taken from [facility]. Pt a/o. Rt radial TR band in place. Site free from redness, swelling and hematoma. Pt reconnected to CVU monitoring. VSS. Will continue to monitor.  Electronically signed by Maria E Wyatt RN on 8/5/2020 at 6:16 PM

## 2020-08-05 NOTE — PLAN OF CARE
Problem: Falls - Risk of:  Goal: Will remain free from falls  Description: Will remain free from falls  Outcome: Ongoing  Note: Pt free from falls this shift, bed side table next to bed, call light in reach, bed in lowest position, wheels locked. Encouraged to call for any assistance      Problem: Pain:  Goal: Control of chronic pain  Description: Control of chronic pain  Outcome: Ongoing  Note: Pt alert and oriented. Pt able to communicate present pain and use the pain scale appropriately. Nonpharmacological pain reducers and pain medication offered as needed. Will cont to monitor.

## 2020-08-05 NOTE — PROGRESS NOTES
Pt JITENDRA from [facility]. Arrived to CVU 15 @ 7799 with transport.  Admitting called to place into system

## 2020-08-05 NOTE — OP NOTE
Patient:  Rainer Pang   :   1955    Procedural Summary  ~Consent:   Obtained written and verbal consent      Risks/benefits explained in detail  ~Procedure:    Left Heart Catheterization  ~Medications:    Procedural sedation with minimal conscious sedation  ~Complications:   None  ~Blood Loss:    <10cc  ~Specimens:    None obtained  ~Pre-sedation re-evaluation: Performed immediately prior to procedure. Medication and Procedural Reconciliation:  An independent trained observer pushed medications at my direction. We monitored the patient's level of consciousness and vital signs/physiologic status throughout the procedure duration (see start and stop times below). Sedation: 5 mg Versed, 100 mcg Fentanyl  Sedation start:   Sedation stop:     Cardiac Cath IVUS, LVG:  Anatomy:   LM-distal 60%   LAD-prox stent patent, distal 50%  Cx-Normal  OM- normal  RCA-mid 80% diffuse  RPDA- Normal  LVEF- 60%  LVG- normal  LVEDP- 11    IVUS LM: MLA 6.3mm2    Contrast: 94  Flouro Time: 9.3  Access: R radial    Impression  ~Coronary Angiography w/ Severe LM, RCA stenosis  ~LVG with LVEF of 60 and no regional wall motion abnormalities    Recommendation  ~Aggressive medical treatment and risk factor modification  ~CABG consult.           Feliz Trinidad MD 2020 6:09 PM

## 2020-08-05 NOTE — PROGRESS NOTES
4 Eyes Skin Assessment     The patient is being assess for  Admission    I agree that 2 RN's have performed a thorough Head to Toe Skin Assessment on the patient. ALL assessment sites listed below have been assessed. Areas assessed by both nurses:   [x]   Head, Face, and Ears   [x]   Shoulders, Back, and Chest  [x]   Arms, Elbows, and Hands   [x]   Coccyx, Sacrum, and IschIum  [x]   Legs, Feet, and Heels        Does the Patient have Skin Breakdown?   No         Joey Prevention initiated:  No   Wound Care Orders initiated:  No      [facility] nurse consulted for Pressure Injury (Stage 3,4, Unstageable, DTI, NWPT, and Complex wounds), New and Established Ostomies:  No      Nurse 1 eSignature: Electronically signed by Romain Suárez RN on 8/5/20 at 12:27 AM EDT    **SHARE this note so that the co-signing nurse is able to place an eSignature**    Nurse 2 eSignature: Electronically signed by Robert Bonner RN on 8/5/20 at 1:29 AM EDT

## 2020-08-05 NOTE — PROGRESS NOTES
Second PIV started per orders from Hospitalist. PIV Amio ordered to restart. Blood drawn, labs sent to laboratory. Pt oriented to room. Home medication list given to primary RN.  Pt resting in bed

## 2020-08-05 NOTE — FLOWSHEET NOTE
Left heart cath consent form reviewed with and signed by pt. Pt has been NPO since admission.  Electronically signed by Praneeth Arroyo RN on 8/5/2020 at 4:31 PM

## 2020-08-05 NOTE — CONSULTS
[address]   Electrophysiology Consultation   Date: 8/5/2020  Reason for Consultation: VT  Consult Requesting Physician: Aurelia Gracia MD     CC: Back pain   HPI: Rosa Soto is a 77 y.o. male history of CAD s/p PCI, DM, HLD, HTN, Obesity, CKD stage III, HFrEF who has been referred to hospital with tachycardia. Patient was reportedly in urology office because he had to strain to urinate and urinary retention. He also had some flank pain and dysuria. He then had a transient tachycardia in urology office with heart rates 200s. He was sent to the [facility]. On arrival to ER, patient was reportedly asymptomatic and had no chest pain and SOB. He was in sinus tachycardia. Later while on telemetry he noted to have wide complex tachycardia with heart rates up to 220 bpm. He spontaneously converted back to sinus tachycardia without any intervention. He has been loaded with amiodarone and has been transferred to [facility].      Patient has history of back pain and has a stimulator. Patient had to have back pain yesterday in the urology office on the time of this incident. Denies having any syncope in the past.  Denies having palpitation. No prior history of atrial arrhythmia.     Cardiac history:   10/2003: PCI: 85% LAD, 70% RCA, PCI to LAD         Past Medical History:   Diagnosis Date    Back pain     Blood circulation, collateral     CAD (coronary artery disease)     Chronic kidney disease     Diabetes mellitus ([facility] [address]     Enlarged prostate     GERD (gastroesophageal reflux disease)     Hyperlipidemia     Hypertension     Medical history reviewed with no changes         Past Surgical History:   Procedure Laterality Date    BACK SURGERY      laminectomy & rods    CARDIAC SURGERY      stents    CORONARY ANGIOPLASTY WITH STENT PLACEMENT  10/2003    EPIDURAL STEROID INJECTION Left 7/23/2019    LEFT LUMBAR THREE LUMBAR FOUR EPIDURAL STEROID INJECTION SITE CONFIRMED BY FLUOROSCOPY performed by Loreto Valiente MD at [facility] Left 9/10/2019    LEFT SACROILIAC JOINT INJECTION SITE CONFIRMED BY FLUOROSCOPY performed by L[facility]to Valiente MD at [facility]      Repaired torn MCL TOTAL RIGHT    SPINAL CORD STIMULATOR SURGERY N/A 5/18/2020    SPINAL CORD STIMULATOR TRIAL WITH FLUOROSCOPY (71372, 41508, [id number]) - [facility] performed by Luis Cai MD at [address] Right 2014       Allergies   Allergen Reactions    Penicillins     Percocet [Oxycodone-Acetaminophen]     Codeine Nausea And Vomiting       Social History:  Reviewed. reports that he quit smoking about 36 years ago. He smoked 1.00 pack per day. He has never used smokeless tobacco. He reports that he does not drink alcohol or use drugs. Family History:  Reviewed. family history includes Birth Defects in his brother; Diabetes in his mother; Heart Attack in his father; Heart Disease in his father and sister; High Blood Pressure in his brother, brother, father, sister, sister, and sister; High Cholesterol in his father. Review of System:  All other systems reviewed except for that noted above. Pertinent negatives and positives are:       · General: negative for fever, chills   · Ophthalmic ROS: negative for - eye pain or loss of vision  · ENT ROS: negative for - headaches, sore throat   · Respiratory: negative for - cough, sputum  · Cardiovascular: Reviewed in HPI  · Gastrointestinal: negative for - abdominal pain, diarrhea, N/V  · Hematology: negative for - bleeding, blood clots, bruising or jaundice  · Genito-Urinary:  negative for - Dysuria or incontinence  · Musculoskeletal: negative for - Joint swelling, muscle pain  · Neurological: negative for - confusion, dizziness, headaches   · Psychiatric: No anxiety, no depression.   · Dermatological: negative for - rash    Physical Examination:  Vitals:    20 0747   BP: (!) 152/87   Pulse: 106   Resp: 18   Temp: 97.4 °F (36.3 °C)   SpO2: 94%      In: 697.2 [P.O.:480; I.V.:217.2]  Out: 550    Wt Readings from Last 3 Encounters:   20 270 lb 4.5 oz (122.6 kg)   20 264 lb 15.9 oz (120.2 kg)   20 265 lb (120.2 kg)     Temp  Av.5 °F (36.4 °C)  Min: 97.4 °F (36.3 °C)  Max: 97.6 °F (36.4 °C)  Pulse  Av.6  Min: 94  Max: 111  BP  Min: 136/78  Max: 157/99  SpO2  Av.8 %  Min: 92 %  Max: 98 %    Intake/Output Summary (Last 24 hours) at 2020 0803  Last data filed at 2020 0610  Gross per 24 hour   Intake 697.2 ml   Output 550 ml   Net 147.2 ml       · Telemetry: Sinus rhythm   · Constitutional: Oriented. No distress. · Head: Normocephalic and atraumatic. · Mouth/Throat: Oropharynx is clear and moist.   · Eyes: Conjunctivae normal. EOM are normal.   · Neck: Neck supple. No rigidity. No JVD present. · Cardiovascular: Normal rate, regular rhythm, S1&S2. · Pulmonary/Chest: Bilateral respiratory sounds. No wheezes, No rhonchi. · Abdominal: Soft. Bowel sounds present. No distension, No tenderness. · Musculoskeletal: No tenderness. No edema    · Lymphadenopathy: Has no cervical adenopathy. · Neurological: Alert and oriented. Cranial nerve appears intact, No Gross deficit   · Skin: Skin is warm and dry. No rash noted. · Psychiatric: Has a normal behavior     Labs, diagnostic and imaging results reviewed. Reviewed.    Recent Labs     20  2220      K 4.2      CO2 25   PHOS 4.9   BUN 20   CREATININE 1.2     Recent Labs     20  0600   WBC 8.3   HGB 13.4*   HCT 40.4*   MCV 91.3        Lab Results   Component Value Date    CKTOTAL 99 2011    CKMB 0.29 2011    CKMBINDEX 0.3 2011    TROPONINI <0.01 2020     No results found for: BNP  Lab Results   Component Value Date    PROTIME 10.7 2020    PROTIME 11.4 2011    INR 0.92 2020    INR 1.04 01/25/2011     Lab Results   Component Value Date    CHOL 169 01/25/2011    HDL 29 01/25/2011    TRIG 233 01/25/2011       ECG: Sinus rhythm   Echo: 2017:   Normal stress ECHO. Normal EKG response to exercise .     Cath: 12/2018:   Non-obstructive CAD involving Lcx and RCA     Scheduled Meds:   sodium chloride flush  10 mL Intravenous 2 times per day    enoxaparin  40 mg Subcutaneous Daily    amLODIPine  10 mg Oral Daily    aspirin  325 mg Oral Daily    atorvastatin  40 mg Oral Nightly    carvedilol  6.25 mg Oral BID WC    isosorbide mononitrate  30 mg Oral Daily    therapeutic multivitamin-minerals  1 tablet Oral Daily    tamsulosin  0.8 mg Oral Nightly    traZODone  150 mg Oral Nightly    gabapentin  600 mg Oral BID    losartan  100 mg Oral Daily     Continuous Infusions:   amiodarone 0.5 mg/min (08/05/20 0719)     PRN Meds:.sodium chloride flush, acetaminophen **OR** acetaminophen, polyethylene glycol, promethazine **OR** ondansetron, potassium chloride **OR** potassium alternative oral replacement **OR** potassium chloride, magnesium sulfate, sodium phosphate IVPB **OR** sodium phosphate IVPB, nitroGLYCERIN, traMADol, cyclobenzaprine, metoprolol     Patient Active Problem List    Diagnosis Date Noted    Ventricular tachycardia ([facility] 08/04/2020    Chronic back pain 03/12/2020    GERD with esophagitis 12/17/2018    Obesity (BMI 30-39.9) 12/17/2018    DM2 (diabetes mellitus, type 2) ([facility] 12/17/2018    BPH (benign prostatic hyperplasia) 12/17/2018    CKD (chronic kidney disease) stage 3, GFR 30-59 ml/min ([facility] 12/17/2018    Other hyperlipidemia 08/09/2011    Essential hypertension, benign 08/09/2011    Atherosclerosis of native coronary artery with stable angina pectoris ([facility] 08/09/2011    Primary cardiomyopathy ([facility] 08/09/2011    Chest pain 08/09/2011    Dizziness and giddiness 08/09/2011    Palpitations 08/09/2011      Active Hospital Problems    Diagnosis Date Noted    Ventricular tachycardia ([facility] [address] [I47.2] 08/04/2020       Assessment:     - Wide complex tachycardia with heart rates 220 bpm on telemetry, spontaneously converted. No 12 lead ECG is available, however, telemetry with wide-complex tachycardia appears to be right bundle pattern. Differential diagnosis include ventricular tachycardia, however atrial flutter with one-to-one conduction cannot be excluded. Of note patient was asymptomatic during this episode, did not have any chest pain or shortness of breath or dizziness. Patient is morbidly obese, has AYAZ which is untreated and is not using his CPAP. I discussed diagnostic and treatment options with him in details. Since he has coronary artery disease he needs cardiac catheterization first to rule out ischemia and significant stenosis. If there is no revascularization, then will proceed with EP study to assess for the cause of his tachycardia. Discussed risk-benefit and alternative of EP study and potential ablation for atrial arrhythmia versus implantation of AICD if he has inducible monomorphic ventricular tachycardia with him and his wife. I discussed risk-benefit of implantation of AICD if we induce ventricular tachycardia. Will discontinue amiodarone drip for now    Continue with aggressive medical therapy with aspirin, atorvastatin, and carvedilol. Increase carvedilol dose since at baseline his heart rates is 80s and his blood pressure can tolerate it. He is not on any ACE inhibitor or ARB due to chronic kidney disease     - Echo. -Coronary artery disease status post prior PCI's. On medical therapy. Interventional cardiology for potential cardiac catheterization. Thank you for allowing me to participate in the care of Selin Zavaleta     All questions and concerns were addressed to the patient/family. Alternatives to my treatment were discussed.  I have discussed the above stated plan and the patient verbalized understanding and agreed with the plan. NOTE: This report was transcribed using voice recognition software. Every effort was made to ensure accuracy, however, inadvertent computerized transcription errors may be present.      Abby Sullivan MD, MPH  [address] [facility]   Office: [phone]

## 2020-08-06 ENCOUNTER — APPOINTMENT (OUTPATIENT)
Dept: GENERAL RADIOLOGY | Age: 65
DRG: 234 | End: 2020-08-06
Attending: FAMILY MEDICINE
Payer: MEDICARE

## 2020-08-06 ENCOUNTER — APPOINTMENT (OUTPATIENT)
Dept: CT IMAGING | Age: 65
DRG: 234 | End: 2020-08-06
Attending: FAMILY MEDICINE
Payer: MEDICARE

## 2020-08-06 LAB
ALBUMIN SERPL-MCNC: 3.8 G/DL (ref 3.4–5)
ALP BLD-CCNC: 107 U/L (ref 40–129)
ALT SERPL-CCNC: 12 U/L (ref 10–40)
ANION GAP SERPL CALCULATED.3IONS-SCNC: 11 MMOL/L (ref 3–16)
APTT: 37.2 SEC (ref 24.2–36.2)
AST SERPL-CCNC: 13 U/L (ref 15–37)
BASE EXCESS ARTERIAL: -0.2 MMOL/L (ref -3–3)
BILIRUB SERPL-MCNC: 0.3 MG/DL (ref 0–1)
BILIRUBIN DIRECT: <0.2 MG/DL (ref 0–0.3)
BILIRUBIN URINE: NEGATIVE
BILIRUBIN, INDIRECT: ABNORMAL MG/DL (ref 0–1)
BLOOD, URINE: NEGATIVE
BUN BLDV-MCNC: 18 MG/DL (ref 7–20)
CALCIUM SERPL-MCNC: 8.7 MG/DL (ref 8.3–10.6)
CARBOXYHEMOGLOBIN ARTERIAL: 0.8 % (ref 0–1.5)
CHLORIDE BLD-SCNC: 102 MMOL/L (ref 99–110)
CHOLESTEROL, TOTAL: 119 MG/DL (ref 0–199)
CLARITY: CLEAR
CO2: 23 MMOL/L (ref 21–32)
COLOR: YELLOW
CREAT SERPL-MCNC: 1 MG/DL (ref 0.8–1.3)
ESTIMATED AVERAGE GLUCOSE: 174.3 MG/DL
GFR AFRICAN AMERICAN: >60
GFR NON-AFRICAN AMERICAN: >60
GLUCOSE BLD-MCNC: 194 MG/DL (ref 70–99)
GLUCOSE URINE: 100 MG/DL
HBA1C MFR BLD: 7.7 %
HCO3 ARTERIAL: 25.4 MMOL/L (ref 21–29)
HDLC SERPL-MCNC: 30 MG/DL (ref 40–60)
HEMOGLOBIN, ART, EXTENDED: 13.6 G/DL (ref 13.5–17.5)
INR BLD: 1.01 (ref 0.86–1.14)
KETONES, URINE: NEGATIVE MG/DL
LDL CHOLESTEROL CALCULATED: 63 MG/DL
LEUKOCYTE ESTERASE, URINE: NEGATIVE
METHEMOGLOBIN ARTERIAL: 0.5 %
MICROSCOPIC EXAMINATION: ABNORMAL
NITRITE, URINE: NEGATIVE
O2 CONTENT ARTERIAL: 18 ML/DL
O2 SAT, ARTERIAL: 94.7 %
O2 THERAPY: ABNORMAL
PCO2 ARTERIAL: 44 MMHG (ref 35–45)
PH ARTERIAL: 7.37 (ref 7.35–7.45)
PH UA: 5.5 (ref 5–8)
PO2 ARTERIAL: 72.9 MMHG (ref 75–108)
POTASSIUM SERPL-SCNC: 4.3 MMOL/L (ref 3.5–5.1)
PROTEIN UA: NEGATIVE MG/DL
PROTHROMBIN TIME: 11.7 SEC (ref 10–13.2)
SARS-COV-2, NAAT: NOT DETECTED
SODIUM BLD-SCNC: 136 MMOL/L (ref 136–145)
SPECIFIC GRAVITY UA: 1.02 (ref 1–1.03)
TCO2 ARTERIAL: 60 MMOL/L
TOTAL PROTEIN: 6.3 G/DL (ref 6.4–8.2)
TRIGL SERPL-MCNC: 132 MG/DL (ref 0–150)
URINE TYPE: ABNORMAL
UROBILINOGEN, URINE: 0.2 E.U./DL
VLDLC SERPL CALC-MCNC: 26 MG/DL

## 2020-08-06 PROCEDURE — 99233 SBSQ HOSP IP/OBS HIGH 50: CPT | Performed by: INTERNAL MEDICINE

## 2020-08-06 PROCEDURE — 81003 URINALYSIS AUTO W/O SCOPE: CPT

## 2020-08-06 PROCEDURE — U0002 COVID-19 LAB TEST NON-CDC: HCPCS

## 2020-08-06 PROCEDURE — 74176 CT ABD & PELVIS W/O CONTRAST: CPT

## 2020-08-06 PROCEDURE — 6360000002 HC RX W HCPCS: Performed by: INTERNAL MEDICINE

## 2020-08-06 PROCEDURE — 2580000003 HC RX 258: Performed by: INTERNAL MEDICINE

## 2020-08-06 PROCEDURE — 93880 EXTRACRANIAL BILAT STUDY: CPT

## 2020-08-06 PROCEDURE — 82803 BLOOD GASES ANY COMBINATION: CPT

## 2020-08-06 PROCEDURE — 2000000000 HC ICU R&B

## 2020-08-06 PROCEDURE — 97530 THERAPEUTIC ACTIVITIES: CPT

## 2020-08-06 PROCEDURE — 71045 X-RAY EXAM CHEST 1 VIEW: CPT

## 2020-08-06 PROCEDURE — 85730 THROMBOPLASTIN TIME PARTIAL: CPT

## 2020-08-06 PROCEDURE — 85610 PROTHROMBIN TIME: CPT

## 2020-08-06 PROCEDURE — 93971 EXTREMITY STUDY: CPT

## 2020-08-06 PROCEDURE — 94010 BREATHING CAPACITY TEST: CPT

## 2020-08-06 PROCEDURE — 99233 SBSQ HOSP IP/OBS HIGH 50: CPT | Performed by: NURSE PRACTITIONER

## 2020-08-06 PROCEDURE — 80048 BASIC METABOLIC PNL TOTAL CA: CPT

## 2020-08-06 PROCEDURE — 97165 OT EVAL LOW COMPLEX 30 MIN: CPT

## 2020-08-06 PROCEDURE — 97161 PT EVAL LOW COMPLEX 20 MIN: CPT

## 2020-08-06 PROCEDURE — 6370000000 HC RX 637 (ALT 250 FOR IP): Performed by: INTERNAL MEDICINE

## 2020-08-06 PROCEDURE — 80076 HEPATIC FUNCTION PANEL: CPT

## 2020-08-06 PROCEDURE — 99222 1ST HOSP IP/OBS MODERATE 55: CPT | Performed by: THORACIC SURGERY (CARDIOTHORACIC VASCULAR SURGERY)

## 2020-08-06 PROCEDURE — 97116 GAIT TRAINING THERAPY: CPT

## 2020-08-06 RX ADMIN — Medication 10 ML: at 09:46

## 2020-08-06 RX ADMIN — DESMOPRESSIN ACETATE 40 MG: 0.2 TABLET ORAL at 19:58

## 2020-08-06 RX ADMIN — GABAPENTIN 600 MG: 300 CAPSULE ORAL at 19:58

## 2020-08-06 RX ADMIN — TRAMADOL HYDROCHLORIDE 50 MG: 50 TABLET, FILM COATED ORAL at 02:44

## 2020-08-06 RX ADMIN — CYCLOBENZAPRINE 10 MG: 10 TABLET, FILM COATED ORAL at 02:44

## 2020-08-06 RX ADMIN — TAMSULOSIN HYDROCHLORIDE 0.8 MG: 0.4 CAPSULE ORAL at 19:58

## 2020-08-06 RX ADMIN — ASPIRIN 325 MG ORAL TABLET 325 MG: 325 PILL ORAL at 09:45

## 2020-08-06 RX ADMIN — CARVEDILOL 12.5 MG: 6.25 TABLET, FILM COATED ORAL at 19:13

## 2020-08-06 RX ADMIN — GABAPENTIN 600 MG: 300 CAPSULE ORAL at 09:45

## 2020-08-06 RX ADMIN — ISOSORBIDE MONONITRATE 30 MG: 30 TABLET, EXTENDED RELEASE ORAL at 09:45

## 2020-08-06 RX ADMIN — CARVEDILOL 12.5 MG: 6.25 TABLET, FILM COATED ORAL at 09:49

## 2020-08-06 RX ADMIN — AMLODIPINE BESYLATE 10 MG: 5 TABLET ORAL at 09:45

## 2020-08-06 RX ADMIN — ENOXAPARIN SODIUM 40 MG: 40 INJECTION SUBCUTANEOUS at 09:46

## 2020-08-06 RX ADMIN — Medication 10 ML: at 20:00

## 2020-08-06 RX ADMIN — MULTIPLE VITAMINS W/ MINERALS TAB 1 TABLET: TAB at 09:45

## 2020-08-06 RX ADMIN — LOSARTAN POTASSIUM 100 MG: 100 TABLET, FILM COATED ORAL at 09:46

## 2020-08-06 RX ADMIN — TRAZODONE HYDROCHLORIDE 150 MG: 50 TABLET ORAL at 19:58

## 2020-08-06 ASSESSMENT — PAIN DESCRIPTION - DESCRIPTORS
DESCRIPTORS: CONSTANT
DESCRIPTORS: CONSTANT
DESCRIPTORS: ACHING;CONSTANT
DESCRIPTORS: ACHING;CONSTANT
DESCRIPTORS: ACHING;CONSTANT;DISCOMFORT

## 2020-08-06 ASSESSMENT — PAIN DESCRIPTION - LOCATION
LOCATION: BACK

## 2020-08-06 ASSESSMENT — PAIN DESCRIPTION - FREQUENCY
FREQUENCY: CONTINUOUS

## 2020-08-06 ASSESSMENT — PAIN DESCRIPTION - PAIN TYPE
TYPE: CHRONIC PAIN

## 2020-08-06 ASSESSMENT — PAIN DESCRIPTION - ONSET
ONSET: ON-GOING
ONSET: AWAKENED FROM SLEEP

## 2020-08-06 ASSESSMENT — PAIN DESCRIPTION - DIRECTION
RADIATING_TOWARDS: LEGS

## 2020-08-06 ASSESSMENT — PAIN SCALES - GENERAL
PAINLEVEL_OUTOF10: 8
PAINLEVEL_OUTOF10: 3
PAINLEVEL_OUTOF10: 0
PAINLEVEL_OUTOF10: 2
PAINLEVEL_OUTOF10: 4
PAINLEVEL_OUTOF10: 4
PAINLEVEL_OUTOF10: 3
PAINLEVEL_OUTOF10: 3

## 2020-08-06 ASSESSMENT — PAIN DESCRIPTION - ORIENTATION
ORIENTATION: RIGHT;LOWER
ORIENTATION: LEFT;LOWER
ORIENTATION: RIGHT;LOWER
ORIENTATION: LEFT;LOWER
ORIENTATION: LEFT;LOWER

## 2020-08-06 ASSESSMENT — PAIN DESCRIPTION - PROGRESSION: CLINICAL_PROGRESSION: NOT CHANGED

## 2020-08-06 NOTE — PROGRESS NOTES
D:  Report received from previous shift RNElizabet. See nursing flow sheet for assessment and document flow sheet for vital signs/hemodynamic status. A:  Instructed patient on plan of care and goals for the day. R: Patient verbalized understanding.

## 2020-08-06 NOTE — PROGRESS NOTES
Physical Therapy    Facility/Department: [facility]  Initial Assessment/Discharge Summary    NAME: Rosa Soto  : 1955  MRN: [id number]    Date of Service: 2020    Discharge Recommendations: Rosa Soto scored a 24/24 on the AM-PAC short mobility form. At this time, no further PT is recommended upon discharge due to pt being at baseline functional mobility. Recommend patient returns to prior setting with prior services. PT Equipment Recommendations  Equipment Needed: No    Assessment   Assessment: Pt is presenting at baseline functional mobility and does not require skilled PT services at this time. As such, pt is being discharged from acute PT caseload. Prognosis: Excellent  Decision Making: Low Complexity  Clinical Presentation: stable  PT Education: PT Role;General Safety  Patient Education: pt educated on anticipated sternal precautions, d/c recommendations--pt verbalized understanding  Barriers to Learning: hearig  REQUIRES PT FOLLOW UP: Yes  Activity Tolerance  Activity Tolerance: Patient Tolerated treatment well       Patient Diagnosis(es): There were no encounter diagnoses. has a past medical history of Back pain, Blood circulation, collateral, CAD (coronary artery disease), Chronic kidney disease, Diabetes mellitus ([facility] [address] Enlarged prostate, GERD (gastroesophageal reflux disease), Hyperlipidemia, Hypertension, and Medical history reviewed with no changes. has a past surgical history that includes back surgery; Cardiac surgery; Tonsillectomy; Coronary angioplasty with stent (10/2003); Total knee arthroplasty (Right, ); epidural steroid injection (Left, 2019); epidural steroid injection (Left, 9/10/2019); joint replacement; and Spinal Cord Stimulator Surgery (N/A, 2020).     Restrictions  Restrictions/Precautions  Restrictions/Precautions: Fall Risk(HIGH FALL RISK)  Required Braces or Orthoses?: No  Position Activity Restriction  Other position/activity assistance  Dressing: Minimal assistance  Grooming: Independent  Feeding: Independent  Toileting: Independent  Homemaking Assistance: (cooks on occasion)  Homemaking Responsibilities: No  Ambulation Assistance: Independent(with SPC)  Transfer Assistance: Independent(with increased time secondary to back pain)  Active : Yes  Occupation: Retired  Type of occupation: fire department  Leisure & Hobbies: use to fish and hunt, limited d/t back pain; puzzles  Additional Comments: pt reports no recent falls, prior stimulator pt experienced LEs \"giving out\"      Objective  AROM RLE (degrees)  RLE AROM: WFL  AROM LLE (degrees)  LLE AROM : WFL  Strength RLE  Strength RLE: WFL  Strength LLE  Strength LLE: WFL  Tone RLE  RLE Tone: Normotonic  Tone LLE  LLE Tone: Normotonic  Motor Control  Gross Motor?: WFL  Sensation  Overall Sensation Status: WFL  Bed mobility  Comment: Not addressed, pt seated in chair at beginning and end of session. Transfers  Sit to Stand: Modified independent  Stand to sit: Modified independent  Comment: Pt performed 3-4 stands from chair and toilet practicing with sternal precautions and able to maintain simulated precautions with min VC. No LOB with transfers. Ambulation  Ambulation?: Yes  More Ambulation?: Yes  Ambulation 1  Surface: level tile  Device: No Device  Assistance: Independent  Quality of Gait: slightly widened Mg, slightly increased sway noted  Distance: 15' in room  Comments: Pt able to negotiate obstacles without assist. No LOB. Ambulation 2  Surface - 2: level tile  Device 2: Single point cane  Assistance 2: Modified Independent  Quality of Gait 2: same as above  Distance: 80'  Comments: same as above  Stairs/Curb  Stairs?: Yes  Stairs  # Steps : 3  Rails: None  Device: Single pt cane  Assistance: Modified independent   Comment: Negotiated with step-to pattern, no LOB.      Balance  Posture: Good  Sitting - Static: Good  Sitting - Dynamic: Good  Standing - Static: Good;-  Standing - Dynamic: -        Plan   Plan  Times per week: discharge  Safety Devices  Type of devices:  All fall risk precautions in place, Call light within reach, Gait belt, Left in chair, Nurse notified(RN verbalizing pt OK to be in chair with alarm off)  Restraints  Initially in place: No    G-Code       OutComes Score        AM-PAC Score  AM-PAC Inpatient Mobility Raw Score : 24 (08/06/20 1604)  AM-PAC Inpatient T-Scale Score : 61.14 (08/06/20 1604)  Mobility Inpatient CMS 0-100% Score: 0 (08/06/20 1604)  Mobility Inpatient CMS G-Code Modifier : [address] (08/06/20 1604)          Goals  Short term goals  Time Frame for Short term goals: none, discharge from PT caseload       Therapy Time   Individual Concurrent Group Co-treatment   Time In 1422         Time Out 1445         Minutes 23              Timed Code Treatment Minutes:   8    Total Treatment Minutes:  1200 [employer] [facility], [address] [place] [address] [id number]

## 2020-08-06 NOTE — CONSULTS
Department of Cardiovascular & Thoracic Surgery  History and Physical / Consultation          PCP: ETTA Art - CNP    Referring Physician: Dr. Drew Monae    DIAGNOSIS:  CAD     CHIEF COMPLAINT:  VTACH    History Obtained From:  Patient and electronic medical record     HISTORY OF PRESENT ILLNESS:      The patient is a 77 y.o. male with significant past medical history of CAD s/p PCI, DM, HLD, HTN, Obesity, CKD stage III, HFrEF who has been referred to hospital with tachycardia. Patient underwent a cardiac angiogram showed multi vessel disease. We were consulted to evaluate the patient for CABG. At the present the patient is in stable condition.      Past Medical History:        Diagnosis Date    Back pain     Blood circulation, collateral     CAD (coronary artery disease)     Chronic kidney disease     Diabetes mellitus ([address]     Enlarged prostate     GERD (gastroesophageal reflux disease)     Hyperlipidemia     Hypertension     Medical history reviewed with no changes        Past Surgical History:        Procedure Laterality Date    BACK SURGERY      laminectomy & rods    CARDIAC SURGERY      stents    CORONARY ANGIOPLASTY WITH STENT PLACEMENT  10/2003    EPIDURAL STEROID INJECTION Left 7/23/2019    LEFT LUMBAR THREE LUMBAR FOUR EPIDURAL STEROID INJECTION SITE CONFIRMED BY FLUOROSCOPY performed by Aristides Navarro MD at [facility] Left 9/10/2019    LEFT SACROILIAC JOINT INJECTION SITE CONFIRMED BY FLUOROSCOPY performed by Aristides Navarro MD at [facility] 173      Repaired torn MCL TOTAL RIGHT    R Daniele Nielsen 105 N/A 5/18/2020    [address] ([id number], [id number]) - [employer] performed by Anne Marie Miner MD at [address] Right 2014       Medications:   Current Facility-Administered Medications   Medication Dose Route Frequency Provider Last Rate Last Dose    carvedilol (COREG) tablet 12.5 mg  12.5 mg Oral BID WC Jojo Nolan MD   12.5 mg at 08/06/20 0949    sodium chloride flush 0.9 % injection 10 mL  10 mL Intravenous 2 times per day Marisela Hector MD   10 mL at 08/06/20 0946    sodium chloride flush 0.9 % injection 10 mL  10 mL Intravenous PRN Marisela Hector MD        acetaminophen (TYLENOL) tablet 650 mg  650 mg Oral Q4H PRN Marisela Hector MD        oxyCODONE-acetaminophen (PERCOCET) 5-325 MG per tablet 1 tablet  1 tablet Oral Q4H PRN Marisela Hector MD        Or    oxyCODONE-acetaminophen (PERCOCET) 5-325 MG per tablet 2 tablet  2 tablet Oral Q4H PRN Marisela Hector MD        [facility]ron [facility] injection 4 mg  4 mg Intravenous Q6H PRN Marisela Hector MD        sodium chloride flush 0.9 % injection 10 mL  10 mL Intravenous 2 times per day Sobia Díaz MD   10 mL at 08/05/20 0846    sodium chloride flush 0.9 % injection 10 mL  10 mL Intravenous PRN Sobia Díaz MD        polyethylene glycol (GLYCOLAX) packet 17 g  17 g Oral Daily PRN Sobia Díaz MD        promethazine (PHENERGAN) tablet 12.5 mg  12.5 mg Oral Q6H PRN Sobia Díaz MD        enoxaparin (LOVENOX) injection 40 mg  40 mg Subcutaneous Daily Sobia Díaz MD   40 mg at 08/06/20 0946    potassium chloride (KLOR-CON M) extended release tablet 40 mEq  40 mEq Oral PRN Sobia Díaz MD        Or    potassium bicarb-citric acid (EFFER-K) effervescent tablet 40 mEq  40 mEq Oral PRN Sobia Díaz MD        Or    potassium chloride 10 mEq/100 mL IVPB (Peripheral Line)  10 mEq Intravenous PRN Sobia Díaz MD        magnesium sulfate 1 g in dextrose 5% 100 mL IVPB  1 g Intravenous PRN Sobia Díaz MD        sodium phosphate 19.17 mmol in dextrose 5 % 250 mL IVPB  0.16 mmol/kg Intravenous PRN Sobia Díaz MD        Or    sodium phosphate 38.34 mmol in dextrose 5 % 250 mL IVPB  0.32 mmol/kg Intravenous PRN Sobia Díaz MD  amLODIPine (NORVASC) tablet 10 mg  10 mg Oral Daily Fabiola Acevedo MD   10 mg at 08/06/20 0945    aspirin tablet 325 mg  325 mg Oral Daily Fabiola Acevedo MD   325 mg at 08/06/20 0945    atorvastatin (LIPITOR) tablet 40 mg  40 mg Oral Nightly Fabiola Acevedo MD   40 mg at 08/05/20 2020    isosorbide mononitrate (IMDUR) extended release tablet 30 mg  30 mg Oral Daily Fabiola Acevedo MD   30 mg at 08/06/20 0945    therapeutic multivitamin-minerals 1 tablet  1 tablet Oral Daily Fabiola Acevedo MD   1 tablet at 08/06/20 0945    nitroGLYCERIN (NITROSTAT) SL tablet 0.4 mg  0.4 mg Sublingual Q5 Min PRN Fabiola Acevedo MD        tamsulosin (FLOMAX) capsule 0.8 mg  0.8 mg Oral Nightly Fabiola Acevedo MD   0.8 mg at 08/05/20 2020    traZODone (DESYREL) tablet 150 mg  150 mg Oral Nightly Fabiola Acevedo MD   150 mg at 08/05/20 2020    traMADol (ULTRAM) tablet 50 mg  50 mg Oral Q6H PRN Fabiola Acevedo MD   50 mg at 08/06/20 0244    gabapentin (NEURONTIN) capsule 600 mg  600 mg Oral BID Fabiola Acevedo MD   600 mg at 08/06/20 0945    cyclobenzaprine (FLEXERIL) tablet 10 mg  10 mg Oral TID PRN Fabiola Acevedo MD   10 mg at 08/06/20 0244    metoprolol (LOPRESSOR) injection 5 mg  5 mg Intravenous Q6H PRN [facility], APRN - [facility]        losartan (COZAAR) tablet 100 mg  100 mg Oral Daily Fabiola Acevedo MD   100 mg at 08/06/20 0946       Allergies:  Penicillins;  Percocet [oxycodone-acetaminophen]; and Codeine    Social History:    TOBACCO: former smoker 15 years ago, 0.5 PPD  ETOH:  no  DRUGS:  no  LIFESTYLE: sedentary  OCCUPATION:  Retired     Family History:        Problem Relation Age of Onset    Diabetes Mother     Heart Disease Father     High Blood Pressure Father     High Cholesterol Father     Heart Attack Father     High Blood Pressure Sister     High Blood Pressure Brother     Birth Defects Brother     High Blood Pressure Brother     Heart Disease Sister     High Blood Pressure Sister     High Blood Pressure Sister        REVIEW OF SYSTEMS:    CONSTITUTIONAL:  Alert and oriented 77year old male   DERMATOLOGICAL: negative  NEUROLOGICAL:  negative  EYES:  negative  HEENT:  negative  RESPIRATORY:  negative  CARDIOVASCULAR:  negative  GASTROINTESTINAL:  negative  GENITO-URINARY: no dysuria, trouble voiding, or hematuria  ENDOCRINE: negative  MUSCULOSKELETAL:  negative  HEMATOLOGICAL AND LYMPHATIC: negative  IMMUNOLOGICAL: negative  PSYCHOLOGICAL: negative    PHYSICAL EXAM:    VITALS:  BP (!) 150/98   Pulse 108   Temp 97.7 °F (36.5 °C) (Temporal)   Resp 16   Ht 6' 2\" (1.88 m)   Wt 272 lb 0.8 oz (123.4 kg)   SpO2 97%   BMI 34.93 kg/m²     Eyes:  lids and lashes normal, pupils equal and round, extra ocular muscles intact, sclera clear, conjunctiva normal    Head/ENT:  Normocephalic, atraumatic,    residual dentition, normal gums, & palate, oropharynx without erythema or exudates    Neck:  supple, symmetrical, trachea midline, no lymphadenopathy, no jugular venous distension, no carotid bruits and MASSES:  no masses    Lungs:  no increased work of breathing, good air exchange, no retractions and clear to auscultation, no palpable / percussible abnormalities    Cardiovascular:  regular rate and rhythm, S1, S2 normal, no murmur, click, rub or gallop    Pulses:  Right dorsalis pedis 2, Left dorsalis pedis 2, Right posterior tibial 1, Left Posterior tibial 1, Right Femoral 2, Left Femoral 2, Right radial 2, and Left radial 2    Abdomen:  Soft, normal bowel sounds, non-tender, no hepatosplenomegaly, aorta likely normal and bruits absent    Musculoskeletal:  Back is straight and non-tender,  No CVAT, full ROM of upper and lower extremities.     Extremities:   No clubbing, or cyanosis, or edema     Skin: warm and normal turgor, no ulcers, infections, or rashes, no rashes    Neurological: awake, alert and oriented x 3, motor 5/5 bilateral upper and lower extremities, sensation

## 2020-08-06 NOTE — PROGRESS NOTES
Rapid Covid test completed  And sent to lab. Droplet Plus isolation initiated. Will discontinue once test is negative.

## 2020-08-06 NOTE — PROGRESS NOTES
[facility]   Electrophysiology Progress Note     Admit Date: 2020     Reason for follow up: Arrhythmia    HPI and Interval History: 77 y.o. male presented with history of CAD s/p PCI, DM, HLD, HTN, Obesity, CKD stage III, HFrEF who has been referred to hospital with tachycardia. He had wide-complex tachycardia with a heart rate around 200 bpm which converted to sinus rhythm spontaneously. He has undergone cardiac catheterization which showed severe left main and RCA stenosis. Is being evaluated for possible bypass surgery. Patient seen and examined. Clinical notes reviewed. Telemetry reviewed. No new complaint today. No major events overnight. Denies having chest pain, shortness of breath, dyspnea on exertion, Orthopnea, PND at the time of this visit. Review of System:  All other systems reviewed except for that noted above. Pertinent negatives and positives are:     · General: negative for fever, chills   · Ophthalmic ROS: negative for - eye pain or loss of vision  · ENT ROS: negative for - headaches, sore throat   · Respiratory: negative for - cough, sputum  · Cardiovascular: Reviewed in HPI  · Gastrointestinal: negative for - abdominal pain, diarrhea, N/V  · Hematology: negative for - bleeding, blood clots, bruising or jaundice  · Genito-Urinary:  negative for - Dysuria or incontinence  · Musculoskeletal: negative for - Joint swelling, muscle pain  · Neurological: negative for - confusion, dizziness, headaches   · Psychiatric: No anxiety, no depression. · Dermatological: negative for - rash      Physical Examination:  Vitals:    20 0730   BP:    Pulse: 108   Resp:    Temp:    SpO2:       In: 1441.3 [P.O.:720;  I.V.:721.3]  Out: 1050    Wt Readings from Last 3 Encounters:   20 272 lb 0.8 oz (123.4 kg)   20 264 lb 15.9 oz (120.2 kg)   20 265 lb (120.2 kg)     Temp  Av.9 °F (36.1 °C)  Min: 96.3 °F (35.7 °C)  Max: 97.7 °F (36.5 °C)  Pulse  Av  Min: 88  Max: 115  BP  Min: 118/69  Max: 179/90  SpO2  Av.3 %  Min: 94 %  Max: 97 %    Intake/Output Summary (Last 24 hours) at 2020 1112  Last data filed at 2020 0945  Gross per 24 hour   Intake 1441.25 ml   Output 1050 ml   Net 391.25 ml       · Telemetry: Sinus rhythm   · Constitutional: Oriented. No distress. · Head: Normocephalic and atraumatic. · Mouth/Throat: Oropharynx is clear and moist.   · Eyes: Conjunctivae normal. EOM are normal.   · Neck: Neck supple. No rigidity. No JVD present. · Cardiovascular: Normal rate, regular rhythm, S1&S2. · Pulmonary/Chest: Bilateral respiratory sounds. No wheezes, No rhonchi. · Abdominal: Soft. Bowel sounds present. No distension, No tenderness. + Obese  · Musculoskeletal: No tenderness. No edema    · Lymphadenopathy: Has no cervical adenopathy. · Neurological: Alert and oriented. Cranial nerve appears intact, No Gross deficit   · Skin: Skin is warm and dry. No rash noted. · Psychiatric: Has a normal behavior     Labs, diagnostic and imaging results reviewed. Reviewed. Recent Labs     20  2220 20  1755 20  0308    136 136   K 4.2 3.9 4.3    102 102   CO2 25 23 23   PHOS 4.9  --   --    BUN 20 19 18   CREATININE 1.2 1.0 1.0     Recent Labs     20  0600 20  1755   WBC 8.3 7.2   HGB 13.4* 13.0*   HCT 40.4* 39.8*   MCV 91.3 91.4    202     Lab Results   Component Value Date    CKTOTAL 99 2011    CKMB 0.29 2011    CKMBINDEX 0.3 2011    TROPONINI <0.01 2020     Estimated Creatinine Clearance: 100 mL/min (based on SCr of 1 mg/dL).    No results found for: BNP  Lab Results   Component Value Date    PROTIME 12.6 2020    PROTIME 10.7 2020    PROTIME 11.4 2011    INR 1.09 2020    INR 0.92 2020    INR 1.04 2011     Lab Results   Component Value Date    CHOL 119 2020    HDL 30 2020    HDL 29 2011    TRIG 132 2020 Scheduled Meds:   carvedilol  12.5 mg Oral BID WC    sodium chloride flush  10 mL Intravenous 2 times per day    sodium chloride flush  10 mL Intravenous 2 times per day    enoxaparin  40 mg Subcutaneous Daily    amLODIPine  10 mg Oral Daily    aspirin  325 mg Oral Daily    atorvastatin  40 mg Oral Nightly    isosorbide mononitrate  30 mg Oral Daily    therapeutic multivitamin-minerals  1 tablet Oral Daily    tamsulosin  0.8 mg Oral Nightly    traZODone  150 mg Oral Nightly    gabapentin  600 mg Oral BID    losartan  100 mg Oral Daily     Continuous Infusions:  PRN Meds:sodium chloride flush, acetaminophen, oxyCODONE-acetaminophen **OR** oxyCODONE-acetaminophen, ondansetron, sodium chloride flush, polyethylene glycol, promethazine **OR** [DISCONTINUED] ondansetron, potassium chloride **OR** potassium alternative oral replacement **OR** potassium chloride, magnesium sulfate, sodium phosphate IVPB **OR** sodium phosphate IVPB, nitroGLYCERIN, traMADol, cyclobenzaprine, metoprolol     Patient Active Problem List    Diagnosis Date Noted    Ventricular tachycardia ([address] 08/04/2020    Chronic back pain 03/12/2020    GERD with esophagitis 12/17/2018    Obesity (BMI 30-39.9) 12/17/2018    DM2 (diabetes mellitus, type 2) ([address] 12/17/2018    BPH (benign prostatic hyperplasia) 12/17/2018    CKD (chronic kidney disease) stage 3, GFR 30-59 ml/min ([facility] 12/17/2018    Other hyperlipidemia 08/09/2011    Essential hypertension, benign 08/09/2011    [facility]rosclerosis of native coronary artery with stable [facility]ina p[facility] 08/09/2011    Primary cardiomyopathy ([address] 08/09/2011    Chest pain 08/09/2011    Dizziness and giddiness 08/09/2011    Palpitations 08/09/2011      Active Hospital Problems    Diagnosis Date Noted    Ventricular tachycardia ([address] [I47.2] 08/04/2020    GERD with esophagitis [K21.0] 12/17/2018    Obesity (BMI 30-39. 9) [E66.9] 12/17/2018    BPH (benign prostatic hyperplasia)

## 2020-08-06 NOTE — PLAN OF CARE
Problem: Falls - Risk of:  Goal: Will remain free from falls  Description: Will remain free from falls  Outcome: Ongoing   Patient remains free of falls at this time. Uses a cane for ambulation. Verbalized understanding of need to call for assistance with transfers and ambulation at this time. Uses call light appropriately. Will continue to monitor. Problem: Falls - Risk of:  Goal: Absence of physical injury  Description: Absence of physical injury  Outcome: Ongoing   Patient remains free of injury at this time. Room kept uncluttered with call light, bedside table, urinal and personal items within easy reach. Hourly rounding for daily care needs. Will continue to monitor. Problem: Pain:  Goal: Pain level will decrease  Description: Pain level will decrease  Outcome: Ongoing   Patient has reported good pain relief with PRN tramadol for chronic back pain. Will continue to monitor.

## 2020-08-06 NOTE — PROGRESS NOTES
TR band removed from right radial cath site with no bleeding or hematoma noted. Arm brace remains in place to remind patient not to use right wrist. VSS. NS infusing at 75 ml/hr as ordered Will continue to monitor.

## 2020-08-06 NOTE — PROGRESS NOTES
[facility] PROGRESS NOTE    8/6/2020 9:12 AM        Name: Aisha Palencia . Admitted: 8/4/2020  Primary Care Provider: ETTA Ellison CNP (Tel: [phone])                        Subjective:  . No acute events overnight. Resting well. Pain control. Diet ok. Labs reviewed  Denies any chest pain sob.      Reviewed interval ancillary notes    Current Medications  carvedilol (COREG) tablet 12.5 mg, BID WC  sodium chloride flush 0.9 % injection 10 mL, 2 times per day  sodium chloride flush 0.9 % injection 10 mL, PRN  acetaminophen (TYLENOL) tablet 650 mg, Q4H PRN  oxyCODONE-acetaminophen (PERCOCET) 5-325 MG per tablet 1 tablet, Q4H PRN    Or  oxyCODONE-acetaminophen (PERCOCET) 5-325 MG per tablet 2 tablet, Q4H PRN  ondansetron (ZOFRAN) injection 4 mg, Q6H PRN  sodium chloride flush 0.9 % injection 10 mL, 2 times per day  sodium chloride flush 0.9 % injection 10 mL, PRN  acetaminophen (TYLENOL) tablet 650 mg, Q6H PRN    Or  acetaminophen (TYLENOL) suppository 650 mg, Q6H PRN  polyethylene glycol (GLYCOLAX) packet 17 g, Daily PRN  promethazine (PHENERGAN) tablet 12.5 mg, Q6H PRN    Or  ondansetron (ZOFRAN) injection 4 mg, Q6H PRN  enoxaparin (LOVENOX) injection 40 mg, Daily  potassium chloride (KLOR-CON M) extended release tablet 40 mEq, PRN    Or  potassium bicarb-citric acid (EFFER-K) effervescent tablet 40 mEq, PRN    Or  potassium chloride 10 mEq/100 mL IVPB (Peripheral Line), PRN  magnesium sulfate 1 g in dextrose 5% 100 mL IVPB, PRN  sodium phosphate 19.17 mmol in dextrose 5 % 250 mL IVPB, PRN    Or  sodium phosphate 38.34 mmol in dextrose 5 % 250 mL IVPB, PRN  amLODIPine (NORVASC) tablet 10 mg, Daily  aspirin tablet 325 mg, Daily  atorvastatin (LIPITOR) tablet 40 mg, Nightly  isosorbide mononitrate (IMDUR) extended release tablet 30 mg, Daily  therapeutic multivitamin-minerals 1 tablet, Daily  nitroGLYCERIN (NITROSTAT) SL tablet 0.4 mg, Q5 Min PRN  tamsulosin (FLOMAX) capsule 0.8 mg, Nightly  traZODone (DESYREL) tablet 150 mg, Nightly  traMADol (ULTRAM) tablet 50 mg, Q6H PRN  gabapentin (NEURONTIN) capsule 600 mg, BID  cyclobenzaprine (FLEXERIL) tablet 10 mg, TID PRN  metoprolol (LOPRESSOR) injection 5 mg, Q6H PRN  losartan (COZAAR) tablet 100 mg, Daily        Objective:  BP (!) 150/98   Pulse 108   Temp 97.7 °F (36.5 °C) (Temporal)   Resp 16   Ht 6' 2\" (1.88 m)   Wt 272 lb 0.8 oz (123.4 kg)   SpO2 97%   BMI 34.93 kg/m²     Intake/Output Summary (Last 24 hours) at 8/6/2020 0912  Last data filed at 8/6/2020 0434  Gross per 24 hour   Intake 1431.25 ml   Output 1350 ml   Net 81.25 ml      Wt Readings from Last 3 Encounters:   08/06/20 272 lb 0.8 oz (123.4 kg)   05/22/20 264 lb 15.9 oz (120.2 kg)   05/18/20 265 lb (120.2 kg)       General appearance:  Appears comfortable  Eyes: Sclera clear. Pupils equal.  ENT: Moist oral mucosa. Trachea midline, no adenopathy. Cardiovascular: Regular rhythm, normal S1, S2. No murmur. No edema in lower extremities  Respiratory: Not using accessory muscles. Good inspiratory effort. Clear to auscultation bilaterally, no wheeze or crackles. GI: Abdomen soft, no tenderness, not distended, normal bowel sounds  Musculoskeletal: No cyanosis in digits, neck supple  Neurology: CN 2-12 grossly intact. No speech or motor deficits  Psych: Normal affect.  Alert and oriented in time, place and person  Skin: Warm, dry, normal turgor    Labs and Tests:  CBC:   Recent Labs     08/05/20  0600 08/05/20  1755   WBC 8.3 7.2   HGB 13.4* 13.0*    202     BMP:    Recent Labs     08/04/20  2220 08/05/20  1755 08/06/20  0308    136 136   K 4.2 3.9 4.3    102 102   CO2 25 23 23   BUN 20 19 18   CREATININE 1.2 1.0 1.0   GLUCOSE 162* 158* 194*     Hepatic:   Recent Labs     08/04/20  2220 08/05/20  1755   AST 11* 9*   ALT 15 11   BILITOT 0.3 0.6   ALKPHOS 127 108       Discussed care with family and patient             Spent 30  minutes with patient and family at bedside and on unit reviewing medical records and labs, spent greater than 50% time counseling patient and family on diagnosis and plan   Problem List  Active Problems:    Essential hypertension, benign    Primary cardiomyopathy ([address]    GERD with esophagitis    Obesity (BMI 30-39. 9)    BPH (benign prostatic hyperplasia)    CKD (chronic kidney disease) stage 3, GFR 30-59 ml/min (HCC)    Ventricular tachycardia (HCC)  Resolved Problems:    * No resolved hospital problems. *       Assessment & Plan:   1. Wide-complex tachycardia  -Resolved. Spontaneously. -Continue management per cardiology was initially started on amiodarone drip which has been discontinued now      CAD. [id number] heart cath  -Severe CAD. With stenosis  -CABG consult initiated    Chronic kidney disease-creatinine seems to be stable now.     Diabetes  -Continue to titrate insulin glucose stable for now    BPH  Morbid obesity  Diet: DIET CARDIAC;  Code:Full Code  DVT PPX lovenox  -The recs pending CT surgery consult      Florentino Lee MD   8/6/2020 9:12 AM

## 2020-08-06 NOTE — PROGRESS NOTES
(COREG) 6.25 MG tablet take 1 tablet by mouth twice a day with meals  Genoveva Rhodes MD   isosorbide mononitrate (IMDUR) 30 MG extended release tablet take 1 tablet by mouth once daily  Genoveva Rhodes MD   glipiZIDE (GLUCOTROL) 5 MG tablet   Historical Provider, MD   traZODone (DESYREL) 50 MG tablet Take 150 mg by mouth nightly   Historical Provider, MD   amLODIPine (NORVASC) 10 MG tablet Take 1 tablet by mouth daily  Genoveva Rhodes MD   olmesartan (BENICAR) 40 MG tablet Take 1 tablet by mouth daily  Genoveva Rhodes MD   aspirin 325 MG tablet Take 325 mg by mouth daily  Historical Provider, MD   nitroGLYCERIN (NITROSTAT) 0.4 MG SL tablet Place 0.4 mg under the tongue every 5 minutes as needed for Chest pain up to max of 3 total doses. If no relief after 1 dose, call 911. Historical Provider, MD   tamsulosin (FLOMAX) 0.4 MG capsule Take 0.8 mg by mouth nightly  Historical Provider, MD   Multiple Vitamins-Minerals (CENTRUM SILVER) TABS Take 1 tablet by mouth daily. Historical Provider, MD   metformin (GLUCOPHAGE) 1000 MG tablet Take 1,000 mg by mouth 2 times daily.     Historical Provider, MD      Scheduled Meds:   carvedilol  12.5 mg Oral BID WC    sodium chloride flush  10 mL Intravenous 2 times per day    sodium chloride flush  10 mL Intravenous 2 times per day    enoxaparin  40 mg Subcutaneous Daily    amLODIPine  10 mg Oral Daily    aspirin  325 mg Oral Daily    atorvastatin  40 mg Oral Nightly    isosorbide mononitrate  30 mg Oral Daily    therapeutic multivitamin-minerals  1 tablet Oral Daily    tamsulosin  0.8 mg Oral Nightly    traZODone  150 mg Oral Nightly    gabapentin  600 mg Oral BID    losartan  100 mg Oral Daily     Continuous Infusions:  PRN Meds:sodium chloride flush, acetaminophen, oxyCODONE-acetaminophen **OR** oxyCODONE-acetaminophen, ondansetron, sodium chloride flush, polyethylene glycol, promethazine **OR** [DISCONTINUED] ondansetron, potassium chloride **OR** potassium alternative oral replacement **OR** potassium chloride, magnesium sulfate, sodium phosphate IVPB **OR** sodium phosphate IVPB, nitroGLYCERIN, traMADol, cyclobenzaprine, metoprolol     Past Medical History:  Past Medical History:   Diagnosis Date    Back pain     Blood circulation, collateral     CAD (coronary artery disease)     Chronic kidney disease     Diabetes mellitus ([facility]     Enlarged prostate     GERD (gastroesophageal reflux disease)     Hyperlipidemia     Hypertension     Medical history reviewed with no changes         Past Surgical History:    has a past surgical history that includes back surgery; Cardiac surgery; Tonsillectomy; Coronary angioplasty with stent (10/2003); Total knee arthroplasty (Right, 2014); epidural steroid injection (Left, 7/23/2019); epidural steroid injection (Left, 9/10/2019); joint replacement; and Spinal Cord Stimulator Surgery (N/A, 5/18/2020). Social History:  Reviewed. reports that he quit smoking about 36 years ago. He smoked 1.00 pack per day. He has never used smokeless tobacco. He reports that he does not drink alcohol or use drugs. Family History:  Reviewed. family history includes Birth Defects in his brother; Diabetes in his mother; Heart Attack in his father; Heart Disease in his father and sister; High Blood Pressure in his brother, brother, father, sister, sister, and sister; High Cholesterol in his father.  Denies family history of sudden cardiac death, arrhythmia, premature CAD    Review of Systems:  · Constitutional: Negative for fever, night sweats, chills, weight changes, or weakness  · Skin: Negative for rash, dry skin, pruritus, bleeding, blood clots, changes in skin pigment, or bruising    · HEENT: Negative for vision changes, ringing in the ears, dysphagia, or swollen lymph nodes  · Respiratory: Reviewed in HPI  · Cardiovascular: Reviewed in HPI  · Gastrointestinal: Negative for abdominal pain, N/V/D, constipation, or black/tarry stools  · Genito-Urinary: Negative for dysuria, incontinence, or hematuria  · Musculoskeletal: Negative for joint swelling, muscle pain, or injuries  · Neurological/Psych: Negative for confusion, seizures, headaches, balance issues or TIA-like symptoms. No anxiety, depression, or insomnia    Physical Examination:  Vitals:    08/06/20 0730   BP:    Pulse: 108   Resp:    Temp:    SpO2:       In: 1441.3 [P.O.:720; I.V.:721.3]  Out: 1050    Wt Readings from Last 3 Encounters:   08/06/20 272 lb 0.8 oz (123.4 kg)   05/22/20 264 lb 15.9 oz (120.2 kg)   05/18/20 265 lb (120.2 kg)       Intake/Output Summary (Last 24 hours) at 8/6/2020 1049  Last data filed at 8/6/2020 0945  Gross per 24 hour   Intake 1441.25 ml   Output 1050 ml   Net 391.25 ml       Telemetry: Personally Reviewed  - Sinus tachy 110-120s, rate 103 at time of visit   · Constitutional: Cooperative and in no apparent distress, and appears well nourished  · Skin: Warm and pink; no pallor, cyanosis, clubbing, or bruising   · HEENT: Symmetric and normocephalic. PERRL, EOM intact. Conjunctiva pink with clear sclera. Mucus membranes pink and moist.   · Cardiovascular: Regular rate and rhythm. S1/S2 present without murmurs, no rubs or gallops. Peripheral pulses 2+, capillary refill < 3 seconds. No elevation of JVP. No peripheral edema  · Respiratory: Respirations symmetric and unlabored. Lungs clear to auscultation bilaterally, no wheezing, crackles, or rhonchi  · Gastrointestinal: Abdomen soft and round. Bowel sounds normoactive without tenderness or masses. · Musculoskeletal: Bilateral upper and lower extremity strength 5/5 with full ROM  · Neurologic/Psych: Awake and orientated to person, place and time.  Calm affect, appropriate mood    Pertinent labs, diagnostic, device, and imaging results reviewed as a part of this visit    Labs:    BMP:   Recent Labs     08/04/20  2220 08/05/20  1755 08/06/20  0308    136 136   K 4.2 3.9 4.3    102 102   CO2 25 23 23   PHOS 4.9  --   --    BUN 20 19 18   CREATININE 1.2 1.0 1.0   MG 2.00  --   --      Estimated Creatinine Clearance: 100 mL/min (based on SCr of 1 mg/dL). CBC:   Recent Labs     20  0600 20  1755   WBC 8.3 7.2   HGB 13.4* 13.0*   HCT 40.4* 39.8*   MCV 91.3 91.4    202     Thyroid: No results found for: TSH, Y1QIEXO, S2AXDKI, THYROIDAB  Lipids:   Lab Results   Component Value Date    CHOL 119 2020    HDL 30 2020    HDL 29 2011    TRIG 132 2020     LFTS:   Lab Results   Component Value Date    ALT 11 2020    AST 9 2020    ALKPHOS 108 2020    PROT 6.4 2020    AGRATIO 1.5 2020    BILITOT 0.6 2020     Cardiac Enzymes:   Lab Results   Component Value Date    CKTOTAL 99 2011    CKMB 0.29 2011    CKMBINDEX 0.3 2011    TROPONINI <0.01 2020    TROPONINI <0.01 2020    TROPONINI <0.01 2020     Coags:   Lab Results   Component Value Date    PROTIME 12.6 2020    INR 1.09 2020       ECG:     ECHO:  20  Summary   -Normal left ventricle size, mild wall thickness and mildly reduced systolic   function with an estimated ejection fraction of 45%. -There is mild diffuse hypokinesis. -Grade I diastolic dysfunction with normal LV filling pressures. E/e\"=7.0   -The aortic valve is mildly thickened/calcified but opens well with normal   gradients.   -Trivial aortic regurgitation.   -The aortic root is mildly dilated.     Cardiac Cath IVUS, LV20  Anatomy:   LM-distal 60%   LAD-prox stent patent, distal 50%  Cx-Normal  OM- normal  RCA-mid 80% diffuse  RPDA- Normal  LVEF- 60%  LVG- normal  LVEDP- 11     IVUS LM: MLA 6.3mm2     Contrast: 94  Flouro Time: 9.3  Access: R radial     Impression  ~Coronary Angiography w/ Severe LM, RCA stenosis  ~LVG with LVEF of 60 and no regional wall motion abnormalities     Recommendation  ~Aggressive medical treatment and risk factor modification  ~CABG consult. Problem List:   Patient Active Problem List    Diagnosis Date Noted    Ventricular tachycardia ([facility] 08/04/2020    Chronic back pain 03/12/2020    GERD with esophagitis 12/17/2018    Obesity (BMI 30-39.9) 12/17/2018    DM[facility] mellitus, type 2) [facility] 12/17/2018    BPH (benign prostatic hyperplasia) 12/17/2018    CKD (chronic kidney disease) stage 3, GFR 30-59 ml/min ([facility] 12/17/2018    Other hyperlipidemia 08/09/2011    Essential hypertension, benign 08/09/2011    Atherosclerosis of native coronary artery with stable angina pectoris ([facility] 08/09/2011    Primary cardiomyopathy ([facility] 08/09/2011    Chest pain 08/09/2011    Dizziness and giddiness 08/09/2011    Palpitations 08/09/2011        Assessment and Plan: Wide complex tachycardia   ~ 220 on tele and spontaneously converted, asymptomatic    ~ EP consulted   ~ [facility] yesterday with severe disease     Coronary artery disease   ~ stable ; no c/o angina today   ~ hx of AMI in 2003 s/p PCI of LAD and LCx with TEJAS ; s/p PCI of RCA with TEJAS in 2009  ~ [address] [facility] 8/5 showing severe LM, RCA stenosis  ~ cardiothoracic surgery consulted to evaluate for CABG  ~ preop testing underway   ~ on asa/ bb/ statin/ arb/ ccb    Cardiomyopathy   ~ stable ; appears compensated   ~ Echo in 2015 EF 45%, mild global hypokinesis ; Prior echo in 2017 with EF 50%  ~ Echo 8/5 with EF 45%, diffuse hypokinesis   ~ BB/ ARB    Hypertension   ~ elevated   ~ coreg increased this AM   ~ monitor     Hyperlipidemia   ~ Controlled; LDL 63 (8/2020)  ~ lipitor 40    AYAZ  ~ not compliant with CPAP at home     Multiple medical conditions with risk of decompensation. All pertinent information and plan of care discussed with the physician. All questions and concerns were addressed to the patient. Alternatives to my treatment were discussed. I have discussed the above stated plan with patient and the nurse. The patient verbalized understanding and agreed with the plan.     Thank

## 2020-08-06 NOTE — CARE COORDINATION
.Discharge Planning Assessment  Readmission Risk Score - 13%  RN/SW discharge planner met with patient (and family member) to discuss reason for admission, current living situation, and potential needs at the time of discharge    Demographics/Insurance verified Medicare / C     Current type of dwelling: house    Patient from ECF/SW confirmed with: n/a    Living arrangements: w/spouse Viktoria Johns - [phone])    Level of function/Support: independent    PCP:  Speedy Hale    Last Visit to PCP: JUL 2020    DME: cane, walker, shower chair     Active with any community resources/agencies/skilled home care: none    Medication compliance issues: none    Financial issues that could impact healthcare: none    Tentative discharge plan: home w/ HHC (if CABG)    Discussed and provided facilities of choice if transition to a skilled nursing facility is required at the time of discharge n/a    Discussed with patient and/or family that on the day of discharge home tentative time of discharge will be between 10 AM and noon.      Transportation at the time of discharge: spouse    CHELA BoydN, RN   [phone]

## 2020-08-06 NOTE — PLAN OF CARE
Problem: Tissue Perfusion - Peripheral, Altered:  Goal: Absence of hematoma at arterial access site  Description: Absence of hematoma at arterial access site  8/6/2020 0738 by Ninfa Humphrey RN  Note: Right radial puncture site WNL. No ecchymosis, oozing, or hematoma present. Will maintain right wrist restrictions.  Will use walker when ambulating instead of the cane

## 2020-08-06 NOTE — PROGRESS NOTES
secondary to back pain)  Bath: Minimal assistance  Dressing: Minimal assistance  Grooming: Independent  Feeding: Independent  Toileting: Independent  Homemaking Assistance: (cooks on occasion)  Homemaking Responsibilities: No  Ambulation Assistance: Independent(with SPC)  Transfer Assistance: Independent(with increased time secondary to back pain)  Active : Yes  Occupation: Retired  Type of occupation: fire department  Leisure & Hobbies: use to fish and hunt, limited d/t back pain; puzzles  Additional Comments: pt reports no recent falls, prior stimulator pt experienced LEs \"giving out\"       Objective   Vision: Impaired  Vision Exceptions: Wears glasses at all times  Hearing: Within functional limits(hears better out of R ear)    Orientation  Overall Orientation Status: Within Functional Limits  Observation/Palpation  Posture: Good  Balance  Sitting Balance: Independent  Standing Balance: Modified independent   Standing Balance  Time: up to ~10 min total  Activity: functional mobility to/from bathroom, in hallway, on steps x4  Comment: with SPC, no LOB  Functional Mobility  Functional - Mobility Device: Cane  Activity: To/from bathroom; Other  Assist Level: Modified independent   Functional Mobility Comments: to/from small stairwell in hallway, to/from bathroom, steady gait  Toilet Transfers  Toilet - Technique: Ambulating  Equipment Used: Standard toilet  Toilet Transfer: Modified independent  Toilet Transfers Comments: without use of UEs  ADL  Additional Comments: Pt declined need to complete ADLs during evaluation  Tone RUE  RUE Tone: Normotonic  Tone LUE  LUE Tone: Normotonic  Coordination  Movements Are Fluid And Coordinated: Yes        Transfers  Sit to stand: Modified independent  Stand to sit: Modified independent  Transfer Comments: completed x2 sit<>stands to/from recliner without use of UEs with mod I  Vision - Basic Assessment  Prior Vision: Wears glasses all the time  Patient Visual Report: No visual complaint reported. Cognition  Overall Cognitive Status: WNL        Sensation  Overall Sensation Status: WFL        LUE AROM (degrees)  LUE AROM : WFL  RUE AROM (degrees)  RUE AROM : WFL  LUE Strength  Gross LUE Strength: WFL  RUE Strength  Gross RUE Strength: WFL                    AM-PAC Score        AM-PAC Inpatient Daily Activity Raw Score: 22 (08/06/20 1610)  AM-PAC Inpatient ADL T-Scale Score : 47.1 (08/06/20 1610)  ADL Inpatient CMS 0-100% Score: 25.8 (08/06/20 1610)  ADL Inpatient CMS G-Code Modifier : CJ (08/06/20 1610)    Goals  Short term goals  Time Frame for Short term goals: No current acute OT goals identified.        Therapy Time   Individual Concurrent Group Co-treatment   Time In 4268         Time Out 1445         Minutes 23               Timed Code Treatment Minutes:   8    Total Treatment Minutes:  [address] [address] [place], [id number]

## 2020-08-07 PROCEDURE — 6370000000 HC RX 637 (ALT 250 FOR IP): Performed by: INTERNAL MEDICINE

## 2020-08-07 PROCEDURE — 94760 N-INVAS EAR/PLS OXIMETRY 1: CPT

## 2020-08-07 PROCEDURE — 2000000000 HC ICU R&B

## 2020-08-07 PROCEDURE — 2500000003 HC RX 250 WO HCPCS: Performed by: INTERNAL MEDICINE

## 2020-08-07 PROCEDURE — 6360000002 HC RX W HCPCS: Performed by: INTERNAL MEDICINE

## 2020-08-07 PROCEDURE — 2580000003 HC RX 258: Performed by: INTERNAL MEDICINE

## 2020-08-07 PROCEDURE — 99233 SBSQ HOSP IP/OBS HIGH 50: CPT | Performed by: INTERNAL MEDICINE

## 2020-08-07 RX ORDER — TRAZODONE HYDROCHLORIDE 50 MG/1
150 TABLET ORAL NIGHTLY
Status: DISCONTINUED | OUTPATIENT
Start: 2020-08-07 | End: 2020-08-07

## 2020-08-07 RX ORDER — METOPROLOL TARTRATE 50 MG/1
100 TABLET, FILM COATED ORAL 2 TIMES DAILY
Status: DISCONTINUED | OUTPATIENT
Start: 2020-08-07 | End: 2020-08-12

## 2020-08-07 RX ORDER — LABETALOL HYDROCHLORIDE 5 MG/ML
10 INJECTION, SOLUTION INTRAVENOUS EVERY 4 HOURS PRN
Status: DISCONTINUED | OUTPATIENT
Start: 2020-08-07 | End: 2020-08-12

## 2020-08-07 RX ADMIN — AMLODIPINE BESYLATE 10 MG: 5 TABLET ORAL at 08:50

## 2020-08-07 RX ADMIN — MULTIPLE VITAMINS W/ MINERALS TAB 1 TABLET: TAB at 08:50

## 2020-08-07 RX ADMIN — ASPIRIN 325 MG ORAL TABLET 325 MG: 325 PILL ORAL at 08:51

## 2020-08-07 RX ADMIN — Medication 10 ML: at 08:53

## 2020-08-07 RX ADMIN — METOPROLOL TARTRATE 100 MG: 50 TABLET, FILM COATED ORAL at 08:50

## 2020-08-07 RX ADMIN — LOSARTAN POTASSIUM 100 MG: 100 TABLET, FILM COATED ORAL at 08:51

## 2020-08-07 RX ADMIN — ENOXAPARIN SODIUM 40 MG: 40 INJECTION SUBCUTANEOUS at 08:51

## 2020-08-07 RX ADMIN — TAMSULOSIN HYDROCHLORIDE 0.8 MG: 0.4 CAPSULE ORAL at 20:25

## 2020-08-07 RX ADMIN — LABETALOL HYDROCHLORIDE 10 MG: 5 INJECTION INTRAVENOUS at 00:43

## 2020-08-07 RX ADMIN — Medication 10 ML: at 00:44

## 2020-08-07 RX ADMIN — METOPROLOL TARTRATE 100 MG: 50 TABLET, FILM COATED ORAL at 20:24

## 2020-08-07 RX ADMIN — DESMOPRESSIN ACETATE 40 MG: 0.2 TABLET ORAL at 20:24

## 2020-08-07 RX ADMIN — GABAPENTIN 600 MG: 300 CAPSULE ORAL at 08:51

## 2020-08-07 RX ADMIN — TRAMADOL HYDROCHLORIDE 50 MG: 50 TABLET, FILM COATED ORAL at 06:02

## 2020-08-07 RX ADMIN — TRAMADOL HYDROCHLORIDE 50 MG: 50 TABLET, FILM COATED ORAL at 20:25

## 2020-08-07 RX ADMIN — ISOSORBIDE MONONITRATE 30 MG: 30 TABLET, EXTENDED RELEASE ORAL at 08:51

## 2020-08-07 RX ADMIN — CYCLOBENZAPRINE 10 MG: 10 TABLET, FILM COATED ORAL at 06:02

## 2020-08-07 RX ADMIN — Medication 10 ML: at 20:25

## 2020-08-07 RX ADMIN — POLYETHYLENE GLYCOL 3350 17 G: 17 POWDER, FOR SOLUTION ORAL at 08:50

## 2020-08-07 RX ADMIN — CYCLOBENZAPRINE 10 MG: 10 TABLET, FILM COATED ORAL at 16:10

## 2020-08-07 RX ADMIN — GABAPENTIN 600 MG: 300 CAPSULE ORAL at 20:24

## 2020-08-07 RX ADMIN — Medication 10 ML: at 08:52

## 2020-08-07 ASSESSMENT — PAIN DESCRIPTION - LOCATION
LOCATION: BACK

## 2020-08-07 ASSESSMENT — PAIN DESCRIPTION - ORIENTATION
ORIENTATION: RIGHT;LOWER
ORIENTATION: RIGHT;LOWER
ORIENTATION: LOWER
ORIENTATION: RIGHT;LOWER
ORIENTATION: LOWER;RIGHT;LEFT
ORIENTATION: RIGHT;LOWER
ORIENTATION: LOWER

## 2020-08-07 ASSESSMENT — PAIN DESCRIPTION - DESCRIPTORS
DESCRIPTORS: ACHING;CONSTANT
DESCRIPTORS: ACHING;CONSTANT
DESCRIPTORS: ACHING;DISCOMFORT
DESCRIPTORS: ACHING;DISCOMFORT
DESCRIPTORS: ACHING;CONSTANT
DESCRIPTORS: ACHING;DISCOMFORT;BURNING

## 2020-08-07 ASSESSMENT — PAIN DESCRIPTION - FREQUENCY
FREQUENCY: CONTINUOUS

## 2020-08-07 ASSESSMENT — PAIN DESCRIPTION - PAIN TYPE
TYPE: CHRONIC PAIN

## 2020-08-07 ASSESSMENT — PAIN SCALES - GENERAL
PAINLEVEL_OUTOF10: 4
PAINLEVEL_OUTOF10: 3
PAINLEVEL_OUTOF10: 6
PAINLEVEL_OUTOF10: 2
PAINLEVEL_OUTOF10: 3
PAINLEVEL_OUTOF10: 4
PAINLEVEL_OUTOF10: 4
PAINLEVEL_OUTOF10: 3
PAINLEVEL_OUTOF10: 5

## 2020-08-07 ASSESSMENT — PAIN DESCRIPTION - ONSET
ONSET: AWAKENED FROM SLEEP
ONSET: ON-GOING
ONSET: ON-GOING
ONSET: AWAKENED FROM SLEEP
ONSET: ON-GOING

## 2020-08-07 ASSESSMENT — PAIN DESCRIPTION - DIRECTION
RADIATING_TOWARDS: LEGS

## 2020-08-07 ASSESSMENT — PAIN DESCRIPTION - PROGRESSION
CLINICAL_PROGRESSION: NOT CHANGED
CLINICAL_PROGRESSION: NOT CHANGED

## 2020-08-07 NOTE — PROGRESS NOTES
Notified Dr Oj Holguin of /101 via [employer]. Received instruction to confirm with manual BP. Manual /95. Received new order for 10mg Labetalol PRN every 4 hours for SBP > 150. Gave 10mg Labetalol per order. Asked patient to call before getting out of bed as it could make him dizzy. Will continue to monitor.

## 2020-08-07 NOTE — PLAN OF CARE
Problem: Falls - Risk of:  Goal: Will remain free from falls  Description: Will remain free from falls  Outcome: Ongoing   Patient remains free from falls. High fall risk due to use of cane. Steady gait with cane noted. Up as tolerated. Will continue to monitor. Problem: Falls - Risk of:  Goal: Absence of physical injury  Description: Absence of physical injury  Outcome: Ongoing   Pt remains free of injury. Room kept uncluttered with call light, bedside table and personal items within easy reach. Hourly rounding for daily care needs. Will continue to monitor. Problem: Pain:  Goal: Pain level will decrease  Description: Pain level will decrease  Outcome: Ongoing   Patient reports chronic back pain at a 4/10 this shift. Has implanted TENS unit that he is currently using to manage his pain. PRN tramadol available if needed. Will continue to monitor. Problem: Pain:  Goal: Control of acute pain  Description: Control of acute pain  Outcome: Ongoing  No acute pain reported this shift. Will continue to monitor. Problem: Pain:  Goal: Control of chronic pain  Description: Control of chronic pain  Outcome: Ongoing     Patient reports chronic back pain at a 4/10 this shift. Has implanted TENS unit that he is currently using to manage his pain. PRN tramadol available if needed. Will continue to monitor. Problem: Infection - Surgical Site:  Goal: Will show no infection signs and symptoms  Description: Will show no infection signs and symptoms  Outcome: Ongoing   Right radial cath site WNL without redness or swelling noted. Will continue to monitor. Problem: Tissue Perfusion - Cardiopulmonary, Altered:  Goal: Absence of angina  Description: Absence of angina  Outcome: Ongoing   Patient denies angina this shift. Will continue to monitor.       Problem: Tissue Perfusion - Peripheral, Altered:  Goal: Absence of hematoma at arterial access site  Description: Absence of hematoma at arterial access site  8/6/2020 2101 by Nano Floyd RN  Outcome: Ongoing  Right radial access site WNL. No oozing or hematoma noted. Continue right wrist restrictions. Encouraging patient to use four wheel walker for ambulating longer distances.

## 2020-08-07 NOTE — PROGRESS NOTES
[facility] Daily Progress Note      Admit Date:  8/4/2020    No chief complaint on file. VT    Subjective:  Mr. Jaden Singho denies exertional chest pain, SOB/CARR, PND, palpitations, light-headedness, or edema.      Objective:   BP (!) 154/94   Pulse 109   Temp 97.5 °F (36.4 °C) (Temporal)   Resp 16   Ht 6' 2\" (1.88 m)   Wt 272 lb 0.8 oz (123.4 kg)   SpO2 95%   BMI 34.93 kg/m²       Intake/Output Summary (Last 24 hours) at 8/7/2020 0845  Last data filed at 8/7/2020 0020  Gross per 24 hour   Intake 1290 ml   Output 200 ml   Net 1090 ml       TELEMETRY: Sinus     Physical Exam:  General:  Awake, alert, oriented x 3, NAD  Skin:  Warm and dry  Neck:  JVD flat  Chest:  normal air entry  Cardiovascular:  RRR S1S2, no S3, no mrmr  Abdomen:  Soft, ND, NT, No HSM  Extremities:  No edema    Medications:    metoprolol tartrate  100 mg Oral BID    sodium chloride flush  10 mL Intravenous 2 times per day    sodium chloride flush  10 mL Intravenous 2 times per day    enoxaparin  40 mg Subcutaneous Daily    amLODIPine  10 mg Oral Daily    aspirin  325 mg Oral Daily    atorvastatin  40 mg Oral Nightly    isosorbide mononitrate  30 mg Oral Daily    therapeutic multivitamin-minerals  1 tablet Oral Daily    tamsulosin  0.8 mg Oral Nightly    traZODone  150 mg Oral Nightly    gabapentin  600 mg Oral BID    losartan  100 mg Oral Daily       labetalol, sodium chloride flush, acetaminophen, oxyCODONE-acetaminophen **OR** oxyCODONE-acetaminophen, ondansetron, sodium chloride flush, polyethylene glycol, promethazine **OR** [DISCONTINUED] ondansetron, potassium chloride **OR** potassium alternative oral replacement **OR** potassium chloride, magnesium sulfate, sodium phosphate IVPB **OR** sodium phosphate IVPB, nitroGLYCERIN, traMADol, cyclobenzaprine, metoprolol    Lab Data:  CBC:   Recent Labs     08/05/20  0600 08/05/20  1755   WBC 8.3 7.2   HGB 13.4* 13.0*   HCT 40.4* 39.8*   MCV 91.3 91.4    202 BMP:   Recent Labs     08/04/20 2220 08/05/20 1755 08/06/20  0308    136 136   K 4.2 3.9 4.3    102 102   CO2 25 23 23   PHOS 4.9  --   --    BUN 20 19 18   CREATININE 1.2 1.0 1.0     LIVER PROFILE:   Recent Labs     08/04/20 2220 08/05/20 1755 08/06/20  0255   AST 11* 9* 13*   ALT 15 11 12   BILIDIR  --   --  <0.2   BILITOT 0.3 0.6 0.3   ALKPHOS 127 108 107     PT/INR:   Recent Labs     08/05/20 1755 08/06/20  1343   PROTIME 12.6 11.7   INR 1.09 1.01     APTT:   Recent Labs     08/05/20 1755 08/06/20  1343   APTT 203.7* 37.2*     BNP:  No results for input(s): BNP in the last 72 hours. IMAGING:  -Normal left ventricle size, mild wall thickness and mildly reduced systolic   function with an estimated ejection fraction of 45%. -There is mild diffuse hypokinesis. -Grade I diastolic dysfunction with normal LV filling pressures. E/e\"=7.0   -The aortic valve is mildly thickened/calcified but opens well with normal   gradients.   -Trivial aortic regurgitation.   -The aortic root is mildly dilated. Cath  Impression  ~Coronary Angiography w/ Severe LM, RCA stenosis  ~LVG with LVEF of 60 and no regional wall motion abnormalities    Assessment/Plan:  Active Problems:       Ventricular tachycardia ([address]  Plan: Wide complex tachycardia at OSH. VT or SVT with aberrancy. S/p LHC with moderate to severe LM disease by IVUS. Consider ischemic evaluation to determine LM severity. FFR vs Stress test. D/w CT surgery and lesions likely not severe enough to cause VT. Patient has no symptoms of angina/CHF. Plan for EP study to determine source of arrhythmia, possibly on Monday    High resting HR. Agree with lopressor, stop coreg.         Tonia Ross MD 8/7/2020 8:45 AM

## 2020-08-07 NOTE — PROGRESS NOTES
Hospitalist Progress Note      PCP: Anthony Bonner, APRN - CNP    Date of Admission: 2020    SUBJECTIVE:   Seated in chair; denies CP/SOB; no other concerns            OBJECTIVE:     Vitals    TEMPERATURE:  Current - Temp: 97.8 °F (36.6 °C); Max - Temp  Av.5 °F (36.4 °C)  Min: 97.4 °F (36.3 °C)  Max: 97.8 °F (36.6 °C)  RESPIRATIONS RANGE: Resp  Av.6  Min: 16  Max: 18  PULSE RANGE: Pulse  Av.2  Min: 87  Max: 113  BLOOD PRESSURE RANGE:  Systolic (00PBI), ZZZ:192 , Min:132 , KUT:144   ; Diastolic (91BVU), KSY:48, Min:74, Max:101    PULSE OXIMETRY RANGE: SpO2  Av.5 %  Min: 95 %  Max: 100 %  24HR INTAKE/OUTPUT:      Intake/Output Summary (Last 24 hours) at 2020 1224  Last data filed at 2020 1015  Gross per 24 hour   Intake 1560 ml   Output 440 ml   Net 1120 ml       Exam:    General appearance: Well, no apparent distress, appears stated age and cooperative. HEENT Normal cephalic, atraumatic without obvious deformity. Pupils equal, round, and reactive to light. Extra ocular muscles intact. Conjunctivae/corneas clear. Neck: Supple, No jugular venous distention/bruits. Trachea midline without thyromegaly or adenopathy with full range of motion. Lungs: Clear to ascultation, bilaterally without Rales/Wheezes/Rhonchi with good respiratory effort. Heart: Regular rate and rhythm with Normal S1/S2 without  murmurs, rubs or gallops, point of maximum impulse non-displaced  Abdomen: Soft, non-tender or non-distended without rigidity or guarding and positive bowel sounds all four quadrants. Extremities: No clubbing, cyanosis, or edema bilaterally. Full range of motion without deformity and normal gait intact. Skin: Skin color, texture, turgor normal. No rashes or lesions. Neurologic: Alert and oriented X 3,  neurovascularly intact with sensory/motor intact upper extremities/lower extremities, bilaterally.  Cranial nerves:II-XII intact, grossly non-focal.  Mental status: Alert, oriented,

## 2020-08-07 NOTE — PROGRESS NOTES
[facility] [address]   Electrophysiology Progress Note     Admit Date: 2020     Reason for follow up: Arrhythmia    HPI and Interval History: 77 y.o. male presented with history of CAD s/p PCI, DM, HLD, HTN, Obesity, CKD stage III, HFrEF who has been referred to hospital with tachycardia. He had wide-complex tachycardia with a heart rate around 200 bpm which converted to sinus rhythm spontaneously. He has undergone cardiac catheterization which showed left main and RCA stenosis. No intervention has been done. He has been seen by interventional cardiology and CT surgery. Patient seen and examined. Clinical notes reviewed. Telemetry reviewed. No new complaint today. No major events overnight. Denies having chest pain, shortness of breath, dyspnea on exertion, Orthopnea, PND at the time of this visit. Review of System:  All other systems reviewed except for that noted above. Pertinent negatives and positives are:     · General: negative for fever, chills   · Ophthalmic ROS: negative for - eye pain or loss of vision  · ENT ROS: negative for - headaches, sore throat   · Respiratory: negative for - cough, sputum  · Cardiovascular: Reviewed in HPI  · Gastrointestinal: negative for - abdominal pain, diarrhea, N/V  · Hematology: negative for - bleeding, blood clots, bruising or jaundice  · Genito-Urinary:  negative for - Dysuria or incontinence  · Musculoskeletal: negative for - Joint swelling, muscle pain  · Neurological: negative for - confusion, dizziness, headaches   · Psychiatric: No anxiety, no depression.   · Dermatological: negative for - rash      Physical Examination:  Vitals:    20 0700   BP:    Pulse: 109   Resp:    Temp:    SpO2:       In: 800 [P.O.:800]  Out: -    Wt Readings from Last 3 Encounters:   20 272 lb 0.8 oz (123.4 kg)   20 264 lb 15.9 oz (120.2 kg)   20 265 lb (120.2 kg)     Temp  Av.5 °F (36.4 °C)  Min: 97.4 °F (36.3 °C)  Max: 97.7 °F (36.5 °C)  Pulse  Av.2  Min: 87  Max: 113  BP  Min: 113/82  Max: 171/101  SpO2  Av.2 %  Min: 94 %  Max: 100 %    Intake/Output Summary (Last 24 hours) at 2020 0800  Last data filed at 2020 0020  Gross per 24 hour   Intake 1290 ml   Output 200 ml   Net 1090 ml       · Telemetry: Sinus rhythm   · Constitutional: Oriented. No distress. · Head: Normocephalic and atraumatic. · Mouth/Throat: Oropharynx is clear and moist.   · Eyes: Conjunctivae normal. EOM are normal.   · Neck: Neck supple. No rigidity. No JVD present. · Cardiovascular: Normal rate, regular rhythm, S1&S2. · Pulmonary/Chest: Bilateral respiratory sounds. No wheezes, No rhonchi. · Abdominal: Soft. Bowel sounds present. No distension, No tenderness. + Obese  · Musculoskeletal: No tenderness. No edema    · Lymphadenopathy: Has no cervical adenopathy. · Neurological: Alert and oriented. Cranial nerve appears intact, No Gross deficit   · Skin: Skin is warm and dry. No rash noted. · Psychiatric: Has a normal behavior     Labs, diagnostic and imaging results reviewed. Reviewed. Recent Labs     20  2220 20  1755 20  0308    136 136   K 4.2 3.9 4.3    102 102   CO2 25 23 23   PHOS 4.9  --   --    BUN 20 19 18   CREATININE 1.2 1.0 1.0     Recent Labs     20  0600 20  1755   WBC 8.3 7.2   HGB 13.4* 13.0*   HCT 40.4* 39.8*   MCV 91.3 91.4    202     Lab Results   Component Value Date    CKTOTAL 99 2011    CKMB 0.29 2011    CKMBINDEX 0.3 2011    TROPONINI <0.01 2020     Estimated Creatinine Clearance: 100 mL/min (based on SCr of 1 mg/dL).    No results found for: BNP  Lab Results   Component Value Date    PROTIME 11.7 2020    PROTIME 12.6 2020    PROTIME 10.7 2020    INR 1.01 2020    INR 1.09 2020    INR 0.92 2020     Lab Results   Component Value Date    CHOL 119 2020    HDL 30 2020    HDL 29 2011    TRIG 132 08/05/2020       Scheduled Meds:   metoprolol tartrate  100 mg Oral BID    sodium chloride flush  10 mL Intravenous 2 times per day    sodium chloride flush  10 mL Intravenous 2 times per day    enoxaparin  40 mg Subcutaneous Daily    amLODIPine  10 mg Oral Daily    aspirin  325 mg Oral Daily    atorvastatin  40 mg Oral Nightly    isosorbide mononitrate  30 mg Oral Daily    therapeutic multivitamin-minerals  1 tablet Oral Daily    tamsulosin  0.8 mg Oral Nightly    traZODone  150 mg Oral Nightly    gabapentin  600 mg Oral BID    losartan  100 mg Oral Daily     Continuous Infusions:  PRN Meds:labetalol, sodium chloride flush, acetaminophen, oxyCODONE-acetaminophen **OR** oxyCODONE-acetaminophen, ondansetron, sodium chloride flush, polyethylene glycol, promethazine **OR** [DISCONTINUED] ondansetron, potassium chloride **OR** potassium alternative oral replacement **OR** potassium chloride, magnesium sulfate, sodium phosphate IVPB **OR** sodium phosphate IVPB, nitroGLYCERIN, traMADol, cyclobenzaprine, metoprolol     Patient Active Problem List    Diagnosis Date Noted    Ventricular tachycardia ([facility] 08/04/2020    Chronic back pain 03/12/2020    GERD with esophagitis 12/17/2018    Obesity (BMI 30-39.9) 12/17/2018    DM2 (diabetes mellitus, type 2) ([facility] 12/17/2018    BPH (benign prostatic hyperplasia) 12/17/2018    CKD (chronic kidney disease) stage 3, GFR 30-59 ml/min ([facility] 12/17/2018    Other hyperlipidemia 08/09/2011    Essential hypertension, benign 08/09/2011    Atherosclerosis of native coronary artery with stable angina pectoris ([facility] 08/09/2011    Primary cardiomyopathy ([facility] 08/09/2011    Chest pain 08/09/2011    Dizziness and giddiness 08/09/2011    Palpitations 08/09/2011      Active Hospital Problems    Diagnosis Date Noted    Ventricular tachycardia ([facility] [I47.2] 08/04/2020    GERD with esophagitis [K21.0] 12/17/2018    Obesity (BMI 30-39. 9) [E66.9] 12/17/2018    BPH (benign prostatic hyperplasia) [N40.0] 12/17/2018    CKD (chronic kidney disease) stage 3, GFR 30-59 ml/min ([facility] [N18.3] 12/17/2018    Essential hypertension, benign [I10] 08/09/2011    Primary cardiomyopathy ([facility] [I42.8] 08/09/2011       Assessment:     - Wide-complex tachycardia   Likely ventricular tachycardia due to history of severe coronary artery disease   DD include Atrial flutter with 1:1 conduction. No twelve-lead EKG is available of this tachycardia. Patient has been found to LM and RCA disease   He has been evaluated by CT surgery and also interventional cardiology. Needs aggressive medical therapy and revascularization. At baseline has mild sinus tachycardia. Change to Metoprolol 100 mg bid. Continue with losartan    - CAD, LM and RCA disease   Plan per IC and CT surgery     - Morbid obesity: Body mass index is 34.93 kg/m². - AYAZ:     - DM     - Catecholamine / metanephrine test per CT surgery and need for endocrine consult. Multiple medical conditions with risk of decompensation. All questions and concerns were addressed to the patient/family. Alternatives to my treatment were discussed. I have discussed the above stated plan and the patient verbalized understanding and agreed with the plan. NOTE: This report was transcribed using voice recognition software. Every effort was made to ensure accuracy, however, inadvertent computerized transcription errors may be present.      Bharat Kim MD, MPH  [place] [address]   Office: [phone]

## 2020-08-07 NOTE — PROGRESS NOTES
CC: VT, CAD    No c/o. No CP. Denies sweating. Always has to valsalva strongly to urinate. Takes cyclobenzaprine chronically at home.   NSR  CTAB RRR  As per CC  Needs to be off flexeril and other psych meds for 2 weeks before urine catecholamine / metanephrine tests -- consider endocrine consult, D/C trazadone

## 2020-08-07 NOTE — PROGRESS NOTES
Resting quietly in bed at present. Monitor NSR with resting heart rate 86/min. No ectopy noted. Waiting for Endocrinologist to see patient and provide input. Questions answered regarding coronary blockages and EP studies. Will explain to wife when she comes in. Information packet given with pictures of the heart and blood vessels.

## 2020-08-07 NOTE — PLAN OF CARE
Problem: Falls - Risk of:  Goal: Will remain free from falls  Description: Will remain free from falls  8/7/2020 1046 by Hollie Hull RN  Note: Pt remains free of falls. Fall risk protocol in place. Bed locked in lowest position. Call light in reach. Pt instructed to call for assistance, verbalizes understanding. Will continue to monitor.    8/6/2020 2101 by Kim Tamez RN  Outcome: Ongoing

## 2020-08-08 LAB
GLUCOSE BLD-MCNC: 247 MG/DL (ref 70–99)
GLUCOSE BLD-MCNC: 264 MG/DL (ref 70–99)
GLUCOSE BLD-MCNC: 277 MG/DL (ref 70–99)
PERFORMED ON: ABNORMAL

## 2020-08-08 PROCEDURE — 2000000000 HC ICU R&B

## 2020-08-08 PROCEDURE — 82306 VITAMIN D 25 HYDROXY: CPT

## 2020-08-08 PROCEDURE — 6370000000 HC RX 637 (ALT 250 FOR IP): Performed by: INTERNAL MEDICINE

## 2020-08-08 PROCEDURE — 94760 N-INVAS EAR/PLS OXIMETRY 1: CPT

## 2020-08-08 PROCEDURE — 2580000003 HC RX 258: Performed by: INTERNAL MEDICINE

## 2020-08-08 PROCEDURE — 6360000002 HC RX W HCPCS: Performed by: INTERNAL MEDICINE

## 2020-08-08 PROCEDURE — 82340 ASSAY OF CALCIUM IN URINE: CPT

## 2020-08-08 PROCEDURE — 99232 SBSQ HOSP IP/OBS MODERATE 35: CPT | Performed by: INTERNAL MEDICINE

## 2020-08-08 PROCEDURE — 83835 ASSAY OF METANEPHRINES: CPT

## 2020-08-08 RX ORDER — ZOLPIDEM TARTRATE 5 MG/1
5 TABLET ORAL NIGHTLY PRN
Status: DISCONTINUED | OUTPATIENT
Start: 2020-08-08 | End: 2020-08-09

## 2020-08-08 RX ORDER — INSULIN LISPRO 100 [IU]/ML
0-12 INJECTION, SOLUTION INTRAVENOUS; SUBCUTANEOUS
Status: DISCONTINUED | OUTPATIENT
Start: 2020-08-08 | End: 2020-08-10 | Stop reason: DRUGHIGH

## 2020-08-08 RX ORDER — INSULIN LISPRO 100 [IU]/ML
0-6 INJECTION, SOLUTION INTRAVENOUS; SUBCUTANEOUS NIGHTLY
Status: DISCONTINUED | OUTPATIENT
Start: 2020-08-08 | End: 2020-08-10 | Stop reason: DRUGHIGH

## 2020-08-08 RX ORDER — TIZANIDINE 4 MG/1
4 TABLET ORAL EVERY 8 HOURS PRN
Status: DISCONTINUED | OUTPATIENT
Start: 2020-08-08 | End: 2020-08-17 | Stop reason: HOSPADM

## 2020-08-08 RX ORDER — OMEGA-3-ACID ETHYL ESTERS 1 G/1
2 CAPSULE, LIQUID FILLED ORAL 2 TIMES DAILY
Status: DISCONTINUED | OUTPATIENT
Start: 2020-08-08 | End: 2020-08-17 | Stop reason: HOSPADM

## 2020-08-08 RX ORDER — ROSUVASTATIN CALCIUM 20 MG/1
20 TABLET, COATED ORAL NIGHTLY
Status: DISCONTINUED | OUTPATIENT
Start: 2020-08-08 | End: 2020-08-17 | Stop reason: HOSPADM

## 2020-08-08 RX ORDER — DEXTROSE MONOHYDRATE 50 MG/ML
100 INJECTION, SOLUTION INTRAVENOUS PRN
Status: DISCONTINUED | OUTPATIENT
Start: 2020-08-08 | End: 2020-08-12

## 2020-08-08 RX ORDER — DEXTROSE MONOHYDRATE 25 G/50ML
12.5 INJECTION, SOLUTION INTRAVENOUS PRN
Status: DISCONTINUED | OUTPATIENT
Start: 2020-08-08 | End: 2020-08-12

## 2020-08-08 RX ORDER — NICOTINE POLACRILEX 4 MG
15 LOZENGE BUCCAL PRN
Status: DISCONTINUED | OUTPATIENT
Start: 2020-08-08 | End: 2020-08-12

## 2020-08-08 RX ORDER — INSULIN LISPRO 100 [IU]/ML
3 INJECTION, SOLUTION INTRAVENOUS; SUBCUTANEOUS
Status: DISCONTINUED | OUTPATIENT
Start: 2020-08-08 | End: 2020-08-09

## 2020-08-08 RX ORDER — LANOLIN ALCOHOL/MO/W.PET/CERES
6 CREAM (GRAM) TOPICAL NIGHTLY PRN
Status: DISCONTINUED | OUTPATIENT
Start: 2020-08-08 | End: 2020-08-12

## 2020-08-08 RX ORDER — VITAMIN B COMPLEX
2000 TABLET ORAL DAILY
Status: DISCONTINUED | OUTPATIENT
Start: 2020-08-08 | End: 2020-08-17 | Stop reason: HOSPADM

## 2020-08-08 RX ADMIN — ASPIRIN 325 MG ORAL TABLET 325 MG: 325 PILL ORAL at 08:42

## 2020-08-08 RX ADMIN — ROSUVASTATIN CALCIUM 20 MG: 20 TABLET, FILM COATED ORAL at 21:10

## 2020-08-08 RX ADMIN — GABAPENTIN 600 MG: 300 CAPSULE ORAL at 21:10

## 2020-08-08 RX ADMIN — INSULIN LISPRO 2 UNITS: 100 INJECTION, SOLUTION INTRAVENOUS; SUBCUTANEOUS at 21:13

## 2020-08-08 RX ADMIN — Medication 10 ML: at 08:45

## 2020-08-08 RX ADMIN — INSULIN GLARGINE 5 UNITS: 100 INJECTION, SOLUTION SUBCUTANEOUS at 21:13

## 2020-08-08 RX ADMIN — INSULIN LISPRO 6 UNITS: 100 INJECTION, SOLUTION INTRAVENOUS; SUBCUTANEOUS at 17:50

## 2020-08-08 RX ADMIN — Medication 2000 UNITS: at 17:59

## 2020-08-08 RX ADMIN — TAMSULOSIN HYDROCHLORIDE 0.8 MG: 0.4 CAPSULE ORAL at 21:10

## 2020-08-08 RX ADMIN — AMLODIPINE BESYLATE 10 MG: 5 TABLET ORAL at 08:42

## 2020-08-08 RX ADMIN — MULTIPLE VITAMINS W/ MINERALS TAB 1 TABLET: TAB at 08:42

## 2020-08-08 RX ADMIN — METOPROLOL TARTRATE 100 MG: 50 TABLET, FILM COATED ORAL at 08:42

## 2020-08-08 RX ADMIN — ISOSORBIDE MONONITRATE 30 MG: 30 TABLET, EXTENDED RELEASE ORAL at 08:42

## 2020-08-08 RX ADMIN — Medication 10 ML: at 21:13

## 2020-08-08 RX ADMIN — METOPROLOL TARTRATE 100 MG: 50 TABLET, FILM COATED ORAL at 21:10

## 2020-08-08 RX ADMIN — INSULIN LISPRO 4 UNITS: 100 INJECTION, SOLUTION INTRAVENOUS; SUBCUTANEOUS at 17:49

## 2020-08-08 RX ADMIN — ENOXAPARIN SODIUM 40 MG: 40 INJECTION SUBCUTANEOUS at 08:42

## 2020-08-08 RX ADMIN — TIZANIDINE 4 MG: 4 TABLET ORAL at 17:59

## 2020-08-08 RX ADMIN — LOSARTAN POTASSIUM 100 MG: 100 TABLET, FILM COATED ORAL at 08:42

## 2020-08-08 RX ADMIN — GABAPENTIN 600 MG: 300 CAPSULE ORAL at 08:42

## 2020-08-08 RX ADMIN — POLYETHYLENE GLYCOL 3350 17 G: 17 POWDER, FOR SOLUTION ORAL at 11:55

## 2020-08-08 ASSESSMENT — PAIN DESCRIPTION - PAIN TYPE: TYPE: CHRONIC PAIN

## 2020-08-08 ASSESSMENT — PAIN SCALES - GENERAL
PAINLEVEL_OUTOF10: 0
PAINLEVEL_OUTOF10: 4

## 2020-08-08 ASSESSMENT — PAIN DESCRIPTION - PROGRESSION
CLINICAL_PROGRESSION: NOT CHANGED

## 2020-08-08 ASSESSMENT — PAIN DESCRIPTION - ONSET
ONSET: ON-GOING

## 2020-08-08 ASSESSMENT — PAIN DESCRIPTION - DESCRIPTORS: DESCRIPTORS: SHARP;SHOOTING;SPASM

## 2020-08-08 ASSESSMENT — PAIN DESCRIPTION - ORIENTATION: ORIENTATION: RIGHT;LOWER

## 2020-08-08 ASSESSMENT — PAIN DESCRIPTION - FREQUENCY: FREQUENCY: CONTINUOUS

## 2020-08-08 ASSESSMENT — PAIN DESCRIPTION - LOCATION: LOCATION: BACK

## 2020-08-08 NOTE — PLAN OF CARE
Problem: Falls - Risk of:  Goal: Will remain free from falls  Description: Will remain free from falls  Outcome: Ongoing     Problem: Falls - Risk of:  Goal: Absence of physical injury  Description: Absence of physical injury  Outcome: Ongoing     Problem: Pain:  Goal: Control of chronic pain  Description: Control of chronic pain  Outcome: Ongoing    Problem: Tissue Perfusion - Cardiopulmonary, Altered:  Goal: Absence of angina  Description: Absence of angina  Outcome: Ongoing

## 2020-08-08 NOTE — PROGRESS NOTES
Cardiovascular Progress Note      Chief Complaint:   WCT    Impression/Recommendations: Wide complex tachycardia at OSH. VT or SVT with aberrancy. S/p LHC with moderate to severe LM disease by IVUS. Consider ischemic evaluation to determine LM severity. FFR vs Stress test. D/w CT surgery and lesions likely not severe enough to cause VT. Patient has no symptoms of angina/CHF. Remains on ARB and BB for Cardiomyopathy ; ASA/Statin for CAD  Plan for EP study to determine source of arrhythmia, Monday 8/10       Interval History:  Patient seen and examined. No events overnight. Denies chest pain palpitation shortness of breath dizziness. Shows understanding of discussions with interventional cardiology, electrophysiology, and CT surgery. Agreeable to monitoring while planning for likely EP study. No oxygen requirements. Telemetry sinus rhythm 80s to 90s no events    8/5/20   -Normal left ventricle size, mild wall thickness and mildly reduced systolic   function with an estimated ejection fraction of 45%. -There is mild diffuse hypokinesis. -Grade I diastolic dysfunction with normal LV filling pressures. E/e\"=7.0   -The aortic valve is mildly thickened/calcified but opens well with normal   gradients.   -Trivial aortic regurgitation.   -The aortic root is mildly dilated.        Medications:  labetalol (NORMODYNE;TRANDATE) injection 10 mg, Q4H PRN  metoprolol tartrate (LOPRESSOR) tablet 100 mg, BID  sodium chloride flush 0.9 % injection 10 mL, 2 times per day  sodium chloride flush 0.9 % injection 10 mL, PRN  acetaminophen (TYLENOL) tablet 650 mg, Q4H PRN  oxyCODONE-acetaminophen (PERCOCET) 5-325 MG per tablet 1 tablet, Q4H PRN    Or  oxyCODONE-acetaminophen (PERCOCET) 5-325 MG per tablet 2 tablet, Q4H PRN  ondansetron (ZOFRAN) injection 4 mg, Q6H PRN  sodium chloride flush 0.9 % injection 10 mL, 2 times per day  sodium chloride flush 0.9 % injection 10 mL, PRN  polyethylene glycol (GLYCOLAX) packet 17 g, Daily PRN  promethazine (PHENERGAN) tablet 12.5 mg, Q6H PRN  enoxaparin (LOVENOX) injection 40 mg, Daily  potassium chloride (KLOR-CON M) extended release tablet 40 mEq, PRN    Or  potassium bicarb-citric acid (EFFER-K) effervescent tablet 40 mEq, PRN    Or  potassium chloride 10 mEq/100 mL IVPB (Peripheral Line), PRN  magnesium sulfate 1 g in dextrose 5% 100 mL IVPB, PRN  sodium phosphate 19.17 mmol in dextrose 5 % 250 mL IVPB, PRN    Or  sodium phosphate 38.34 mmol in dextrose 5 % 250 mL IVPB, PRN  amLODIPine (NORVASC) tablet 10 mg, Daily  aspirin tablet 325 mg, Daily  atorvastatin (LIPITOR) tablet 40 mg, Nightly  isosorbide mononitrate (IMDUR) extended release tablet 30 mg, Daily  therapeutic multivitamin-minerals 1 tablet, Daily  nitroGLYCERIN (NITROSTAT) SL tablet 0.4 mg, Q5 Min PRN  tamsulosin (FLOMAX) capsule 0.8 mg, Nightly  traMADol (ULTRAM) tablet 50 mg, Q6H PRN  gabapentin (NEURONTIN) capsule 600 mg, BID  cyclobenzaprine (FLEXERIL) tablet 10 mg, TID PRN  metoprolol (LOPRESSOR) injection 5 mg, Q6H PRN  losartan (COZAAR) tablet 100 mg, Daily        I/O:     Intake/Output Summary (Last 24 hours) at 8/8/2020 0928  Last data filed at 8/7/2020 1015  Gross per 24 hour   Intake --   Output 240 ml   Net -240 ml       Physical Exam:    BP (!) 144/97   Pulse 89   Temp 98.1 °F (36.7 °C) (Temporal)   Resp 19   Ht 6' 2\" (1.88 m)   Wt 272 lb 14.9 oz (123.8 kg)   SpO2 95%   BMI 35.04 kg/m²   Wt Readings from Last 3 Encounters:   08/08/20 272 lb 14.9 oz (123.8 kg)   05/22/20 264 lb 15.9 oz (120.2 kg)   05/18/20 265 lb (120.2 kg)       GENERAL: Well developed, well nourished, no acute distress  NEUROLOGICAL: Alert and oriented x3  PSYCH: Normal mood and affect   SKIN: Warm and dry, without lesions  HEENT: Normocephalic, atraumatic, Sclera non-icteric, mucous membranes moist  NECK: supple, JVP normal, thyroid not enlarged   CAROTID: Normal upstroke, no bruits  CARDIAC: Normal PMI, regular rate and rhythm, normal S1S2, no murmur, rub  RESPIRATORY: Normal respiratory effort, clear to auscultation bilaterally  EXTREMITIES: No cyanosis, clubbing or edema, palpable pulses bilaterally   MUSCULOSKELETAL: No joint swelling or tenderness, no chest wall tenderness  GASTROINTESTINAL:  soft, non-tender, no bruit      Data Review:    CBC:   Recent Labs     08/05/20 1755   WBC 7.2   HGB 13.0*   HCT 39.8*   MCV 91.4        BMP:   Recent Labs     08/05/20 1755 08/06/20  0308    136   K 3.9 4.3    102   CO2 23 23   BUN 19 18   CREATININE 1.0 1.0   GFRAA >60 >60     LFTS:   Recent Labs     08/05/20 1755 08/06/20  0255   ALT 11 12   AST 9* 13*   ALKPHOS 108 107   PROT 6.4 6.3*   AGRATIO 1.5  --    BILITOT 0.6 0.3     PT/INR:   Recent Labs     08/05/20 1755 08/06/20  1343   PROTIME 12.6 11.7   INR 1.09 1.01     APTT:   Recent Labs     08/05/20 1755 08/06/20  1343   APTT 203.7* 37.2*               Salomon Monroe D.O., [address] [facility]  Interventional Cardiology     o: [phone]  [address] [place] [address] [facility], [facility]      NOTE:  This report was transcribed using voice recognition software. Every effort was made to ensure accuracy; however, inadvertent computerized transcription errors may be present.

## 2020-08-08 NOTE — PROGRESS NOTES
Spontaneously.  -Amio Gtt and Coreg dc'd  -Metoprolol added  -unclear etiology; EPS study 8/10/2020        CAD.   Underwent LHC  -Severe CAD.  With stenosis  -CABG consult initiated; no plans for surgery currently; meds adjusted  -CTS doubts stenosis significant enough to cause VT     Chronic kidney disease-creatinine seems to be stable now.     Diabetes  -Continue to titrate insulin; resume Metformin     BPH  Class 1 obesity  Diet: DIET CARDIAC;  Code:Full Code  DVT PPX lovenox            [facility],

## 2020-08-08 NOTE — PLAN OF CARE
Problem: Falls - Risk of:  Goal: Will remain free from falls  Description: Will remain free from falls  8/7/2020 2322 by Kushal Arana  Outcome: Ongoing  8/7/2020 1046 by Manuel Carbone RN  Note: Pt remains free of falls. Fall risk protocol in place. Bed locked in lowest position. Call light in reach. Pt instructed to call for assistance, verbalizes understanding. Will continue to monitor.       Problem: Pain:  Goal: Pain level will decrease  Description: Pain level will decrease  Outcome: Ongoing  Goal: Control of chronic pain  Description: Control of chronic pain  Outcome: Ongoing     Problem: Tissue Perfusion - Cardiopulmonary, Altered:  Goal: Absence of angina  Description: Absence of angina  Outcome: Ongoing  Note: Denies any chest pain or shortness of breath  NSR rate in the 80's

## 2020-08-08 NOTE — PROGRESS NOTES
Endocrinology    Thanks for calling! A 77year-old Cauc. type 2 diabetic man presents with tachydysrhythmia believed to be ventricular tachycardia which spontaneously resolved. Pres Illness:  Patient was visiting his urologist, Dr Rhina Evangelista in [place], [place] follow-up of prostatism which ad been doing fairly well with Flomax 0.4 mg and prior urolift surgery in 2017 when check of his vital signs showed pulse of greater than 230 beats per minute. REmarkably the patient was asymptomatic, not dyspneic, without palpitations. He went to ER where cardioversion was considered but the tachycardia  Spontaneously resolved. Further in hospital work-up included cardiology consultation and coronary angiography which confirmed his known CAD (post MI in 2003 and also in about 2008, possibly in 2012) showing left main and RCA stenoses; cardiothoracic surgery consulted but did not feel the anatomy was amenable to revascularization and optimization of medical therapy was preferred. The patient says his blood sugars are usually good, below 150 mg/dl although up to 264 mg/dl here in hospital off his usual metformin 1000 mg twice daily and glipizide 5 mg daily. The patient has long history of hypertension previously treated with carvedilol 6.25 mg twice daily and olmesartan 40 mg daily and Imdur 30 mg daily; electrophysiology or cardiology (Dr. Bryan Villanueva on 8-7-2020 agreed)  has adjusted carvedilol 6.25 mg twice daily to metoprolol .  mg dailyl. The patient denies any spells of flushing, spells of hypertension exacerbation, or syncope. He takes aspirin 325 mg daily. He used to smoke about a half pack of cigarettes daily but stopped smoking cigarettes in 1984. He takes atorvastatin 40 mg for hyperlipidemia.     Past Hx ROS:   Neuro: Neg for stroke or severe neuropathy but positive for severe back disease post back surgery at about age 34 years, laminectomy lumbar, then hardware to lumbosacral in 2000 and most recently surgery to insert a pain relief stimulator I believe last year. He takes flexeril three times daily with limited relief. Takes neurontin 600 mg twice daily for ? Chronic pain ? Neuropathy. Resp: Neg, smoked 0.5 packs daily in his youth, stopped in . CV: No chest pain now, says 3 prior MI's, LVEF 45% with global hypokinesis per cardiology note today, (Dr. Travis Echeverira), consistent with ischemic cardiomyopathy. GI: Neg except overweight BMI 35.04  : Prostatism post urolift, on Flomax 0.4 mg. Endocrine: Type 2 DM since about , has been NIDDM, no history of thyroid disease nor adrenal disease. Had renal lithiasis several years ago severe pain but he recalls no severe hematuria and CT shows minor, nonobstructing urolithiasis. Hyperlipidemia. Heme Onc: Anemia of chronic disease, mile    Allergies: Penicillin and codeine    Family: Markedly positie for CAD: Dad had MI age 48 and  of it; brother congenital heart disease  of it about age 35 yrs, sister MI in late 52's, nonsmoker and not obese, multiple relatives with essential hypertension, none with renal stones (other than patient); patient's mother had anxiety and probably depression, in [facility] Receiving at least twice. Mother Type 2 DM, NIDDM but no hi BP. No known clotting disorder in any family member. Pulse 82; /75; resp 17; Temp 97.4 deg F  HEENT: EOM's intact, minor ptosis left eye resolves when he is more awake. Neck: Not stiff, no JVD  Chest: Clear per chart  Heart: S1 and S2  Abd: Android adiposity  Ext: No cyanosis. Neuro: Intact except very stiff, clearly has back muscle spasm. Alert, oriented x3 normal memory, normal affect and speech. Not tested carefully for neuropathy. Assessment:  1. History not suggestive of pheochromocytoma to me, no episodic hypertension, no flushing, no spells, so no objection on my part to metoprolol use either.   Agree that medications can interfere with pheo evaluation; however, if the metanephrines are negative acute catecholamine effects are not likely. 2. Ventricular tachycardia, probably due directly or indirectly to ischemic cardiomyopathy; however, electrophysiologic approach could still be helpful if abnormal electrophysiologic circuits can be found and interrupted. Occult (or apathetic) hyperthyroidism is a consideration, more common in diabetics so will check thyroid profile. Would consider stopping flomax, which is not always benign from cardiac dysrhythmia standpoint. 3. Familial CAD, possibly familial hyperlipidemia exacerbated by lifestyle (patient and his dad both enjoy saturated fat) and glucosse intolerance; lipoprotein a is a consideration as well. 4. Hyperlipidemia; rosuvastatin should be better than atorvastatin and added Vascepa reasonable. Lipid profile is low HDL pattern which ofte benefits from omega 3 fish oil. 5. Type 2 DM: can use insulin in hospital for blood sugar > 250 and adjust to meds with positive cardiac effects as possible: GLP-1 or SGLT-2 such as Regan Loveless or Xigduo to get around carcinogens reported in generic metformin. 6. Hypertension: also familial but no family history of pheo nor other family history of renal lithiasis so MEN syndrome is not likely. Adrenal contribution to hypertension is possible, some component of Conn's syndrome may affect a higher fraction of hypertensives than commonly realized so checking serum aldosterone and even plasma renin activity is reasonable but also more reliable on stable medications, probably as outpatient. Without hypokalemia aldosteronoma is relatively unlikely though. 7. LFT's are normal but patient very likely has NAFLD, virtually ubiquitous among overweight type 2 diabetics. 8. Obesity. 9. Hyperparathyroidism is an outside cosideration; there are eucalcemic hyperparathyroid patients if \"corrected\" by hypercalciuria as sometimes happens in patients with renal lithiasis.   A 24 hr urine calcium may be revealing. Plan:  1. Plasma metanephrines if available here (need to be collected and cooled fairly quickly). 2. Basal Lantus 5 units twice daily and bolus 2,3,4 units Humalog with medium sliding scale; no oral agents for now in hospital.  3. SGLT-2 if insurance will cover (likely will as he has tried other oral agents without optimal diabetic control). 4. Free T4, Free T3, TSH, serum cortisol AM and aldosterone. .5. Change to rosuvastatin 20 mg instead of atorvastatin 40 mg.  6. Add Vascepa 2 g oral twice daily; Elizabeth Marker can be used similarly if Vascepa not available here. 7. Lipoprotein a if possible here to order. 8. Low calorie weight reducing diet as tolerated. 9. Add usual vitamin D3 about 3000 units daily. 10. Check uric acid. 11. 24 hr urine for calcium and creatinine. DELFINA Lovett M.D.

## 2020-08-09 LAB
ANION GAP SERPL CALCULATED.3IONS-SCNC: 10 MMOL/L (ref 3–16)
BUN BLDV-MCNC: 20 MG/DL (ref 7–20)
CALCIUM SERPL-MCNC: 9.6 MG/DL (ref 8.3–10.6)
CHLORIDE BLD-SCNC: 100 MMOL/L (ref 99–110)
CO2: 25 MMOL/L (ref 21–32)
CORTISOL - AM: 6.2 UG/DL (ref 4.3–22.4)
CREAT SERPL-MCNC: 1.1 MG/DL (ref 0.8–1.3)
GFR AFRICAN AMERICAN: >60
GFR NON-AFRICAN AMERICAN: >60
GLUCOSE BLD-MCNC: 195 MG/DL (ref 70–99)
GLUCOSE BLD-MCNC: 199 MG/DL (ref 70–99)
GLUCOSE BLD-MCNC: 222 MG/DL (ref 70–99)
GLUCOSE BLD-MCNC: 274 MG/DL (ref 70–99)
GLUCOSE BLD-MCNC: 292 MG/DL (ref 70–99)
HCT VFR BLD CALC: 41.4 % (ref 40.5–52.5)
HEMOGLOBIN: 13.7 G/DL (ref 13.5–17.5)
MCH RBC QN AUTO: 30.2 PG (ref 26–34)
MCHC RBC AUTO-ENTMCNC: 33.1 G/DL (ref 31–36)
MCV RBC AUTO: 91.4 FL (ref 80–100)
PDW BLD-RTO: 14.7 % (ref 12.4–15.4)
PERFORMED ON: ABNORMAL
PLATELET # BLD: 238 K/UL (ref 135–450)
PMV BLD AUTO: 7.6 FL (ref 5–10.5)
POTASSIUM SERPL-SCNC: 4.5 MMOL/L (ref 3.5–5.1)
RBC # BLD: 4.53 M/UL (ref 4.2–5.9)
SODIUM BLD-SCNC: 135 MMOL/L (ref 136–145)
T3 FREE: 2.9 PG/ML (ref 2.3–4.2)
T4 FREE: 1.2 NG/DL (ref 0.9–1.8)
TSH SERPL DL<=0.05 MIU/L-ACNC: 2.85 UIU/ML (ref 0.27–4.2)
URIC ACID, SERUM: 5.4 MG/DL (ref 3.5–7.2)
VITAMIN D 25-HYDROXY: 48.6 NG/ML
VITAMIN D 25-HYDROXY: 51.6 NG/ML
WBC # BLD: 5.8 K/UL (ref 4–11)

## 2020-08-09 PROCEDURE — 82533 TOTAL CORTISOL: CPT

## 2020-08-09 PROCEDURE — 84481 FREE ASSAY (FT-3): CPT

## 2020-08-09 PROCEDURE — 6370000000 HC RX 637 (ALT 250 FOR IP): Performed by: INTERNAL MEDICINE

## 2020-08-09 PROCEDURE — 83695 ASSAY OF LIPOPROTEIN(A): CPT

## 2020-08-09 PROCEDURE — 99232 SBSQ HOSP IP/OBS MODERATE 35: CPT | Performed by: INTERNAL MEDICINE

## 2020-08-09 PROCEDURE — 84550 ASSAY OF BLOOD/URIC ACID: CPT

## 2020-08-09 PROCEDURE — 85027 COMPLETE CBC AUTOMATED: CPT

## 2020-08-09 PROCEDURE — 2580000003 HC RX 258: Performed by: INTERNAL MEDICINE

## 2020-08-09 PROCEDURE — 80048 BASIC METABOLIC PNL TOTAL CA: CPT

## 2020-08-09 PROCEDURE — 84439 ASSAY OF FREE THYROXINE: CPT

## 2020-08-09 PROCEDURE — 6360000002 HC RX W HCPCS: Performed by: INTERNAL MEDICINE

## 2020-08-09 PROCEDURE — 82306 VITAMIN D 25 HYDROXY: CPT

## 2020-08-09 PROCEDURE — 2000000000 HC ICU R&B

## 2020-08-09 PROCEDURE — 82627 DEHYDROEPIANDROSTERONE: CPT

## 2020-08-09 PROCEDURE — 84443 ASSAY THYROID STIM HORMONE: CPT

## 2020-08-09 RX ORDER — INSULIN LISPRO 100 [IU]/ML
6 INJECTION, SOLUTION INTRAVENOUS; SUBCUTANEOUS
Status: DISCONTINUED | OUTPATIENT
Start: 2020-08-09 | End: 2020-08-12

## 2020-08-09 RX ADMIN — POLYETHYLENE GLYCOL 3350 17 G: 17 POWDER, FOR SOLUTION ORAL at 08:27

## 2020-08-09 RX ADMIN — Medication 10 ML: at 00:02

## 2020-08-09 RX ADMIN — TIZANIDINE 4 MG: 4 TABLET ORAL at 05:05

## 2020-08-09 RX ADMIN — INSULIN LISPRO 6 UNITS: 100 INJECTION, SOLUTION INTRAVENOUS; SUBCUTANEOUS at 16:27

## 2020-08-09 RX ADMIN — ROSUVASTATIN CALCIUM 20 MG: 20 TABLET, FILM COATED ORAL at 20:14

## 2020-08-09 RX ADMIN — INSULIN LISPRO 3 UNITS: 100 INJECTION, SOLUTION INTRAVENOUS; SUBCUTANEOUS at 11:38

## 2020-08-09 RX ADMIN — Medication 10 ML: at 08:27

## 2020-08-09 RX ADMIN — ENOXAPARIN SODIUM 40 MG: 40 INJECTION SUBCUTANEOUS at 08:21

## 2020-08-09 RX ADMIN — TRAMADOL HYDROCHLORIDE 50 MG: 50 TABLET, FILM COATED ORAL at 03:01

## 2020-08-09 RX ADMIN — ISOSORBIDE MONONITRATE 30 MG: 30 TABLET, EXTENDED RELEASE ORAL at 08:21

## 2020-08-09 RX ADMIN — TIZANIDINE 4 MG: 4 TABLET ORAL at 20:14

## 2020-08-09 RX ADMIN — INSULIN GLARGINE 5 UNITS: 100 INJECTION, SOLUTION SUBCUTANEOUS at 08:07

## 2020-08-09 RX ADMIN — GABAPENTIN 600 MG: 300 CAPSULE ORAL at 08:21

## 2020-08-09 RX ADMIN — INSULIN LISPRO 2 UNITS: 100 INJECTION, SOLUTION INTRAVENOUS; SUBCUTANEOUS at 08:06

## 2020-08-09 RX ADMIN — LOSARTAN POTASSIUM 100 MG: 100 TABLET, FILM COATED ORAL at 08:21

## 2020-08-09 RX ADMIN — AMLODIPINE BESYLATE 10 MG: 5 TABLET ORAL at 08:21

## 2020-08-09 RX ADMIN — ASPIRIN 325 MG ORAL TABLET 325 MG: 325 PILL ORAL at 08:21

## 2020-08-09 RX ADMIN — INSULIN LISPRO 2 UNITS: 100 INJECTION, SOLUTION INTRAVENOUS; SUBCUTANEOUS at 08:07

## 2020-08-09 RX ADMIN — TAMSULOSIN HYDROCHLORIDE 0.8 MG: 0.4 CAPSULE ORAL at 20:14

## 2020-08-09 RX ADMIN — INSULIN LISPRO 2 UNITS: 100 INJECTION, SOLUTION INTRAVENOUS; SUBCUTANEOUS at 21:15

## 2020-08-09 RX ADMIN — METOPROLOL TARTRATE 100 MG: 50 TABLET, FILM COATED ORAL at 20:14

## 2020-08-09 RX ADMIN — GABAPENTIN 600 MG: 300 CAPSULE ORAL at 20:14

## 2020-08-09 RX ADMIN — INSULIN LISPRO 6 UNITS: 100 INJECTION, SOLUTION INTRAVENOUS; SUBCUTANEOUS at 11:37

## 2020-08-09 RX ADMIN — METOPROLOL TARTRATE 100 MG: 50 TABLET, FILM COATED ORAL at 08:21

## 2020-08-09 RX ADMIN — MULTIPLE VITAMINS W/ MINERALS TAB 1 TABLET: TAB at 08:21

## 2020-08-09 RX ADMIN — MELATONIN 3 MG ORAL TABLET 6 MG: 3 TABLET ORAL at 23:27

## 2020-08-09 RX ADMIN — Medication 10 ML: at 21:39

## 2020-08-09 RX ADMIN — Medication 2000 UNITS: at 08:21

## 2020-08-09 ASSESSMENT — PAIN DESCRIPTION - FREQUENCY
FREQUENCY: CONTINUOUS
FREQUENCY: CONTINUOUS

## 2020-08-09 ASSESSMENT — PAIN SCALES - GENERAL
PAINLEVEL_OUTOF10: 4
PAINLEVEL_OUTOF10: 0
PAINLEVEL_OUTOF10: 0
PAINLEVEL_OUTOF10: 4
PAINLEVEL_OUTOF10: 4
PAINLEVEL_OUTOF10: 0
PAINLEVEL_OUTOF10: 4
PAINLEVEL_OUTOF10: 4
PAINLEVEL_OUTOF10: 0

## 2020-08-09 ASSESSMENT — PAIN DESCRIPTION - ONSET
ONSET: ON-GOING
ONSET: ON-GOING

## 2020-08-09 ASSESSMENT — PAIN DESCRIPTION - DIRECTION: RADIATING_TOWARDS: LEGS

## 2020-08-09 ASSESSMENT — PAIN DESCRIPTION - PAIN TYPE
TYPE: CHRONIC PAIN
TYPE: CHRONIC PAIN

## 2020-08-09 ASSESSMENT — PAIN DESCRIPTION - ORIENTATION
ORIENTATION: RIGHT
ORIENTATION: RIGHT;LOWER

## 2020-08-09 ASSESSMENT — PAIN DESCRIPTION - DESCRIPTORS
DESCRIPTORS: SHARP;SHOOTING;SPASM
DESCRIPTORS: DISCOMFORT;CONSTANT

## 2020-08-09 ASSESSMENT — PAIN DESCRIPTION - LOCATION
LOCATION: BACK
LOCATION: BACK

## 2020-08-09 ASSESSMENT — PAIN - FUNCTIONAL ASSESSMENT
PAIN_FUNCTIONAL_ASSESSMENT: PREVENTS OR INTERFERES SOME ACTIVE ACTIVITIES AND ADLS
PAIN_FUNCTIONAL_ASSESSMENT: PREVENTS OR INTERFERES SOME ACTIVE ACTIVITIES AND ADLS

## 2020-08-09 ASSESSMENT — PAIN DESCRIPTION - PROGRESSION
CLINICAL_PROGRESSION: NOT CHANGED

## 2020-08-09 NOTE — PLAN OF CARE
Problem: Falls - Risk of:  Goal: Will remain free from falls  Description: Will remain free from falls  8/9/2020 1053 by Lidya Sy RN  Outcome: Ongoing  8/8/2020 2151 by Carla Arana  Outcome: Ongoing     Problem: Pain:  Goal: Control of acute pain  Description: Control of acute pain  Outcome: Ongoing  Chronic pain managed with implanted spinal cord stimulator, muscle relaxer ordered and administered PRN per pt request  Problem: Tissue Perfusion - Cardiopulmonary, Altered:    Goal: Absence of angina  Description: Absence of angina  8/9/2020 1053 by Lidya Sy RN  Outcome: Ongoing  No c/o angina  8/8/2020 2151 by Carla Arana  Outcome: Ongoing

## 2020-08-09 NOTE — PROGRESS NOTES
Endocrinology Progress    Patient feels better with Zanaflex which has helped back spasm at lest as much as prior flexeril. He still has insomnia and says he cannot take Ambien (which I had ordered) owing to its causing nightmares; trazodone is ineffective  He says that his wife thinks he has sleep apnea. Blood sugars remain high despite insulin initiated. /107; Pulse 93; Temp 97.2 deg F  Resp 18    HEENT: EOM's intact  Neck: No JVD, not stiff  Chest: Breathing normal, not labored, no abnormal use of accessory muscles of respiration. Heart: S1 and S2  Abdomen: Nontender  Back: Less guarding  Ext: No cyanosis nor edema. Assessment:  1. Ischemic cardiomyopathy. 2. Wide complex tachydysrhythmia, likely ventricular tachycardia but electrophysiology study pending to further evaluate. 3. Uncontrolled type 2 DM, needs more insulin. Would not give metformin in hospital in case he is unlucky and needs further unexpected angiographic intervention (e.g. peripheral vascular disease related or CT IV contrast for some reasson). 4. Sleep apnea: since 60% of male diabetics have sleep apnea, his wife's opinion about its presence has at least this chance (and probably more) of being accurate. 5. Insomnia    Plan/Recommend:  1. Increase insulin to 10 units Lantus twice daily and 5, 6, 7 bolus doses with medium slidding scale. 2. Stop inpatient metformin. 3. As I previously commented, Flomax has been associated with arrhythmia and I would not recommend its use until mechanism of arrhythmia is clarified (it could be due to flomax). 4. Pulmonary consult to arrange for sleep apnea evaluation (another common cause of dysrhythmia).   5. Although improved glycemic control is indicated, it will not likely have immediate impact on cardiac risk; newer \"diabetes\" medications such as SGLT-2 inhibitors (starting to be recommended in nondiabetics as well to prevent cardiac coplications)  and GLP-1 agents are indicated and

## 2020-08-09 NOTE — PROGRESS NOTES
Hospitalist Progress Note      PCP: ETTA Keene CNP    Date of Admission: 2020    SUBJECTIVE:   No new concerns            OBJECTIVE:     Vitals    TEMPERATURE:  Current - Temp: 97.4 °F (36.3 °C); Max - Temp  Av.7 °F (36.5 °C)  Min: 97.4 °F (36.3 °C)  Max: 98.6 °F (37 °C)  RESPIRATIONS RANGE: Resp  Av.6  Min: 17  Max: 20  PULSE RANGE: Pulse  Av.6  Min: 81  Max: 86  BLOOD PRESSURE RANGE:  Systolic (00XZH), IAW:159 , Min:117 , VLQ:984   ; Diastolic (41USP), IBM:05, Min:75, Max:94    PULSE OXIMETRY RANGE: SpO2  Av.8 %  Min: 95 %  Max: 98 %  24HR INTAKE/OUTPUT:      Intake/Output Summary (Last 24 hours) at 2020 1135  Last data filed at 2020 0944  Gross per 24 hour   Intake --   Output 1550 ml   Net -1550 ml       Exam:    General appearance: Well, no apparent distress, appears stated age and cooperative. HEENT Normal cephalic, atraumatic without obvious deformity. Pupils equal, round, and reactive to light. Extra ocular muscles intact. Conjunctivae/corneas clear. Neck: Supple, No jugular venous distention/bruits. Trachea midline without thyromegaly or adenopathy with full range of motion. Lungs: Clear to ascultation, bilaterally without Rales/Wheezes/Rhonchi with good respiratory effort. Heart: Regular rate and rhythm with Normal S1/S2 without  murmurs, rubs or gallops, point of maximum impulse non-displaced  Abdomen: Soft, non-tender or non-distended without rigidity or guarding and positive bowel sounds all four quadrants. Extremities: No clubbing, cyanosis, or edema bilaterally. Full range of motion without deformity and normal gait intact. Skin: Skin color, texture, turgor normal. No rashes or lesions. Neurologic: Alert and oriented X 3,  neurovascularly intact with sensory/motor intact upper extremities/lower extremities, bilaterally.  Cranial nerves:II-XII intact, grossly non-focal.  Mental status: Alert, oriented, thought content appropriate. ASSESSMENT AND PLAN    1. Wide-complex tachycardia  -Resolved.  Spontaneously.  -Amio Gtt and Coreg dc'd  -Metoprolol added  -unclear etiology; EPS study 8/10/2020  -pheo w/u in progress per Endo; seems less likely currently        CAD.   Underwent LHC  -Severe CAD.  With stenosis  -CABG consult initiated; no plans for surgery currently; meds adjusted  -CTS doubts stenosis significant enough to cause VT     Chronic kidney disease-creatinine seems to be stable now.     Diabetes  -Continue to titrate insulin; resume Metformin     BPH  Class 1 obesity  Diet: DIET CARDIAC;  Code:Full Code  DVT PPX lovenox               Enrique Rodas DO

## 2020-08-10 ENCOUNTER — TELEPHONE (OUTPATIENT)
Dept: PULMONOLOGY | Age: 65
End: 2020-08-10

## 2020-08-10 LAB
24HR URINE VOLUME (ML): 3400 ML
CALCIUM 24 HOUR URINE: 201 MG/24 HR (ref 42–353)
CREATININE 24 HOUR URINE: 1.9 G/24HR (ref 0.6–2.5)
GLUCOSE BLD-MCNC: 148 MG/DL (ref 70–99)
GLUCOSE BLD-MCNC: 212 MG/DL (ref 70–99)
GLUCOSE BLD-MCNC: 227 MG/DL (ref 70–99)
GLUCOSE BLD-MCNC: 303 MG/DL (ref 70–99)
METANEPH/PLASMA INTERP: ABNORMAL
METANEPHRINE FREE PLASMA: 0.26 NMOL/L (ref 0–0.49)
NORMETANEPHRINE FREE PLASMA: 1.39 NMOL/L (ref 0–0.89)
PERFORMED ON: ABNORMAL

## 2020-08-10 PROCEDURE — 4A023FZ MEASUREMENT OF CARDIAC RHYTHM, PERCUTANEOUS APPROACH: ICD-10-PCS | Performed by: INTERNAL MEDICINE

## 2020-08-10 PROCEDURE — C1730 CATH, EP, 19 OR FEW ELECT: HCPCS

## 2020-08-10 PROCEDURE — 99153 MOD SED SAME PHYS/QHP EA: CPT

## 2020-08-10 PROCEDURE — 2580000003 HC RX 258: Performed by: INTERNAL MEDICINE

## 2020-08-10 PROCEDURE — 93623 PRGRMD STIMJ&PACG IV RX NFS: CPT | Performed by: INTERNAL MEDICINE

## 2020-08-10 PROCEDURE — 6370000000 HC RX 637 (ALT 250 FOR IP): Performed by: HOSPITALIST

## 2020-08-10 PROCEDURE — 93620 COMP EP EVL R AT VEN PAC&REC: CPT

## 2020-08-10 PROCEDURE — 93621 COMP EP EVL L PAC&REC C SINS: CPT | Performed by: INTERNAL MEDICINE

## 2020-08-10 PROCEDURE — C1732 CATH, EP, DIAG/ABL, 3D/VECT: HCPCS

## 2020-08-10 PROCEDURE — 99152 MOD SED SAME PHYS/QHP 5/>YRS: CPT

## 2020-08-10 PROCEDURE — 99233 SBSQ HOSP IP/OBS HIGH 50: CPT | Performed by: INTERNAL MEDICINE

## 2020-08-10 PROCEDURE — 93621 COMP EP EVL L PAC&REC C SINS: CPT

## 2020-08-10 PROCEDURE — 2000000000 HC ICU R&B

## 2020-08-10 PROCEDURE — 99152 MOD SED SAME PHYS/QHP 5/>YRS: CPT | Performed by: INTERNAL MEDICINE

## 2020-08-10 PROCEDURE — 99223 1ST HOSP IP/OBS HIGH 75: CPT | Performed by: INTERNAL MEDICINE

## 2020-08-10 PROCEDURE — C1894 INTRO/SHEATH, NON-LASER: HCPCS

## 2020-08-10 PROCEDURE — 2580000003 HC RX 258

## 2020-08-10 PROCEDURE — 94760 N-INVAS EAR/PLS OXIMETRY 1: CPT

## 2020-08-10 PROCEDURE — 6370000000 HC RX 637 (ALT 250 FOR IP): Performed by: INTERNAL MEDICINE

## 2020-08-10 PROCEDURE — 2500000003 HC RX 250 WO HCPCS

## 2020-08-10 PROCEDURE — 4A0234Z MEASUREMENT OF CARDIAC ELECTRICAL ACTIVITY, PERCUTANEOUS APPROACH: ICD-10-PCS | Performed by: INTERNAL MEDICINE

## 2020-08-10 PROCEDURE — 6360000002 HC RX W HCPCS

## 2020-08-10 PROCEDURE — C1769 GUIDE WIRE: HCPCS

## 2020-08-10 PROCEDURE — 93613 INTRACARDIAC EPHYS 3D MAPG: CPT

## 2020-08-10 PROCEDURE — 93620 COMP EP EVL R AT VEN PAC&REC: CPT | Performed by: INTERNAL MEDICINE

## 2020-08-10 PROCEDURE — 93623 PRGRMD STIMJ&PACG IV RX NFS: CPT

## 2020-08-10 RX ORDER — INSULIN LISPRO 100 [IU]/ML
0-9 INJECTION, SOLUTION INTRAVENOUS; SUBCUTANEOUS NIGHTLY
Status: DISCONTINUED | OUTPATIENT
Start: 2020-08-10 | End: 2020-08-12

## 2020-08-10 RX ORDER — INSULIN LISPRO 100 [IU]/ML
0-18 INJECTION, SOLUTION INTRAVENOUS; SUBCUTANEOUS
Status: DISCONTINUED | OUTPATIENT
Start: 2020-08-10 | End: 2020-08-12

## 2020-08-10 RX ADMIN — TIZANIDINE 4 MG: 4 TABLET ORAL at 21:22

## 2020-08-10 RX ADMIN — TRAMADOL HYDROCHLORIDE 50 MG: 50 TABLET, FILM COATED ORAL at 21:21

## 2020-08-10 RX ADMIN — AMLODIPINE BESYLATE 10 MG: 5 TABLET ORAL at 09:04

## 2020-08-10 RX ADMIN — INSULIN LISPRO 4 UNITS: 100 INJECTION, SOLUTION INTRAVENOUS; SUBCUTANEOUS at 09:07

## 2020-08-10 RX ADMIN — LOSARTAN POTASSIUM 100 MG: 100 TABLET, FILM COATED ORAL at 15:58

## 2020-08-10 RX ADMIN — ASPIRIN 325 MG ORAL TABLET 325 MG: 325 PILL ORAL at 15:59

## 2020-08-10 RX ADMIN — Medication 10 ML: at 09:05

## 2020-08-10 RX ADMIN — METOPROLOL TARTRATE 100 MG: 50 TABLET, FILM COATED ORAL at 15:58

## 2020-08-10 RX ADMIN — Medication 2000 UNITS: at 15:59

## 2020-08-10 RX ADMIN — METOPROLOL TARTRATE 100 MG: 50 TABLET, FILM COATED ORAL at 21:16

## 2020-08-10 RX ADMIN — TRAMADOL HYDROCHLORIDE 50 MG: 50 TABLET, FILM COATED ORAL at 06:37

## 2020-08-10 RX ADMIN — Medication 10 ML: at 21:19

## 2020-08-10 RX ADMIN — GABAPENTIN 600 MG: 300 CAPSULE ORAL at 21:17

## 2020-08-10 RX ADMIN — INSULIN LISPRO 6 UNITS: 100 INJECTION, SOLUTION INTRAVENOUS; SUBCUTANEOUS at 16:22

## 2020-08-10 RX ADMIN — INSULIN LISPRO 6 UNITS: 100 INJECTION, SOLUTION INTRAVENOUS; SUBCUTANEOUS at 16:23

## 2020-08-10 RX ADMIN — ROSUVASTATIN CALCIUM 20 MG: 20 TABLET, FILM COATED ORAL at 21:17

## 2020-08-10 RX ADMIN — MELATONIN 3 MG ORAL TABLET 6 MG: 3 TABLET ORAL at 21:17

## 2020-08-10 RX ADMIN — INSULIN LISPRO 6 UNITS: 100 INJECTION, SOLUTION INTRAVENOUS; SUBCUTANEOUS at 21:26

## 2020-08-10 RX ADMIN — GABAPENTIN 600 MG: 300 CAPSULE ORAL at 09:05

## 2020-08-10 RX ADMIN — ISOSORBIDE MONONITRATE 30 MG: 30 TABLET, EXTENDED RELEASE ORAL at 09:05

## 2020-08-10 RX ADMIN — TIZANIDINE 4 MG: 4 TABLET ORAL at 04:47

## 2020-08-10 RX ADMIN — MULTIPLE VITAMINS W/ MINERALS TAB 1 TABLET: TAB at 15:59

## 2020-08-10 RX ADMIN — Medication 10 ML: at 09:00

## 2020-08-10 RX ADMIN — TAMSULOSIN HYDROCHLORIDE 0.8 MG: 0.4 CAPSULE ORAL at 21:16

## 2020-08-10 ASSESSMENT — PAIN DESCRIPTION - DIRECTION
RADIATING_TOWARDS: LEGS

## 2020-08-10 ASSESSMENT — PAIN DESCRIPTION - FREQUENCY
FREQUENCY: CONTINUOUS

## 2020-08-10 ASSESSMENT — PAIN DESCRIPTION - PAIN TYPE
TYPE: CHRONIC PAIN

## 2020-08-10 ASSESSMENT — PAIN SCALES - GENERAL
PAINLEVEL_OUTOF10: 6
PAINLEVEL_OUTOF10: 6
PAINLEVEL_OUTOF10: 8
PAINLEVEL_OUTOF10: 6
PAINLEVEL_OUTOF10: 5

## 2020-08-10 ASSESSMENT — PAIN DESCRIPTION - LOCATION
LOCATION: BACK

## 2020-08-10 ASSESSMENT — PAIN DESCRIPTION - DESCRIPTORS
DESCRIPTORS: ACHING;DISCOMFORT

## 2020-08-10 ASSESSMENT — PAIN DESCRIPTION - ORIENTATION
ORIENTATION: RIGHT
ORIENTATION: RIGHT
ORIENTATION: RIGHT;LEFT;LOWER
ORIENTATION: RIGHT

## 2020-08-10 ASSESSMENT — PAIN DESCRIPTION - ONSET
ONSET: ON-GOING

## 2020-08-10 ASSESSMENT — PAIN DESCRIPTION - PROGRESSION: CLINICAL_PROGRESSION: NOT CHANGED

## 2020-08-10 NOTE — PROGRESS NOTES
Hospitalist Progress Note      PCP: ETTA Hyatt CNP    Date of Admission: 2020    SUBJECTIVE:   No new concerns            OBJECTIVE:     Vitals    TEMPERATURE:  Current - Temp: 97.2 °F (36.2 °C); Max - Temp  Av.3 °F (36.3 °C)  Min: 97.2 °F (36.2 °C)  Max: 97.5 °F (36.4 °C)  RESPIRATIONS RANGE: Resp  Av.5  Min: 18  Max: 20  PULSE RANGE: Pulse  Av  Min: 81  Max: 93  BLOOD PRESSURE RANGE:  Systolic (14CYG), YUD:876 , Min:125 , CTX:574   ; Diastolic (53GJF), OZH:49, Min:70, Max:97    PULSE OXIMETRY RANGE: SpO2  Av %  Min: 94 %  Max: 97 %  24HR INTAKE/OUTPUT:      Intake/Output Summary (Last 24 hours) at 8/10/2020 1226  Last data filed at 8/10/2020 0905  Gross per 24 hour   Intake 270 ml   Output 700 ml   Net -430 ml       Exam:    General appearance: Well, no apparent distress, appears stated age and cooperative. HEENT Normal cephalic, atraumatic without obvious deformity. Pupils equal, round, and reactive to light. Extra ocular muscles intact. Conjunctivae/corneas clear. Neck: Supple, No jugular venous distention/bruits. Trachea midline without thyromegaly or adenopathy with full range of motion. Lungs: Clear to ascultation, bilaterally without Rales/Wheezes/Rhonchi with good respiratory effort. Heart: Regular rate and rhythm with Normal S1/S2 without  murmurs, rubs or gallops, point of maximum impulse non-displaced  Abdomen: Soft, non-tender or non-distended without rigidity or guarding and positive bowel sounds all four quadrants. Extremities: No clubbing, cyanosis, or edema bilaterally. Full range of motion without deformity and normal gait intact. Skin: Skin color, texture, turgor normal. No rashes or lesions. Neurologic: Alert and oriented X 3,  neurovascularly intact with sensory/motor intact upper extremities/lower extremities, bilaterally.  Cranial nerves:II-XII intact, grossly non-focal.  Mental status: Alert, oriented, thought content

## 2020-08-10 NOTE — PROGRESS NOTES
Returned from cardiac cath lab post atrial fib ablation attempt. Sleepy. TENS unit turned back on. Right groin pressure dressing clean dry and intact. Patient falls asleep during assessment. Denies pain. Right pedal pulse palpable.

## 2020-08-10 NOTE — PROCEDURES
[facility]     Electrophysiology Procedure Note       Date of Procedure: 8/10/2020  Patient's Name: Sandy Noel  YOB: 1955   Medical Record Number: [id number]  Referring Physician: Jerrica Staton MD  Procedure Performed by: Cata Wharton MD    Procedure performed:    · Comprehensive electrophysiological study with attempted induction of arrhythmia at baseline. · Attempted induction of arrhythmia after IV drug infusion. · IV sedation  · Sedation start time: 11:39 AM  · Sedation stop time: 12:55 PM     Indications for procedure: Arrhythmia   Sandy Noel 77 y.o. male with PMH of CAD who has presented to the hospital with tachycardia, recorded on telemetry, differential diagnosis included SVT/AT with one-to-one conduction versus ventricular tachycardia. He has undergone cardiac catheterization with no intervention. No twelve-lead EKG of his tachycardia is available. However he remained asymptomatic during his tachycardia with stable blood pressure and heart rate. He is here today for EP study. Details of procedure: The risks, benefits, alternatives of procedure were explained to the patient. All questions were answered and patient understood. Written informed consent was signed and placed in the chart. The patient was brought to the electrophysiology lab in a fasting nonsedated state. The patient was prepped and draped in a sterile fashion. A timeout protocol was completed to identify the patient and the procedure being performed. Ultrasound was used for femoral venous access. After injection of 2% lidocaine, femoral vein access was obtained using modified seldinger technique and US guidance. We gained access to right femoral vein. One 8F, one 6F and one 7F sheath used and were placed in the right femoral vein using modified Seldinger technique and ultrasound guidance.      A deca polar catheter was advanced into the coronary sinus so the distal poles were in the coronary sinus for left atrial recording and mapping. Two quadripolar catheters were advanced sequentially to His bundle position and right ventricular apex. Comprehensive EP study and programmed stimulation was performed. . [place] interval: 87 msec  . QRS duration: 92 msec  . A-H interval of 87 msec and H-V interval of 52  msec. [place] 1:1 antegrade conduction over AV block (AV danielito wenckebach cycle length) was 300 msec   . Atrial ERP: 500/200 msec   . AVNERP was 500/300/250 msec  . 1:1 retrograde conduction over AV node (VA wenckebach cycle lenght) was 300 msec   . VA ERP of 500/210 msec  . Retrograde conduction was central and decremental     Arrhythmia Induction:  Programmed stimulation with up to three atrial/ventricular extrastimuli up to 200 msec was performed for induction of arrhythmia at baseline. Ventricular programmed stimulation with two ventricular extra stimuli up to 200 msec, using two drive train and from two locations (RV apex and RVOT), according to MUSTT study protocol was performed for induction of ventricular tachycardia. Aggressive programmed stimulation with three ventricular extra-stimuli up to 200 msec did not induce any ventricular arrhythmia. No ventricular arrhythmia was induced. Patient was started on isuprel up to 4 mcg/min and programmed stimulation was repeated. He had non-sustained atrial tachycardia with 1:1 conduction over AV node, similar to his clinical arrhythmia. This arrhythmia could not be mapped due to being nonsustained. He has been on medical therapy. Will follow with outpatient Holter monitoring and if he has sustained arrhythmia then will consider ablation in future. Sheaths and cathter removed and hemostasis achieved using manual compression. The patient tolerated the procedure well and there were no complications. Post-sedation evaluation was completed. Patient was transported to the holding area in stable condition.         Conclusion:  Comprehensive electrophysiologic study with no inducible ventricular arrhythmia     Inducible non-sustained atrial tachycardia with 1:1 conduction similar to clinical arrhythmia     Normal conduction interval     Plan:   The patient will be observed and discharged home if remains stable. Will provide Holter monitoring. Will also continue with medical therapy.      Kellee Weber MD, MPH  [place] [id number]   Office: [phone]

## 2020-08-10 NOTE — PROGRESS NOTES
Case discussed with Dr. Dayton Chambers, Dr. Ole Fuller, Dr Vikki Elias    Too high risk for cardiovascular collapse with rhthym seen-likely atrial tachycardia with 1:1 conduction rate to 240    Best option is to revascularize left main and RCA then reassess rhythm

## 2020-08-10 NOTE — PROGRESS NOTES
D:  Report received from previous shift RN, Fabian Parks. See nursing flow sheet for assessment and document flow sheet for vital signs/hemodynamic status. A:  Instructed patient on plan of care and goals for the day. R: Patient verbalized understanding.

## 2020-08-10 NOTE — PROCEDURES
Pulmonary Function Testing      Patient name:  Rosa Soto     [facility] Unit #:   [id number]   Date of test:  8/6/2020  Date of interpretation:   8/10/2020    Mr. Rosa Soto is a 77y.o. year-old non smoker. The spirometry data were acceptable and reproducible. Spirometry:  Flow volume loops were normal. The FEV-1/FVC ratio was normal. The FEV-1 was 2.59 liters (66% of predicted), which was moderately decreased. The FVC was 3.05 liters (58% of predicted), which was decreased. Response to inhaled bronchodilators (albuterol) was not performed. Lung volumes:  Lung volumes were not tested by plethysmography. Diffusion capacity was  not tested. Interpretation:  Pt could have restriction based on spirometry. Would need lung volume studies to confirm if indicated.     Comments:

## 2020-08-10 NOTE — PROGRESS NOTES
[address] Daily Progress Note      Admit Date:  8/4/2020    No chief complaint on file. VT    Subjective:  Mr. Dane Law denies exertional chest pain, SOB/CARR, PND, palpitations, light-headedness, or edema.      Objective:   /70   Pulse 82   Temp 97.2 °F (36.2 °C) (Temporal)   Resp 18   Ht 6' 2\" (1.88 m)   Wt 272 lb 11.3 oz (123.7 kg)   SpO2 94%   BMI 35.01 kg/m²       Intake/Output Summary (Last 24 hours) at 8/10/2020 0920  Last data filed at 8/9/2020 1751  Gross per 24 hour   Intake 480 ml   Output 1200 ml   Net -720 ml       TELEMETRY: Sinus     Physical Exam:  General:  Awake, alert, oriented x 3, NAD  Skin:  Warm and dry  Neck:  JVD flat  Chest:  normal air entry  Cardiovascular:  RRR S1S2, no S3, no mrmr  Abdomen:  Soft, ND, NT, No HSM  Extremities:  No edema    Medications:    insulin lispro  6 Units Subcutaneous TID WC    insulin glargine  10 Units Subcutaneous Q12H    insulin lispro  0-12 Units Subcutaneous TID WC    insulin lispro  0-6 Units Subcutaneous Nightly    Vitamin D  2,000 Units Oral Daily    rosuvastatin  20 mg Oral Nightly    omega-3 acid ethyl esters  2 g Oral BID    metoprolol tartrate  100 mg Oral BID    sodium chloride flush  10 mL Intravenous 2 times per day    sodium chloride flush  10 mL Intravenous 2 times per day    enoxaparin  40 mg Subcutaneous Daily    amLODIPine  10 mg Oral Daily    aspirin  325 mg Oral Daily    isosorbide mononitrate  30 mg Oral Daily    therapeutic multivitamin-minerals  1 tablet Oral Daily    tamsulosin  0.8 mg Oral Nightly    gabapentin  600 mg Oral BID    losartan  100 mg Oral Daily      dextrose       melatonin, glucose, dextrose, glucagon (rDNA), dextrose, tiZANidine, labetalol, sodium chloride flush, acetaminophen, oxyCODONE-acetaminophen **OR** oxyCODONE-acetaminophen, ondansetron, sodium chloride flush, polyethylene glycol, promethazine **OR** [DISCONTINUED] ondansetron, potassium chloride **OR** potassium alternative oral replacement **OR** potassium chloride, magnesium sulfate, sodium phosphate IVPB **OR** sodium phosphate IVPB, nitroGLYCERIN, traMADol, metoprolol    Lab Data:  CBC:   Recent Labs     08/09/20  0304   WBC 5.8   HGB 13.7   HCT 41.4   MCV 91.4        BMP:   Recent Labs     08/09/20  0304   *   K 4.5      CO2 25   BUN 20   CREATININE 1.1     LIVER PROFILE:   No results for input(s): AST, ALT, LIPASE, BILIDIR, BILITOT, ALKPHOS in the last 72 hours. Invalid input(s): AMYLASE,  ALB  PT/INR:   No results for input(s): PROTIME, INR in the last 72 hours. APTT:   No results for input(s): APTT in the last 72 hours. BNP:  No results for input(s): BNP in the last 72 hours. IMAGING:  -Normal left ventricle size, mild wall thickness and mildly reduced systolic   function with an estimated ejection fraction of 45%. -There is mild diffuse hypokinesis. -Grade I diastolic dysfunction with normal LV filling pressures. E/e\"=7.0   -The aortic valve is mildly thickened/calcified but opens well with normal   gradients.   -Trivial aortic regurgitation.   -The aortic root is mildly dilated. Cath  Impression  ~Coronary Angiography w/ Severe LM, RCA stenosis  ~LVG with LVEF of 60 and no regional wall motion abnormalities    Assessment/Plan:  Active Problems:       Ventricular tachycardia ([address]  Plan: Wide complex tachycardia at OSH. Monomorphic VT or SVT with aberrancy. S/p LHC with moderate to moderate to severe LM disease by IVUS. Consider ischemic evaluation to determine LM severity. FFR possibly tomorrow. D/w CT surgery and lesions likely not severe enough to cause VT. Patient has no symptoms of angina/CHF. Plan for EP study to determine source of arrhythmia today. HTN: cont lopressor, losartan, norvasc.         Tomy Bettencourt MD 8/10/2020 9:20 AM

## 2020-08-10 NOTE — PLAN OF CARE
Problem: Pain:  Goal: Pain level will decrease  Description: Pain level will decrease  Outcome: Ongoing  Note: Pt states pain is controled with medications and does not have any complaints at this time. Call light remains in reach will continue to monitor.

## 2020-08-10 NOTE — CONSULTS
P Pulmonary and Critical Care   Consult Note      Reason for Consult: Sleep apnea  Requesting Physician: Dr Deidra Cleveland    Subjective:   [facility] / HPI:                The patient is a 77 y.o. male with significant past medical history of:      Diagnosis Date    Back pain     Blood circulation, collateral     CAD (coronary artery disease)     Chronic kidney disease     Diabetes mellitus ([address]     Enlarged prostate     GERD (gastroesophageal reflux disease)     Hyperlipidemia     Hypertension     Medical history reviewed with no changes      Date of admission: 8/4/2020    The patient was originally sent to the emergency department after having a heart rate in the 200s at his PCP office. He was found to have nonsustained ventricular tachycardia. The patient has had cardiac evaluation which documents coronary artery disease. However, CAD was felt not severe enough to have caused his VT. There is concern for possible obstructive sleep apnea. The patient relates that he had a sleep study many years ago but is never been treated for sleep apnea. He admits that his wife complains that he snores during sleep. He states he sometimes wakes himself up snoring. He has had witnessed apneas. He wakes from sleep feeling poorly rested and complains of sleepiness during the day. He sometimes falls asleep while watching television. The patient is a retired . He is had exposure to toxins and fumes in the course of his work. He quit smoking in 1983. He does admit to some exertional dyspnea and occasional wheezing. However, he does not carry a diagnosis of asthma or COPD. He has not been prescribed inhaled medications.         Past Surgical History:        Procedure Laterality Date    BACK SURGERY      laminectomy & rods    CARDIAC SURGERY      stents    CORONARY ANGIOPLASTY WITH STENT PLACEMENT  10/2003    EPIDURAL STEROID INJECTION Left 7/23/2019    LEFT LUMBAR THREE LUMBAR FOUR EPIDURAL STEROID INJECTION SITE CONFIRMED BY FLUOROSCOPY [facility]    LEFT SACROILIAC JOINT INJECTION SITE CONFIRMED BY FLUOROSCOPY performed by Felecia Bermudez MD at [facility]      Repaired torn MCL TOTAL RIGHT   [facility] N/A 5/18/2020    SPINAL CORD STIMULATOR TRIAL WITH FLUOROSCOPY ([id number], [id number], [id number]) - [employer] performed by Wiliam Schaefer MD at [address] Right 2014     Current Medications:    Current Facility-Administered Medications: insulin lispro (1 Unit Dial) 6 Units, 6 Units, Subcutaneous, TID WC  insulin glargine (LANTUS;BASAGLAR) injection pen 10 Units, 10 Units, Subcutaneous, Q12H  melatonin tablet 6 mg, 6 mg, Oral, Nightly PRN  insulin lispro (1 Unit Dial) 0-12 Units, 0-12 Units, Subcutaneous, TID WC  insulin lispro (1 Unit Dial) 0-6 Units, 0-6 Units, Subcutaneous, Nightly  glucose (GLUTOSE) 40 % oral gel 15 g, 15 g, Oral, PRN  dextrose 50 % IV solution, 12.5 g, Intravenous, PRN  glucagon (rDNA) injection 1 mg, 1 mg, Intramuscular, PRN  dextrose 5 % solution, 100 mL/hr, Intravenous, PRN  Vitamin D (CHOLECALCIFEROL) tablet 2,000 Units, 2,000 Units, Oral, Daily  tiZANidine (ZANAFLEX) tablet 4 mg, 4 mg, Oral, Q8H PRN  rosuvastatin (CRESTOR) tablet 20 mg, 20 mg, Oral, Nightly  omega-3 acid ethyl esters (LOVAZA) capsule 2 g, 2 g, Oral, BID  labetalol (NORMODYNE;TRANDATE) injection 10 mg, 10 mg, Intravenous, Q4H PRN  metoprolol tartrate (LOPRESSOR) tablet 100 mg, 100 mg, Oral, BID  sodium chloride flush 0.9 % injection 10 mL, 10 mL, Intravenous, 2 times per day  sodium chloride flush 0.9 % injection 10 mL, 10 mL, Intravenous, PRN  acetaminophen (TYLENOL) tablet 650 mg, 650 mg, Oral, Q4H PRN  oxyCODONE-acetaminophen (PERCOCET) 5-325 MG per tablet 1 tablet, 1 tablet, Oral, Q4H PRN **OR** oxyCODONE-acetaminophen (PERCOCET) 5-325 MG per tablet 2 tablet, 2 tablet, Oral, Q4H PRN  ondansetron (ZOFRAN) injection 4 mg, 4 mg, Intravenous, Q6H PRN  sodium chloride flush 0.9 % injection 10 mL, 10 mL, Intravenous, 2 times per day  sodium chloride flush 0.9 % injection 10 mL, 10 mL, Intravenous, PRN  polyethylene glycol (GLYCOLAX) packet 17 g, 17 g, Oral, Daily PRN  promethazine (PHENERGAN) tablet 12.5 mg, 12.5 mg, Oral, Q6H PRN **OR** [DISCONTINUED] ondansetron (ZOFRAN) injection 4 mg, 4 mg, Intravenous, Q6H PRN  enoxaparin (LOVENOX) injection 40 mg, 40 mg, Subcutaneous, Daily  potassium chloride (KLOR-CON M) extended release tablet 40 mEq, 40 mEq, Oral, PRN **OR** potassium bicarb-citric acid (EFFER-K) effervescent tablet 40 mEq, 40 mEq, Oral, PRN **OR** potassium chloride 10 mEq/100 mL IVPB (Peripheral Line), 10 mEq, Intravenous, PRN  magnesium sulfate 1 g in dextrose 5% 100 mL IVPB, 1 g, Intravenous, PRN  sodium phosphate 19.17 mmol in dextrose 5 % 250 mL IVPB, 0.16 mmol/kg, Intravenous, PRN **OR** sodium phosphate 38.34 mmol in dextrose 5 % 250 mL IVPB, 0.32 mmol/kg, Intravenous, PRN  amLODIPine (NORVASC) tablet 10 mg, 10 mg, Oral, Daily  aspirin tablet 325 mg, 325 mg, Oral, Daily  isosorbide mononitrate (IMDUR) extended release tablet 30 mg, 30 mg, Oral, Daily  therapeutic multivitamin-minerals 1 tablet, 1 tablet, Oral, Daily  nitroGLYCERIN (NITROSTAT) SL tablet 0.4 mg, 0.4 mg, Sublingual, Q5 Min PRN  tamsulosin (FLOMAX) capsule 0.8 mg, 0.8 mg, Oral, Nightly  traMADol (ULTRAM) tablet 50 mg, 50 mg, Oral, Q6H PRN  gabapentin (NEURONTIN) capsule 600 mg, 600 mg, Oral, BID  metoprolol (LOPRESSOR) injection 5 mg, 5 mg, Intravenous, Q6H PRN  losartan (COZAAR) tablet 100 mg, 100 mg, Oral, Daily    Allergies   Allergen Reactions    Penicillins     Percocet [Oxycodone-Acetaminophen]     Codeine Nausea And Vomiting       Social History:    TOBACCO:   reports that he quit smoking about 36 years ago. He smoked 1.00 pack per day.  He has never used smokeless tobacco.  ETOH:   reports no history of alcohol use. Patient currently lives independently  Environmental/chemical exposure: See HPI    Family History:       Problem Relation Age of Onset    Diabetes Mother     Heart Disease Father     High Blood Pressure Father     High Cholesterol Father     Heart Attack Father     High Blood Pressure Sister     High Blood Pressure Brother     Birth Defects Brother     High Blood Pressure Brother     Heart Disease Sister     High Blood Pressure Sister     High Blood Pressure Sister      REVIEW OF SYSTEMS:    CONSTITUTIONAL:  negative for fevers, chills, diaphoresis, activity change, appetite change, fatigue, night sweats and unexpected weight change. EYES:  negative for blurred vision, eye discharge, visual disturbance and icterus  HEENT:  negative for hearing loss, tinnitus, ear drainage, sinus pressure, nasal congestion, epistaxis and snoring  RESPIRATORY:  See HPI  CARDIOVASCULAR:  negative for chest pain, palpitations, exertional chest pressure/discomfort, edema, syncope  GASTROINTESTINAL:  negative for nausea, vomiting, diarrhea, constipation, blood in stool and abdominal pain  GENITOURINARY:  negative for frequency, dysuria, urinary incontinence, decreased urine volume, and hematuria  HEMATOLOGIC/LYMPHATIC:  negative for easy bruising, bleeding and lymphadenopathy  ALLERGIC/IMMUNOLOGIC:  negative for recurrent infections, angioedema, anaphylaxis and drug reactions  ENDOCRINE:  negative for weight changes and diabetic symptoms including polyuria, polydipsia and polyphagia  MUSCULOSKELETAL:  negative for  pain, joint swelling, decreased range of motion and muscle weakness  NEUROLOGICAL:  negative for headaches, slurred speech, unilateral weakness  PSYCHIATRIC/BEHAVIORAL: negative for hallucinations, behavioral problems, confusion and agitation.      Objective:   PHYSICAL EXAM:      VITALS:  /79   Pulse 81   Temp 97.2 °F (36.2 °C) (Temporal)   Resp 20   Ht 6' 2\" (1.88 m)   Wt 272 lb 11.3 oz (123.7 kg)   SpO2 97%   BMI 35.01 kg/m²      24HR INTAKE/OUTPUT:      Intake/Output Summary (Last 24 hours) at 8/10/2020 0931  Last data filed at 8/9/2020 1751  Gross per 24 hour   Intake 480 ml   Output 1200 ml   Net -720 ml     CONSTITUTIONAL:  awake, alert, cooperative, no apparent distress, and appears stated age  NECK:  Supple, symmetrical, trachea midline, no adenopathy, thyroid symmetric, not enlarged and no tenderness, skin normal  LUNGS:  no increased work of breathing and clear to auscultation. No accessory muscle use  CARDIOVASCULAR: S1 and S2, no edema and no JVD  ABDOMEN:  normal bowel sounds, non-distended and no masses palpated, and no tenderness to palpation. No hepatospleenomegaly  LYMPHADENOPATHY:  no axillary or supraclavicular adenopathy. No cervical adnenopathy  PSYCHIATRIC: Oriented to person place and time. No obvious depression or anxiety. MUSCULOSKELETAL: No obvious misalignment or effusion of the joints. No clubbing, cyanosis of the digits. SKIN:  normal skin color, texture, turgor and no redness, warmth, or swelling. No palpable nodules    DATA:    Old records have been reviewed    CBC:  Recent Labs     08/09/20  0304   WBC 5.8   RBC 4.53   HGB 13.7   HCT 41.4      MCV 91.4   MCH 30.2   MCHC 33.1   RDW 14.7      BMP:  Recent Labs     08/09/20  0304   *   K 4.5      CO2 25   BUN 20   CREATININE 1.1   CALCIUM 9.6   GLUCOSE 199*      ABG:  No results for input(s): PHART, UTQ0NCK, PO2ART, NTK4IYA, Z0PCOBTV, BEART in the last 72 hours. No results found for: BNP  Lab Results   Component Value Date    CKTOTAL 99 01/25/2011    CKMB 0.29 01/25/2011    CKMBINDEX 0.3 01/25/2011    TROPONINI <0.01 08/05/2020       Cultures:     Abx:    Radiology Review:  All pertinent images / reports were reviewed as a part of this visit. Assessment:       1. Ventricular tachycardia  2. Coronary artery disease  3.

## 2020-08-10 NOTE — PROGRESS NOTES
Report received from SRINI Mckeon from cath lab. Lines pulled. Will get an event monitor and possibly go home today. Will follow up with Dr Morena Mendoza regarding 80 % blockage of RCA.

## 2020-08-10 NOTE — PROGRESS NOTES
oz (123.7 kg)   20 264 lb 15.9 oz (120.2 kg)   20 265 lb (120.2 kg)     Temp  Av.4 °F (36.3 °C)  Min: 97.2 °F (36.2 °C)  Max: 97.6 °F (36.4 °C)  Pulse  Av.3  Min: 82  Max: 93  BP  Min: 121/79  Max: 149/97  SpO2  Av.8 %  Min: 92 %  Max: 95 %    Intake/Output Summary (Last 24 hours) at 8/10/2020 0847  Last data filed at 2020 1751  Gross per 24 hour   Intake 480 ml   Output 1200 ml   Net -720 ml       · Telemetry: Sinus rhythm   · Constitutional: Oriented. No distress. · Head: Normocephalic and atraumatic. · Mouth/Throat: Oropharynx is clear and moist.   · Eyes: Conjunctivae normal. EOM are normal.   · Neck: Neck supple. No JVD present. · Cardiovascular: Normal rate, regular rhythm, S1&S2. · Pulmonary/Chest: Bilateral respiratory sounds. No rhonchi. · Abdominal: Soft. No tenderness. · Musculoskeletal: No tenderness. No edema    · Lymphadenopathy: Has no cervical adenopathy. · Neurological: Alert and oriented. Follows command, No Gross deficit   · Skin: Skin is warm, No rash noted. · Psychiatric: Has a normal behavior     Labs, diagnostic and imaging results reviewed. Reviewed. Recent Labs     20  0304   *   K 4.5      CO2 25   BUN 20   CREATININE 1.1     Recent Labs     20  0304   WBC 5.8   HGB 13.7   HCT 41.4   MCV 91.4        Lab Results   Component Value Date    CKTOTAL 99 2011    CKMB 0.29 2011    CKMBINDEX 0.3 2011    TROPONINI <0.01 2020     Estimated Creatinine Clearance: 91 mL/min (based on SCr of 1.1 mg/dL).    No results found for: BNP  Lab Results   Component Value Date    PROTIME 11.7 2020    PROTIME 12.6 2020    PROTIME 10.7 2020    INR 1.01 2020    INR 1.09 2020    INR 0.92 2020     Lab Results   Component Value Date    CHOL 119 2020    HDL 30 2020    HDL 29 2011    TRIG 132 2020       Scheduled Meds:   insulin lispro  6 Units Subcutaneous TID WC    insulin glargine  10 Units Subcutaneous Q12H    insulin lispro  0-12 Units Subcutaneous TID WC    insulin lispro  0-6 Units Subcutaneous Nightly    Vitamin D  2,000 Units Oral Daily    rosuvastatin  20 mg Oral Nightly    omega-3 acid ethyl esters  2 g Oral BID    metoprolol tartrate  100 mg Oral BID    sodium chloride flush  10 mL Intravenous 2 times per day    sodium chloride flush  10 mL Intravenous 2 times per day    enoxaparin  40 mg Subcutaneous Daily    amLODIPine  10 mg Oral Daily    aspirin  325 mg Oral Daily    isosorbide mononitrate  30 mg Oral Daily    therapeutic multivitamin-minerals  1 tablet Oral Daily    tamsulosin  0.8 mg Oral Nightly    gabapentin  600 mg Oral BID    losartan  100 mg Oral Daily     Continuous Infusions:   dextrose       PRN Meds:melatonin, glucose, dextrose, glucagon (rDNA), dextrose, tiZANidine, labetalol, sodium chloride flush, acetaminophen, oxyCODONE-acetaminophen **OR** oxyCODONE-acetaminophen, ondansetron, sodium chloride flush, polyethylene glycol, promethazine **OR** [DISCONTINUED] ondansetron, potassium chloride **OR** potassium alternative oral replacement **OR** potassium chloride, magnesium sulfate, sodium phosphate IVPB **OR** sodium phosphate IVPB, nitroGLYCERIN, traMADol, metoprolol     Patient Active Problem List    Diagnosis Date Noted    Ventricular tachycardia ([facility] 08/04/2020    Chronic back pain 03/12/2020    GERD with esophagitis 12/17/2018    Obesity (BMI 30-39.9) 12/17/2018    DM2 (diabetes mellitus, type 2) ([facility] 12/17/2018    BPH (benign prostatic hyperplasia) 12/17/2018    CKD (chronic kidney disease) stage 3, GFR 30-59 ml/min ([facility] 12/17/2018    Other hyperlipidemia 08/09/2011    Essential hypertension, benign 08/09/2011    Atherosclerosis of native coronary artery with stable angina pectoris ([facility] 08/09/2011    Primary cardiomyopathy ([facility] 08/09/2011    Chest pain 08/09/2011    Dizziness and giddiness 08/09/2011  Palpitations 08/09/2011      Active Hospital Problems    Diagnosis Date Noted    [facility] [I47.2] 08/04/2020    GERD with esophagitis [K21.0] 12/17/2018    Obesity (BMI 30-39. 9) [E66.9] 12/17/2018    BPH (benign prostatic hyperplasia) [N40.0] 12/17/2018    CKD (chronic kidney disease) stage 3, GFR 30-59 ml/min (HCC) [N18.3] 12/17/2018    Essential hypertension, benign [I10] 08/09/2011    Primary cardiomyopathy ([facility] [I42.8] 08/09/2011       Assessment:     - Wide-complex tachycardia   ? DD include SVT with aberrancy vs VT. No twelve-lead EKG is available of this tachycardia. He has been evaluated by CT surgery and also interventional cardiology. There is no plan for immediate revascularization. Off amiodarone therapy. On aggressive medical therapy. Diagnostic options including EP study to assess for sustained monomorphic VT or SVT with aberrancy discussed with patient. If sustained monomorphic VT induced, then AICD is indicated. The risks, benefits and alternatives of the procedure were discussed with the patient. The risks including, but not limited to, the risks of bleeding, infection, pain, device malfunction, lead dislodgement, radiation exposure, injury to cardiac and surrounding structures (including pneumothorax), stroke, cardiac perforation, tamponade, need for emergent heart surgery, myocardial infarction and death were discussed in detail. Printed information about ablation including risks were given. Patient was encouraged to review information given, and call back with any questions about risks, benefits and alternative of procedure. The patient opted to proceed with the procedure. - CAD, LM and RCA    Aggressive medical therapy. No immediate plan for revascularization. Under the care of interventional cardiology. - Morbid obesity: Body mass index is 35.01 kg/m².      - AYAZ:     - DM     All questions and concerns were addressed to the patient/family. Alternatives to my treatment were discussed. I have discussed the above stated plan and the patient verbalized understanding and agreed with the plan. NOTE: This report was transcribed using voice recognition software. Every effort was made to ensure accuracy, however, inadvertent computerized transcription errors may be present. Anthony Kc MD, MPH  [facility]   Office: [phone]     H&P Update    I have reviewed the history and physical and examined the patient and updated with relevant changes. Consent: I have discussed with the patient and/or the patient representative the indication, alternatives, and the possible risks and/or complications of the planned procedure and the anesthesia methods. The patient and/or patient representative appear to understand and agree to proceed. Vitals:    08/17/20 1206   BP: (!) 156/89   Pulse: 116   Resp: 18   Temp: 97.3 °F (36.3 °C)   SpO2: 97%     Prior to Admission medications    Medication Sig Start Date End Date Taking? Authorizing Provider   olmesartan (BENICAR) 20 MG tablet Take 1 tablet by mouth daily 8/17/20  Yes Vane Rutherford APRN - CNP   carvedilol (COREG) 12.5 MG tablet Take 1 tablet by mouth 2 times daily (with meals) 8/17/20  Yes Vane Rutherford APRN - CNP   amLODIPine (NORVASC) 5 MG tablet Take 1 tablet by mouth daily 8/17/20  Yes Vane Rutherford APRN - FREDY   glipiZIDE (GLUCOTROL) 5 MG tablet Take 1 tablet by mouth every morning (before breakfast) 8/18/20  Yes Vane Rutherford APRN - CNP   aspirin 325 MG EC tablet Take 1 tablet by mouth daily 8/15/20  Yes Vane Rutherford APRN - CNP   cyclobenzaprine (FLEXERIL) 10 MG tablet Take 10 mg by mouth 3 times daily as needed for Muscle spasms   Yes Historical Provider, MD   gabapentin (NEURONTIN) 300 MG capsule Take 600 mg by mouth 2 times daily.     Yes Historical Provider, MD   atorvastatin (LIPITOR) 40 MG tablet take 1 tablet by mouth once daily 7/7/20   Sai Rehman MD traZODone (DESYREL) 50 MG tablet Take 150 mg by mouth nightly     Historical Provider, MD   tamsulosin (FLOMAX) 0.4 MG capsule Take 0.8 mg by mouth nightly    Historical Provider, MD   Multiple Vitamins-Minerals (CENTRUM SILVER) TABS Take 1 tablet by mouth daily. Historical Provider, MD   metformin (GLUCOPHAGE) 1000 MG tablet Take 1,000 mg by mouth 2 times daily.       Historical Provider, MD     Past Medical History:   Diagnosis Date    Blood circulation, collateral     CAD (coronary artery disease)     Chronic back pain     Chronic kidney disease     Diabetes mellitus ([address]     Enlarged prostate     GERD (gastroesophageal reflux disease)     Hyperlipidemia     Hypertension     Medical history reviewed with no changes      Past Surgical History:   Procedure Laterality Date    CARDIAC CATHETERIZATION  08/05/2020    Dr. Adrianne aKm - w/IVUS of L main   [address]  12/18/2018    Dr. William Rock  10/2003    [facility] N/A 8/12/2020    Dr. Madhu Kramer - x3 (L SV-LAD, SV-OM1, SV-PDA); ligation of SARA w/45mm AtriClip    EPIDURAL STEROID INJECTION Left 7/23/2019    LEFT LUMBAR THREE LUMBAR FOUR EPIDURAL STEROID INJECTION SITE CONFIRMED BY FLUOROSCOPY performed by Kevin Charles MD at [facility] Left 9/10/2019    LEFT SACROILIAC JOINT INJECTION SITE CONFIRMED BY FLUOROSCOPY performed by Kevin Charles MD at [address] [place]    w/rods implanted   [address] [facility] [place] N/A 5/18/2020    ([id number], [id number], [id number]) - [employer] performed by Lavern Shepherd MD at [address] Right 2014    TRANSESOPHAGEAL ECHOCARDIOGRAM  08/12/2020    during CABG     Allergies   Allergen Reactions    Penicillins     Percocet [Oxycodone-Acetaminophen]      Made him feel \"weird\"    Codeine Nausea And Vomiting       Pre-Sedation Documentation and Exam:   I have personally completed a history, physical exam & review of systems for this patient (see notes).     Mallampati Airway Assessment:  Class II     Prior History of Anesthesia Complications:   None    ASA Classification:  Class 3 - A patient with severe systemic disease that limits activity but is not incapacitating    Sedation/ Anesthesia Plan:   Intravenous sedation    Medications Planned:   Midazolam (Versed) and Fentanyl intravenously    Patient is an appropriate candidate for plan of sedation:   Yes    Electronically signed by Anthony Kc MD on 8/23/2020 at 7:57 PM

## 2020-08-10 NOTE — TELEPHONE ENCOUNTER
----- Message from Randell Zuleta MD sent at 8/10/2020  9:45 AM EDT -----   Please arrange outpatient sleep study Marlenyso needs outpatient PFTFollow-up appointment 2 weeks

## 2020-08-10 NOTE — CARE COORDINATION
CM reviewed chart to plan d/c options. Course undetermined, PCI/EP involvement patient should not have any d/c needs. Case management will continue to follow progress and update discharge plan as needed.     STEVE Sheets, .[phone]

## 2020-08-10 NOTE — PROGRESS NOTES
Resting quietly in bed. Up to bathroom to void. Waiting to go to cardiac cath lab. Beta blocker, anticoagulants, and Cozaar held this AM. Morning Lantus and correction dose of insulin given.

## 2020-08-10 NOTE — PROGRESS NOTES
Attempting to void with wife at bedside. Unable to do so. Bladder scanned for 475 ml. Feeling uncomfortable. Pressure dressing remains intact. No oozing, ecchymosis, or hematoma noted right groin site. Ate 100% meal. Blood sugar over 200.  Will treat accordingly

## 2020-08-10 NOTE — PROGRESS NOTES
Attempting to void. Placed in reverse trendelenburg . Ate a full meal after return to room. Will recheck blood sugar and treat accordingly. Wife at bedside.

## 2020-08-11 ENCOUNTER — TELEPHONE (OUTPATIENT)
Dept: SLEEP CENTER | Age: 65
End: 2020-08-11

## 2020-08-11 ENCOUNTER — ANESTHESIA EVENT (OUTPATIENT)
Dept: OPERATING ROOM | Age: 65
DRG: 234 | End: 2020-08-11
Payer: MEDICARE

## 2020-08-11 LAB
ABO/RH: NORMAL
ANTIBODY SCREEN: NORMAL
GLUCOSE BLD-MCNC: 143 MG/DL (ref 70–99)
GLUCOSE BLD-MCNC: 198 MG/DL (ref 70–99)
GLUCOSE BLD-MCNC: 223 MG/DL (ref 70–99)
GLUCOSE BLD-MCNC: 225 MG/DL (ref 70–99)
PERFORMED ON: ABNORMAL

## 2020-08-11 PROCEDURE — 99233 SBSQ HOSP IP/OBS HIGH 50: CPT | Performed by: INTERNAL MEDICINE

## 2020-08-11 PROCEDURE — 6360000002 HC RX W HCPCS: Performed by: INTERNAL MEDICINE

## 2020-08-11 PROCEDURE — 86900 BLOOD TYPING SEROLOGIC ABO: CPT

## 2020-08-11 PROCEDURE — 2000000000 HC ICU R&B

## 2020-08-11 PROCEDURE — 6370000000 HC RX 637 (ALT 250 FOR IP): Performed by: INTERNAL MEDICINE

## 2020-08-11 PROCEDURE — 86923 COMPATIBILITY TEST ELECTRIC: CPT

## 2020-08-11 PROCEDURE — 6370000000 HC RX 637 (ALT 250 FOR IP): Performed by: NURSE PRACTITIONER

## 2020-08-11 PROCEDURE — 2580000003 HC RX 258: Performed by: INTERNAL MEDICINE

## 2020-08-11 PROCEDURE — 86901 BLOOD TYPING SEROLOGIC RH(D): CPT

## 2020-08-11 PROCEDURE — 94760 N-INVAS EAR/PLS OXIMETRY 1: CPT

## 2020-08-11 PROCEDURE — 93005 ELECTROCARDIOGRAM TRACING: CPT | Performed by: NURSE PRACTITIONER

## 2020-08-11 PROCEDURE — 2580000003 HC RX 258: Performed by: NURSE PRACTITIONER

## 2020-08-11 PROCEDURE — 86850 RBC ANTIBODY SCREEN: CPT

## 2020-08-11 RX ORDER — ALPRAZOLAM 0.25 MG/1
0.25 TABLET ORAL ONCE
Status: COMPLETED | OUTPATIENT
Start: 2020-08-11 | End: 2020-08-11

## 2020-08-11 RX ORDER — CHLORHEXIDINE GLUCONATE 0.12 MG/ML
15 RINSE ORAL ONCE
Status: COMPLETED | OUTPATIENT
Start: 2020-08-12 | End: 2020-08-12

## 2020-08-11 RX ORDER — SODIUM CHLORIDE 0.9 % (FLUSH) 0.9 %
10 SYRINGE (ML) INJECTION PRN
Status: DISCONTINUED | OUTPATIENT
Start: 2020-08-11 | End: 2020-08-12

## 2020-08-11 RX ORDER — SODIUM CHLORIDE 0.9 % (FLUSH) 0.9 %
10 SYRINGE (ML) INJECTION EVERY 12 HOURS SCHEDULED
Status: DISCONTINUED | OUTPATIENT
Start: 2020-08-11 | End: 2020-08-12

## 2020-08-11 RX ORDER — PANTOPRAZOLE SODIUM 40 MG/10ML
40 INJECTION, POWDER, LYOPHILIZED, FOR SOLUTION INTRAVENOUS ONCE
Status: DISCONTINUED | OUTPATIENT
Start: 2020-08-12 | End: 2020-08-11 | Stop reason: SDUPTHER

## 2020-08-11 RX ORDER — SODIUM CHLORIDE, SODIUM LACTATE, POTASSIUM CHLORIDE, CALCIUM CHLORIDE 600; 310; 30; 20 MG/100ML; MG/100ML; MG/100ML; MG/100ML
INJECTION, SOLUTION INTRAVENOUS CONTINUOUS
Status: DISCONTINUED | OUTPATIENT
Start: 2020-08-12 | End: 2020-08-12

## 2020-08-11 RX ORDER — PANTOPRAZOLE SODIUM 40 MG/10ML
40 INJECTION, POWDER, LYOPHILIZED, FOR SOLUTION INTRAVENOUS DAILY
Status: DISCONTINUED | OUTPATIENT
Start: 2020-08-12 | End: 2020-08-12

## 2020-08-11 RX ORDER — CHLORHEXIDINE GLUCONATE 4 G/100ML
SOLUTION TOPICAL SEE ADMIN INSTRUCTIONS
Status: DISCONTINUED | OUTPATIENT
Start: 2020-08-11 | End: 2020-08-12 | Stop reason: HOSPADM

## 2020-08-11 RX ADMIN — ASPIRIN 325 MG ORAL TABLET 325 MG: 325 PILL ORAL at 08:12

## 2020-08-11 RX ADMIN — MULTIPLE VITAMINS W/ MINERALS TAB 1 TABLET: TAB at 08:12

## 2020-08-11 RX ADMIN — Medication 10 ML: at 09:00

## 2020-08-11 RX ADMIN — TIZANIDINE 4 MG: 4 TABLET ORAL at 15:02

## 2020-08-11 RX ADMIN — ROSUVASTATIN CALCIUM 20 MG: 20 TABLET, FILM COATED ORAL at 21:00

## 2020-08-11 RX ADMIN — INSULIN LISPRO 6 UNITS: 100 INJECTION, SOLUTION INTRAVENOUS; SUBCUTANEOUS at 11:36

## 2020-08-11 RX ADMIN — Medication 2000 UNITS: at 08:12

## 2020-08-11 RX ADMIN — INSULIN LISPRO 6 UNITS: 100 INJECTION, SOLUTION INTRAVENOUS; SUBCUTANEOUS at 17:12

## 2020-08-11 RX ADMIN — METOPROLOL TARTRATE 100 MG: 50 TABLET, FILM COATED ORAL at 08:12

## 2020-08-11 RX ADMIN — METOPROLOL TARTRATE 100 MG: 50 TABLET, FILM COATED ORAL at 20:59

## 2020-08-11 RX ADMIN — GABAPENTIN 600 MG: 300 CAPSULE ORAL at 21:00

## 2020-08-11 RX ADMIN — ISOSORBIDE MONONITRATE 30 MG: 30 TABLET, EXTENDED RELEASE ORAL at 08:12

## 2020-08-11 RX ADMIN — LOSARTAN POTASSIUM 100 MG: 100 TABLET, FILM COATED ORAL at 08:12

## 2020-08-11 RX ADMIN — GABAPENTIN 600 MG: 300 CAPSULE ORAL at 08:12

## 2020-08-11 RX ADMIN — MUPIROCIN: 20 OINTMENT TOPICAL at 21:00

## 2020-08-11 RX ADMIN — INSULIN LISPRO 2 UNITS: 100 INJECTION, SOLUTION INTRAVENOUS; SUBCUTANEOUS at 21:00

## 2020-08-11 RX ADMIN — INSULIN LISPRO 6 UNITS: 100 INJECTION, SOLUTION INTRAVENOUS; SUBCUTANEOUS at 08:13

## 2020-08-11 RX ADMIN — ALPRAZOLAM 0.25 MG: 0.25 TABLET ORAL at 20:59

## 2020-08-11 RX ADMIN — OXYCODONE HYDROCHLORIDE AND ACETAMINOPHEN 2 TABLET: 5; 325 TABLET ORAL at 19:01

## 2020-08-11 RX ADMIN — TAMSULOSIN HYDROCHLORIDE 0.8 MG: 0.4 CAPSULE ORAL at 21:00

## 2020-08-11 RX ADMIN — TIZANIDINE 4 MG: 4 TABLET ORAL at 05:36

## 2020-08-11 RX ADMIN — INSULIN LISPRO 3 UNITS: 100 INJECTION, SOLUTION INTRAVENOUS; SUBCUTANEOUS at 08:15

## 2020-08-11 RX ADMIN — Medication 10 ML: at 21:01

## 2020-08-11 RX ADMIN — ENOXAPARIN SODIUM 40 MG: 40 INJECTION SUBCUTANEOUS at 08:13

## 2020-08-11 RX ADMIN — TRAMADOL HYDROCHLORIDE 50 MG: 50 TABLET, FILM COATED ORAL at 05:36

## 2020-08-11 RX ADMIN — AMLODIPINE BESYLATE 10 MG: 5 TABLET ORAL at 08:12

## 2020-08-11 RX ADMIN — INSULIN LISPRO 6 UNITS: 100 INJECTION, SOLUTION INTRAVENOUS; SUBCUTANEOUS at 11:34

## 2020-08-11 RX ADMIN — Medication 10 ML: at 15:02

## 2020-08-11 ASSESSMENT — PAIN DESCRIPTION - PROGRESSION
CLINICAL_PROGRESSION: NOT CHANGED

## 2020-08-11 ASSESSMENT — PAIN DESCRIPTION - PAIN TYPE
TYPE: CHRONIC PAIN

## 2020-08-11 ASSESSMENT — PAIN SCALES - GENERAL
PAINLEVEL_OUTOF10: 7
PAINLEVEL_OUTOF10: 4
PAINLEVEL_OUTOF10: 2
PAINLEVEL_OUTOF10: 4
PAINLEVEL_OUTOF10: 0
PAINLEVEL_OUTOF10: 1
PAINLEVEL_OUTOF10: 0

## 2020-08-11 ASSESSMENT — PAIN DESCRIPTION - ONSET
ONSET: ON-GOING

## 2020-08-11 ASSESSMENT — PAIN DESCRIPTION - DIRECTION
RADIATING_TOWARDS: LEGS

## 2020-08-11 ASSESSMENT — PAIN DESCRIPTION - ORIENTATION
ORIENTATION: RIGHT

## 2020-08-11 ASSESSMENT — PAIN DESCRIPTION - LOCATION
LOCATION: BACK

## 2020-08-11 ASSESSMENT — PAIN DESCRIPTION - FREQUENCY
FREQUENCY: CONTINUOUS

## 2020-08-11 ASSESSMENT — PAIN DESCRIPTION - DESCRIPTORS
DESCRIPTORS: ACHING;DISCOMFORT

## 2020-08-11 NOTE — PROGRESS NOTES
Hospitalist Progress Note      PCP: ETTA Bell CNP    Date of Admission: 2020    SUBJECTIVE:   No new concerns            OBJECTIVE:     Vitals    TEMPERATURE:  Current - Temp: 97.4 °F (36.3 °C); Max - Temp  Av.3 °F (36.3 °C)  Min: 97.2 °F (36.2 °C)  Max: 97.4 °F (36.3 °C)  RESPIRATIONS RANGE: Resp  Av.6  Min: 16  Max: 20  PULSE RANGE: Pulse  Av.9  Min: 80  Max: 98  BLOOD PRESSURE RANGE:  Systolic (06AWN), FUO:083 , Min:110 , VHD:020   ; Diastolic (55FYN), CKT:47, Min:73, Max:98    PULSE OXIMETRY RANGE: SpO2  Av.3 %  Min: 93 %  Max: 98 %  24HR INTAKE/OUTPUT:      Intake/Output Summary (Last 24 hours) at 2020 1628  Last data filed at 2020 1215  Gross per 24 hour   Intake 1270 ml   Output 500 ml   Net 770 ml       Exam:    General appearance: Well, no apparent distress, appears stated age and cooperative. HEENT Normal cephalic, atraumatic without obvious deformity. Pupils equal, round, and reactive to light. Extra ocular muscles intact. Conjunctivae/corneas clear. Neck: Supple, No jugular venous distention/bruits. Trachea midline without thyromegaly or adenopathy with full range of motion. Lungs: Clear to ascultation, bilaterally without Rales/Wheezes/Rhonchi with good respiratory effort. Heart: Regular rate and rhythm with Normal S1/S2 without  murmurs, rubs or gallops, point of maximum impulse non-displaced  Abdomen: Soft, non-tender or non-distended without rigidity or guarding and positive bowel sounds all four quadrants. Extremities: No clubbing, cyanosis, or edema bilaterally. Full range of motion without deformity and normal gait intact. Skin: Skin color, texture, turgor normal. No rashes or lesions. Neurologic: Alert and oriented X 3,  neurovascularly intact with sensory/motor intact upper extremities/lower extremities, bilaterally.  Cranial nerves:II-XII intact, grossly non-focal.  Mental status: Alert, oriented, thought content appropriate. ASSESSMENT AND PLAN    1. Wide-complex tachycardia  -Resolved.  Spontaneously. On manjit and cored d  -Metoprolol added  -unclear etiology; EPS study 8/10/2020  -further recs per EP         CAD.   Underwent LHC  -Severe CAD.  With stenosis  -CABG tomorrow       Chronic kidney disease-creatinine seems to be stable now.     Diabetes  -Continue to titrate insulin;  Endocrine consulted fillwng       BPH  Class 1 obesity  Diet: DIET CARDIAC;  Code:Full Code  DVT PPX lovenox               Joyce Haynes MD

## 2020-08-11 NOTE — ANESTHESIA PRE PROCEDURE
Historical Provider, MD       Current medications:    Current Facility-Administered Medications   Medication Dose Route Frequency Provider Last Rate Last Dose    sodium chloride flush 0.9 % injection 10 mL  10 mL Intravenous 2 times per day ETTA Hernandez CNP        sodium chloride flush 0.9 % injection 10 mL  10 mL Intravenous PRN ETTA Hernandez CNP        [START ON 8/12/2020] lactated ringers infusion   Intravenous Continuous ETTA Hernandez CNP        [START ON 8/12/2020] pantoprazole (PROTONIX) injection 40 mg  40 mg Intravenous Once ETTA Hernandez CNP        ALPRAZolam Daryel Ham) tablet 0.25 mg  0.25 mg Oral Once ETTA Hernandez CNP        insulin lispro (1 Unit Dial) 0-18 Units  0-18 Units Subcutaneous TID  Jacquelin Correa MD   6 Units at 08/11/20 1134    insulin lispro (1 Unit Dial) 0-9 Units  0-9 Units Subcutaneous Nightly Jacquelin Correa MD   6 Units at 08/10/20 2126    insulin lispro (1 Unit Dial) 6 Units  6 Units Subcutaneous TID  Milana Carlson MD   6 Units at 08/11/20 1136    insulin glargine (LANTUS;BASAGLAR) injection pen 10 Units  10 Units Subcutaneous Q12H Milana Carlson MD   10 Units at 08/11/20 0813    melatonin tablet 6 mg  6 mg Oral Nightly PRN Sumeet Rodas DO   6 mg at 08/10/20 2117    glucose (GLUTOSE) 40 % oral gel 15 g  15 g Oral PRN Milana Carlson MD        dextrose 50 % IV solution  12.5 g Intravenous PRN Milana Carlson MD        glucagon (rDNA) injection 1 mg  1 mg Intramuscular PRN Milana Carlson MD        dextrose 5 % solution  100 mL/hr Intravenous PRN Milana Carlson MD        Vitamin D (CHOLECALCIFEROL) tablet 2,000 Units  2,000 Units Oral Daily Milana Carlson MD   2,000 Units at 08/11/20 0812    tiZANidine (ZANAFLEX) tablet 4 mg  4 mg Oral Q8H PRN Milana Carlson MD   4 mg at 08/11/20 0536    rosuvastatin (CRESTOR) tablet 20 mg  20 mg Oral Nightly Milana Carlson MD   20 mg at 08/10/20 2117    omega-3 acid ethyl esters (LOVAZA) capsule 2 g  2 g Oral BID Lucy Nino MD        labetalol (NORMODYNE;TRANDATE) injection 10 mg  10 mg Intravenous Q4H PRN Manisha Mistry MD   10 mg at 08/07/20 0043    metoprolol tartrate (LOPRESSOR) tablet 100 mg  100 mg Oral BID Bharat Kim MD   100 mg at 08/11/20 3136    sodium chloride flush 0.9 % injection 10 mL  10 mL Intravenous 2 times per day Milan Hsieh MD   10 mL at 08/11/20 0900    sodium chloride flush 0.9 % injection 10 mL  10 mL Intravenous PRN Milan Hsieh MD   10 mL at 08/07/20 0044    acetaminophen (TYLENOL) tablet 650 mg  650 mg Oral Q4H PRN Milan Hsieh MD        oxyCODONE-acetaminophen (PERCOCET) 5-325 MG per tablet 1 tablet  1 tablet Oral Q4H PRN Milan Hsieh MD        Or    oxyCODONE-acetaminophen (PERCOCET) 5-325 MG per tablet 2 tablet  2 tablet Oral Q4H PRN Milan Hsieh MD        [facility]ron [facility]) injection 4 mg  4 mg Intravenous Q6H PRN Milan Hsieh MD        polyethylene glycol [facility]) packet 17 g  17 g Oral Daily PRN Manisha Mistry MD   17 g at 08/09/20 0827    promethazine (PHENERGAN) tablet 12.5 mg  12.5 mg Oral Q6H PRN Manisha Mistry MD        potassium chloride (KLOR-CON M) extended release tablet 40 mEq  40 mEq Oral PRN Manisha Mistry MD        Or    potassium bicarb-citric acid (EFFER-K) effervescent tablet 40 mEq  40 mEq Oral PRN Manisha Mistry MD        Or    potassium chloride 10 mEq/100 mL IVPB (Peripheral Line)  10 mEq Intravenous PRN Manisha Mistry MD        magnesium sulfate 1 g in dextrose 5% 100 mL IVPB  1 g Intravenous PRN Manisha Mistry MD        sodium phosphate 19.17 mmol in dextrose 5 % 250 mL IVPB  0.16 mmol/kg Intravenous PRN Manisha Mistry MD        Or    sodium phosphate 38.34 mmol in dextrose 5 % 250 mL IVPB  0.32 mmol/kg Intravenous PRN Manisha Mistry MD        amLODIPine (NORVASC) tablet 10 mg  10 mg Oral Daily Manisha Mistry MD   10 mg at 08/11/20 8906    aspirin tablet 325 mg  325 mg Oral Daily Kiarra Freeman MD   325 mg at 08/11/20 6400    isosorbide mononitrate (IMDUR) extended release tablet 30 mg  30 mg Oral Daily Kiarra Freeman MD   30 mg at 08/11/20 3652    therapeutic multivitamin-minerals 1 tablet  1 tablet Oral Daily Kiarra Freeman MD   1 tablet at 08/11/20 0812    nitroGLYCERIN (NITROSTAT) SL tablet 0.4 mg  0.4 mg Sublingual Q5 Min PRN Kiarra Freeman MD        tamsulosin (FLOMAX) capsule 0.8 mg  0.8 mg Oral Nightly Abiroger Dennis MD   0.8 mg at 08/10/20 2116    traMADol (ULTRAM) tablet 50 mg  50 mg Oral Q6H PRN Kiarra Freeman MD   50 mg at 08/11/20 0536    gabapentin (NEURONTIN) capsule 600 mg  600 mg Oral BID Kiarra Freeman MD   600 mg at 08/11/20 0812    metoprolol (LOPRESSOR) injection 5 mg  5 mg Intravenous Q6H PRN Adam Coreas, APRN - CNP           Allergies: Allergies   Allergen Reactions    Penicillins     Percocet [Oxycodone-Acetaminophen]      Made him feel \"weird\"    Codeine Nausea And Vomiting       Problem List:    Patient Active Problem List   Diagnosis Code    Other hyperlipidemia E78.49    Essential hypertension, benign I10    Coronary artery disease involving native coronary artery of native heart without angina pectoris I25.10    Primary cardiomyopathy ([facility] [address] I42.8    Chest pain R07.9    Dizziness and giddiness R42    Palpitations R00.2    GERD with esophagitis K21.0    Obesity (BMI 30-39. 9) E66.9    DM2 (diabetes mellitus, type 2) ([facility]) E11.9    BPH (benign prostatic hyperplasia) N40.0    CKD (chronic kidney disease) stage 3, GFR 30-59 ml/min ([facility]) N18.3    Chronic back pain M54.9, G89.29    Ventricular tachycardia ([facility]) I47.2    Tachycardia R00.0    PAT (paroxysmal atrial tachycardia) ([facility]) I47.1       Past Medical History:        Diagnosis Date    Back pain     Blood circulation, collateral     CAD (coronary artery disease)     Chronic kidney disease     Diabetes mellitus ([facility] [address]     Enlarged prostate     GERD (gastroesophageal reflux disease)     Hyperlipidemia     Hypertension     Medical history reviewed with no changes        Past Surgical History:        Procedure Laterality Date    BACK SURGERY      laminectomy & rods    CARDIAC SURGERY      stents    CORONARY ANGIOPLASTY WITH STENT PLACEMENT  10/2003    EPIDURAL STEROID INJECTION Left 2019    LEFT LUMBAR THREE LUMBAR FOUR EPIDURAL STEROID INJECTION SITE CONFIRMED BY FLUOROSCOPY performed by Shantell Contreras MD at [facility] Left 9/10/2019    LEFT SACROILIAC JOINT INJECTION SITE CONFIRMED BY FLUOROSCOPY performed by Shantell Contreras MD at [facility]      Repaired torn MCL TOTAL RIGHT    SPINAL CORD STIMULATOR SURGERY N/A 2020    SPINAL CORD STIMULATOR TRIAL WITH FLUOROSCOPY (51529, 34063, 10769) - [employer] performed by Manuel Tam MD at [address] Right        Social History:    Social History     Tobacco Use    Smoking status: Former Smoker     Packs/day: 1.00     Last attempt to quit: 1984     Years since quittin.5    Smokeless tobacco: Never Used    Tobacco comment: stopped 25 years ago   Substance Use Topics    Alcohol use:  No                                Counseling given: Not Answered  Comment: stopped 25 years ago      Vital Signs (Current):   Vitals:    20 0700 20 0713 20 0743 20 1243   BP:   (!) 143/98    Pulse: 85  84    Resp:  16 20    Temp:   97.4 °F (36.3 °C)    TempSrc:   Temporal    SpO2:  97% 98%    Weight:       Height:    6' 2\" (1.88 m)                                              BP Readings from Last 3 Encounters:   20 (!) 143/98   20 (!) 151/90   20 131/89       NPO Status:                                                                                 BMI:   Wt Readings from Last 3 Encounters:   08/11/20 269 lb 2.9 oz (122.1 kg)   05/22/20 264 lb 15.9 oz (120.2 kg)   05/18/20 265 lb (120.2 kg)     Body mass index is 34.56 kg/m².     CBC:   Lab Results   Component Value Date    WBC 5.8 08/09/2020    RBC 4.53 08/09/2020    HGB 13.7 08/09/2020    HCT 41.4 08/09/2020    MCV 91.4 08/09/2020    RDW 14.7 08/09/2020     08/09/2020       CMP:   Lab Results   Component Value Date     08/09/2020    K 4.5 08/09/2020    K 4.5 12/18/2018     08/09/2020    CO2 25 08/09/2020    BUN 20 08/09/2020    CREATININE 1.1 08/09/2020    GFRAA >60 08/09/2020    GFRAA >60 01/25/2011    AGRATIO 1.5 08/05/2020    LABGLOM >60 08/09/2020    GLUCOSE 199 08/09/2020    PROT 6.3 08/06/2020    CALCIUM 9.6 08/09/2020    BILITOT 0.3 08/06/2020    ALKPHOS 107 08/06/2020    AST 13 08/06/2020    ALT 12 08/06/2020       POC Tests:   Recent Labs     08/11/20  1130   POCGLU 225*       Coags:   Lab Results   Component Value Date    PROTIME 11.7 08/06/2020    INR 1.01 08/06/2020    APTT 37.2 08/06/2020       HCG (If Applicable): No results found for: PREGTESTUR, PREGSERUM, HCG, HCGQUANT     ABGs:   Lab Results   Component Value Date    PHART 7.370 08/06/2020    PO2ART 72.9 08/06/2020    CJM1MHR 44.0 08/06/2020    UZC4SAN 25.4 08/06/2020    BEART -0.2 08/06/2020    Z7IQFDPA 94.7 08/06/2020        Type & Screen (If Applicable):  No results found for: LABABO, LABRH    Drug/Infectious Status (If Applicable):  No results found for: HIV, HEPCAB    COVID-19 Screening (If Applicable):   Lab Results   Component Value Date    COVID19 Not Detected 08/06/2020    COVID19 NOT DETECTED 05/13/2020         Anesthesia Evaluation  Patient summary reviewed and Nursing notes reviewed  Airway: Mallampati: III        Dental:          Pulmonary:normal exam                               Cardiovascular:  Exercise tolerance: poor (<4 METS),   (+) hypertension:, CAD:, dysrhythmias: atrial fibrillation and ventricular tachycardia, CHF: systolic and diastolic, hyperlipidemia      ECG reviewed  Rhythm: regular    Echocardiogram reviewed                  Neuro/Psych:   (+) neuromuscular disease:,             GI/Hepatic/Renal:   (+) GERD:, renal disease: CRI, morbid obesity          Endo/Other:    (+) Diabetes, . Abdominal:   (+) obese,         Vascular:                                        Anesthesia Plan      general     ASA 4     (Chart reviewed on 8/11/2020.)  Induction: intravenous. arterial line, central line, PA catheter and ERIC  MIPS: Postoperative opioids intended, Prophylactic antiemetics administered and Postoperative ventilation.                 MEDICATIONS:  Current Facility-Administered Medications   Medication Dose Route Frequency Provider Last Rate Last Dose    sodium chloride flush 0.9 % injection 10 mL  10 mL Intravenous 2 times per day ETTA Davila CNP        sodium chloride flush 0.9 % injection 10 mL  10 mL Intravenous PRN ETTA Davila CNP        [START ON 8/12/2020] lactated ringers infusion   Intravenous Continuous ETTA Davila CNP        [START ON 8/12/2020] pantoprazole (PROTONIX) injection 40 mg  40 mg Intravenous Once ETTA Davila CNP        ALPRAZolam [facility]) tablet 0.25 mg  0.25 mg Oral Once ETTA Davila CNP        insulin lispro (1 Unit Dial) 0-18 Units  0-18 Units Subcutaneous TID  Jacquelin Correa MD   6 Units at 08/11/20 1134    insulin lispro (1 Unit Dial) 0-9 Units  0-9 Units Subcutaneous Nightly Jacquelin Correa MD   6 Units at 08/10/20 2126    insulin lispro (1 Unit Dial) 6 Units  6 Units Subcutaneous TID  Lucy Nino MD   6 Units at 08/11/20 1136    insulin glargine (LANTUS;BASAGLAR) injection pen 10 Units  10 Units Subcutaneous Q12H Lucy Nino MD   10 Units at 08/11/20 0813    melatonin tablet 6 mg  6 mg Oral Nightly PRN Marlenmarilyn Rodas, DO   6 mg at 08/10/20 2117    glucose (GLUTOSE) 40 % oral gel 15 g  15 g Oral PRN Carlotta Mustfaa Problem List:  Patient Active Problem List   Diagnosis    Other hyperlipidemia    Essential hypertension, benign    Coronary artery disease involving native coronary artery of native heart without angina pectoris    Primary cardiomyopathy ([facility] [address]    Chest pain    Dizziness and giddiness    Palpitations    GERD with esophagitis    Obesity (BMI 30-39. 9)    DM2 (diabetes mellitus, type 2) (HCC)    BPH (benign prostatic hyperplasia)    CKD (chronic kidney disease) stage 3, GFR 30-59 ml/min (HCC)    Chronic back pain    Ventricular tachycardia (HCC)    Tachycardia    PAT ([facility]oxysmal [facility] tachycardia) ([facility])           Michelle George MD   8/11/2020

## 2020-08-11 NOTE — PROGRESS NOTES
%    Intake/Output Summary (Last 24 hours) at 8/11/2020 0932  Last data filed at 8/11/2020 0539  Gross per 24 hour   Intake 660 ml   Output 1050 ml   Net -390 ml       · Telemetry: Sinus rhythm   · Constitutional: Oriented. No distress. · Head: Normocephalic and atraumatic. · Mouth/Throat: Oropharynx is clear and moist.   · Eyes: Conjunctivae normal. EOM are normal.   · Neck: Neck supple. No rigidity. No JVD present. · Cardiovascular: Normal rate, regular rhythm, S1&S2. · Pulmonary/Chest: Bilateral respiratory sounds. No wheezes, No rhonchi. · Abdominal: Soft. Bowel sounds present. No distension, No tenderness. · Musculoskeletal: No tenderness. No edema    · Lymphadenopathy: Has no cervical adenopathy. · Neurological: Alert and oriented. Cranial nerve appears intact, No Gross deficit   · Skin: Skin is warm and dry. No rash noted. · Psychiatric: Has a normal behavior     Labs, diagnostic and imaging results reviewed. Reviewed. Recent Labs     08/09/20  0304   *   K 4.5      CO2 25   BUN 20   CREATININE 1.1     Recent Labs     08/09/20  0304   WBC 5.8   HGB 13.7   HCT 41.4   MCV 91.4        Lab Results   Component Value Date    CKTOTAL 99 01/25/2011    CKMB 0.29 01/25/2011    CKMBINDEX 0.3 01/25/2011    TROPONINI <0.01 08/05/2020     Estimated Creatinine Clearance: 91 mL/min (based on SCr of 1.1 mg/dL).    No results found for: BNP  Lab Results   Component Value Date    PROTIME 11.7 08/06/2020    PROTIME 12.6 08/05/2020    PROTIME 10.7 05/18/2020    INR 1.01 08/06/2020    INR 1.09 08/05/2020    INR 0.92 05/18/2020     Lab Results   Component Value Date    CHOL 119 08/05/2020    HDL 30 08/05/2020    HDL 29 01/25/2011    TRIG 132 08/05/2020       Scheduled Meds:   insulin lispro  0-18 Units Subcutaneous TID WC    insulin lispro  0-9 Units Subcutaneous Nightly    insulin lispro  6 Units Subcutaneous TID WC    insulin glargine  10 Units Subcutaneous Q12H    Vitamin D  2,000 Units Oral Daily    rosuvastatin  20 mg Oral Nightly    omega-3 acid ethyl esters  2 g Oral BID    metoprolol tartrate  100 mg Oral BID    sodium chloride flush  10 mL Intravenous 2 times per day    amLODIPine  10 mg Oral Daily    aspirin  325 mg Oral Daily    isosorbide mononitrate  30 mg Oral Daily    therapeutic multivitamin-minerals  1 tablet Oral Daily    tamsulosin  0.8 mg Oral Nightly    gabapentin  600 mg Oral BID     Continuous Infusions:   dextrose       PRN Meds:melatonin, glucose, dextrose, glucagon (rDNA), dextrose, tiZANidine, labetalol, sodium chloride flush, acetaminophen, oxyCODONE-acetaminophen **OR** oxyCODONE-acetaminophen, ondansetron, polyethylene glycol, promethazine **OR** [DISCONTINUED] ondansetron, potassium chloride **OR** potassium alternative oral replacement **OR** potassium chloride, magnesium sulfate, sodium phosphate IVPB **OR** sodium phosphate IVPB, nitroGLYCERIN, traMADol, metoprolol     Patient Active Problem List    Diagnosis Date Noted    Tachycardia     PAT [facility]ox[facility]mal [facility] tachycardia) ([facility])     Ventricular tachycardia ([facility] 08/04/2020    Chronic back pain 03/12/2020    GERD with esophagitis 12/17/2018    Obesity (BMI 30-39.9) 12/17/2018    [facility], [facility] 12/17/2018    BPH (benign prostatic hyperplasia) 12/17/2018    CKD (chronic kidney disease) stage 3, GFR 30-59 ml/min [facility] 12/17/2018    Other hyperlipidemia 08/09/2011    Essential hypertension, benign 08/09/2011    Coronary artery disease involving native coronary artery of native heart without angina pectoris 08/09/2011    [facility] 08/09/2011    Chest pain 08/09/2011    Dizziness and giddiness 08/09/2011    Palpitations 08/09/2011      Active Hospital Problems    Diagnosis Date Noted    Tachycardia [R00.0]     PAT (paroxysmal atrial tachycardia) (HCC) [I47.1]     Ventricular tachycardia (HCC) [I47.2] 08/04/2020    GERD with esophagitis [K21.0] 12/17/2018    Obesity (BMI 30-39. 9) [E66.9] 12/17/2018    BPH (benign prostatic hyperplasia) [N40.0] 12/17/2018    CKD (chronic kidney disease) stage 3, GFR 30-59 ml/min (HCC) [N18.3] 12/17/2018    Essential hypertension, benign [I10] 08/09/2011    Primary cardiomyopathy ([facility] [address] [I42.8] 08/09/2011    Coronary artery disease involving native coronary artery of native heart without angina pectoris [I25.10] 08/09/2011       Assessment:   - Paroxysmal atrial tachycardia/flutter:    EP study with no inducible ventricular tachycardia despite aggressive pacing. However he had atrial tachycardia with one-to-one conduction with similar rate of his clinical tachycardia. Tachycardia was nonsustained and could not be mapped/ablated. Recommend continuation of beta-blocker therapy and aggressive medical therapy. If he has recurrent sustained tachycardia can consider ablation     - CAD, LM and RCA               Aggressive medical therapy. Plan for revascularization per CT surgery. - Morbid obesity: Body mass index is 35.01 kg/m².      - AYAZ: needs CPAP.      Multiple medical conditions with risk of decompensation. All questions and concerns were addressed to the patient/family. Alternatives to my treatment were discussed. I have discussed the above stated plan and the patient verbalized understanding and agreed with the plan. NOTE: This report was transcribed using voice recognition software. Every effort was made to ensure accuracy, however, inadvertent computerized transcription errors may be present.      Abby Sullivan MD, MPH  [place] [address]   Office: [phone]

## 2020-08-11 NOTE — PROGRESS NOTES
Nutrition Assessment     Type and Reason for Visit: Initial, Consult, Patient Education(pre op CABG)    Nutrition Recommendations/Plan:   On-going diet education as appropriate     Nutrition Assessment:  Pt is adequately nourished upon admission to hospital, AEB report of excellent appetite & po intake % of meals thus far. Pt reports wt is stable. Educated on [facility], Cardiac diet (CVU D/C guidelines) with family present. No further nutrition concerns expressed @ this time.     Malnutrition Assessment:  Malnutrition Status: No malnutrition    Nutrition Related Findings: + BS; +1 nonpitting edema BLE; glucose 225 (8/11)      Current Nutrition Therapies:    DIET CARDIAC; Carb Control: 4 carb choices (60 gms)/meal  Diet NPO Time Specified    Anthropometric Measures:  · Height: 6' 2\" (188 cm)  · Current Body Wt: 269 lb (122 kg)   · BMI: 34.5    Nutrition Diagnosis:   · Food & Nutrition-related knowledge deficit related to cardiac dysfunction, endocrine dysfuntion as evidenced by lab values, other (comment)(pt verbalized limited knowledge regarding CCC/ Cardiac diet guidelines)      Nutrition Interventions:   Food and/or Nutrient Delivery:  Continue Current Diet  Nutrition Education/Counseling:  Education completed   Coordination of Nutrition Care:  Continued Inpatient Monitoring    Goals:  diet compliance with po intake greater than 75% of meals       Nutrition Monitoring and Evaluation:   Behavioral-Environmental Outcomes:  Knowledge or Skill   Food/Nutrient Intake Outcomes:  Food and Nutrient Intake    Discharge Planning:    No discharge needs at this time     Electronically signed by Koby Islas RD, LD on 8/11/20 at 12:49 PM EDT    Contact: [phone]

## 2020-08-11 NOTE — ANESTHESIA PRE PROCEDURE
Department of Anesthesiology  Preprocedure Note       Name:  Selin Zavaleta   Age:  77 y.o.  :  1955                                          MRN:  [id number]         Date:  2020      Surgeon: Zina Dorman): Rigo Severino MD    Procedure: Procedure(s):  CABG CORONARY ARTERY BYPASS/LIGATION SARA    Medications prior to admission:   Prior to Admission medications    Medication Sig Start Date End Date Taking? Authorizing Provider   cyclobenzaprine (FLEXERIL) 10 MG tablet Take 10 mg by mouth 3 times daily as needed for Muscle spasms   Yes Historical Provider, MD   gabapentin (NEURONTIN) 300 MG capsule Take 600 mg by mouth 2 times daily. Yes Historical Provider, MD   atorvastatin (LIPITOR) 40 MG tablet take 1 tablet by mouth once daily 20   Sai Rehman MD   carvedilol (COREG) 6.25 MG tablet take 1 tablet by mouth twice a day with meals 20   Sai Rehman MD   isosorbide mononitrate (IMDUR) 30 MG extended release tablet take 1 tablet by mouth once daily 20   Sai Rehman MD   glipiZIDE (GLUCOTROL) 5 MG tablet  1/15/20   Historical Provider, MD   traZODone (DESYREL) 50 MG tablet Take 150 mg by mouth nightly     Historical Provider, MD   amLODIPine (NORVASC) 10 MG tablet Take 1 tablet by mouth daily 19  Sai Rehman MD   olmesartan (BENICAR) 40 MG tablet Take 1 tablet by mouth daily 19   Sai Rehman MD   aspirin 325 MG tablet Take 325 mg by mouth daily    Historical Provider, MD   nitroGLYCERIN (NITROSTAT) 0.4 MG SL tablet Place 0.4 mg under the tongue every 5 minutes as needed for Chest pain up to max of 3 total doses. If no relief after 1 dose, call 911. Historical Provider, MD   tamsulosin (FLOMAX) 0.4 MG capsule Take 0.8 mg by mouth nightly    Historical Provider, MD   Multiple Vitamins-Minerals (CENTRUM SILVER) TABS Take 1 tablet by mouth daily. Historical Provider, MD   metformin (GLUCOPHAGE) 1000 MG tablet Take 1,000 mg by mouth 2 times daily. Historical Provider, MD       Current medications:    Current Facility-Administered Medications   Medication Dose Route Frequency Provider Last Rate Last Dose    sodium chloride flush 0.9 % injection 10 mL  10 mL Intravenous 2 times per day ETTA Bedoya - CNP        sodium chloride flush 0.9 % injection 10 mL  10 mL Intravenous PRN Marysol Castillo APRJOEL - CNP        [START ON 8/12/2020] lactated ringers infusion   Intravenous Continuous Joshdie Painter APRJOEL - CNP        [START ON 8/12/2020] pantoprazole (PROTONIX) injection 40 mg  40 mg Intravenous Once Marysol Castillo APRJOEL - CNP        ALPRAZolam [place]) tablet 0.25 mg  0.25 mg Oral Once Joshdie , APRN - CNP        insulin lispro (1 Unit Dial) 0-18 Units  0-18 Units Subcutaneous TID  Jacquelin Correa MD   6 Units at 08/11/20 1134    insulin lispro (1 Unit Dial) 0-9 Units  0-9 Units Subcutaneous Nightly Jacquelin Correa MD   6 Units at 08/10/20 2126    insulin lispro (1 Unit Dial) 6 Units  6 Units Subcutaneous TID  Evelyn Perea MD   6 Units at 08/11/20 1136    insulin glargine (LANTUS;BASAGLAR) injection pen 10 Units  10 Units Subcutaneous Q12H Evelyn Perea MD   10 Units at 08/11/20 0813    melatonin tablet 6 mg  6 mg Oral Nightly PRN Dao Mcneal Mangen, DO   6 mg at 08/10/20 2117    glucose (GLUTOSE) 40 % oral gel 15 g  15 g Oral PRN Evelyn Perea MD        dextrose 50 % IV solution  12.5 g Intravenous PRN Evelyn Perea MD        glucagon (rDNA) injection 1 mg  1 mg Intramuscular PRN Evelyn Perea MD        dextrose 5 % solution  100 mL/hr Intravenous PRN Evelyn Perea MD        Vitamin D (CHOLECALCIFEROL) tablet 2,000 Units  2,000 Units Oral Daily Evelyn Perea MD   2,000 Units at 08/11/20 0812    tiZANidine (ZANAFLEX) tablet 4 mg  4 mg Oral Q8H PRN Evelyn Perea MD   4 mg at 08/11/20 0536    rosuvastatin (CRESTOR) tablet 20 mg  20 mg Oral Nightly Evelyn Perea MD   20 mg at 08/10/20 2117    omega-3 acid ethyl esters (LOVAZA) capsule 2 g  2 g Oral BID Clark Cantrell MD        labetalol (NORMODYNE;TRANDATE) injection 10 mg  10 mg Intravenous Q4H PRN Dia Fabry, MD   10 mg at 08/07/20 0043    metoprolol tartrate (LOPRESSOR) tablet 100 mg  100 mg Oral BID Patsy Spaulding MD   100 mg at 08/11/20 2316    sodium chloride flush 0.9 % injection 10 mL  10 mL Intravenous 2 times per day Adriano Tipton MD   10 mL at 08/11/20 0900    sodium chloride flush 0.9 % injection 10 mL  10 mL Intravenous PRN Adriano Tipton MD   10 mL at 08/07/20 0044    acetaminophen (TYLENOL) tablet 650 mg  650 mg Oral Q4H PRN Adriano Tipton MD        oxyCODONE-acetaminophen (PERCOCET) 5-325 MG per tablet 1 tablet  1 tablet Oral Q4H PRN Adriano Tipton MD        Or    oxyCODONE-acetaminophen (PERCOCET) 5-325 MG per tablet 2 tablet  2 tablet Oral Q4H PRN Adriano Tipton MD        ondansetron [facility]) injection 4 mg  4 mg Intravenous Q6H PRN Adriano Tipton MD        polyethylene glycol [facility]) packet 17 g  17 g Oral Daily PRN Dia Fabry, MD   17 g at 08/09/20 0827    promethazine (PHENERGAN) tablet 12.5 mg  12.5 mg Oral Q6H PRN Dia Fabry, MD        potassium chloride (KLOR-CON M) extended release tablet 40 mEq  40 mEq Oral PRN Dia Fabry, MD        Or    potassium bicarb-citric acid (EFFER-K) effervescent tablet 40 mEq  40 mEq Oral PRN Dia Fabry, MD        Or    potassium chloride 10 mEq/100 mL IVPB (Peripheral Line)  10 mEq Intravenous PRN Dia Fabry, MD        magnesium sulfate 1 g in dextrose 5% 100 mL IVPB  1 g Intravenous PRN Dia Fabry, MD        sodium phosphate 19.17 mmol in dextrose 5 % 250 mL IVPB  0.16 mmol/kg Intravenous PRN Dia Fabry, MD        Or    sodium phosphate 38.34 mmol in dextrose 5 % 250 mL IVPB  0.32 mmol/kg Intravenous PRN Dia Fabry, MD        amLODIPine (NORVASC) tablet 10 mg  10 mg Oral Daily Dia Fabry, MD   10 mg at 08/11/20 0945    aspirin tablet 325 mg  325 mg Oral Daily Bryan Brenner MD   325 mg at 08/11/20 4995    isosorbide mononitrate (IMDUR) extended release tablet 30 mg  30 mg Oral Daily Bryan Brenner MD   30 mg at 08/11/20 1989    therapeutic multivitamin-minerals 1 tablet  1 tablet Oral Daily Bryan Brenner MD   1 tablet at 08/11/20 0812    nitroGLYCERIN (NITROSTAT) SL tablet 0.4 mg  0.4 mg Sublingual Q5 Min PRN Bryan Brenner MD        tamsulosin (FLOMAX) capsule 0.8 mg  0.8 mg Oral Nightly Abidemi COLE Dennis MD   0.8 mg at 08/10/20 2116    traMADol (ULTRAM) tablet 50 mg  50 mg Oral Q6H PRN Bryan Brenner MD   50 mg at 08/11/20 0536    gabapentin (NEURONTIN) capsule 600 mg  600 mg Oral BID Bryan Brenner MD   600 mg at 08/11/20 0812    metoprolol (LOPRESSOR) injection 5 mg  5 mg Intravenous Q6H PRN Lu Diana, APRN - CNP           Allergies: Allergies   Allergen Reactions    Penicillins     Percocet [Oxycodone-Acetaminophen]      Made him feel \"weird\"    Codeine Nausea And Vomiting       Problem List:    Patient Active Problem List   Diagnosis Code    Other hyperlipidemia E78.49    Essential hypertension, benign I10    Coronary artery disease involving native coronary artery of native heart without angina pectoris I25.10    Primary cardiomyopathy ([address] I42.8    Chest pain R07.9    Dizziness and giddiness R42    Palpitations R00.2    GERD with esophagitis K21.0    Obesity (BMI 30-39. 9) E66.9    DM2 (diabetes mellitus, type 2) ([facility]) E11.9    BPH (benign prostatic hyperplasia) N40.0    CKD (chronic kidney disease) stage 3, GFR 30-59 ml/min ([facility]) N18.3    Chronic back pain M54.9, G89.29    Ventricular tachycardia ([facility]) I47.2    Tachycardia R00.0    PAT (paroxysmal atrial tachycardia) ([facility]) I47.1       Past Medical History:        Diagnosis Date    Back pain     Blood circulation, collateral     CAD (coronary artery disease)     Chronic kidney disease     Diabetes mellitus ([facility] [address]     Enlarged prostate     GERD (gastroesophageal reflux disease)     Hyperlipidemia     Hypertension     Medical history reviewed with no changes        Past Surgical History:        Procedure Laterality Date    BACK SURGERY      laminectomy & rods    CARDIAC SURGERY      stents    CORONARY ANGIOPLASTY WITH STENT PLACEMENT  10/2003    EPIDURAL STEROID INJECTION Left 2019    LEFT LUMBAR THREE LUMBAR FOUR EPIDURAL STEROID INJECTION SITE CONFIRMED BY FLUOROSCOPY performed by Crescencio Mackey MD at [facility] Left 9/10/2019    LEFT SACROILIAC JOINT INJECTION SITE CONFIRMED BY FLUOROSCOPY performed by Crescencio Mackey MD at [facility] 173      Repaired torn MCL TOTAL RIGHT    SPINAL CORD STIMULATOR SURGERY N/A 2020    SPINAL CORD STIMULATOR TRIAL WITH FLUOROSCOPY (78756, 49697, 35148) - [employer] performed by Carmen Winslow MD at [address] Right        Social History:    Social History     Tobacco Use    Smoking status: Former Smoker     Packs/day: 1.00     Last attempt to quit: 1984     Years since quittin.5    Smokeless tobacco: Never Used    Tobacco comment: stopped 25 years ago   Substance Use Topics    Alcohol use:  No                                Counseling given: Not Answered  Comment: stopped 25 years ago      Vital Signs (Current):   Vitals:    20 0700 20 0713 20 0743 20 1243   BP:   (!) 143/98    Pulse: 85  84    Resp:  16 20    Temp:   97.4 °F (36.3 °C)    TempSrc:   Temporal    SpO2:  97% 98%    Weight:       Height:    6' 2\" (1.88 m)                                              BP Readings from Last 3 Encounters:   20 (!) 143/98   20 (!) 151/90   20 131/89       NPO Status:                                                                                 BMI:   Wt Readings from Last 3 Encounters:   08/11/20 269 lb 2.9 oz (122.1 kg)   05/22/20 264 lb 15.9 oz (120.2 kg)   05/18/20 265 lb (120.2 kg)     Body mass index is 34.56 kg/m².     CBC:   Lab Results   Component Value Date    WBC 5.8 08/09/2020    RBC 4.53 08/09/2020    HGB 13.7 08/09/2020    HCT 41.4 08/09/2020    MCV 91.4 08/09/2020    RDW 14.7 08/09/2020     08/09/2020       CMP:   Lab Results   Component Value Date     08/09/2020    K 4.5 08/09/2020    K 4.5 12/18/2018     08/09/2020    CO2 25 08/09/2020    BUN 20 08/09/2020    CREATININE 1.1 08/09/2020    GFRAA >60 08/09/2020    GFRAA >60 01/25/2011    AGRATIO 1.5 08/05/2020    LABGLOM >60 08/09/2020    GLUCOSE 199 08/09/2020    PROT 6.3 08/06/2020    CALCIUM 9.6 08/09/2020    BILITOT 0.3 08/06/2020    ALKPHOS 107 08/06/2020    AST 13 08/06/2020    ALT 12 08/06/2020       POC Tests:   Recent Labs     08/11/20  1130   POCGLU 225*       Coags:   Lab Results   Component Value Date    PROTIME 11.7 08/06/2020    INR 1.01 08/06/2020    APTT 37.2 08/06/2020       HCG (If Applicable): No results found for: PREGTESTUR, PREGSERUM, HCG, HCGQUANT     ABGs:   Lab Results   Component Value Date    PHART 7.370 08/06/2020    PO2ART 72.9 08/06/2020    HEZ5TJA 44.0 08/06/2020    HXH8WXJ 25.4 08/06/2020    BEART -0.2 08/06/2020    K2KFBXSB 94.7 08/06/2020        Type & Screen (If Applicable):  No results found for: LABABO, LABRH    Drug/Infectious Status (If Applicable):  No results found for: HIV, HEPCAB    COVID-19 Screening (If Applicable):   Lab Results   Component Value Date    COVID19 Not Detected 08/06/2020    COVID19 NOT DETECTED 05/13/2020         Anesthesia Evaluation  Patient summary reviewed and Nursing notes reviewed  Airway:         Dental:          Pulmonary:                              Cardiovascular:  Exercise tolerance: poor (<4 METS),   (+) hypertension:, CAD:, hyperlipidemia      ECG reviewed      Echocardiogram reviewed    Cleared by cardiology           ROS comment: Summary   -Normal left ventricle size, mild wall thickness and mildly reduced systolic   function with an estimated ejection fraction of 45%. -There is mild diffuse hypokinesis. -Grade I diastolic dysfunction with normal LV filling pressures. E/e\"=7.0   -The aortic valve is mildly thickened/calcified but opens well with normal   gradients.   -Trivial aortic regurgitation.   -The aortic root is mildly dilated. Neuro/Psych:               GI/Hepatic/Renal:   (+) GERD:,           Endo/Other:    (+) Diabetespoorly controlled, using insulin, . Abdominal:           Vascular:                                        Anesthesia Plan      general     ASA 4     (Chart Review)  Induction: intravenous.   arterial line, central line, CVP, PA catheter and ERIC                        Anca Horowitz MD   8/11/2020

## 2020-08-11 NOTE — PLAN OF CARE
Problem: Tissue Perfusion - Peripheral, Altered:  Goal: Absence of hematoma at arterial access site  Description: Absence of hematoma at arterial access site  Note: Right groin site clear. No oozing, no hematoma, or ecchymosis noted.  Will continue to monitor

## 2020-08-11 NOTE — PROGRESS NOTES
CC: LM and RCA CAD; SVT    No c/o  NSR  Screening plasma metanephrines not c/w pheo, unlikely per endocrinology  CTAB RRR  As manuel CC  D/W Dr. Mark Martínez yesterday  R/B/Alt/E discussed with patient. He understands that with surgery he has the risks of death and major complication including, but not limited to: bleeding, infection, heart attack, heart block (with possible need for permanent pacemaker), stroke (with possible permanent neurologic deficit), kidney failure (with possible need for dialysis), respiratory insufficiency (with possible need for tracheostomy and / or prolonged ventilator support), intestinal ischemia, intestinal infarction, limb ischemia, limb loss, recurrence of diease and need for further surgery. We discussed the necessity of scars and the possibility of chronic pain at or associated with the surgical sites. All questions answered. Will schedule for CABG tomorrow AM.  Desmond Yung all venous conduit as LM only 60%.     Dale Richmond MD

## 2020-08-11 NOTE — CONSULTS
P Pulmonary and Critical Care   Consult Note      Reason for Consult: Sleep apnea  Requesting Physician: Dr Carlos Garcia    Subjective:   [facility] / HPI:                The patient is a 77 y.o. male with significant past medical history of:      Diagnosis Date    Back pain     Blood circulation, collateral     CAD (coronary artery disease)     Chronic kidney disease     Diabetes mellitus ([address]     Enlarged prostate     GERD (gastroesophageal reflux disease)     Hyperlipidemia     Hypertension     Medical history reviewed with no changes      Date of admission: 8/4/2020    Interval history: Patient is sitting up in the chair this morning. He is tolerating room air. No complaint of shortness of breath or chest pain at the moment. Plans for surgical revascularization likely tomorrow. The patient did have bedside spirometry yesterday.         Past Surgical History:        Procedure Laterality Date    BACK SURGERY      laminectomy & rods    CARDIAC SURGERY      stents    CORONARY ANGIOPLASTY WITH STENT PLACEMENT  10/2003    EPIDURAL STEROID INJECTION Left 7/23/2019    LEFT LUMBAR THREE LUMBAR FOUR EPIDURAL STEROID INJECTION SITE CONFIRMED BY FLUOROSCOPY performed by Aristides Navarro MD at [facility] Left 9/10/2019    LEFT SACROILIAC JOINT INJECTION SITE CONFIRMED BY FLUOROSCOPY performed by Aristides Navarro MD at [facility] 173      Repaired torn MCL TOTAL RIGHT    R Daniele Nielsen 105 N/A 5/18/2020    SPINAL CORD STIMULATOR TRIAL WITH FLUOROSCOPY (78166, 34349, 77389) - [employer] performed by Anne Marie Miner MD at [address] [place] [address] Right 2014     Current Medications:    Current Facility-Administered Medications: insulin lispro (1 Unit Dial) 0-18 Units, 0-18 Units, Subcutaneous, TID WC  insulin lispro (1 Unit Dial) 0-9 Units, 0-9 Units, Subcutaneous, Nightly  insulin lispro (1 Unit Dial) 6 Units, 6 Units, Subcutaneous, TID WC  insulin glargine (LANTUS;BASAGLAR) injection pen 10 Units, 10 Units, Subcutaneous, Q12H  melatonin tablet 6 mg, 6 mg, Oral, Nightly PRN  glucose (GLUTOSE) 40 % oral gel 15 g, 15 g, Oral, PRN  dextrose 50 % IV solution, 12.5 g, Intravenous, PRN  glucagon (rDNA) injection 1 mg, 1 mg, Intramuscular, PRN  dextrose 5 % solution, 100 mL/hr, Intravenous, PRN  Vitamin D (CHOLECALCIFEROL) tablet 2,000 Units, 2,000 Units, Oral, Daily  tiZANidine (ZANAFLEX) tablet 4 mg, 4 mg, Oral, Q8H PRN  rosuvastatin (CRESTOR) tablet 20 mg, 20 mg, Oral, Nightly  omega-3 acid ethyl esters (LOVAZA) capsule 2 g, 2 g, Oral, BID  labetalol (NORMODYNE;TRANDATE) injection 10 mg, 10 mg, Intravenous, Q4H PRN  metoprolol tartrate (LOPRESSOR) tablet 100 mg, 100 mg, Oral, BID  sodium chloride flush 0.9 % injection 10 mL, 10 mL, Intravenous, 2 times per day  sodium chloride flush 0.9 % injection 10 mL, 10 mL, Intravenous, PRN  acetaminophen (TYLENOL) tablet 650 mg, 650 mg, Oral, Q4H PRN  oxyCODONE-acetaminophen (PERCOCET) 5-325 MG per tablet 1 tablet, 1 tablet, Oral, Q4H PRN **OR** oxyCODONE-acetaminophen (PERCOCET) 5-325 MG per tablet 2 tablet, 2 tablet, Oral, Q4H PRN  ondansetron (ZOFRAN) injection 4 mg, 4 mg, Intravenous, Q6H PRN  polyethylene glycol (GLYCOLAX) packet 17 g, 17 g, Oral, Daily PRN  promethazine (PHENERGAN) tablet 12.5 mg, 12.5 mg, Oral, Q6H PRN **OR** [DISCONTINUED] ondansetron (ZOFRAN) injection 4 mg, 4 mg, Intravenous, Q6H PRN  potassium chloride (KLOR-CON M) extended release tablet 40 mEq, 40 mEq, Oral, PRN **OR** potassium bicarb-citric acid (EFFER-K) effervescent tablet 40 mEq, 40 mEq, Oral, PRN **OR** potassium chloride 10 mEq/100 mL IVPB (Peripheral Line), 10 mEq, Intravenous, PRN  magnesium sulfate 1 g in dextrose 5% 100 mL IVPB, 1 g, Intravenous, PRN  sodium phosphate 19.17 mmol in dextrose 5 % 250 mL IVPB, 0.16 mmol/kg, Intravenous, PRN **OR** sodium phosphate 38.34 mmol in dextrose 5 % 250 mL IVPB, 0.32 mmol/kg, Intravenous, PRN  amLODIPine (NORVASC) tablet 10 mg, 10 mg, Oral, Daily  aspirin tablet 325 mg, 325 mg, Oral, Daily  isosorbide mononitrate (IMDUR) extended release tablet 30 mg, 30 mg, Oral, Daily  therapeutic multivitamin-minerals 1 tablet, 1 tablet, Oral, Daily  nitroGLYCERIN (NITROSTAT) SL tablet 0.4 mg, 0.4 mg, Sublingual, Q5 Min PRN  tamsulosin (FLOMAX) capsule 0.8 mg, 0.8 mg, Oral, Nightly  traMADol (ULTRAM) tablet 50 mg, 50 mg, Oral, Q6H PRN  gabapentin (NEURONTIN) capsule 600 mg, 600 mg, Oral, BID  metoprolol (LOPRESSOR) injection 5 mg, 5 mg, Intravenous, Q6H PRN    Allergies   Allergen Reactions    Penicillins     Percocet [Oxycodone-Acetaminophen]      Made him feel \"weird\"    Codeine Nausea And Vomiting       Social History:    TOBACCO:   reports that he quit smoking about 36 years ago. He smoked 1.00 pack per day. He has never used smokeless tobacco.  ETOH:   reports no history of alcohol use. Patient currently lives independently  Environmental/chemical exposure: See HPI    Family History:       Problem Relation Age of Onset    Diabetes Mother     Heart Disease Father     High Blood Pressure Father     High Cholesterol Father     Heart Attack Father     High Blood Pressure Sister     High Blood Pressure Brother     Birth Defects Brother     High Blood Pressure Brother     Heart Disease Sister     High Blood Pressure Sister     High Blood Pressure Sister      REVIEW OF SYSTEMS:    CONSTITUTIONAL:  negative for fevers, chills, diaphoresis, activity change, appetite change, fatigue, night sweats and unexpected weight change.    EYES:  negative for blurred vision, eye discharge, visual disturbance and icterus  HEENT:  negative for hearing loss, tinnitus, ear drainage, sinus pressure, nasal congestion, epistaxis and snoring  RESPIRATORY:  See HPI  CARDIOVASCULAR:  negative for chest pain, palpitations, exertional chest pressure/discomfort, digits. SKIN:  normal skin color, texture, turgor and no redness, warmth, or swelling. No palpable nodules    DATA:    Old records have been reviewed    CBC:  Recent Labs     08/09/20  0304   WBC 5.8   RBC 4.53   HGB 13.7   HCT 41.4      MCV 91.4   MCH 30.2   MCHC 33.1   RDW 14.7      BMP:  Recent Labs     08/09/20  0304   *   K 4.5      CO2 25   BUN 20   CREATININE 1.1   CALCIUM 9.6   GLUCOSE 199*      ABG:  No results for input(s): PHART, VHV8HVM, PO2ART, IIR9ZFC, A9NIFHTF, BEART in the last 72 hours. No results found for: BNP  Lab Results   Component Value Date    CKTOTAL 99 01/25/2011    CKMB 0.29 01/25/2011    CKMBINDEX 0.3 01/25/2011    TROPONINI <0.01 08/05/2020       Cultures:     Abx:    Radiology Review:  All pertinent images / reports were reviewed as a part of this visit. Assessment:       1. Ventricular tachycardia  2. Coronary artery disease  3. Obstructive sleep apnea  4. Dyspnea on exertion/wheezing    Plan:     1. I have reviewed laboratories, medical records and images for this visit  2. I reviewed CT imaging and chest x-ray. This is clear. 3.  Bedside spirometry revealed nonspecific reduction in both FEV1 and FVC. FEV1 is 2.59 L which is 66% predicted. This should be adequate for the planned surgical procedure. 4.  Numbers appear somewhat restricted. This could be on the basis of obesity. Interstitial lung disease is not noted on recent CT chest imaging. 5.  Patient will benefit from outpatient PSG once he is past the acute episode.   6.  From the pulmonary point of view, surgery may proceed as planned

## 2020-08-11 NOTE — PROGRESS NOTES
[address] Daily Progress Note      Admit Date:  8/4/2020    No chief complaint on file. VT    Subjective:  Mr. Aylin Montilla denies exertional chest pain, SOB/CARR, PND, palpitations, light-headedness, or edema.      Objective:   BP (!) 157/97   Pulse 85   Temp 97.2 °F (36.2 °C) (Temporal)   Resp 16   Ht 6' 2\" (1.88 m)   Wt 269 lb 2.9 oz (122.1 kg)   SpO2 97%   BMI 34.56 kg/m²       Intake/Output Summary (Last 24 hours) at 8/11/2020 0802  Last data filed at 8/11/2020 0539  Gross per 24 hour   Intake 710 ml   Output 1050 ml   Net -340 ml       TELEMETRY: Sinus     Physical Exam:  General:  Awake, alert, oriented x 3, NAD  Skin:  Warm and dry  Neck:  JVD flat  Chest:  normal air entry  Cardiovascular:  RRR S1S2, no S3, no mrmr  Abdomen:  Soft, ND, NT, No HSM  Extremities:  No edema    Medications:    insulin lispro  0-18 Units Subcutaneous TID WC    insulin lispro  0-9 Units Subcutaneous Nightly    insulin lispro  6 Units Subcutaneous TID WC    insulin glargine  10 Units Subcutaneous Q12H    Vitamin D  2,000 Units Oral Daily    rosuvastatin  20 mg Oral Nightly    omega-3 acid ethyl esters  2 g Oral BID    metoprolol tartrate  100 mg Oral BID    sodium chloride flush  10 mL Intravenous 2 times per day    enoxaparin  40 mg Subcutaneous Daily    amLODIPine  10 mg Oral Daily    aspirin  325 mg Oral Daily    isosorbide mononitrate  30 mg Oral Daily    therapeutic multivitamin-minerals  1 tablet Oral Daily    tamsulosin  0.8 mg Oral Nightly    gabapentin  600 mg Oral BID    losartan  100 mg Oral Daily      dextrose       melatonin, glucose, dextrose, glucagon (rDNA), dextrose, tiZANidine, labetalol, sodium chloride flush, acetaminophen, oxyCODONE-acetaminophen **OR** oxyCODONE-acetaminophen, ondansetron, polyethylene glycol, promethazine **OR** [DISCONTINUED] ondansetron, potassium chloride **OR** potassium alternative oral replacement **OR** potassium chloride, magnesium sulfate, sodium

## 2020-08-12 ENCOUNTER — APPOINTMENT (OUTPATIENT)
Dept: GENERAL RADIOLOGY | Age: 65
DRG: 234 | End: 2020-08-12
Attending: FAMILY MEDICINE
Payer: MEDICARE

## 2020-08-12 ENCOUNTER — ANESTHESIA (OUTPATIENT)
Dept: OPERATING ROOM | Age: 65
DRG: 234 | End: 2020-08-12
Payer: MEDICARE

## 2020-08-12 VITALS
SYSTOLIC BLOOD PRESSURE: 143 MMHG | OXYGEN SATURATION: 97 % | RESPIRATION RATE: 14 BRPM | TEMPERATURE: 99.4 F | DIASTOLIC BLOOD PRESSURE: 70 MMHG

## 2020-08-12 PROBLEM — Z98.890 S/P LEFT ATRIAL APPENDAGE LIGATION: Status: ACTIVE | Noted: 2020-08-12

## 2020-08-12 PROBLEM — Z95.1 S/P CORONARY ARTERY BYPASS GRAFT X 3: Status: ACTIVE | Noted: 2020-08-12

## 2020-08-12 LAB
ACTIVATED CLOTTING TIME: 103 SEC (ref 99–130)
ACTIVATED CLOTTING TIME: 122 SEC (ref 99–130)
ACTIVATED CLOTTING TIME: 411 SEC (ref 99–130)
ACTIVATED CLOTTING TIME: 440 SEC (ref 99–130)
ACTIVATED CLOTTING TIME: 471 SEC (ref 99–130)
ACTIVATED CLOTTING TIME: 561 SEC (ref 99–130)
ANION GAP SERPL CALCULATED.3IONS-SCNC: 8 MMOL/L (ref 3–16)
BASE EXCESS ARTERIAL: -1 (ref -3–3)
BASE EXCESS ARTERIAL: -2 (ref -3–3)
BASE EXCESS ARTERIAL: -2 (ref -3–3)
BASE EXCESS ARTERIAL: -3 (ref -3–3)
BASE EXCESS ARTERIAL: 0 (ref -3–3)
BASE EXCESS ARTERIAL: 0 (ref -3–3)
BASE EXCESS ARTERIAL: 1 (ref -3–3)
BASE EXCESS ARTERIAL: 1 (ref -3–3)
BUN BLDV-MCNC: 22 MG/DL (ref 7–20)
CALCIUM IONIZED: 1.04 MMOL/L (ref 1.12–1.32)
CALCIUM IONIZED: 1.06 MMOL/L (ref 1.12–1.32)
CALCIUM IONIZED: 1.07 MMOL/L (ref 1.12–1.32)
CALCIUM IONIZED: 1.13 MMOL/L (ref 1.12–1.32)
CALCIUM IONIZED: 1.19 MMOL/L (ref 1.12–1.32)
CALCIUM IONIZED: 1.23 MMOL/L (ref 1.12–1.32)
CALCIUM IONIZED: 1.23 MMOL/L (ref 1.12–1.32)
CALCIUM IONIZED: 1.25 MMOL/L (ref 1.12–1.32)
CALCIUM IONIZED: 1.33 MMOL/L (ref 1.12–1.32)
CALCIUM SERPL-MCNC: 9.4 MG/DL (ref 8.3–10.6)
CHLORIDE BLD-SCNC: 105 MMOL/L (ref 99–110)
CO2: 23 MMOL/L (ref 21–32)
CREAT SERPL-MCNC: 1.1 MG/DL (ref 0.8–1.3)
DHEAS (DHEA SULFATE): 66.1 UG/DL (ref 42–290)
EKG ATRIAL RATE: 80 BPM
EKG DIAGNOSIS: NORMAL
EKG P AXIS: 59 DEGREES
EKG P-R INTERVAL: 180 MS
EKG Q-T INTERVAL: 384 MS
EKG QRS DURATION: 104 MS
EKG QTC CALCULATION (BAZETT): 442 MS
EKG R AXIS: -12 DEGREES
EKG T AXIS: 25 DEGREES
EKG VENTRICULAR RATE: 80 BPM
GFR AFRICAN AMERICAN: >60
GFR NON-AFRICAN AMERICAN: >60
GLUCOSE BLD-MCNC: 113 MG/DL (ref 70–99)
GLUCOSE BLD-MCNC: 125 MG/DL (ref 70–99)
GLUCOSE BLD-MCNC: 134 MG/DL (ref 70–99)
GLUCOSE BLD-MCNC: 140 MG/DL (ref 70–99)
GLUCOSE BLD-MCNC: 140 MG/DL (ref 70–99)
GLUCOSE BLD-MCNC: 142 MG/DL (ref 70–99)
GLUCOSE BLD-MCNC: 145 MG/DL (ref 70–99)
GLUCOSE BLD-MCNC: 149 MG/DL (ref 70–99)
GLUCOSE BLD-MCNC: 180 MG/DL (ref 70–99)
GLUCOSE BLD-MCNC: 184 MG/DL (ref 70–99)
GLUCOSE BLD-MCNC: 205 MG/DL (ref 70–99)
GLUCOSE BLD-MCNC: 217 MG/DL (ref 70–99)
GLUCOSE BLD-MCNC: 220 MG/DL (ref 70–99)
GLUCOSE BLD-MCNC: 222 MG/DL (ref 70–99)
GLUCOSE BLD-MCNC: 226 MG/DL (ref 70–99)
GLUCOSE BLD-MCNC: 231 MG/DL (ref 70–99)
GLUCOSE BLD-MCNC: 239 MG/DL (ref 70–99)
GLUCOSE BLD-MCNC: 278 MG/DL (ref 70–99)
HCO3 ARTERIAL: 22.9 MMOL/L (ref 21–29)
HCO3 ARTERIAL: 23 MMOL/L (ref 21–29)
HCO3 ARTERIAL: 23.5 MMOL/L (ref 21–29)
HCO3 ARTERIAL: 23.6 MMOL/L (ref 21–29)
HCO3 ARTERIAL: 23.8 MMOL/L (ref 21–29)
HCO3 ARTERIAL: 24 MMOL/L (ref 21–29)
HCO3 ARTERIAL: 25.1 MMOL/L (ref 21–29)
HCO3 ARTERIAL: 25.5 MMOL/L (ref 21–29)
HCO3 ARTERIAL: 26.2 MMOL/L (ref 21–29)
HCO3 ARTERIAL: 26.4 MMOL/L (ref 21–29)
HCT VFR BLD CALC: 36.7 % (ref 40.5–52.5)
HEMOGLOBIN: 11 GM/DL (ref 13.5–17.5)
HEMOGLOBIN: 12.1 G/DL (ref 13.5–17.5)
HEMOGLOBIN: 12.1 GM/DL (ref 13.5–17.5)
HEMOGLOBIN: 12.5 GM/DL (ref 13.5–17.5)
HEMOGLOBIN: 12.5 GM/DL (ref 13.5–17.5)
HEMOGLOBIN: 12.9 GM/DL (ref 13.5–17.5)
HEMOGLOBIN: 8.3 GM/DL (ref 13.5–17.5)
HEMOGLOBIN: 8.4 GM/DL (ref 13.5–17.5)
HEMOGLOBIN: 8.9 GM/DL (ref 13.5–17.5)
HEMOGLOBIN: 9.6 GM/DL (ref 13.5–17.5)
LACTATE: 0.4 MMOL/L (ref 0.4–2)
LACTATE: 0.56 MMOL/L (ref 0.4–2)
LACTATE: 1.14 MMOL/L (ref 0.4–2)
LACTATE: 1.64 MMOL/L (ref 0.4–2)
LACTATE: 1.7 MMOL/L (ref 0.4–2)
LACTATE: 1.91 MMOL/L (ref 0.4–2)
MAGNESIUM: 2.8 MG/DL (ref 1.8–2.4)
MCH RBC QN AUTO: 29.9 PG (ref 26–34)
MCHC RBC AUTO-ENTMCNC: 33 G/DL (ref 31–36)
MCV RBC AUTO: 90.6 FL (ref 80–100)
O2 SAT, ARTERIAL: 100 % (ref 93–100)
O2 SAT, ARTERIAL: 92 % (ref 93–100)
O2 SAT, ARTERIAL: 94 % (ref 93–100)
O2 SAT, ARTERIAL: 95 % (ref 93–100)
O2 SAT, ARTERIAL: 95 % (ref 93–100)
O2 SAT, ARTERIAL: 99 % (ref 93–100)
O2 SAT, ARTERIAL: 99 % (ref 93–100)
PCO2 ARTERIAL: 34.6 MM HG (ref 35–45)
PCO2 ARTERIAL: 34.7 MM HG (ref 35–45)
PCO2 ARTERIAL: 37.2 MM HG (ref 35–45)
PCO2 ARTERIAL: 39.2 MM HG (ref 35–45)
PCO2 ARTERIAL: 40 MM HG (ref 35–45)
PCO2 ARTERIAL: 43.4 MM HG (ref 35–45)
PCO2 ARTERIAL: 46.2 MM HG (ref 35–45)
PCO2 ARTERIAL: 46.2 MM HG (ref 35–45)
PCO2 ARTERIAL: 47.7 MM HG (ref 35–45)
PCO2 ARTERIAL: 49.6 MM HG (ref 35–45)
PDW BLD-RTO: 14.1 % (ref 12.4–15.4)
PERFORMED ON: ABNORMAL
PERFORMED ON: NORMAL
PH ARTERIAL: 7.32 (ref 7.35–7.45)
PH ARTERIAL: 7.33 (ref 7.35–7.45)
PH ARTERIAL: 7.34 (ref 7.35–7.45)
PH ARTERIAL: 7.35 (ref 7.35–7.45)
PH ARTERIAL: 7.37 (ref 7.35–7.45)
PH ARTERIAL: 7.38 (ref 7.35–7.45)
PH ARTERIAL: 7.39 (ref 7.35–7.45)
PH ARTERIAL: 7.4 (ref 7.35–7.45)
PH ARTERIAL: 7.44 (ref 7.35–7.45)
PH ARTERIAL: 7.44 (ref 7.35–7.45)
PLATELET # BLD: 181 K/UL (ref 135–450)
PMV BLD AUTO: 7.6 FL (ref 5–10.5)
PO2 ARTERIAL: 129.5 MM HG (ref 75–108)
PO2 ARTERIAL: 171.9 MM HG (ref 75–108)
PO2 ARTERIAL: 236.4 MM HG (ref 75–108)
PO2 ARTERIAL: 372.2 MM HG (ref 75–108)
PO2 ARTERIAL: 397.1 MM HG (ref 75–108)
PO2 ARTERIAL: 399.2 MM HG (ref 75–108)
PO2 ARTERIAL: 62.4 MM HG (ref 75–108)
PO2 ARTERIAL: 73 MM HG (ref 75–108)
PO2 ARTERIAL: 76.6 MM HG (ref 75–108)
PO2 ARTERIAL: 79.8 MM HG (ref 75–108)
POC HEMATOCRIT: 24 % (ref 40.5–52.5)
POC HEMATOCRIT: 25 % (ref 40.5–52.5)
POC HEMATOCRIT: 26 % (ref 40.5–52.5)
POC HEMATOCRIT: 28 % (ref 40.5–52.5)
POC HEMATOCRIT: 32 % (ref 40.5–52.5)
POC HEMATOCRIT: 36 % (ref 40.5–52.5)
POC HEMATOCRIT: 37 % (ref 40.5–52.5)
POC HEMATOCRIT: 37 % (ref 40.5–52.5)
POC HEMATOCRIT: 38 % (ref 40.5–52.5)
POC PATIENT TEMP: 97
POC PATIENT TEMP: 98
POC PATIENT TEMP: 98
POC PATIENT TEMP: 99
POC POTASSIUM: 3.7 MMOL/L (ref 3.5–5.1)
POC POTASSIUM: 3.9 MMOL/L (ref 3.5–5.1)
POC POTASSIUM: 4.1 MMOL/L (ref 3.5–5.1)
POC POTASSIUM: 4.2 MMOL/L (ref 3.5–5.1)
POC POTASSIUM: 4.4 MMOL/L (ref 3.5–5.1)
POC POTASSIUM: 4.5 MMOL/L (ref 3.5–5.1)
POC POTASSIUM: 4.6 MMOL/L (ref 3.5–5.1)
POC POTASSIUM: 4.6 MMOL/L (ref 3.5–5.1)
POC POTASSIUM: 4.8 MMOL/L (ref 3.5–5.1)
POC POTASSIUM: 4.9 MMOL/L (ref 3.5–5.1)
POC SAMPLE TYPE: ABNORMAL
POC SAMPLE TYPE: NORMAL
POC SODIUM: 132 MMOL/L (ref 136–145)
POC SODIUM: 133 MMOL/L (ref 136–145)
POC SODIUM: 135 MMOL/L (ref 136–145)
POC SODIUM: 136 MMOL/L (ref 136–145)
POC SODIUM: 136 MMOL/L (ref 136–145)
POC SODIUM: 138 MMOL/L (ref 136–145)
POC SODIUM: 138 MMOL/L (ref 136–145)
POTASSIUM SERPL-SCNC: 3.9 MMOL/L (ref 3.5–5.1)
RBC # BLD: 4.05 M/UL (ref 4.2–5.9)
SODIUM BLD-SCNC: 136 MMOL/L (ref 136–145)
TCO2 ARTERIAL: 24 MMOL/L
TCO2 ARTERIAL: 24 MMOL/L
TCO2 ARTERIAL: 25 MMOL/L
TCO2 ARTERIAL: 27 MMOL/L
TCO2 ARTERIAL: 27 MMOL/L
TCO2 ARTERIAL: 28 MMOL/L
TCO2 ARTERIAL: 28 MMOL/L
WBC # BLD: 11.4 K/UL (ref 4–11)

## 2020-08-12 PROCEDURE — 33508 ENDOSCOPIC VEIN HARVEST: CPT | Performed by: THORACIC SURGERY (CARDIOTHORACIC VASCULAR SURGERY)

## 2020-08-12 PROCEDURE — 2100000000 HC CCU R&B

## 2020-08-12 PROCEDURE — 5A1221Z PERFORMANCE OF CARDIAC OUTPUT, CONTINUOUS: ICD-10-PCS | Performed by: THORACIC SURGERY (CARDIOTHORACIC VASCULAR SURGERY)

## 2020-08-12 PROCEDURE — 2500000003 HC RX 250 WO HCPCS: Performed by: THORACIC SURGERY (CARDIOTHORACIC VASCULAR SURGERY)

## 2020-08-12 PROCEDURE — 85027 COMPLETE CBC AUTOMATED: CPT

## 2020-08-12 PROCEDURE — 2580000003 HC RX 258: Performed by: THORACIC SURGERY (CARDIOTHORACIC VASCULAR SURGERY)

## 2020-08-12 PROCEDURE — 3600000008 HC SURGERY OHS BASE: Performed by: THORACIC SURGERY (CARDIOTHORACIC VASCULAR SURGERY)

## 2020-08-12 PROCEDURE — 83735 ASSAY OF MAGNESIUM: CPT

## 2020-08-12 PROCEDURE — 84132 ASSAY OF SERUM POTASSIUM: CPT

## 2020-08-12 PROCEDURE — 2580000003 HC RX 258: Performed by: NURSE PRACTITIONER

## 2020-08-12 PROCEDURE — 3700000000 HC ANESTHESIA ATTENDED CARE: Performed by: THORACIC SURGERY (CARDIOTHORACIC VASCULAR SURGERY)

## 2020-08-12 PROCEDURE — 71045 X-RAY EXAM CHEST 1 VIEW: CPT

## 2020-08-12 PROCEDURE — 2780000010 HC IMPLANT OTHER: Performed by: THORACIC SURGERY (CARDIOTHORACIC VASCULAR SURGERY)

## 2020-08-12 PROCEDURE — P9045 ALBUMIN (HUMAN), 5%, 250 ML: HCPCS | Performed by: ANESTHESIOLOGY

## 2020-08-12 PROCEDURE — C1751 CATH, INF, PER/CENT/MIDLINE: HCPCS | Performed by: THORACIC SURGERY (CARDIOTHORACIC VASCULAR SURGERY)

## 2020-08-12 PROCEDURE — 3700000001 HC ADD 15 MINUTES (ANESTHESIA): Performed by: THORACIC SURGERY (CARDIOTHORACIC VASCULAR SURGERY)

## 2020-08-12 PROCEDURE — 6360000002 HC RX W HCPCS: Performed by: NURSE PRACTITIONER

## 2020-08-12 PROCEDURE — 6370000000 HC RX 637 (ALT 250 FOR IP): Performed by: NURSE PRACTITIONER

## 2020-08-12 PROCEDURE — 85347 COAGULATION TIME ACTIVATED: CPT

## 2020-08-12 PROCEDURE — 33512 CABG VEIN THREE: CPT | Performed by: THORACIC SURGERY (CARDIOTHORACIC VASCULAR SURGERY)

## 2020-08-12 PROCEDURE — 94761 N-INVAS EAR/PLS OXIMETRY MLT: CPT

## 2020-08-12 PROCEDURE — 2700000000 HC OXYGEN THERAPY PER DAY

## 2020-08-12 PROCEDURE — 82803 BLOOD GASES ANY COMBINATION: CPT

## 2020-08-12 PROCEDURE — 82330 ASSAY OF CALCIUM: CPT

## 2020-08-12 PROCEDURE — 93010 ELECTROCARDIOGRAM REPORT: CPT | Performed by: INTERNAL MEDICINE

## 2020-08-12 PROCEDURE — 6370000000 HC RX 637 (ALT 250 FOR IP): Performed by: INTERNAL MEDICINE

## 2020-08-12 PROCEDURE — 6360000002 HC RX W HCPCS: Performed by: THORACIC SURGERY (CARDIOTHORACIC VASCULAR SURGERY)

## 2020-08-12 PROCEDURE — 6360000002 HC RX W HCPCS: Performed by: ANESTHESIOLOGY

## 2020-08-12 PROCEDURE — 6370000000 HC RX 637 (ALT 250 FOR IP): Performed by: THORACIC SURGERY (CARDIOTHORACIC VASCULAR SURGERY)

## 2020-08-12 PROCEDURE — C9113 INJ PANTOPRAZOLE SODIUM, VIA: HCPCS | Performed by: NURSE PRACTITIONER

## 2020-08-12 PROCEDURE — 6370000000 HC RX 637 (ALT 250 FOR IP)

## 2020-08-12 PROCEDURE — 3600000018 HC SURGERY OHS ADDTL 15MIN: Performed by: THORACIC SURGERY (CARDIOTHORACIC VASCULAR SURGERY)

## 2020-08-12 PROCEDURE — 33512 CABG VEIN THREE: CPT | Performed by: PHYSICIAN ASSISTANT

## 2020-08-12 PROCEDURE — 2500000003 HC RX 250 WO HCPCS: Performed by: ANESTHESIOLOGY

## 2020-08-12 PROCEDURE — 94002 VENT MGMT INPAT INIT DAY: CPT

## 2020-08-12 PROCEDURE — 85014 HEMATOCRIT: CPT

## 2020-08-12 PROCEDURE — 2580000003 HC RX 258: Performed by: ANESTHESIOLOGY

## 2020-08-12 PROCEDURE — 94750 HC PULMONARY COMPLIANCE STUDY: CPT

## 2020-08-12 PROCEDURE — 84295 ASSAY OF SERUM SODIUM: CPT

## 2020-08-12 PROCEDURE — 83605 ASSAY OF LACTIC ACID: CPT

## 2020-08-12 PROCEDURE — 021209W BYPASS CORONARY ARTERY, THREE ARTERIES FROM AORTA WITH AUTOLOGOUS VENOUS TISSUE, OPEN APPROACH: ICD-10-PCS | Performed by: THORACIC SURGERY (CARDIOTHORACIC VASCULAR SURGERY)

## 2020-08-12 PROCEDURE — 82947 ASSAY GLUCOSE BLOOD QUANT: CPT

## 2020-08-12 PROCEDURE — 6370000000 HC RX 637 (ALT 250 FOR IP): Performed by: ANESTHESIOLOGY

## 2020-08-12 PROCEDURE — 02L70DK OCCLUSION OF LEFT ATRIAL APPENDAGE WITH INTRALUMINAL DEVICE, OPEN APPROACH: ICD-10-PCS | Performed by: THORACIC SURGERY (CARDIOTHORACIC VASCULAR SURGERY)

## 2020-08-12 PROCEDURE — 33508 ENDOSCOPIC VEIN HARVEST: CPT | Performed by: PHYSICIAN ASSISTANT

## 2020-08-12 PROCEDURE — 7100000011 HC PHASE II RECOVERY - ADDTL 15 MIN

## 2020-08-12 PROCEDURE — 2000000000 HC ICU R&B

## 2020-08-12 PROCEDURE — C1729 CATH, DRAINAGE: HCPCS | Performed by: THORACIC SURGERY (CARDIOTHORACIC VASCULAR SURGERY)

## 2020-08-12 PROCEDURE — 80048 BASIC METABOLIC PNL TOTAL CA: CPT

## 2020-08-12 PROCEDURE — B246ZZ4 ULTRASONOGRAPHY OF RIGHT AND LEFT HEART, TRANSESOPHAGEAL: ICD-10-PCS | Performed by: ANESTHESIOLOGY

## 2020-08-12 PROCEDURE — 2709999900 HC NON-CHARGEABLE SUPPLY: Performed by: THORACIC SURGERY (CARDIOTHORACIC VASCULAR SURGERY)

## 2020-08-12 PROCEDURE — 7100000010 HC PHASE II RECOVERY - FIRST 15 MIN

## 2020-08-12 PROCEDURE — 06BQ4ZZ EXCISION OF LEFT SAPHENOUS VEIN, PERCUTANEOUS ENDOSCOPIC APPROACH: ICD-10-PCS | Performed by: THORACIC SURGERY (CARDIOTHORACIC VASCULAR SURGERY)

## 2020-08-12 PROCEDURE — 2720000010 HC SURG SUPPLY STERILE: Performed by: THORACIC SURGERY (CARDIOTHORACIC VASCULAR SURGERY)

## 2020-08-12 DEVICE — ZINACTIVE USE 2540324 DEVICE OCCL CLP L45MM SM FOOTPRINT 1 HND APPL SUTURELESS W/: Type: IMPLANTABLE DEVICE | Status: FUNCTIONAL

## 2020-08-12 RX ORDER — ACETAMINOPHEN 500 MG
1000 TABLET ORAL EVERY 6 HOURS
Status: DISCONTINUED | OUTPATIENT
Start: 2020-08-12 | End: 2020-08-17 | Stop reason: HOSPADM

## 2020-08-12 RX ORDER — ALBUMIN, HUMAN INJ 5% 5 %
SOLUTION INTRAVENOUS PRN
Status: DISCONTINUED | OUTPATIENT
Start: 2020-08-12 | End: 2020-08-12

## 2020-08-12 RX ORDER — PANTOPRAZOLE SODIUM 40 MG/1
40 TABLET, DELAYED RELEASE ORAL DAILY
Status: DISCONTINUED | OUTPATIENT
Start: 2020-08-13 | End: 2020-08-17 | Stop reason: HOSPADM

## 2020-08-12 RX ORDER — SODIUM CHLORIDE 9 MG/ML
INJECTION, SOLUTION INTRAVENOUS CONTINUOUS PRN
Status: DISCONTINUED | OUTPATIENT
Start: 2020-08-12 | End: 2020-08-12 | Stop reason: SDUPTHER

## 2020-08-12 RX ORDER — MAGNESIUM HYDROXIDE 1200 MG/15ML
LIQUID ORAL CONTINUOUS PRN
Status: COMPLETED | OUTPATIENT
Start: 2020-08-12 | End: 2020-08-12

## 2020-08-12 RX ORDER — EPINEPHRINE 1 MG/ML
INJECTION, SOLUTION, CONCENTRATE INTRAVENOUS PRN
Status: DISCONTINUED | OUTPATIENT
Start: 2020-08-12 | End: 2020-08-12

## 2020-08-12 RX ORDER — CALCIUM CHLORIDE 100 MG/ML
INJECTION INTRAVENOUS; INTRAVENTRICULAR
Status: COMPLETED | OUTPATIENT
Start: 2020-08-12 | End: 2020-08-12

## 2020-08-12 RX ORDER — POLYETHYLENE GLYCOL 3350 17 G/17G
17 POWDER, FOR SOLUTION ORAL DAILY PRN
Status: DISCONTINUED | OUTPATIENT
Start: 2020-08-12 | End: 2020-08-17 | Stop reason: HOSPADM

## 2020-08-12 RX ORDER — SODIUM CHLORIDE 0.9 % (FLUSH) 0.9 %
10 SYRINGE (ML) INJECTION PRN
Status: DISCONTINUED | OUTPATIENT
Start: 2020-08-12 | End: 2020-08-17 | Stop reason: HOSPADM

## 2020-08-12 RX ORDER — FUROSEMIDE 10 MG/ML
20 INJECTION INTRAMUSCULAR; INTRAVENOUS 2 TIMES DAILY
Status: DISCONTINUED | OUTPATIENT
Start: 2020-08-13 | End: 2020-08-16

## 2020-08-12 RX ORDER — ONDANSETRON 2 MG/ML
INJECTION INTRAMUSCULAR; INTRAVENOUS PRN
Status: DISCONTINUED | OUTPATIENT
Start: 2020-08-12 | End: 2020-08-12

## 2020-08-12 RX ORDER — MAGNESIUM SULFATE IN WATER 40 MG/ML
2 INJECTION, SOLUTION INTRAVENOUS PRN
Status: DISCONTINUED | OUTPATIENT
Start: 2020-08-12 | End: 2020-08-13

## 2020-08-12 RX ORDER — ALBUMIN, HUMAN INJ 5% 5 %
25 SOLUTION INTRAVENOUS PRN
Status: DISCONTINUED | OUTPATIENT
Start: 2020-08-12 | End: 2020-08-13

## 2020-08-12 RX ORDER — ESMOLOL HYDROCHLORIDE 10 MG/ML
5 INJECTION INTRAVENOUS ONCE
Status: COMPLETED | OUTPATIENT
Start: 2020-08-12 | End: 2020-08-12

## 2020-08-12 RX ORDER — CALCIUM CHLORIDE 100 MG/ML
INJECTION INTRAVENOUS; INTRAVENTRICULAR PRN
Status: DISCONTINUED | OUTPATIENT
Start: 2020-08-12 | End: 2020-08-12 | Stop reason: SDUPTHER

## 2020-08-12 RX ORDER — KETAMINE HCL IN NACL, ISO-OSM 100MG/10ML
SYRINGE (ML) INJECTION PRN
Status: DISCONTINUED | OUTPATIENT
Start: 2020-08-12 | End: 2020-08-12 | Stop reason: SDUPTHER

## 2020-08-12 RX ORDER — NITROGLYCERIN 40 MG/1
1 PATCH TRANSDERMAL DAILY
Status: COMPLETED | OUTPATIENT
Start: 2020-08-13 | End: 2020-08-15

## 2020-08-12 RX ORDER — DOBUTAMINE HYDROCHLORIDE 200 MG/100ML
2 INJECTION INTRAVENOUS CONTINUOUS PRN
Status: DISCONTINUED | OUTPATIENT
Start: 2020-08-12 | End: 2020-08-13

## 2020-08-12 RX ORDER — METOCLOPRAMIDE HYDROCHLORIDE 5 MG/ML
10 INJECTION INTRAMUSCULAR; INTRAVENOUS EVERY 6 HOURS PRN
Status: DISCONTINUED | OUTPATIENT
Start: 2020-08-12 | End: 2020-08-17 | Stop reason: HOSPADM

## 2020-08-12 RX ORDER — FENTANYL CITRATE 50 UG/ML
50 INJECTION, SOLUTION INTRAMUSCULAR; INTRAVENOUS ONCE
Status: COMPLETED | OUTPATIENT
Start: 2020-08-12 | End: 2020-08-12

## 2020-08-12 RX ORDER — ASPIRIN 300 MG/1
300 SUPPOSITORY RECTAL ONCE
Status: DISCONTINUED | OUTPATIENT
Start: 2020-08-12 | End: 2020-08-14

## 2020-08-12 RX ORDER — DEXTROSE MONOHYDRATE 50 MG/ML
100 INJECTION, SOLUTION INTRAVENOUS PRN
Status: DISCONTINUED | OUTPATIENT
Start: 2020-08-12 | End: 2020-08-13

## 2020-08-12 RX ORDER — MILRINONE LACTATE 0.2 MG/ML
0.38 INJECTION, SOLUTION INTRAVENOUS CONTINUOUS PRN
Status: DISCONTINUED | OUTPATIENT
Start: 2020-08-12 | End: 2020-08-13

## 2020-08-12 RX ORDER — FUROSEMIDE 10 MG/ML
20 INJECTION INTRAMUSCULAR; INTRAVENOUS PRN
Status: DISCONTINUED | OUTPATIENT
Start: 2020-08-12 | End: 2020-08-13

## 2020-08-12 RX ORDER — TRAMADOL HYDROCHLORIDE 50 MG/1
50 TABLET ORAL EVERY 6 HOURS PRN
Status: DISCONTINUED | OUTPATIENT
Start: 2020-08-12 | End: 2020-08-17 | Stop reason: HOSPADM

## 2020-08-12 RX ORDER — DEXTROSE, SODIUM CHLORIDE, AND POTASSIUM CHLORIDE 5; .45; .15 G/100ML; G/100ML; G/100ML
1000 INJECTION INTRAVENOUS CONTINUOUS
Status: DISCONTINUED | OUTPATIENT
Start: 2020-08-12 | End: 2020-08-13

## 2020-08-12 RX ORDER — DEXAMETHASONE SODIUM PHOSPHATE 4 MG/ML
INJECTION, SOLUTION INTRA-ARTICULAR; INTRALESIONAL; INTRAMUSCULAR; INTRAVENOUS; SOFT TISSUE PRN
Status: DISCONTINUED | OUTPATIENT
Start: 2020-08-12 | End: 2020-08-12 | Stop reason: SDUPTHER

## 2020-08-12 RX ORDER — INSULIN LISPRO 100 [IU]/ML
0-12 INJECTION, SOLUTION INTRAVENOUS; SUBCUTANEOUS
Status: DISCONTINUED | OUTPATIENT
Start: 2020-08-12 | End: 2020-08-17 | Stop reason: HOSPADM

## 2020-08-12 RX ORDER — MEPERIDINE HYDROCHLORIDE 25 MG/ML
25 INJECTION INTRAMUSCULAR; INTRAVENOUS; SUBCUTANEOUS
Status: ACTIVE | OUTPATIENT
Start: 2020-08-12 | End: 2020-08-12

## 2020-08-12 RX ORDER — FENTANYL CITRATE 50 UG/ML
25 INJECTION, SOLUTION INTRAMUSCULAR; INTRAVENOUS
Status: DISCONTINUED | OUTPATIENT
Start: 2020-08-12 | End: 2020-08-14

## 2020-08-12 RX ORDER — DEXTROSE MONOHYDRATE 25 G/50ML
12.5 INJECTION, SOLUTION INTRAVENOUS PRN
Status: DISCONTINUED | OUTPATIENT
Start: 2020-08-12 | End: 2020-08-13

## 2020-08-12 RX ORDER — GLYCOPYRROLATE 0.2 MG/ML
INJECTION INTRAMUSCULAR; INTRAVENOUS PRN
Status: DISCONTINUED | OUTPATIENT
Start: 2020-08-12 | End: 2020-08-12 | Stop reason: SDUPTHER

## 2020-08-12 RX ORDER — EPHEDRINE SULFATE/0.9% NACL/PF 50 MG/5 ML
SYRINGE (ML) INTRAVENOUS PRN
Status: DISCONTINUED | OUTPATIENT
Start: 2020-08-12 | End: 2020-08-12

## 2020-08-12 RX ORDER — MIDAZOLAM HYDROCHLORIDE 1 MG/ML
INJECTION INTRAMUSCULAR; INTRAVENOUS PRN
Status: DISCONTINUED | OUTPATIENT
Start: 2020-08-12 | End: 2020-08-12 | Stop reason: SDUPTHER

## 2020-08-12 RX ORDER — MIDAZOLAM HYDROCHLORIDE 5 MG/ML
INJECTION INTRAMUSCULAR; INTRAVENOUS PRN
Status: DISCONTINUED | OUTPATIENT
Start: 2020-08-12 | End: 2020-08-12 | Stop reason: SDUPTHER

## 2020-08-12 RX ORDER — PROPOFOL 10 MG/ML
10 INJECTION, EMULSION INTRAVENOUS
Status: DISCONTINUED | OUTPATIENT
Start: 2020-08-12 | End: 2020-08-13

## 2020-08-12 RX ORDER — PROTAMINE SULFATE 10 MG/ML
50 INJECTION, SOLUTION INTRAVENOUS
Status: ACTIVE | OUTPATIENT
Start: 2020-08-12 | End: 2020-08-12

## 2020-08-12 RX ORDER — INSULIN LISPRO 100 [IU]/ML
0.08 INJECTION, SOLUTION INTRAVENOUS; SUBCUTANEOUS
Status: DISCONTINUED | OUTPATIENT
Start: 2020-08-14 | End: 2020-08-16

## 2020-08-12 RX ORDER — NICOTINE POLACRILEX 4 MG
15 LOZENGE BUCCAL PRN
Status: DISCONTINUED | OUTPATIENT
Start: 2020-08-12 | End: 2020-08-17 | Stop reason: HOSPADM

## 2020-08-12 RX ORDER — ROCURONIUM BROMIDE 10 MG/ML
INJECTION, SOLUTION INTRAVENOUS PRN
Status: DISCONTINUED | OUTPATIENT
Start: 2020-08-12 | End: 2020-08-12 | Stop reason: SDUPTHER

## 2020-08-12 RX ORDER — MORPHINE SULFATE 4 MG/ML
INJECTION, SOLUTION INTRAMUSCULAR; INTRAVENOUS
Status: DISCONTINUED
Start: 2020-08-12 | End: 2020-08-12 | Stop reason: WASHOUT

## 2020-08-12 RX ORDER — POTASSIUM CHLORIDE 29.8 MG/ML
20 INJECTION INTRAVENOUS PRN
Status: DISCONTINUED | OUTPATIENT
Start: 2020-08-12 | End: 2020-08-13

## 2020-08-12 RX ORDER — SUCCINYLCHOLINE/SOD CL,ISO/PF 100 MG/5ML
SYRINGE (ML) INTRAVENOUS PRN
Status: DISCONTINUED | OUTPATIENT
Start: 2020-08-12 | End: 2020-08-12 | Stop reason: SDUPTHER

## 2020-08-12 RX ORDER — PANTOPRAZOLE SODIUM 40 MG/10ML
40 INJECTION, POWDER, LYOPHILIZED, FOR SOLUTION INTRAVENOUS DAILY
Status: DISCONTINUED | OUTPATIENT
Start: 2020-08-12 | End: 2020-08-13

## 2020-08-12 RX ORDER — DIPHENHYDRAMINE HCL 25 MG
25 TABLET ORAL NIGHTLY PRN
Status: DISCONTINUED | OUTPATIENT
Start: 2020-08-13 | End: 2020-08-14

## 2020-08-12 RX ORDER — AMINOCAPROIC ACID 250 MG/ML
INJECTION, SOLUTION INTRAVENOUS PRN
Status: DISCONTINUED | OUTPATIENT
Start: 2020-08-12 | End: 2020-08-12 | Stop reason: SDUPTHER

## 2020-08-12 RX ORDER — HEPARIN SODIUM 1000 [USP'U]/ML
INJECTION, SOLUTION INTRAVENOUS; SUBCUTANEOUS PRN
Status: DISCONTINUED | OUTPATIENT
Start: 2020-08-12 | End: 2020-08-12 | Stop reason: SDUPTHER

## 2020-08-12 RX ORDER — NITROGLYCERIN 5 MG/ML
INJECTION, SOLUTION INTRAVENOUS PRN
Status: DISCONTINUED | OUTPATIENT
Start: 2020-08-12 | End: 2020-08-12 | Stop reason: SDUPTHER

## 2020-08-12 RX ORDER — ZOLPIDEM TARTRATE 5 MG/1
5 TABLET ORAL NIGHTLY PRN
Status: DISCONTINUED | OUTPATIENT
Start: 2020-08-13 | End: 2020-08-17 | Stop reason: HOSPADM

## 2020-08-12 RX ORDER — VASOPRESSIN 20 U/ML
INJECTION PARENTERAL PRN
Status: DISCONTINUED | OUTPATIENT
Start: 2020-08-12 | End: 2020-08-12 | Stop reason: SDUPTHER

## 2020-08-12 RX ORDER — SODIUM CHLORIDE, SODIUM LACTATE, POTASSIUM CHLORIDE, CALCIUM CHLORIDE 600; 310; 30; 20 MG/100ML; MG/100ML; MG/100ML; MG/100ML
INJECTION, SOLUTION INTRAVENOUS CONTINUOUS PRN
Status: DISCONTINUED | OUTPATIENT
Start: 2020-08-12 | End: 2020-08-12 | Stop reason: SDUPTHER

## 2020-08-12 RX ORDER — MIDAZOLAM HYDROCHLORIDE 1 MG/ML
INJECTION INTRAMUSCULAR; INTRAVENOUS
Status: DISPENSED
Start: 2020-08-12 | End: 2020-08-13

## 2020-08-12 RX ORDER — SENNA AND DOCUSATE SODIUM 50; 8.6 MG/1; MG/1
1 TABLET, FILM COATED ORAL 2 TIMES DAILY
Status: DISCONTINUED | OUTPATIENT
Start: 2020-08-12 | End: 2020-08-17 | Stop reason: HOSPADM

## 2020-08-12 RX ORDER — NITROGLYCERIN 20 MG/100ML
INJECTION INTRAVENOUS CONTINUOUS PRN
Status: DISCONTINUED | OUTPATIENT
Start: 2020-08-12 | End: 2020-08-12 | Stop reason: SDUPTHER

## 2020-08-12 RX ORDER — TRAMADOL HYDROCHLORIDE 50 MG/1
100 TABLET ORAL EVERY 6 HOURS PRN
Status: DISCONTINUED | OUTPATIENT
Start: 2020-08-12 | End: 2020-08-17 | Stop reason: HOSPADM

## 2020-08-12 RX ORDER — DIPHENHYDRAMINE HYDROCHLORIDE 50 MG/ML
INJECTION INTRAMUSCULAR; INTRAVENOUS PRN
Status: DISCONTINUED | OUTPATIENT
Start: 2020-08-12 | End: 2020-08-12

## 2020-08-12 RX ORDER — CHLORHEXIDINE GLUCONATE 0.12 MG/ML
RINSE ORAL
Status: DISCONTINUED
Start: 2020-08-12 | End: 2020-08-12

## 2020-08-12 RX ORDER — DOPAMINE HYDROCHLORIDE 160 MG/100ML
2 INJECTION, SOLUTION INTRAVENOUS CONTINUOUS PRN
Status: DISCONTINUED | OUTPATIENT
Start: 2020-08-12 | End: 2020-08-13

## 2020-08-12 RX ORDER — ONDANSETRON 2 MG/ML
4 INJECTION INTRAMUSCULAR; INTRAVENOUS EVERY 6 HOURS PRN
Status: DISCONTINUED | OUTPATIENT
Start: 2020-08-12 | End: 2020-08-17 | Stop reason: HOSPADM

## 2020-08-12 RX ORDER — PROTAMINE SULFATE 10 MG/ML
INJECTION, SOLUTION INTRAVENOUS PRN
Status: DISCONTINUED | OUTPATIENT
Start: 2020-08-12 | End: 2020-08-12

## 2020-08-12 RX ORDER — DOPAMINE HYDROCHLORIDE 160 MG/100ML
INJECTION, SOLUTION INTRAVENOUS CONTINUOUS PRN
Status: DISCONTINUED | OUTPATIENT
Start: 2020-08-12 | End: 2020-08-12

## 2020-08-12 RX ORDER — FENTANYL CITRATE 50 UG/ML
INJECTION, SOLUTION INTRAMUSCULAR; INTRAVENOUS PRN
Status: DISCONTINUED | OUTPATIENT
Start: 2020-08-12 | End: 2020-08-12 | Stop reason: SDUPTHER

## 2020-08-12 RX ORDER — PROPOFOL 10 MG/ML
INJECTION, EMULSION INTRAVENOUS PRN
Status: DISCONTINUED | OUTPATIENT
Start: 2020-08-12 | End: 2020-08-12 | Stop reason: SDUPTHER

## 2020-08-12 RX ORDER — POTASSIUM CHLORIDE 750 MG/1
10 TABLET, FILM COATED, EXTENDED RELEASE ORAL
Status: DISCONTINUED | OUTPATIENT
Start: 2020-08-13 | End: 2020-08-16

## 2020-08-12 RX ORDER — SODIUM CHLORIDE 9 MG/ML
10 INJECTION INTRAVENOUS DAILY
Status: DISCONTINUED | OUTPATIENT
Start: 2020-08-12 | End: 2020-08-17 | Stop reason: HOSPADM

## 2020-08-12 RX ORDER — FONDAPARINUX SODIUM 2.5 MG/.5ML
2.5 INJECTION SUBCUTANEOUS DAILY
Status: DISCONTINUED | OUTPATIENT
Start: 2020-08-13 | End: 2020-08-17 | Stop reason: HOSPADM

## 2020-08-12 RX ORDER — SODIUM CHLORIDE, SODIUM LACTATE, POTASSIUM CHLORIDE, CALCIUM CHLORIDE 600; 310; 30; 20 MG/100ML; MG/100ML; MG/100ML; MG/100ML
250 INJECTION, SOLUTION INTRAVENOUS CONTINUOUS PRN
Status: DISCONTINUED | OUTPATIENT
Start: 2020-08-12 | End: 2020-08-13

## 2020-08-12 RX ORDER — ALBUTEROL SULFATE 90 UG/1
2 AEROSOL, METERED RESPIRATORY (INHALATION) EVERY 6 HOURS PRN
Status: DISCONTINUED | OUTPATIENT
Start: 2020-08-12 | End: 2020-08-17 | Stop reason: HOSPADM

## 2020-08-12 RX ORDER — MIDAZOLAM HYDROCHLORIDE 1 MG/ML
2 INJECTION INTRAMUSCULAR; INTRAVENOUS ONCE
Status: COMPLETED | OUTPATIENT
Start: 2020-08-12 | End: 2020-08-12

## 2020-08-12 RX ORDER — INSULIN LISPRO 100 [IU]/ML
0-6 INJECTION, SOLUTION INTRAVENOUS; SUBCUTANEOUS NIGHTLY
Status: DISCONTINUED | OUTPATIENT
Start: 2020-08-12 | End: 2020-08-17 | Stop reason: HOSPADM

## 2020-08-12 RX ORDER — SODIUM CHLORIDE 0.9 % (FLUSH) 0.9 %
10 SYRINGE (ML) INJECTION EVERY 12 HOURS SCHEDULED
Status: DISCONTINUED | OUTPATIENT
Start: 2020-08-12 | End: 2020-08-17 | Stop reason: HOSPADM

## 2020-08-12 RX ORDER — LISINOPRIL 5 MG/1
2.5 TABLET ORAL
Status: DISCONTINUED | OUTPATIENT
Start: 2020-08-15 | End: 2020-08-15

## 2020-08-12 RX ORDER — NITROGLYCERIN 20 MG/100ML
10 INJECTION INTRAVENOUS CONTINUOUS PRN
Status: DISCONTINUED | OUTPATIENT
Start: 2020-08-12 | End: 2020-08-17 | Stop reason: HOSPADM

## 2020-08-12 RX ORDER — BUPIVACAINE HYDROCHLORIDE 2.5 MG/ML
INJECTION, SOLUTION EPIDURAL; INFILTRATION; INTRACAUDAL
Status: COMPLETED | OUTPATIENT
Start: 2020-08-12 | End: 2020-08-12

## 2020-08-12 RX ADMIN — FENTANYL CITRATE 100 MCG: 50 INJECTION, SOLUTION INTRAMUSCULAR; INTRAVENOUS at 07:52

## 2020-08-12 RX ADMIN — ALBUMIN (HUMAN) 250 ML: 12.5 INJECTION, SOLUTION INTRAVENOUS at 08:44

## 2020-08-12 RX ADMIN — ALBUMIN (HUMAN) 250 ML: 12.5 INJECTION, SOLUTION INTRAVENOUS at 09:05

## 2020-08-12 RX ADMIN — PHENYLEPHRINE HYDROCHLORIDE 100 MCG: 10 INJECTION INTRAVENOUS at 12:13

## 2020-08-12 RX ADMIN — VASOPRESSIN 2 UNITS: 20 INJECTION INTRAVENOUS at 08:28

## 2020-08-12 RX ADMIN — EPINEPHRINE 10 MCG: 1 INJECTION, SOLUTION INTRAMUSCULAR; SUBCUTANEOUS at 08:51

## 2020-08-12 RX ADMIN — POTASSIUM CHLORIDE 20 MEQ: 29.8 INJECTION, SOLUTION INTRAVENOUS at 23:55

## 2020-08-12 RX ADMIN — CALCIUM CHLORIDE 0.3 G: 100 INJECTION INTRAVENOUS; INTRAVENTRICULAR at 12:32

## 2020-08-12 RX ADMIN — MIDAZOLAM 2 MG: 1 INJECTION INTRAMUSCULAR; INTRAVENOUS at 14:00

## 2020-08-12 RX ADMIN — CHLORHEXIDINE GLUCONATE 15 ML: 1.2 RINSE ORAL at 05:17

## 2020-08-12 RX ADMIN — MIDAZOLAM 2 MG: 1 INJECTION INTRAMUSCULAR; INTRAVENOUS at 07:17

## 2020-08-12 RX ADMIN — NITROGLYCERIN 100 MCG: 5 INJECTION, SOLUTION INTRAVENOUS at 09:02

## 2020-08-12 RX ADMIN — DIPHENHYDRAMINE HYDROCHLORIDE 50 MG: 50 INJECTION, SOLUTION INTRAMUSCULAR; INTRAVENOUS at 07:57

## 2020-08-12 RX ADMIN — HEPARIN SODIUM 3000 UNITS: 1000 INJECTION INTRAVENOUS; SUBCUTANEOUS at 08:12

## 2020-08-12 RX ADMIN — SODIUM BICARBONATE 50 MEQ: 84 INJECTION, SOLUTION INTRAVENOUS at 12:27

## 2020-08-12 RX ADMIN — VANCOMYCIN HYDROCHLORIDE 2000 MG: 10 INJECTION, POWDER, LYOPHILIZED, FOR SOLUTION INTRAVENOUS at 20:03

## 2020-08-12 RX ADMIN — VASOPRESSIN 2 UNITS: 20 INJECTION INTRAVENOUS at 09:34

## 2020-08-12 RX ADMIN — CALCIUM CHLORIDE 0.5 G: 100 INJECTION INTRAVENOUS; INTRAVENTRICULAR at 12:24

## 2020-08-12 RX ADMIN — GABAPENTIN 600 MG: 300 CAPSULE ORAL at 20:03

## 2020-08-12 RX ADMIN — ESMOLOL HYDROCHLORIDE: 100 INJECTION, SOLUTION INTRAVENOUS at 06:27

## 2020-08-12 RX ADMIN — VANCOMYCIN HYDROCHLORIDE 2000 MG: 10 INJECTION, POWDER, LYOPHILIZED, FOR SOLUTION INTRAVENOUS at 07:29

## 2020-08-12 RX ADMIN — VASOPRESSIN 2 UNITS: 20 INJECTION INTRAVENOUS at 09:21

## 2020-08-12 RX ADMIN — VASOPRESSIN 2 UNITS: 20 INJECTION INTRAVENOUS at 08:11

## 2020-08-12 RX ADMIN — VASOPRESSIN 2 UNITS: 20 INJECTION INTRAVENOUS at 08:45

## 2020-08-12 RX ADMIN — EPINEPHRINE 10 MCG: 1 INJECTION, SOLUTION INTRAMUSCULAR; SUBCUTANEOUS at 11:43

## 2020-08-12 RX ADMIN — ALBUMIN (HUMAN) 250 ML: 12.5 INJECTION, SOLUTION INTRAVENOUS at 13:04

## 2020-08-12 RX ADMIN — EPINEPHRINE 20 MCG: 1 INJECTION, SOLUTION INTRAMUSCULAR; SUBCUTANEOUS at 09:49

## 2020-08-12 RX ADMIN — ACETAMINOPHEN 1000 MG: 500 TABLET, FILM COATED ORAL at 20:03

## 2020-08-12 RX ADMIN — ASPIRIN 325 MG: 325 TABLET, COATED ORAL at 18:40

## 2020-08-12 RX ADMIN — FENTANYL CITRATE 250 MCG: 50 INJECTION, SOLUTION INTRAMUSCULAR; INTRAVENOUS at 10:06

## 2020-08-12 RX ADMIN — HYDROCORTISONE SODIUM SUCCINATE 100 MG: 100 INJECTION, POWDER, FOR SOLUTION INTRAMUSCULAR; INTRAVENOUS at 08:30

## 2020-08-12 RX ADMIN — ROSUVASTATIN CALCIUM 20 MG: 20 TABLET, FILM COATED ORAL at 20:03

## 2020-08-12 RX ADMIN — SODIUM CHLORIDE, POTASSIUM CHLORIDE, SODIUM LACTATE AND CALCIUM CHLORIDE: 600; 310; 30; 20 INJECTION, SOLUTION INTRAVENOUS at 07:48

## 2020-08-12 RX ADMIN — FENTANYL CITRATE 50 MCG: 50 INJECTION, SOLUTION INTRAMUSCULAR; INTRAVENOUS at 14:32

## 2020-08-12 RX ADMIN — TAMSULOSIN HYDROCHLORIDE 0.8 MG: 0.4 CAPSULE ORAL at 20:03

## 2020-08-12 RX ADMIN — STANDARDIZED SENNA CONCENTRATE AND DOCUSATE SODIUM 1 TABLET: 8.6; 5 TABLET ORAL at 20:03

## 2020-08-12 RX ADMIN — VASOPRESSIN 1 UNITS: 20 INJECTION INTRAVENOUS at 12:12

## 2020-08-12 RX ADMIN — EPINEPHRINE 10 MCG: 1 INJECTION, SOLUTION INTRAMUSCULAR; SUBCUTANEOUS at 08:32

## 2020-08-12 RX ADMIN — SODIUM CHLORIDE 3 UNITS/HR: 9 INJECTION, SOLUTION INTRAVENOUS at 08:47

## 2020-08-12 RX ADMIN — VASOPRESSIN 2 UNITS: 20 INJECTION INTRAVENOUS at 09:13

## 2020-08-12 RX ADMIN — PROPOFOL 80 MG: 10 INJECTION, EMULSION INTRAVENOUS at 07:49

## 2020-08-12 RX ADMIN — ACETAMINOPHEN 1000 MG: 500 TABLET, FILM COATED ORAL at 15:25

## 2020-08-12 RX ADMIN — FENTANYL CITRATE 250 MCG: 50 INJECTION, SOLUTION INTRAMUSCULAR; INTRAVENOUS at 08:41

## 2020-08-12 RX ADMIN — POTASSIUM CHLORIDE, DEXTROSE MONOHYDRATE AND SODIUM CHLORIDE 1000 ML: 150; 5; 450 INJECTION, SOLUTION INTRAVENOUS at 13:41

## 2020-08-12 RX ADMIN — Medication 3 MCG/MIN: at 08:16

## 2020-08-12 RX ADMIN — SODIUM CHLORIDE, POTASSIUM CHLORIDE, SODIUM LACTATE AND CALCIUM CHLORIDE: 600; 310; 30; 20 INJECTION, SOLUTION INTRAVENOUS at 06:26

## 2020-08-12 RX ADMIN — PHENYLEPHRINE HYDROCHLORIDE 100 MCG: 10 INJECTION INTRAVENOUS at 09:56

## 2020-08-12 RX ADMIN — PROTAMINE SULFATE 360 MG: 10 INJECTION, SOLUTION INTRAVENOUS at 11:53

## 2020-08-12 RX ADMIN — POTASSIUM CHLORIDE 20 MEQ: 29.8 INJECTION, SOLUTION INTRAVENOUS at 13:40

## 2020-08-12 RX ADMIN — EPINEPHRINE 10 MCG: 1 INJECTION, SOLUTION INTRAMUSCULAR; SUBCUTANEOUS at 13:09

## 2020-08-12 RX ADMIN — DEXAMETHASONE SODIUM PHOSPHATE 12 MG: 4 INJECTION, SOLUTION INTRAMUSCULAR; INTRAVENOUS at 07:58

## 2020-08-12 RX ADMIN — SODIUM CHLORIDE 8.64 UNITS/HR: 9 INJECTION, SOLUTION INTRAVENOUS at 14:38

## 2020-08-12 RX ADMIN — CALCIUM CHLORIDE 0.2 G: 100 INJECTION INTRAVENOUS; INTRAVENTRICULAR at 12:49

## 2020-08-12 RX ADMIN — PHENYLEPHRINE HYDROCHLORIDE 100 MCG: 10 INJECTION INTRAVENOUS at 10:17

## 2020-08-12 RX ADMIN — MUPIROCIN: 20 OINTMENT TOPICAL at 23:02

## 2020-08-12 RX ADMIN — EPINEPHRINE 10 MCG: 1 INJECTION, SOLUTION INTRAMUSCULAR; SUBCUTANEOUS at 08:17

## 2020-08-12 RX ADMIN — PHENYLEPHRINE HYDROCHLORIDE 100 MCG: 10 INJECTION INTRAVENOUS at 10:05

## 2020-08-12 RX ADMIN — SODIUM CHLORIDE: 9 INJECTION, SOLUTION INTRAVENOUS at 07:48

## 2020-08-12 RX ADMIN — PHENYLEPHRINE HYDROCHLORIDE 100 MCG: 10 INJECTION INTRAVENOUS at 08:29

## 2020-08-12 RX ADMIN — ROCURONIUM BROMIDE 60 MG: 10 INJECTION, SOLUTION INTRAVENOUS at 10:05

## 2020-08-12 RX ADMIN — Medication 160 MG: at 07:50

## 2020-08-12 RX ADMIN — POTASSIUM CHLORIDE 20 MEQ: 29.8 INJECTION, SOLUTION INTRAVENOUS at 18:41

## 2020-08-12 RX ADMIN — FENTANYL CITRATE 50 MCG: 50 INJECTION, SOLUTION INTRAMUSCULAR; INTRAVENOUS at 07:00

## 2020-08-12 RX ADMIN — OXYCODONE HYDROCHLORIDE AND ACETAMINOPHEN 2 TABLET: 5; 325 TABLET ORAL at 04:53

## 2020-08-12 RX ADMIN — Medication 30 MG: at 10:09

## 2020-08-12 RX ADMIN — NITROGLYCERIN 100 MCG: 5 INJECTION, SOLUTION INTRAVENOUS at 09:58

## 2020-08-12 RX ADMIN — SODIUM CHLORIDE 8.1 UNITS/HR: 9 INJECTION, SOLUTION INTRAVENOUS at 19:01

## 2020-08-12 RX ADMIN — Medication 10 MG: at 11:39

## 2020-08-12 RX ADMIN — EPINEPHRINE 20 MCG: 1 INJECTION, SOLUTION INTRAMUSCULAR; SUBCUTANEOUS at 09:33

## 2020-08-12 RX ADMIN — VASOPRESSIN 1 UNITS: 20 INJECTION INTRAVENOUS at 11:57

## 2020-08-12 RX ADMIN — FENTANYL CITRATE 50 MCG: 50 INJECTION, SOLUTION INTRAMUSCULAR; INTRAVENOUS at 06:50

## 2020-08-12 RX ADMIN — EPINEPHRINE 10 MCG: 1 INJECTION, SOLUTION INTRAMUSCULAR; SUBCUTANEOUS at 12:48

## 2020-08-12 RX ADMIN — FENTANYL CITRATE 50 MCG: 50 INJECTION, SOLUTION INTRAMUSCULAR; INTRAVENOUS at 07:15

## 2020-08-12 RX ADMIN — ONDANSETRON 4 MG: 2 INJECTION INTRAMUSCULAR; INTRAVENOUS at 08:52

## 2020-08-12 RX ADMIN — NITROGLYCERIN 100 MCG: 5 INJECTION, SOLUTION INTRAVENOUS at 12:01

## 2020-08-12 RX ADMIN — TIZANIDINE 4 MG: 4 TABLET ORAL at 02:27

## 2020-08-12 RX ADMIN — NITROGLYCERIN 10 MCG/MIN: 20 INJECTION INTRAVENOUS at 08:17

## 2020-08-12 RX ADMIN — MIDAZOLAM 10 MG: 5 INJECTION INTRAMUSCULAR; INTRAVENOUS at 07:49

## 2020-08-12 RX ADMIN — EPINEPHRINE 10 MCG: 1 INJECTION, SOLUTION INTRAMUSCULAR; SUBCUTANEOUS at 10:17

## 2020-08-12 RX ADMIN — VASOPRESSIN 1 UNITS: 20 INJECTION INTRAVENOUS at 10:18

## 2020-08-12 RX ADMIN — VASOPRESSIN 0.02 UNITS/MIN: 20 INJECTION INTRAVENOUS at 10:12

## 2020-08-12 RX ADMIN — Medication 30 MG: at 07:49

## 2020-08-12 RX ADMIN — ALBUMIN (HUMAN) 250 ML: 12.5 INJECTION, SOLUTION INTRAVENOUS at 12:27

## 2020-08-12 RX ADMIN — CALCIUM CHLORIDE 1 G: 100 INJECTION INTRAVENOUS; INTRAVENTRICULAR at 11:54

## 2020-08-12 RX ADMIN — PHENYLEPHRINE HYDROCHLORIDE 100 MCG: 10 INJECTION INTRAVENOUS at 10:00

## 2020-08-12 RX ADMIN — PHENYLEPHRINE HYDROCHLORIDE 100 MCG: 10 INJECTION INTRAVENOUS at 09:21

## 2020-08-12 RX ADMIN — ROCURONIUM BROMIDE 40 MG: 10 INJECTION, SOLUTION INTRAVENOUS at 07:56

## 2020-08-12 RX ADMIN — EPINEPHRINE 10 MCG: 1 INJECTION, SOLUTION INTRAMUSCULAR; SUBCUTANEOUS at 08:29

## 2020-08-12 RX ADMIN — PHENYLEPHRINE HYDROCHLORIDE 100 MCG: 10 INJECTION INTRAVENOUS at 08:12

## 2020-08-12 RX ADMIN — Medication 10 MG: at 08:39

## 2020-08-12 RX ADMIN — EPINEPHRINE 10 MCG: 1 INJECTION, SOLUTION INTRAMUSCULAR; SUBCUTANEOUS at 13:14

## 2020-08-12 RX ADMIN — PROPOFOL 50 MG: 10 INJECTION, EMULSION INTRAVENOUS at 09:59

## 2020-08-12 RX ADMIN — HEPARIN SODIUM 32000 UNITS: 1000 INJECTION INTRAVENOUS; SUBCUTANEOUS at 09:53

## 2020-08-12 RX ADMIN — Medication 10 MG: at 08:16

## 2020-08-12 RX ADMIN — DOPAMINE HYDROCHLORIDE IN DEXTROSE 3 MCG/KG/MIN: 1.6 INJECTION, SOLUTION INTRAVENOUS at 11:35

## 2020-08-12 RX ADMIN — TRAMADOL HYDROCHLORIDE 50 MG: 50 TABLET, FILM COATED ORAL at 18:44

## 2020-08-12 RX ADMIN — EPINEPHRINE 10 MCG: 1 INJECTION, SOLUTION INTRAMUSCULAR; SUBCUTANEOUS at 12:14

## 2020-08-12 RX ADMIN — PANTOPRAZOLE SODIUM 40 MG: 40 INJECTION, POWDER, FOR SOLUTION INTRAVENOUS at 05:18

## 2020-08-12 RX ADMIN — AMINOCAPROIC ACID 5000 MG: 250 INJECTION, SOLUTION INTRAVENOUS at 08:40

## 2020-08-12 RX ADMIN — EPINEPHRINE 10 MCG: 1 INJECTION, SOLUTION INTRAMUSCULAR; SUBCUTANEOUS at 08:04

## 2020-08-12 RX ADMIN — Medication 30 MG: at 12:36

## 2020-08-12 RX ADMIN — GLYCOPYRROLATE 0.2 MG: 0.2 INJECTION, SOLUTION INTRAMUSCULAR; INTRAVENOUS at 09:37

## 2020-08-12 RX ADMIN — FENTANYL CITRATE 25 MCG: 50 INJECTION, SOLUTION INTRAMUSCULAR; INTRAVENOUS at 23:04

## 2020-08-12 RX ADMIN — FAMOTIDINE 20 MG: 10 INJECTION INTRAVENOUS at 09:36

## 2020-08-12 RX ADMIN — AMINOCAPROIC ACID 1 G/HR: 250 INJECTION, SOLUTION INTRAVENOUS at 11:54

## 2020-08-12 RX ADMIN — Medication 10 MG: at 08:03

## 2020-08-12 RX ADMIN — METOPROLOL TARTRATE 100 MG: 50 TABLET, FILM COATED ORAL at 05:55

## 2020-08-12 RX ADMIN — EPINEPHRINE 10 MCG: 1 INJECTION, SOLUTION INTRAMUSCULAR; SUBCUTANEOUS at 11:58

## 2020-08-12 ASSESSMENT — PULMONARY FUNCTION TESTS
PIF_VALUE: 25
PIF_VALUE: 25
PIF_VALUE: 26
PIF_VALUE: 0
PIF_VALUE: 23
PIF_VALUE: 26
PIF_VALUE: 27
PIF_VALUE: 30
PIF_VALUE: 26
PIF_VALUE: 26
PIF_VALUE: 27
PIF_VALUE: 1
PIF_VALUE: 2
PIF_VALUE: 1
PIF_VALUE: 24
PIF_VALUE: 23
PIF_VALUE: 29
PIF_VALUE: 1
PIF_VALUE: 27
PIF_VALUE: 1
PIF_VALUE: 1
PIF_VALUE: 25
PIF_VALUE: 26
PIF_VALUE: 20
PIF_VALUE: 25
PIF_VALUE: 0
PIF_VALUE: 25
PIF_VALUE: 26
PIF_VALUE: 24
PIF_VALUE: 26
PIF_VALUE: 27
PIF_VALUE: 2
PIF_VALUE: 25
PIF_VALUE: 27
PIF_VALUE: 24
PIF_VALUE: 23
PIF_VALUE: 1
PIF_VALUE: 1
PIF_VALUE: 23
PIF_VALUE: 0
PIF_VALUE: 26
PIF_VALUE: 25
PIF_VALUE: 24
PIF_VALUE: 24
PIF_VALUE: 29
PIF_VALUE: 28
PIF_VALUE: 25
PIF_VALUE: 26
PIF_VALUE: 23
PIF_VALUE: 23
PIF_VALUE: 27
PIF_VALUE: 25
PIF_VALUE: 24
PIF_VALUE: 1
PIF_VALUE: 1
PIF_VALUE: 28
PIF_VALUE: 23
PIF_VALUE: 23
PIF_VALUE: 25
PIF_VALUE: 26
PIF_VALUE: 2
PIF_VALUE: 26
PIF_VALUE: 25
PIF_VALUE: 24
PIF_VALUE: 24
PIF_VALUE: 26
PIF_VALUE: 0
PIF_VALUE: 27
PIF_VALUE: 1
PIF_VALUE: 1
PIF_VALUE: 2
PIF_VALUE: 28
PIF_VALUE: 1
PIF_VALUE: 26
PIF_VALUE: 1
PIF_VALUE: 26
PIF_VALUE: 25
PIF_VALUE: 1
PIF_VALUE: 24
PIF_VALUE: 1
PIF_VALUE: 26
PIF_VALUE: 27
PIF_VALUE: 25
PIF_VALUE: 1
PIF_VALUE: 1
PIF_VALUE: 25
PIF_VALUE: 27
PIF_VALUE: 26
PIF_VALUE: 1
PIF_VALUE: 28
PIF_VALUE: 23
PIF_VALUE: 24
PIF_VALUE: 25
PIF_VALUE: 25
PIF_VALUE: 27
PIF_VALUE: 25
PIF_VALUE: 26
PIF_VALUE: 1
PIF_VALUE: 24
PIF_VALUE: 26
PIF_VALUE: 25
PIF_VALUE: 2
PIF_VALUE: 25
PIF_VALUE: 1
PIF_VALUE: 29
PIF_VALUE: 25
PIF_VALUE: 26
PIF_VALUE: 25
PIF_VALUE: 27
PIF_VALUE: 25
PIF_VALUE: 1
PIF_VALUE: 1
PIF_VALUE: 26
PIF_VALUE: 25
PIF_VALUE: 27
PIF_VALUE: 27
PIF_VALUE: 1
PIF_VALUE: 27
PIF_VALUE: 0
PIF_VALUE: 23
PIF_VALUE: 1
PIF_VALUE: 2
PIF_VALUE: 26
PIF_VALUE: 28
PIF_VALUE: 27
PIF_VALUE: 26
PIF_VALUE: 29
PIF_VALUE: 23
PIF_VALUE: 26
PIF_VALUE: 28
PIF_VALUE: 24
PIF_VALUE: 26
PIF_VALUE: 1
PIF_VALUE: 23
PIF_VALUE: 26
PIF_VALUE: 26
PIF_VALUE: 1
PIF_VALUE: 15
PIF_VALUE: 27
PIF_VALUE: 26
PIF_VALUE: 1
PIF_VALUE: 27
PIF_VALUE: 24
PIF_VALUE: 2
PIF_VALUE: 1
PIF_VALUE: 2
PIF_VALUE: 2
PIF_VALUE: 26
PIF_VALUE: 2
PIF_VALUE: 27
PIF_VALUE: 1
PIF_VALUE: 24
PIF_VALUE: 24
PIF_VALUE: 25
PIF_VALUE: 0
PIF_VALUE: 27
PIF_VALUE: 23
PIF_VALUE: 28
PIF_VALUE: 24
PIF_VALUE: 27
PIF_VALUE: 23
PIF_VALUE: 27
PIF_VALUE: 25
PIF_VALUE: 25
PIF_VALUE: 27
PIF_VALUE: 1
PIF_VALUE: 24
PIF_VALUE: 26
PIF_VALUE: 25
PIF_VALUE: 25
PIF_VALUE: 27
PIF_VALUE: 0
PIF_VALUE: 24
PIF_VALUE: 25
PIF_VALUE: 25
PIF_VALUE: 1
PIF_VALUE: 27
PIF_VALUE: 27
PIF_VALUE: 25
PIF_VALUE: 24
PIF_VALUE: 1
PIF_VALUE: 27
PIF_VALUE: 20
PIF_VALUE: 1
PIF_VALUE: 26
PIF_VALUE: 25
PIF_VALUE: 23
PIF_VALUE: 26
PIF_VALUE: 23
PIF_VALUE: 1
PIF_VALUE: 30
PIF_VALUE: 26
PIF_VALUE: 4
PIF_VALUE: 24
PIF_VALUE: 27
PIF_VALUE: 27
PIF_VALUE: 1
PIF_VALUE: 26
PIF_VALUE: 23
PIF_VALUE: 24
PIF_VALUE: 2
PIF_VALUE: 1
PIF_VALUE: 25
PIF_VALUE: 25
PIF_VALUE: 27
PIF_VALUE: 1
PIF_VALUE: 24
PIF_VALUE: 26
PIF_VALUE: 3
PIF_VALUE: 26
PIF_VALUE: 23
PIF_VALUE: 27
PIF_VALUE: 29
PIF_VALUE: 0
PIF_VALUE: 26
PIF_VALUE: 2
PIF_VALUE: 25
PIF_VALUE: 30
PIF_VALUE: 25
PIF_VALUE: 27
PIF_VALUE: 24
PIF_VALUE: 26
PIF_VALUE: 25
PIF_VALUE: 24
PIF_VALUE: 19
PIF_VALUE: 25
PIF_VALUE: 1
PIF_VALUE: 25
PIF_VALUE: 1
PIF_VALUE: 4
PIF_VALUE: 1
PIF_VALUE: 25
PIF_VALUE: 28
PIF_VALUE: 25
PIF_VALUE: 27
PIF_VALUE: 25
PIF_VALUE: 26
PIF_VALUE: 28
PIF_VALUE: 26
PIF_VALUE: 27
PIF_VALUE: 1
PIF_VALUE: 1
PIF_VALUE: 25
PIF_VALUE: 27
PIF_VALUE: 23
PIF_VALUE: 1
PIF_VALUE: 23
PIF_VALUE: 27
PIF_VALUE: 25
PIF_VALUE: 3
PIF_VALUE: 28
PIF_VALUE: 25
PIF_VALUE: 23
PIF_VALUE: 25
PIF_VALUE: 1
PIF_VALUE: 24
PIF_VALUE: 27
PIF_VALUE: 24
PIF_VALUE: 27
PIF_VALUE: 1
PIF_VALUE: 24
PIF_VALUE: 1
PIF_VALUE: 1
PIF_VALUE: 24
PIF_VALUE: 25
PIF_VALUE: 1
PIF_VALUE: 24
PIF_VALUE: 26
PIF_VALUE: 27
PIF_VALUE: 1
PIF_VALUE: 24
PIF_VALUE: 26
PIF_VALUE: 27
PIF_VALUE: 1
PIF_VALUE: 23
PIF_VALUE: 26
PIF_VALUE: 2
PIF_VALUE: 25
PIF_VALUE: 1
PIF_VALUE: 27
PIF_VALUE: 1
PIF_VALUE: 1
PIF_VALUE: 25
PIF_VALUE: 25
PIF_VALUE: 23
PIF_VALUE: 1
PIF_VALUE: 3
PIF_VALUE: 25
PIF_VALUE: 2
PIF_VALUE: 1
PIF_VALUE: 24
PIF_VALUE: 3
PIF_VALUE: 28
PIF_VALUE: 27
PIF_VALUE: 23
PIF_VALUE: 28
PIF_VALUE: 26
PIF_VALUE: 25
PIF_VALUE: 24
PIF_VALUE: 23
PIF_VALUE: 1
PIF_VALUE: 28
PIF_VALUE: 24
PIF_VALUE: 27
PIF_VALUE: 23
PIF_VALUE: 24
PIF_VALUE: 1
PIF_VALUE: 25
PIF_VALUE: 0
PIF_VALUE: 26
PIF_VALUE: 26
PIF_VALUE: 2
PIF_VALUE: 26
PIF_VALUE: 29
PIF_VALUE: 1
PIF_VALUE: 25
PIF_VALUE: 1
PIF_VALUE: 27
PIF_VALUE: 1
PIF_VALUE: 25
PIF_VALUE: 27

## 2020-08-12 ASSESSMENT — PAIN DESCRIPTION - ONSET: ONSET: PROGRESSIVE

## 2020-08-12 ASSESSMENT — PAIN SCALES - GENERAL
PAINLEVEL_OUTOF10: 7
PAINLEVEL_OUTOF10: 6
PAINLEVEL_OUTOF10: 8
PAINLEVEL_OUTOF10: 6
PAINLEVEL_OUTOF10: 0
PAINLEVEL_OUTOF10: 5
PAINLEVEL_OUTOF10: 8
PAINLEVEL_OUTOF10: 0

## 2020-08-12 ASSESSMENT — PAIN DESCRIPTION - LOCATION
LOCATION: CHEST
LOCATION: BACK

## 2020-08-12 ASSESSMENT — COPD QUESTIONNAIRES: CAT_SEVERITY: MILD

## 2020-08-12 ASSESSMENT — PAIN DESCRIPTION - PAIN TYPE
TYPE: CHRONIC PAIN
TYPE: SURGICAL PAIN

## 2020-08-12 ASSESSMENT — PAIN DESCRIPTION - DESCRIPTORS: DESCRIPTORS: ACHING

## 2020-08-12 ASSESSMENT — PAIN DESCRIPTION - PROGRESSION: CLINICAL_PROGRESSION: GRADUALLY WORSENING

## 2020-08-12 ASSESSMENT — PAIN DESCRIPTION - ORIENTATION: ORIENTATION: MID

## 2020-08-12 ASSESSMENT — PAIN DESCRIPTION - FREQUENCY: FREQUENCY: CONTINUOUS

## 2020-08-12 NOTE — PROGRESS NOTES
Patient was extubated and placed on 10 Lpm high flow nasal cannula due to PaO2 of 62.4 on his ABG. Current SpO2 is 96%. Patient immediately asked for his IS. Will continue to monitor.

## 2020-08-12 NOTE — PROGRESS NOTES
08/12/20 1326   Vent Patient Data   Plateau Pressure 17 [id number]   Static Compliance 55.8 mL/cmH2O   Dynamic Compliance 44.7 mL/cmH2O

## 2020-08-12 NOTE — ADDENDUM NOTE
Addendum  created 08/12/20 1512 by Owen Gautam MD    Clinical Note Signed, Intraprocedure Event edited, Intraprocedure Meds edited

## 2020-08-12 NOTE — ANESTHESIA PRE PROCEDURE
Department of Anesthesiology  Preprocedure Note       Name:  Praveena Rowe   Age:  77 y.o.  :  1955                                          MRN:  [id number]         Date:  2020      Surgeon: Cortney Degree): Zak Roman MD    Procedure: Procedure(s):  CABG CORONARY ARTERY BYPASS/LIGATION SARA    Medications prior to admission:   Prior to Admission medications    Medication Sig Start Date End Date Taking? Authorizing Provider   cyclobenzaprine (FLEXERIL) 10 MG tablet Take 10 mg by mouth 3 times daily as needed for Muscle spasms    Historical Provider, MD   atorvastatin (LIPITOR) 40 MG tablet take 1 tablet by mouth once daily 20   Inderjit Thomas MD   carvedilol (COREG) 6.25 MG tablet take 1 tablet by mouth twice a day with meals 20   Inderjit Thomas MD   isosorbide mononitrate (IMDUR) 30 MG extended release tablet take 1 tablet by mouth once daily 20   Inderjit Thomas MD   gabapentin (NEURONTIN) 300 MG capsule Take 600 mg by mouth 2 times daily. Historical Provider, MD   glipiZIDE (GLUCOTROL) 5 MG tablet  1/15/20   Historical Provider, MD   traZODone (DESYREL) 50 MG tablet Take 150 mg by mouth nightly     Historical Provider, MD   amLODIPine (NORVASC) 10 MG tablet Take 1 tablet by mouth daily 19  Inderjit Thomas MD   olmesartan (BENICAR) 40 MG tablet Take 1 tablet by mouth daily 19   Inderjit Thomas MD   aspirin 325 MG tablet Take 325 mg by mouth daily    Historical Provider, MD   nitroGLYCERIN (NITROSTAT) 0.4 MG SL tablet Place 0.4 mg under the tongue every 5 minutes as needed for Chest pain up to max of 3 total doses. If no relief after 1 dose, call 911. Historical Provider, MD   tamsulosin (FLOMAX) 0.4 MG capsule Take 0.8 mg by mouth nightly    Historical Provider, MD   Multiple Vitamins-Minerals (CENTRUM SILVER) TABS Take 1 tablet by mouth daily. Historical Provider, MD   metformin (GLUCOPHAGE) 1000 MG tablet Take 1,000 mg by mouth 2 times daily.       Historical MD Luis       Current medications:    No current facility-administered medications for this visit. No current outpatient medications on file.      Facility-Administered Medications Ordered in Other Visits   Medication Dose Route Frequency Provider Last Rate Last Dose    aminocaproic acid (AMICAR) 5,000 mg in sodium chloride 0.9 % 250 mL infusion bolus  5 g Intravenous Once Elisabeth Hollis MD        insulin regular (HUMULIN R;NOVOLIN R) 100 Units in sodium chloride 0.9 % 100 mL infusion  0.1 Units/kg/hr Intravenous Once Elisabeth Hollis MD        norepinephrine (LEVOPHED) 16 mg in dextrose 5 % 250 mL infusion  2 mcg/min Intravenous Once Elisabeth Hollis MD        phenylephrine (TINO-SYNEPHRINE) 50 mg in dextrose 5 % 250 mL infusion  100 mcg/min Intravenous Once Elisabeth Hollis MD        chlorhexidine (PERIDEX) 0.12 % solution             fentaNYL (SUBLIMAZE) injection    PRN Fabián Cross MD   50 mcg at 08/12/20 0715    midazolam (VERSED) injection    PRN Fabián Cross MD   2 mg at 08/12/20 0717    norepinephrine (LEVOPHED) 16 mg in dextrose 5% 250 mL infusion    Continuous PRN Fabián Cross MD 1.9 mL/hr at 08/12/20 0821 2 mcg/min at 08/12/20 0821    nitroGLYCERIN 50 mg in dextrose 5% 250 mL infusion    Continuous PRN Fabián Cross MD 6.9 mL/hr at 08/12/20 0821 23 mcg/min at 08/12/20 0821    sodium chloride flush 0.9 % injection 10 mL  10 mL Intravenous 2 times per day Maggie Tomas APRN - CNP   10 mL at 08/11/20 2101    sodium chloride flush 0.9 % injection 10 mL  10 mL Intravenous PRN Maggie Tomas APRN - CNP        lactated ringers infusion   Intravenous Continuous ETTA العلي - CNP 50 mL/hr at 08/12/20 0626      vancomycin (VANCOCIN) 2,000 mg in dextrose 5 % 500 mL IVPB  2,000 mg Intravenous On Call to [address] [place] [address], ETTA -  mL/hr at 08/12/20 0729 2,000 mg at 08/12/20 0729    chlorhexidine (HIBICLENS) 4 % liquid   Topical See Admin Instructions Lindsay COLES Leonarda Pulliam, APRN - CNP        pantoprazole (PROTONIX) injection 40 mg  40 mg Intravenous Daily Real Steve, ETTA - CNP   40 mg at 08/12/20 0518    mupirocin (BACTROBAN) 2 % ointment   Topical BID Real Steve, APRN - CNP        insulin lispro (1 Unit Dial) 0-18 Units  0-18 Units Subcutaneous TID  Ritchie Gaona MD   6 Units at 08/11/20 1712    insulin lispro (1 Unit Dial) 0-9 Units  0-9 Units Subcutaneous Nightly Jacquelin Correa MD   2 Units at 08/11/20 2100    insulin lispro (1 Unit Dial) 6 Units  6 Units Subcutaneous TID  Jenny Lazar MD   6 Units at 08/11/20 1712    insulin glargine (LANTUS;BASAGLAR) injection pen 10 Units  10 Units Subcutaneous Q12H Jenny Lazar MD   10 Units at 08/11/20 2100    melatonin tablet 6 mg  6 mg Oral Nightly PRN Carl Rodas DO   6 mg at 08/10/20 2117    glucose (GLUTOSE) 40 % oral gel 15 g  15 g Oral PRN Jenny Lazar MD        dextrose 50 % IV solution  12.5 g Intravenous PRN Jenny Lazar MD        glucagon (rDNA) injection 1 mg  1 mg Intramuscular PRN Jenny Lazar MD        dextrose 5 % solution  100 mL/hr Intravenous PRN Jenny Lazar MD        Vitamin D (CHOLECALCIFEROL) tablet 2,000 Units  2,000 Units Oral Daily Jenny Lazar MD   2,000 Units at 08/11/20 0812    tiZANidine (ZANAFLEX) tablet 4 mg  4 mg Oral Q8H PRN Jenny Lazar MD   4 mg at 08/12/20 0207    rosuvastatin (CRESTOR) tablet 20 mg  20 mg Oral Nightly Jenny Lazar MD   20 mg at 08/11/20 2100    omega-3 acid ethyl esters (LOVAZA) capsule 2 g  2 g Oral BID Jenny Lazar MD        labetalol (NORMODYNE;TRANDATE) injection 10 mg  10 mg Intravenous Q4H PRN Dewayne Le MD   10 mg at 08/07/20 0043    metoprolol tartrate (LOPRESSOR) tablet 100 mg  100 mg Oral BID Osamn Gerard MD   100 mg at 08/12/20 0555    sodium chloride flush 0.9 % injection 10 mL  10 mL Intravenous 2 times per day Tonia Ross MD   10 mL at 08/11/20 0900    sodium chloride flush 0.9 % injection 10 mL  10 mL Intravenous PRN Helen Fernandez MD   10 mL at 08/07/20 0044    acetaminophen (TYLENOL) tablet 650 mg  650 mg Oral Q4H PRN Helen Fernandez MD        oxyCODONE-acetaminophen (PERCOCET) 5-325 MG per tablet 1 tablet  1 tablet Oral Q4H PRN Helen Fernandez MD        Or    oxyCODONE-acetaminophen (PERCOCET) 5-325 MG per tablet 2 tablet  2 tablet Oral Q4H PRN Helen Fernandez MD   2 tablet at 08/12/20 0453    ondansetron (ZOFRAN) injection 4 mg  4 mg Intravenous Q6H PRN Helen Fernandez MD        polyethylene glycol [facility]) packet 17 g  17 g Oral Daily PRN Michael Manning MD   17 g at 08/09/20 0827    promethazine (PHENERGAN) tablet 12.5 mg  12.5 mg Oral Q6H PRN Michael Manning MD        potassium chloride (KLOR-CON M) extended release tablet 40 mEq  40 mEq Oral PRN Michael Manning MD        Or    potassium bicarb-citric acid (EFFER-K) effervescent tablet 40 mEq  40 mEq Oral PRN Michael Manning MD        Or    potassium chloride 10 mEq/100 mL IVPB (Peripheral Line)  10 mEq Intravenous PRN Michael Manning MD        magnesium sulfate 1 g in dextrose 5% 100 mL IVPB  1 g Intravenous PRN Michael Manning MD        sodium phosphate 19.17 mmol in dextrose 5 % 250 mL IVPB  0.16 mmol/kg Intravenous PRN Michael Manning MD        Or    sodium phosphate 38.34 mmol in dextrose 5 % 250 mL IVPB  0.32 mmol/kg Intravenous PRN Michael Manning MD        amLODIPine (NORVASC) tablet 10 mg  10 mg Oral Daily Michael Manning MD   10 mg at 08/11/20 9777    aspirin tablet 325 mg  325 mg Oral Daily Michael Manning MD   325 mg at 08/11/20 1380    isosorbide mononitrate (IMDUR) extended release tablet 30 mg  30 mg Oral Daily Michael Manning MD   30 mg at 08/11/20 5329    therapeutic multivitamin-minerals 1 tablet  1 tablet Oral Daily Michael Manning MD   1 tablet at 08/11/20 0812    nitroGLYCERIN (NITROSTAT) SL tablet 0.4 mg  0.4 mg Sublingual Q5 Min PRN Abiroger B EPIDURAL STEROID INJECTION SITE CONFIRMED BY FLUOROSCOPY performed by Madisyn Sullivan MD at [place] Left 9/10/2019    LEFT SACROILIAC JOINT INJECTION SITE CONFIRMED BY FLUOROSCOPY performed by Madisyn Sullivan MD at [facility]      Repaired torn MCL TOTAL RIGHT    SPINAL CORD STIMULATOR SURGERY N/A 2020    SPINAL CORD STIMULATOR TRIAL WITH FLUOROSCOPY (90498, [id number], 41221) - [employer] performed by Sg Mario MD at [address] Right        Social History:    Social History     Tobacco Use    Smoking status: Former Smoker     Packs/day: 1.00     Last attempt to quit: 1984     Years since quittin.5    Smokeless tobacco: Never Used    Tobacco comment: stopped 25 years ago   Substance Use Topics    Alcohol use: No                                Counseling given: Not Answered  Comment: stopped 25 years ago      Vital Signs (Current): There were no vitals filed for this visit.                                            BP Readings from Last 3 Encounters:   20 139/76   20 (!) 151/90   20 131/89       NPO Status:                                                                                 BMI:   Wt Readings from Last 3 Encounters:   20 269 lb 10 oz (122.3 kg)   20 264 lb 15.9 oz (120.2 kg)   20 265 lb (120.2 kg)     There is no height or weight on file to calculate BMI.    CBC:   Lab Results   Component Value Date    WBC 5.8 2020    RBC 4.53 2020    HGB 12.5 2020    HCT 41.4 2020    MCV 91.4 2020    RDW 14.7 2020     2020       CMP:   Lab Results   Component Value Date     2020    K 4.5 2020    K 4.5 2018     2020    CO2 25 2020    BUN 20 2020    CREATININE 1.1 2020    GFRAA >60 2020    GFRAA >60 2011    AGRATIO 1.5 08/05/2020    LABGLOM >60 08/09/2020    GLUCOSE 199 08/09/2020    PROT 6.3 08/06/2020    CALCIUM 9.6 08/09/2020    BILITOT 0.3 08/06/2020    ALKPHOS 107 08/06/2020    AST 13 08/06/2020    ALT 12 08/06/2020       POC Tests:   Recent Labs     08/12/20  0809   POCGLU 205*   POCNA 136   POCK 4.4   POCHCT 37.0*       Coags:   Lab Results   Component Value Date    PROTIME 11.7 08/06/2020    INR 1.01 08/06/2020    APTT 37.2 08/06/2020       HCG (If Applicable): No results found for: PREGTESTUR, PREGSERUM, HCG, HCGQUANT     ABGs:   Lab Results   Component Value Date    PHART 7.334 08/12/2020    PO2ART 171.9 08/12/2020    SRR0FWR 49.6 08/12/2020    GGI9EFH 26.4 08/12/2020    BEART 1 08/12/2020    T4NZYNHC 99 08/12/2020        Type & Screen (If Applicable):  No results found for: LABABO, LABRH    Drug/Infectious Status (If Applicable):  No results found for: HIV, HEPCAB    COVID-19 Screening (If Applicable):   Lab Results   Component Value Date    COVID19 Not Detected 08/06/2020    COVID19 NOT DETECTED 05/13/2020         Anesthesia Evaluation  Patient summary reviewed and Nursing notes reviewed  Airway: Mallampati: III        Dental:    (+) other  Comment: Poor dentition    Pulmonary:Negative Pulmonary ROS and normal exam    (+) COPD: mild,                            ROS comment: Possible AYAZ. Cardiovascular:  Exercise tolerance: poor (<4 METS),   (+) hypertension:, CAD:, dysrhythmias: atrial fibrillation and ventricular tachycardia, CHF: systolic and diastolic, hyperlipidemia      ECG reviewed  Rhythm: regular    Echocardiogram reviewed               ROS comment: EF 45%     Neuro/Psych:   (+) neuromuscular disease:,              ROS comment: Chronic pain with pain pump use. GI/Hepatic/Renal:   (+) GERD:, renal disease: CRI, morbid obesity         ROS comment: H/o ETOH use. .   Endo/Other:    (+) Diabetes (poor control)Type II DM, , electrolyte abnormalities, . ROS comment: BPH. Possible blood transfusion at 08/11/20 9270    aspirin tablet 325 mg  325 mg Oral Daily Keven Busch MD   325 mg at 08/11/20 8515    isosorbide mononitrate (IMDUR) extended release tablet 30 mg  30 mg Oral Daily Keven Busch MD   30 mg at 08/11/20 0557    therapeutic multivitamin-minerals 1 tablet  1 tablet Oral Daily Keven Busch MD   1 tablet at 08/11/20 2577    nitroGLYCERIN (NITROSTAT) SL tablet 0.4 mg  0.4 mg Sublingual Q5 Min PRN Keven Busch MD        tamsulosin (FLOMAX) capsule 0.8 mg  0.8 mg Oral Nightly Keven Busch MD   0.8 mg at 08/11/20 2100    traMADol (ULTRAM) tablet 50 mg  50 mg Oral Q6H PRN Keven Busch MD   50 mg at 08/11/20 0536    gabapentin (NEURONTIN) capsule 600 mg  600 mg Oral BID Keven Busch MD   600 mg at 08/11/20 2100    metoprolol (LOPRESSOR) injection 5 mg  5 mg Intravenous Q6H PRN ETTA Duron - CNP            LABS:  Lab Results   Component Value Date    WBC 5.8 08/09/2020    HGB 12.5 (L) 08/12/2020    HCT 41.4 08/09/2020    MCV 91.4 08/09/2020     08/09/2020    GLUCOSE 199 (H) 08/09/2020    PROTIME 11.7 08/06/2020    INR 1.01 08/06/2020    APTT 37.2 (H) 08/06/2020       Lab Results   Component Value Date     (L) 08/09/2020    K 4.5 08/09/2020     08/09/2020    CO2 25 08/09/2020    PHOS 4.9 08/04/2020    BUN 20 08/09/2020    CREATININE 1.1 08/09/2020       Problem List:  Patient Active Problem List   Diagnosis    Other hyperlipidemia    Essential hypertension, benign    Coronary artery disease involving native coronary artery of native heart without angina pectoris    Primary cardiomyopathy ([facility]    Chest pain    Dizziness and giddiness    Palpitations    GERD with esophagitis    Obesity (BMI 30-39. 9)    DM2 (diabetes mellitus, type 2) ([facility])    BPH (benign prostatic hyperplasia)    CKD (chronic kidney disease) stage 3, GFR 30-59 ml/min ([facility])    Chronic back pain    Ventricular tachycardia (HCC)    Tachycardia    PAT (paroxysmal atrial tachycardia) ([facility]           Nik Ruby MD   8/12/2020

## 2020-08-12 NOTE — ANESTHESIA POSTPROCEDURE EVALUATION
Department of Anesthesiology  Postprocedure Note    Patient: Brett Sanchez  MRN: [id number]  YOB: 1955  Date of evaluation: 8/12/2020  Time:  1:50 PM     Procedure Summary     Date:  08/12/20 Room / Location:  [address]    Anesthesia Start:  7686 Anesthesia Stop:  1092    Procedure:  CABG CORONARY ARTERY BYPASS X 3, LIGATION SARA WITH 45MM ATRICLIP, EVH LEFT LEG, TOTAL CPB, ERIC, DOPPLER VERIFICATION OF BYPASS GRAFT FLOW, BILATERAL INTERCOSTAL NERVE BLOCK (N/A ) Diagnosis:  (CORONARY ARTERY DISEASE)    Surgeon:  Azam Garsia MD Responsible Provider:  Alicia Gerardo MD    Anesthesia Type:  general ASA Status:  4          Anesthesia Type: general    Bertha Phase I:      Bertha Phase II:      Last vitals: Reviewed and per EMR flowsheets. Anesthesia Post Evaluation    Patient location during evaluation: ICU  Patient participation: complete - patient cannot participate  Level of consciousness: sedated and ventilated  Airway patency: patent  Complications: no  Cardiovascular status: hemodynamically stable  Respiratory status: acceptable and intubated  Comments: Patient continues to be dilated with CI 4/CO 10 in CVICU with SVR about 600.

## 2020-08-12 NOTE — PLAN OF CARE
Pt receiving percocet for chronic back pain, has spinal cord stimulator. Denies CP. Continue to monitor for pain and pain related behaviors.    Problem: Pain:  Goal: Pain level will decrease  Description: Pain level will decrease  Outcome: Ongoing

## 2020-08-12 NOTE — PROGRESS NOTES
CC: 3V CAD, SVT    No c/o   NSR  CTAB RRR abd benign  As per CC  For CABG with ligation SARA today  Seen in pre-op holding  All patient, wife and daughter's questions answered

## 2020-08-12 NOTE — PROGRESS NOTES
Patient awake and following commands. Patient placed on CPAP per open heart protocol. ABG drawn 30 minutes later and WNL. Respiratory called for weaning parameters. NIF - 22. Patient suctioned and extubated to 10 liters nasal cannula  OG tube discontinued. Patient tolerated well. Oxygen saturation 95 on 10 liters nasal cannula.   Patient alert and oriented X 3.           RECOVERY PERIOD ENDS 4251

## 2020-08-12 NOTE — OP NOTE
Operative Note  ______________________________________________________________    Patient: Karol Wilde  YOB: 1955  MRN: [id number]  Date of Procedure: 8/12/2020    Pre-Op Diagnosis: CORONARY ARTERY DISEASE, VT / SVT, DM, HTN, HLD    Post-Op Diagnosis: Same       Procedure(s):  CABG CORONARY ARTERY BYPASS X 3 (SV-LAD, SV-OM1, SV-PDA), LIGATION SARA WITH 45MM ATRICLIP, EVH LEFT LEG, TOTAL CPB, ERIC, DOPPLER VERIFICATION OF BYPASS GRAFT FLOW, BILATERAL INTERCOSTAL NERVE BLOCK x 5 with 1/4% marcaine    Anesthesia: GETA by Dr. Ashley Day): Ssisy Reyna MD    Assistants due to complexity of case: Rachel Merrill first assist, [facility] and [facility]    Perfusionist: Yoanna Johnson    Pump Time: 90   Cross-clamp Time: 72    Drains: 24 Fr Noé x 1   Pacing Wires: Bipolar ventricular    Estimated Blood Loss (mL): 782     Complications: None    Specimens:   * No specimens in log *    Implants:  Implant Name Type Inv. Item Serial No.  Lot No. LRB No. Used Action   CLIP LIGATION ATRICURE FLEX V 45MM Fastener CLIP LIGATION ATRICURE FLEX V 45MM  ATRICURE [id number] N/A 1 Implanted         Drains:   Chest Tube 1 Anterior Mediastinal 24 Persian (Active)       Urethral Catheter Temperature probe 16 fr (Active)       Findings: Excellent SV.  >2mm coronary targets with normal walls. 1+ AI with normal EF post-CABG. Excellent doppler signals in all grafts post-CPB. Details of Procedure:    After placement of appropriate monitors, induction of general endotracheal anesthesia and infusion of appropriate antibiotics, the patient was sterilely prepped and draped. Saphenous vein was endoscopically harvested from the left leg and thigh. These wounds were irrigated and closed in layers after achieving meticulous hemostasis. The patient was then cannulated in standard fashion for antegrade cardioplegia and cardiopulmonary bypass.   After reaching an appropriate ACT, the patient went on cardiopulmonary bypass without difficulty. The aorta was cross-clamped and warm antegrade cardioplegia was given for 300cc or cardiac standstill, and then cold blood cardioplegia was given to complete induction and intermittently throughout the case as needed. First, the vein graft to the PDA was performed in running end-to-side fashion. Next, a punch aortotomy was performed and the proximal anastomosis was performed in running end-to-side fashion. Next, the vein graft to the first obtuse marginal was performed in running end-to-side fashion. At this point, the left atrial appendage was sized and found to be appropriate for a 45mm ACHV AtriClip which was then placed at the base of the appendage. Next, a punch aortotomy was performed and the proximal anastomosis was performed in running end-to-side fashion. Next, the vein graft to the distal LAD was performed in running end-to-side fashion. Next, a punch aortotomy was performed and the proximal anastomosis was performed in running end-to-side fashion. The patient was given a hot shot of cardioplegia. De-airing maneuvers were performed. The aortic cross-clamp was removed and the patient allowed to re-perfuse. During this time, one 24 Fr Noé drains were brought through a separate stab incision inferiorly. A bipolar ventricular pacing wire was placed. The patient was weaned from cardiopulmonary bypass without difficulty and was decannulated and heparin reversed. The grafts were interrogated with doppler ultrasound and noted to have excellent signals. The wound was checked for hemostasis, which was meticulously achieved. Findings were as above. The pericardium and thymic fat was loosely re-approximated in the midline using interrupted 0 Ethibond sutures. The sternal edges were treated with platelet gel. The median sternotomy was then closed in standard fashion, irrigating between each layer.   Bilateral parasternal intercostal nerve blocks were performed with 1/4% marcaine prior to closing the fascia. The patient tolerated the procedure well without apparent complications and was taken in critical, but stable condition to the CVICU. Sponge and needle count were reported as correct at the end of the procedure.     Azam Garsia MD  Date: 8/12/2020  Time: 12:44 PM

## 2020-08-12 NOTE — ANESTHESIA PROCEDURE NOTES
Arterial Line:    An arterial line was placed using surface landmarks, in the pre-op for the following indication(s): continuous blood pressure monitoring and blood sampling needed. A 20 gauge (size), 12 cm (length), Arrow (type) catheter was placed, Seldinger technique used, into the left brachial artery, secured by Tegaderm. Anesthesia type: Local  Local infiltration: Injection    Events:  patient tolerated procedure well with no complications and EBL > 5mL. Additional notes:  Good waveform.   8/12/2020 6:50 AM8/12/2020 6:57 AM  Anesthesiologist: Wilfrid Tavarez MD  Performed: Anesthesiologist   Preanesthetic Checklist  Completed: patient identified, site marked, surgical consent, pre-op evaluation, timeout performed, IV checked, risks and benefits discussed, monitors and equipment checked, anesthesia consent given, oxygen available and patient being monitored

## 2020-08-12 NOTE — PROGRESS NOTES
Patient following all commands, able to take deep breaths when directed, on the vent. Patient became apneic when switched to spontaneous. Patient back on SIMV, will continue to monitor and adjust appropriately.

## 2020-08-12 NOTE — PROGRESS NOTES
Patient passed weaning parameters with RSBI of 17.6 and NIF -22 cwp. Patient is following commands and is on Spontaneous mode on the ventilator 10/5. Will await ABG and completion of SBT for extubation.

## 2020-08-12 NOTE — PLAN OF CARE
Problem: Falls - Risk of:  Goal: Will remain free from falls  Description: Will remain free from falls  Outcome: Met This Shift  Note: Gait steady with cane. Problem: Tissue Perfusion - Peripheral, Altered:  Goal: Absence of hematoma at arterial access site  Description: Absence of hematoma at arterial access site  8/11/2020 0721 by Ninfa Humphrey RN  Note: Right groin site clear. No oozing, no hematoma, or ecchymosis noted.  Will continue to monitor

## 2020-08-12 NOTE — PROGRESS NOTES
Pt verified information regarding surgery, name, birth date, surgeon, procedure and allergies: NKA. Patient transferred to [address] [place] [address] for surgery. Appropriate antibiotics ordered: Vancomycin. Beta blocker: Esmolol @ 0627. Lab work within normal limits, 2 units of RBC's on call to OR, vital signs stable, Mepilex sacral border applied and 2% chlorhexidine gluconate skin prep given. Patient and family educated about surgery and pain management. Arterial line placed in left brachial and TLC introducer in right IJ with PA line by Dr. Rebeca Ivory. To surgery per bed at 0734.

## 2020-08-12 NOTE — PROGRESS NOTES
Patient admitted to CVU from [place] [address] and attached to ventilator and monitors. Report received from anesthesiologist.  Chest x-ray ordered. Labs drawn and sent. Assessment complete. Hemodymanics stable and will continue to monitor. Family let back to see patient. Visiting hours reviewed and all questions answered. Family aware of discharge class. Primary RN Rosalind/Kandice.     Gurpreet Aguayo

## 2020-08-12 NOTE — ANESTHESIA PROCEDURE NOTES
Central Venous Line:    A central venous line was placed using surface landmarks, in the pre-op for the following indication(s): central venous access and CVP monitoring. 8/12/2020 7:00 AM8/12/2020 7:16 AM    Sterility preparation included the following: hand hygiene performed prior to procedure, maximum sterile barriers used and sterile technique used to drape from head to toe. The patient was placed in Trendelenburg position. The right internal jugular vein was prepped. The site was prepped with Chloraprep. A 9 Fr (size), introducer triple lumen was placed. During the procedure, the following specific steps were taken: target vein identified, needle advanced into vein and blood aspirated and guidewire advanced into vein. Intravenous verification was obtained by venous blood return. Post insertion care included: all ports aspirated, all ports flushed easily, guidewire removed intact, Biopatch applied, line sutured in place and dressing applied. During the procedure the patient experienced: patient tolerated procedure well with no complications and EBL < 5mL. Insertion site scrubbed per usage guidelines?: Yes  Skin prep agent dried for 3 minutes prior to procedure?:yes  Anesthesia type: localA(n) non-oximetric, 7.5 (size) Pulmonary Artery Catheter (PAC) was placed through the Introducer CVL in the right internal jugular vein. The PAC placement was confirmed by pressure tracing changes. The patient experienced the following events during the procedure: patient tolerated procedure well with no complications. PA Cath placed?: Yes    Additional notes:  Good waveform.   Staffing  Anesthesiologist: Ashok Roper MD  Performed: Anesthesiologist   Preanesthetic Checklist  Completed: patient identified, site marked, surgical consent, pre-op evaluation, timeout performed, IV checked, risks and benefits discussed, monitors and equipment checked, anesthesia consent given, oxygen available and

## 2020-08-13 LAB
ANION GAP SERPL CALCULATED.3IONS-SCNC: 9 MMOL/L (ref 3–16)
BUN BLDV-MCNC: 16 MG/DL (ref 7–20)
CALCIUM SERPL-MCNC: 8.7 MG/DL (ref 8.3–10.6)
CHLORIDE BLD-SCNC: 104 MMOL/L (ref 99–110)
CO2: 22 MMOL/L (ref 21–32)
CREAT SERPL-MCNC: 0.9 MG/DL (ref 0.8–1.3)
EKG ATRIAL RATE: 108 BPM
EKG DIAGNOSIS: NORMAL
EKG P AXIS: 147 DEGREES
EKG P-R INTERVAL: 184 MS
EKG Q-T INTERVAL: 328 MS
EKG QRS DURATION: 92 MS
EKG QTC CALCULATION (BAZETT): 439 MS
EKG R AXIS: -25 DEGREES
EKG T AXIS: 20 DEGREES
EKG VENTRICULAR RATE: 108 BPM
GFR AFRICAN AMERICAN: >60
GFR NON-AFRICAN AMERICAN: >60
GLUCOSE BLD-MCNC: 102 MG/DL (ref 70–99)
GLUCOSE BLD-MCNC: 105 MG/DL (ref 70–99)
GLUCOSE BLD-MCNC: 106 MG/DL (ref 70–99)
GLUCOSE BLD-MCNC: 106 MG/DL (ref 70–99)
GLUCOSE BLD-MCNC: 115 MG/DL (ref 70–99)
GLUCOSE BLD-MCNC: 119 MG/DL (ref 70–99)
GLUCOSE BLD-MCNC: 136 MG/DL (ref 70–99)
GLUCOSE BLD-MCNC: 136 MG/DL (ref 70–99)
GLUCOSE BLD-MCNC: 138 MG/DL (ref 70–99)
GLUCOSE BLD-MCNC: 144 MG/DL (ref 70–99)
GLUCOSE BLD-MCNC: 145 MG/DL (ref 70–99)
GLUCOSE BLD-MCNC: 147 MG/DL (ref 70–99)
GLUCOSE BLD-MCNC: 157 MG/DL (ref 70–99)
GLUCOSE BLD-MCNC: 161 MG/DL (ref 70–99)
GLUCOSE BLD-MCNC: 161 MG/DL (ref 70–99)
GLUCOSE BLD-MCNC: 165 MG/DL (ref 70–99)
GLUCOSE BLD-MCNC: 167 MG/DL (ref 70–99)
GLUCOSE BLD-MCNC: 185 MG/DL (ref 70–99)
GLUCOSE BLD-MCNC: 75 MG/DL (ref 70–99)
GLUCOSE BLD-MCNC: 87 MG/DL (ref 70–99)
GLUCOSE BLD-MCNC: 93 MG/DL (ref 70–99)
GLUCOSE BLD-MCNC: 99 MG/DL (ref 70–99)
HCT VFR BLD CALC: 34.6 % (ref 40.5–52.5)
HEMOGLOBIN: 11.8 G/DL (ref 13.5–17.5)
LIPOPROTEIN (A): 125
MAGNESIUM: 2 MG/DL (ref 1.8–2.4)
MCH RBC QN AUTO: 31.2 PG (ref 26–34)
MCHC RBC AUTO-ENTMCNC: 34.1 G/DL (ref 31–36)
MCV RBC AUTO: 91.4 FL (ref 80–100)
PDW BLD-RTO: 14.3 % (ref 12.4–15.4)
PERFORMED ON: ABNORMAL
PERFORMED ON: NORMAL
PLATELET # BLD: 157 K/UL (ref 135–450)
PMV BLD AUTO: 7.4 FL (ref 5–10.5)
POTASSIUM SERPL-SCNC: 4.3 MMOL/L (ref 3.5–5.1)
RBC # BLD: 3.79 M/UL (ref 4.2–5.9)
SODIUM BLD-SCNC: 135 MMOL/L (ref 136–145)
WBC # BLD: 15.9 K/UL (ref 4–11)

## 2020-08-13 PROCEDURE — 85027 COMPLETE CBC AUTOMATED: CPT

## 2020-08-13 PROCEDURE — 2700000000 HC OXYGEN THERAPY PER DAY

## 2020-08-13 PROCEDURE — 93010 ELECTROCARDIOGRAM REPORT: CPT | Performed by: INTERNAL MEDICINE

## 2020-08-13 PROCEDURE — 2100000000 HC CCU R&B

## 2020-08-13 PROCEDURE — 6370000000 HC RX 637 (ALT 250 FOR IP): Performed by: NURSE PRACTITIONER

## 2020-08-13 PROCEDURE — 6370000000 HC RX 637 (ALT 250 FOR IP): Performed by: THORACIC SURGERY (CARDIOTHORACIC VASCULAR SURGERY)

## 2020-08-13 PROCEDURE — 6360000002 HC RX W HCPCS: Performed by: THORACIC SURGERY (CARDIOTHORACIC VASCULAR SURGERY)

## 2020-08-13 PROCEDURE — 99233 SBSQ HOSP IP/OBS HIGH 50: CPT | Performed by: NURSE PRACTITIONER

## 2020-08-13 PROCEDURE — 93005 ELECTROCARDIOGRAM TRACING: CPT | Performed by: NURSE PRACTITIONER

## 2020-08-13 PROCEDURE — 2000000000 HC ICU R&B

## 2020-08-13 PROCEDURE — 83735 ASSAY OF MAGNESIUM: CPT

## 2020-08-13 PROCEDURE — 94761 N-INVAS EAR/PLS OXIMETRY MLT: CPT

## 2020-08-13 PROCEDURE — 2580000003 HC RX 258: Performed by: THORACIC SURGERY (CARDIOTHORACIC VASCULAR SURGERY)

## 2020-08-13 PROCEDURE — 80048 BASIC METABOLIC PNL TOTAL CA: CPT

## 2020-08-13 RX ORDER — SODIUM CHLORIDE 9 MG/ML
INJECTION, SOLUTION INTRAVENOUS
Status: DISPENSED
Start: 2020-08-13 | End: 2020-08-13

## 2020-08-13 RX ADMIN — POTASSIUM CHLORIDE 10 MEQ: 750 TABLET, FILM COATED, EXTENDED RELEASE ORAL at 18:21

## 2020-08-13 RX ADMIN — VANCOMYCIN HYDROCHLORIDE 2000 MG: 10 INJECTION, POWDER, LYOPHILIZED, FOR SOLUTION INTRAVENOUS at 06:38

## 2020-08-13 RX ADMIN — GABAPENTIN 600 MG: 300 CAPSULE ORAL at 20:03

## 2020-08-13 RX ADMIN — VANCOMYCIN HYDROCHLORIDE 2000 MG: 10 INJECTION, POWDER, LYOPHILIZED, FOR SOLUTION INTRAVENOUS at 19:09

## 2020-08-13 RX ADMIN — MUPIROCIN: 20 OINTMENT TOPICAL at 20:04

## 2020-08-13 RX ADMIN — SODIUM CHLORIDE 9.8 UNITS/HR: 9 INJECTION, SOLUTION INTRAVENOUS at 18:24

## 2020-08-13 RX ADMIN — FONDAPARINUX SODIUM 2.5 MG: 2.5 INJECTION SUBCUTANEOUS at 08:48

## 2020-08-13 RX ADMIN — Medication 10 ML: at 08:52

## 2020-08-13 RX ADMIN — STANDARDIZED SENNA CONCENTRATE AND DOCUSATE SODIUM 1 TABLET: 8.6; 5 TABLET ORAL at 08:48

## 2020-08-13 RX ADMIN — MAGNESIUM GLUCONATE 500 MG ORAL TABLET 400 MG: 500 TABLET ORAL at 08:48

## 2020-08-13 RX ADMIN — FUROSEMIDE 20 MG: 10 INJECTION, SOLUTION INTRAMUSCULAR; INTRAVENOUS at 18:22

## 2020-08-13 RX ADMIN — POTASSIUM CHLORIDE 20 MEQ: 29.8 INJECTION, SOLUTION INTRAVENOUS at 12:24

## 2020-08-13 RX ADMIN — METOPROLOL TARTRATE 25 MG: 25 TABLET, FILM COATED ORAL at 08:48

## 2020-08-13 RX ADMIN — MUPIROCIN: 20 OINTMENT TOPICAL at 08:52

## 2020-08-13 RX ADMIN — GABAPENTIN 600 MG: 300 CAPSULE ORAL at 08:47

## 2020-08-13 RX ADMIN — Medication 10 ML: at 20:04

## 2020-08-13 RX ADMIN — Medication 2000 UNITS: at 08:46

## 2020-08-13 RX ADMIN — PANTOPRAZOLE SODIUM 40 MG: 40 TABLET, DELAYED RELEASE ORAL at 08:48

## 2020-08-13 RX ADMIN — ACETAMINOPHEN 1000 MG: 500 TABLET, FILM COATED ORAL at 08:48

## 2020-08-13 RX ADMIN — FENTANYL CITRATE 25 MCG: 50 INJECTION, SOLUTION INTRAMUSCULAR; INTRAVENOUS at 06:38

## 2020-08-13 RX ADMIN — FENTANYL CITRATE 25 MCG: 50 INJECTION, SOLUTION INTRAMUSCULAR; INTRAVENOUS at 04:12

## 2020-08-13 RX ADMIN — ACETAMINOPHEN 1000 MG: 500 TABLET, FILM COATED ORAL at 20:03

## 2020-08-13 RX ADMIN — FUROSEMIDE 20 MG: 10 INJECTION, SOLUTION INTRAMUSCULAR; INTRAVENOUS at 08:48

## 2020-08-13 RX ADMIN — ASPIRIN 325 MG: 325 TABLET, COATED ORAL at 08:48

## 2020-08-13 RX ADMIN — METOPROLOL TARTRATE 12.5 MG: 25 TABLET, FILM COATED ORAL at 13:34

## 2020-08-13 RX ADMIN — FENTANYL CITRATE 25 MCG: 50 INJECTION, SOLUTION INTRAMUSCULAR; INTRAVENOUS at 02:03

## 2020-08-13 RX ADMIN — POTASSIUM CHLORIDE 10 MEQ: 750 TABLET, FILM COATED, EXTENDED RELEASE ORAL at 08:49

## 2020-08-13 RX ADMIN — TAMSULOSIN HYDROCHLORIDE 0.8 MG: 0.4 CAPSULE ORAL at 20:03

## 2020-08-13 RX ADMIN — STANDARDIZED SENNA CONCENTRATE AND DOCUSATE SODIUM 1 TABLET: 8.6; 5 TABLET ORAL at 20:04

## 2020-08-13 RX ADMIN — TRAMADOL HYDROCHLORIDE 100 MG: 50 TABLET, FILM COATED ORAL at 01:04

## 2020-08-13 RX ADMIN — SODIUM CHLORIDE 9.44 UNITS/HR: 9 INJECTION, SOLUTION INTRAVENOUS at 06:38

## 2020-08-13 RX ADMIN — TRAMADOL HYDROCHLORIDE 100 MG: 50 TABLET, FILM COATED ORAL at 07:21

## 2020-08-13 RX ADMIN — POTASSIUM CHLORIDE 10 MEQ: 750 TABLET, FILM COATED, EXTENDED RELEASE ORAL at 13:34

## 2020-08-13 RX ADMIN — ROSUVASTATIN CALCIUM 20 MG: 20 TABLET, FILM COATED ORAL at 20:03

## 2020-08-13 RX ADMIN — METOPROLOL TARTRATE 25 MG: 25 TABLET, FILM COATED ORAL at 20:04

## 2020-08-13 RX ADMIN — METOPROLOL TARTRATE 12.5 MG: 25 TABLET, FILM COATED ORAL at 15:42

## 2020-08-13 RX ADMIN — SODIUM CHLORIDE 23.54 UNITS/HR: 9 INJECTION, SOLUTION INTRAVENOUS at 12:20

## 2020-08-13 RX ADMIN — ACETAMINOPHEN 1000 MG: 500 TABLET, FILM COATED ORAL at 14:42

## 2020-08-13 RX ADMIN — TRAMADOL HYDROCHLORIDE 100 MG: 50 TABLET, FILM COATED ORAL at 13:34

## 2020-08-13 RX ADMIN — MAGNESIUM GLUCONATE 500 MG ORAL TABLET 400 MG: 500 TABLET ORAL at 20:03

## 2020-08-13 RX ADMIN — ACETAMINOPHEN 1000 MG: 500 TABLET, FILM COATED ORAL at 01:04

## 2020-08-13 RX ADMIN — MULTIPLE VITAMINS W/ MINERALS TAB 1 TABLET: TAB at 08:47

## 2020-08-13 RX ADMIN — INSULIN GLARGINE 18 UNITS: 100 INJECTION, SOLUTION SUBCUTANEOUS at 20:23

## 2020-08-13 RX ADMIN — TRAMADOL HYDROCHLORIDE 50 MG: 50 TABLET, FILM COATED ORAL at 19:20

## 2020-08-13 ASSESSMENT — PAIN SCALES - GENERAL
PAINLEVEL_OUTOF10: 4
PAINLEVEL_OUTOF10: 10
PAINLEVEL_OUTOF10: 7
PAINLEVEL_OUTOF10: 5
PAINLEVEL_OUTOF10: 2
PAINLEVEL_OUTOF10: 0
PAINLEVEL_OUTOF10: 5
PAINLEVEL_OUTOF10: 0
PAINLEVEL_OUTOF10: 7
PAINLEVEL_OUTOF10: 5
PAINLEVEL_OUTOF10: 5
PAINLEVEL_OUTOF10: 7
PAINLEVEL_OUTOF10: 5
PAINLEVEL_OUTOF10: 7
PAINLEVEL_OUTOF10: 0
PAINLEVEL_OUTOF10: 10
PAINLEVEL_OUTOF10: 6
PAINLEVEL_OUTOF10: 7
PAINLEVEL_OUTOF10: 5

## 2020-08-13 ASSESSMENT — PAIN DESCRIPTION - PAIN TYPE
TYPE: SURGICAL PAIN

## 2020-08-13 ASSESSMENT — PAIN DESCRIPTION - ORIENTATION
ORIENTATION: MID

## 2020-08-13 ASSESSMENT — PAIN DESCRIPTION - PROGRESSION
CLINICAL_PROGRESSION: NOT CHANGED
CLINICAL_PROGRESSION: GRADUALLY IMPROVING
CLINICAL_PROGRESSION: NOT CHANGED
CLINICAL_PROGRESSION: GRADUALLY IMPROVING
CLINICAL_PROGRESSION: GRADUALLY IMPROVING
CLINICAL_PROGRESSION: NOT CHANGED

## 2020-08-13 ASSESSMENT — PAIN DESCRIPTION - LOCATION
LOCATION: CHEST;STERNUM
LOCATION: STERNUM
LOCATION: CHEST;STERNUM
LOCATION: STERNUM

## 2020-08-13 ASSESSMENT — PAIN DESCRIPTION - FREQUENCY
FREQUENCY: CONTINUOUS

## 2020-08-13 ASSESSMENT — PAIN SCALES - WONG BAKER: WONGBAKER_NUMERICALRESPONSE: 0

## 2020-08-13 ASSESSMENT — PAIN DESCRIPTION - DESCRIPTORS
DESCRIPTORS: CONSTANT;DISCOMFORT
DESCRIPTORS: CONSTANT;DISCOMFORT

## 2020-08-13 ASSESSMENT — PAIN DESCRIPTION - ONSET
ONSET: ON-GOING

## 2020-08-13 NOTE — PROGRESS NOTES
[address] Daily Progress Note      Admit Date:  8/4/2020    No chief complaint on file. Subjective:  Mr. Regan Drummond of mild Chest pain at incision site. denies SOB. Chest tubes in place with minimal drainage. Mendoza and a line removed.     Objective:   /70   Pulse 113   Temp 97.3 °F (36.3 °C) (Core)   Resp 18   Ht 6' 2\" (1.88 m)   Wt 281 lb 8.4 oz (127.7 kg)   SpO2 96%   BMI 36.15 kg/m²       Intake/Output Summary (Last 24 hours) at 8/13/2020 1524  Last data filed at 8/13/2020 1458  Gross per 24 hour   Intake 943 ml   Output 6315 ml   Net -5372 ml       TELEMETRY: Sinus     Physical Exam:  General:  Awake, alert, oriented x 3, NAD  Skin:  Warm and dry  Neck:  JVD flat  Chest:  normal air entry  Cardiovascular:  RRR S1S2, no S3, no mrm  Abdomen:  Soft, ND, NT, No HSM  Extremities:  2+ edema    Medications:    sodium chloride flush  10 mL Intravenous 2 times per day    acetaminophen  1,000 mg Oral Q6H    sennosides-docusate sodium  1 tablet Oral BID    pantoprazole  40 mg Oral Daily    pantoprazole  40 mg Intravenous Daily    And    sodium chloride (PF)  10 mL Intravenous Daily    metoprolol tartrate  25 mg Oral BID    [START ON 8/15/2020] lisinopril  2.5 mg Oral Lunch    furosemide  20 mg Intravenous BID    magnesium oxide  400 mg Oral BID    mupirocin   Nasal BID    nitroGLYCERIN  1 patch Transdermal Daily    potassium chloride  10 mEq Oral TID WC    fondaparinux  2.5 mg Subcutaneous Daily    aspirin  325 mg Oral Daily    aspirin  300 mg Rectal Once    insulin lispro  0-12 Units Subcutaneous TID     insulin lispro  0-6 Units Subcutaneous Nightly    vancomycin (VANCOCIN) IV  2,000 mg Intravenous Q12H    insulin glargine  0.15 Units/kg Subcutaneous Nightly    [START ON 8/14/2020] insulin lispro  0.08 Units/kg Subcutaneous TID     Vitamin D  2,000 Units Oral Daily    rosuvastatin  20 mg Oral Nightly    omega-3 acid ethyl esters  2 g Oral BID    therapeutic multivitamin-minerals  1 tablet Oral Daily    tamsulosin  0.8 mg Oral Nightly    gabapentin  600 mg Oral BID      dextrose 5% and 0.45% NaCl with KCl 20 mEq 1,000 mL (08/12/20 1341)    propofol      lactated ringers      DOPamine Stopped (08/12/20 1345)    DOBUTamine      phenylephrine (TINO-SYNEPHRINE) 50mg/250mL infusion      milrinone      norepinephrine Stopped (08/12/20 1900)    nitroGLYCERIN Stopped (08/13/20 0816)    clevidipine      insulin 18.72 Units/hr (08/13/20 1436)    dextrose       sodium chloride flush, potassium chloride, magnesium sulfate, calcium chloride IVPB, calcium chloride IVPB, traMADol **OR** traMADol, diphenhydrAMINE, zolpidem, magnesium hydroxide, polyethylene glycol, ondansetron, metoclopramide, albuterol sulfate HFA, albumin human, lactated ringers, DOPamine, DOBUTamine, phenylephrine (TINO-SYNEPHRINE) 50mg/250mL infusion, furosemide, milrinone, norepinephrine, nitroGLYCERIN, clevidipine, glucose, dextrose, glucagon (rDNA), dextrose, fentanNYL, tiZANidine    Lab Data:  CBC:   Recent Labs     08/12/20  1340 08/12/20 2015 08/12/20  2351 08/13/20  0521   WBC 11.4*  --   --  15.9*   HGB 12.1* 12.9* 12.1* 11.8*   HCT 36.7*  --   --  34.6*   MCV 90.6  --   --  91.4     --   --  157     BMP:   Recent Labs     08/12/20  1340 08/13/20  0521    135*   K 3.9 4.3    104   CO2 23 22   BUN 22* 16   CREATININE 1.1 0.9     LIVER PROFILE: No results for input(s): AST, ALT, LIPASE, BILIDIR, BILITOT, ALKPHOS in the last 72 hours. Invalid input(s): AMYLASE,  ALB  PT/INR: No results for input(s): PROTIME, INR in the last 72 hours. APTT: No results for input(s): APTT in the last 72 hours. BNP:  No results for input(s): BNP in the last 72 hours.   IMAGING: none    Assessment/Plan:  Active Problems:    Essential hypertension, benign  Plan: home meds    Coronary artery disease involving native coronary artery of native heart without angina pectoris  Plan: severe 3VD s/p CABG. POD 1. Cont aspirin, statin    Primary cardiomyopathy (HCC)  Plan: lvef 45%, ischemic in origin, s/p Cardiac cath. CKD (chronic kidney disease) stage 3, GFR 30-59 ml/min ([facility])  Plan: monitor creatinine    Ventricular tachycardia (HCC)  Plan:         PAT (paroxysmal atrial tachycardia) ([facility] [address]  Plan: s/p atriclip. Cont lopressor    S/P coronary artery bypass graft x 3  Plan: POD 1. SVG to LAD, SVG to OM1, SVG to PDA. Remove lines and drains as needed. Recommend OOB. Incentive spirometry. Seth Fernandez MD 8/13/2020 3:24 PM

## 2020-08-13 NOTE — PROGRESS NOTES
Patient met criteria per open heart protocol to transition to stepdown level of care. Procedures explained to patient. Right [place] Mace discontinued and obturator inserted. Patient tolerated well and minimal ectopy on monitor.   Will monitor

## 2020-08-13 NOTE — PROGRESS NOTES
CC: 8/12/2020 s/p CABG x 3 and ligation SARA     Patient is doing well, nitro drip D/C     CV: sinus rhythm  ( sbp 120's/ 60's)   Pulm: diminished , O2 Sats 93-96% on high flow 6 NC, afebrile  Renal:K+ 4.3, Creat 0.9,   Heme:WBC 15.9, H/H 11.8/ 34.6  WT:  127.7 kg/122 kg /pre-op 123.4 kg - Lasix IV 20 BID  UOP: 5875  CT:  50/80/120-250  Incision:C/D/I, sternnum stable        As per CC:   Plan: Swanz and  Jillian D/C, Kelly D/C, chest tube remove when meets criteria, continue diuresis,  Encourage IS, Encourage activity - PT/ OT      Disp:Home with HomeCare

## 2020-08-13 NOTE — CONSULTS
Outpatient Cardiac Rehab explained to pt. Verbalized understanding. Given brochure. Closer to [facility] or [facility] for phase 2 cardiac rehab.  12:25 [address]-spoke with -no outpatient cardiac rehab program.  12:28 Called [facility]-spoke with -no cardiac rehab program.  12:30 Informed pt. Of above information about outlying programs. Verbalized understanding. 12:35 Pt. [phone] [address] Martin would next closer for cardiac rehab. Handout with their phone number put in L pocket of binder in room.

## 2020-08-13 NOTE — PROGRESS NOTES
[address]   Electrophysiology Progress Note     Date: 8/13/2020  Admit Date: 8/4/2020     Reason for consultation: Tachycardia    Chief Complaint: Tachycardia    History of Present Illness: History obtained from patient and medical record. Sandy Noel is a 77 y.o. male with a past medical history of CAD, HTN, DM, CKD, HLD, obesity, and AYAZ. Pt presented to hospital from the urology office due to urinary retention issues. He was noted to have transient tachycardia with HR in 200s. He was sent to [facility]. On arrival to [facility], he was in sinus tachycardia without any symptoms. During the admission, he was noted to have WCT with HR up to 220 BPM. He was found to have MVD on cath. He underwent EP study on 8/10/20, but no arrhythmia was able to be induced despite aggressive pacing. S/p CABG x3 with SARA ligation (8/12/20). Interval Hx: Today, he is being seen for follow up. S/p CABG x3 with SARA ligation yesterday with Dr. Viola Trinidad. He is progressing well. Pt's only complaint is chest discomfort, particularly with deep breathing. He is in sinus rhythm on telemetry, noted ST elevation today. Patient seen and examined. Clinical notes reviewed. Telemetry reviewed. No new complaints today. No major events overnight. Denies having palpitations, shortness of breath, orthopnea/PND, cough, or dizziness at the time of this visit. Allergies: Allergies   Allergen Reactions    Penicillins     Percocet [Oxycodone-Acetaminophen]      Made him feel \"weird\"    Codeine Nausea And Vomiting     Home Meds:  Prior to Visit Medications    Medication Sig Taking? Authorizing Provider   cyclobenzaprine (FLEXERIL) 10 MG tablet Take 10 mg by mouth 3 times daily as needed for Muscle spasms Yes Historical Provider, MD   gabapentin (NEURONTIN) 300 MG capsule Take 600 mg by mouth 2 times daily.   Yes Historical Provider, MD   atorvastatin (LIPITOR) 40 MG tablet take 1 tablet by mouth once daily  Sherleen Mortimer Subcutaneous Nightly    vancomycin (VANCOCIN) IV  2,000 mg Intravenous Q12H    insulin glargine  0.15 Units/kg Subcutaneous Nightly    [START ON 8/14/2020] insulin lispro  0.08 Units/kg Subcutaneous TID     Vitamin D  2,000 Units Oral Daily    rosuvastatin  20 mg Oral Nightly    omega-3 acid ethyl esters  2 g Oral BID    therapeutic multivitamin-minerals  1 tablet Oral Daily    tamsulosin  0.8 mg Oral Nightly    gabapentin  600 mg Oral BID     Continuous Infusions:   dextrose 5% and 0.45% NaCl with KCl 20 mEq 1,000 mL (08/12/20 1341)    propofol      lactated ringers      DOPamine Stopped (08/12/20 1345)    DOBUTamine      phenylephrine (TINO-SYNEPHRINE) 50mg/250mL infusion      milrinone      norepinephrine Stopped (08/12/20 1900)    nitroGLYCERIN Stopped (08/13/20 0816)    clevidipine      insulin 23.23 Units/hr (08/13/20 1326)    dextrose       PRN Meds:sodium chloride flush, potassium chloride, magnesium sulfate, calcium chloride IVPB, calcium chloride IVPB, traMADol **OR** traMADol, diphenhydrAMINE, zolpidem, magnesium hydroxide, polyethylene glycol, ondansetron, metoclopramide, albuterol sulfate HFA, albumin human, lactated ringers, DOPamine, DOBUTamine, phenylephrine (TINO-SYNEPHRINE) 50mg/250mL infusion, furosemide, milrinone, norepinephrine, nitroGLYCERIN, clevidipine, glucose, dextrose, glucagon (rDNA), dextrose, fentanNYL, tiZANidine     Past Medical History:  Past Medical History:   Diagnosis Date    Back pain     Blood circulation, collateral     CAD (coronary artery disease)     Chronic kidney disease     Diabetes mellitus ([facility] [address]     Enlarged prostate     GERD (gastroesophageal reflux disease)     Hyperlipidemia     Hypertension     Medical history reviewed with no changes       Past Surgical History:    has a past surgical history that includes back surgery; Cardiac surgery; Tonsillectomy; Coronary angioplasty with stent (10/2003);  Total knee arthroplasty (Right, 2014); epidural steroid injection (Left, 7/23/2019); epidural steroid injection (Left, 9/10/2019); joint replacement; Spinal Cord Stimulator Surgery (N/A, 5/18/2020); and Coronary artery bypass graft (N/A, 8/12/2020). Social History:  Reviewed. reports that he quit smoking about 36 years ago. He smoked 1.00 pack per day. He has never used smokeless tobacco. He reports that he does not drink alcohol or use drugs. Family History:  Reviewed. family history includes Birth Defects in his brother; Diabetes in his mother; Heart Attack in his father; Heart Disease in his father and sister; High Blood Pressure in his brother, brother, father, sister, sister, and sister; High Cholesterol in his father. Review of Systems:  · Constitutional: Negative for fever, night sweats, chills, weight changes, or weakness  · Skin: Negative for rash, dry skin, pruritus, bruising, bleeding, blood clots, or changes in skin pigment  · HEENT: Negative for vision changes, ringing in the ears, sore throat, dysphagia, or swollen lymph nodes  · Respiratory: Reviewed in HPI  · Cardiovascular: Reviewed in HPI  · Gastrointestinal: Negative for abdominal pain, N/V/D, constipation, or black/tarry stools  · Genito-Urinary: Negative for dysuria, incontinence, urgency, or hematuria  · Musculoskeletal: Negative for joint swelling, muscle pain, or injuries  · Neurological/Psych: Negative for confusion, seizures, headaches, balance issues or TIA-like symptoms.  No anxiety, depression, or insomnia    Physical Examination:  Vitals:    08/13/20 1300   BP: 137/70   Pulse: 108   Resp: 11   Temp:    SpO2: 96%      In: 943 [P.O.:360; I.V.:233]  Out: 6230    Wt Readings from Last 3 Encounters:   08/13/20 281 lb 8.4 oz (127.7 kg)   05/22/20 264 lb 15.9 oz (120.2 kg)   05/18/20 265 lb (120.2 kg)       Intake/Output Summary (Last 24 hours) at 8/13/2020 1328  Last data filed at 8/13/2020 1230  Gross per 24 hour   Intake 943 ml   Output 7905 ml   Net -6962 ml Telemetry: Personally Reviewed  - Sinus rhythm with ST elevation  · Constitutional: Cooperative and in no apparent distress, and appears well nourished  · Skin: Warm and pink; no pallor, cyanosis, bruising, or clubbing. Right IJ central line  · HEENT: Symmetric and normocephalic. PERRL, EOM intact. Conjunctiva pink with clear sclera. Mucus membranes pink and moist. Teeth intact. Thyroid smooth without nodules or goiter. · Cardiovascular: Regular rate and rhythm. S1/S2 present without murmurs, rubs, or gallops. Peripheral pulses 2+, capillary refill < 3 seconds. No elevation of JVP. No peripheral edema  · Respiratory: Respirations symmetric and unlabored. Lungs clear to auscultation bilaterally, no wheezing, crackles, or rhonchi. + CT in place. On 5L of oxygen  · Gastrointestinal/: Abdomen soft and round. Bowel sounds normoactive in all quadrants without tenderness or masses. + Mendoza catheter  · Musculoskeletal: Bilateral upper and lower extremity strength 5/5 with full ROM  · Neurologic/Psych: Awake and orientated to person, place and time. Calm affect, appropriate mood    Pertinent labs, diagnostic, device, and imaging results reviewed as a part of this visit    Labs:    BMP:   Recent Labs     08/12/20  1340 08/13/20  0521    135*   K 3.9 4.3    104   CO2 23 22   BUN 22* 16   CREATININE 1.1 0.9   MG 2.80* 2.00     Estimated Creatinine Clearance: 113 mL/min (based on SCr of 0.9 mg/dL).    CBC:   Recent Labs     08/12/20  1340 08/12/20 2015 08/12/20  2351 08/13/20  0521   WBC 11.4*  --   --  15.9*   HGB 12.1* 12.9* 12.1* 11.8*   HCT 36.7*  --   --  34.6*   MCV 90.6  --   --  91.4     --   --  157     Thyroid:   Lab Results   Component Value Date    TSH 2.85 08/09/2020     Lipids:   Lab Results   Component Value Date    CHOL 119 08/05/2020    HDL 30 08/05/2020    HDL 29 01/25/2011    TRIG 132 08/05/2020     LFTS:   Lab Results   Component Value Date    ALT 12 08/06/2020    AST 13 08/06/2020 hyperlipidemia 08/09/2011    Essential hypertension, benign 08/09/2011    Coronary artery disease involving native coronary artery of native heart without angina pectoris 08/09/2011    Primary cardiomyopathy ([facility] [address] 08/09/2011    Chest pain 08/09/2011    Dizziness and giddiness 08/09/2011    Palpitations 08/09/2011        Assessment and Plan:     1. Paroxysmal atrial tachycardia/flutter  - S/p EP study (8/10/20, Dr. Carlotta Alexander)   ~ No inducible ventricular arrhythmia. 1:1 Atrial tach, unable to be mapped    - Stable  - Continue BB; up titrate as tolerates  - Will follow up in office and place event monitor at that time   ~ If recurrent arrhythmia, may consider ablation    2. CAD  - S/p CABG x3 with SARA ligation (8/12/20)  - Stable  - No complaints of angina; mild sternal site pain  - Encourage IS and ambulation as tolerated  - Continue ASA, ACEI, BB, and statin  - CVTS managing    3. ST elevation   - Pericarditis post CABG ?   - Discussed with CVTS NP    4. HTN  - Controlled: Goal <130/80  - Continue current medications    5. AYAZ  - High risk for AAYZ  - Discussed long term effects of untreated AYAZ  - Referral to sleep study center at d/c    6. Hyperlipidemia  - Controlled. Goal: LDL <70   ~ LDL 63 (8/20)  - On rosuvastatin 20 mg QD  - Discussed diet, weight loss, and exercise needs  - Followed per PCP    Multiple medical conditions with risk of decompensation. All pertinent information and plan of care discussed with the EP physician. All questions and concerns were addressed to the patient. Alternatives to my treatment were discussed. I have discussed the above stated plan with patient and the nurse. The patient verbalized understanding and agreed with the plan. Thank you for allowing to us to participate in the care of Helio Hall.     ETTA Callejas-CNP  [address]   Office: [phone]

## 2020-08-13 NOTE — PROGRESS NOTES
Incentive Spirometry education and demonstration completed by Respiratory Therapy Yes      Response to education: Excellent     Teaching Time: 5 minutes    Minimum Predicted Vital Capacity - 822 mL. Patient's Actual Vital Capacity - 1000 mL. Turning over to Nursing for routine follow-up Yes. Comments: pt understands the use and purpose of the IS at this time.     Electronically signed by Dionte Ponce RCP on 8/12/2020 at 11:53 PM

## 2020-08-14 ENCOUNTER — APPOINTMENT (OUTPATIENT)
Dept: GENERAL RADIOLOGY | Age: 65
DRG: 234 | End: 2020-08-14
Attending: FAMILY MEDICINE
Payer: MEDICARE

## 2020-08-14 LAB
BLOOD BANK DISPENSE STATUS: NORMAL
BLOOD BANK PRODUCT CODE: NORMAL
BPU ID: NORMAL
DESCRIPTION BLOOD BANK: NORMAL
GLUCOSE BLD-MCNC: 104 MG/DL (ref 70–99)
GLUCOSE BLD-MCNC: 105 MG/DL (ref 70–99)
GLUCOSE BLD-MCNC: 107 MG/DL (ref 70–99)
GLUCOSE BLD-MCNC: 112 MG/DL (ref 70–99)
GLUCOSE BLD-MCNC: 113 MG/DL (ref 70–99)
GLUCOSE BLD-MCNC: 117 MG/DL (ref 70–99)
GLUCOSE BLD-MCNC: 128 MG/DL (ref 70–99)
GLUCOSE BLD-MCNC: 136 MG/DL (ref 70–99)
GLUCOSE BLD-MCNC: 137 MG/DL (ref 70–99)
GLUCOSE BLD-MCNC: 149 MG/DL (ref 70–99)
GLUCOSE BLD-MCNC: 173 MG/DL (ref 70–99)
GLUCOSE BLD-MCNC: 204 MG/DL (ref 70–99)
GLUCOSE BLD-MCNC: 71 MG/DL (ref 70–99)
GLUCOSE BLD-MCNC: 72 MG/DL (ref 70–99)
GLUCOSE BLD-MCNC: 76 MG/DL (ref 70–99)
GLUCOSE BLD-MCNC: 78 MG/DL (ref 70–99)
GLUCOSE BLD-MCNC: 80 MG/DL (ref 70–99)
GLUCOSE BLD-MCNC: 81 MG/DL (ref 70–99)
GLUCOSE BLD-MCNC: 85 MG/DL (ref 70–99)
GLUCOSE BLD-MCNC: 86 MG/DL (ref 70–99)
GLUCOSE BLD-MCNC: 88 MG/DL (ref 70–99)
GLUCOSE BLD-MCNC: 89 MG/DL (ref 70–99)
GLUCOSE BLD-MCNC: 90 MG/DL (ref 70–99)
GLUCOSE BLD-MCNC: 93 MG/DL (ref 70–99)
GLUCOSE BLD-MCNC: 93 MG/DL (ref 70–99)
GLUCOSE BLD-MCNC: 98 MG/DL (ref 70–99)
MAGNESIUM: 2.1 MG/DL (ref 1.8–2.4)
PERFORMED ON: ABNORMAL
PERFORMED ON: NORMAL

## 2020-08-14 PROCEDURE — 97161 PT EVAL LOW COMPLEX 20 MIN: CPT

## 2020-08-14 PROCEDURE — 99233 SBSQ HOSP IP/OBS HIGH 50: CPT | Performed by: NURSE PRACTITIONER

## 2020-08-14 PROCEDURE — 97530 THERAPEUTIC ACTIVITIES: CPT

## 2020-08-14 PROCEDURE — 2580000003 HC RX 258: Performed by: THORACIC SURGERY (CARDIOTHORACIC VASCULAR SURGERY)

## 2020-08-14 PROCEDURE — 97116 GAIT TRAINING THERAPY: CPT

## 2020-08-14 PROCEDURE — 6370000000 HC RX 637 (ALT 250 FOR IP): Performed by: THORACIC SURGERY (CARDIOTHORACIC VASCULAR SURGERY)

## 2020-08-14 PROCEDURE — 71045 X-RAY EXAM CHEST 1 VIEW: CPT

## 2020-08-14 PROCEDURE — 83735 ASSAY OF MAGNESIUM: CPT

## 2020-08-14 PROCEDURE — 6370000000 HC RX 637 (ALT 250 FOR IP): Performed by: NURSE PRACTITIONER

## 2020-08-14 PROCEDURE — 2000000000 HC ICU R&B

## 2020-08-14 PROCEDURE — 97165 OT EVAL LOW COMPLEX 30 MIN: CPT

## 2020-08-14 PROCEDURE — 6360000002 HC RX W HCPCS: Performed by: THORACIC SURGERY (CARDIOTHORACIC VASCULAR SURGERY)

## 2020-08-14 RX ORDER — METOPROLOL TARTRATE 50 MG/1
50 TABLET, FILM COATED ORAL 2 TIMES DAILY
Status: DISCONTINUED | OUTPATIENT
Start: 2020-08-14 | End: 2020-08-14

## 2020-08-14 RX ORDER — LISINOPRIL 2.5 MG/1
2.5 TABLET ORAL
Qty: 30 TABLET | Refills: 3 | Status: SHIPPED | OUTPATIENT
Start: 2020-08-15 | End: 2020-08-17 | Stop reason: HOSPADM

## 2020-08-14 RX ORDER — CARVEDILOL 6.25 MG/1
6.25 TABLET ORAL 2 TIMES DAILY WITH MEALS
Status: DISCONTINUED | OUTPATIENT
Start: 2020-08-14 | End: 2020-08-14

## 2020-08-14 RX ORDER — METOPROLOL TARTRATE 50 MG/1
50 TABLET, FILM COATED ORAL ONCE
Status: COMPLETED | OUTPATIENT
Start: 2020-08-14 | End: 2020-08-14

## 2020-08-14 RX ORDER — ASPIRIN 325 MG
325 TABLET, DELAYED RELEASE (ENTERIC COATED) ORAL EVERY 6 HOURS PRN
Qty: 30 TABLET | Refills: 11 | Status: SHIPPED | OUTPATIENT
Start: 2020-08-14 | End: 2020-08-17 | Stop reason: HOSPADM

## 2020-08-14 RX ORDER — TRAMADOL HYDROCHLORIDE 50 MG/1
50 TABLET ORAL EVERY 6 HOURS PRN
Qty: 28 TABLET | Refills: 0 | Status: SHIPPED | OUTPATIENT
Start: 2020-08-14 | End: 2020-08-21

## 2020-08-14 RX ORDER — DIPHENHYDRAMINE HCL 25 MG
25 TABLET ORAL NIGHTLY PRN
Status: DISCONTINUED | OUTPATIENT
Start: 2020-08-14 | End: 2020-08-17 | Stop reason: HOSPADM

## 2020-08-14 RX ADMIN — GABAPENTIN 600 MG: 300 CAPSULE ORAL at 20:26

## 2020-08-14 RX ADMIN — STANDARDIZED SENNA CONCENTRATE AND DOCUSATE SODIUM 1 TABLET: 8.6; 5 TABLET ORAL at 20:26

## 2020-08-14 RX ADMIN — FONDAPARINUX SODIUM 2.5 MG: 2.5 INJECTION SUBCUTANEOUS at 08:08

## 2020-08-14 RX ADMIN — ACETAMINOPHEN 1000 MG: 500 TABLET, FILM COATED ORAL at 01:52

## 2020-08-14 RX ADMIN — TRAMADOL HYDROCHLORIDE 100 MG: 50 TABLET, FILM COATED ORAL at 12:07

## 2020-08-14 RX ADMIN — Medication 10 ML: at 08:10

## 2020-08-14 RX ADMIN — FUROSEMIDE 20 MG: 10 INJECTION, SOLUTION INTRAMUSCULAR; INTRAVENOUS at 18:03

## 2020-08-14 RX ADMIN — METOPROLOL TARTRATE 12.5 MG: 25 TABLET, FILM COATED ORAL at 22:29

## 2020-08-14 RX ADMIN — Medication 2000 UNITS: at 08:10

## 2020-08-14 RX ADMIN — Medication 10 ML: at 20:28

## 2020-08-14 RX ADMIN — ASPIRIN 325 MG: 325 TABLET, COATED ORAL at 08:09

## 2020-08-14 RX ADMIN — ROSUVASTATIN CALCIUM 20 MG: 20 TABLET, FILM COATED ORAL at 20:26

## 2020-08-14 RX ADMIN — GABAPENTIN 600 MG: 300 CAPSULE ORAL at 08:10

## 2020-08-14 RX ADMIN — METFORMIN HYDROCHLORIDE 1000 MG: 500 TABLET ORAL at 17:04

## 2020-08-14 RX ADMIN — Medication 10 ML: at 08:22

## 2020-08-14 RX ADMIN — METFORMIN HYDROCHLORIDE 1000 MG: 500 TABLET ORAL at 12:07

## 2020-08-14 RX ADMIN — METOPROLOL TARTRATE 25 MG: 25 TABLET, FILM COATED ORAL at 05:17

## 2020-08-14 RX ADMIN — SODIUM CHLORIDE 14.1 UNITS/HR: 9 INJECTION, SOLUTION INTRAVENOUS at 16:05

## 2020-08-14 RX ADMIN — MULTIPLE VITAMINS W/ MINERALS TAB 1 TABLET: TAB at 08:10

## 2020-08-14 RX ADMIN — FUROSEMIDE 20 MG: 10 INJECTION, SOLUTION INTRAMUSCULAR; INTRAVENOUS at 08:10

## 2020-08-14 RX ADMIN — ACETAMINOPHEN 1000 MG: 500 TABLET, FILM COATED ORAL at 08:11

## 2020-08-14 RX ADMIN — STANDARDIZED SENNA CONCENTRATE AND DOCUSATE SODIUM 1 TABLET: 8.6; 5 TABLET ORAL at 08:10

## 2020-08-14 RX ADMIN — TRAMADOL HYDROCHLORIDE 100 MG: 50 TABLET, FILM COATED ORAL at 05:18

## 2020-08-14 RX ADMIN — POTASSIUM CHLORIDE 10 MEQ: 750 TABLET, FILM COATED, EXTENDED RELEASE ORAL at 08:10

## 2020-08-14 RX ADMIN — DIPHENHYDRAMINE HCL 25 MG: 25 TABLET ORAL at 21:39

## 2020-08-14 RX ADMIN — MAGNESIUM GLUCONATE 500 MG ORAL TABLET 400 MG: 500 TABLET ORAL at 08:10

## 2020-08-14 RX ADMIN — TAMSULOSIN HYDROCHLORIDE 0.8 MG: 0.4 CAPSULE ORAL at 20:26

## 2020-08-14 RX ADMIN — POTASSIUM CHLORIDE 10 MEQ: 750 TABLET, FILM COATED, EXTENDED RELEASE ORAL at 12:07

## 2020-08-14 RX ADMIN — SODIUM CHLORIDE 20.4 UNITS/HR: 9 INJECTION, SOLUTION INTRAVENOUS at 01:52

## 2020-08-14 RX ADMIN — INSULIN GLARGINE 45 UNITS: 100 INJECTION, SOLUTION SUBCUTANEOUS at 20:17

## 2020-08-14 RX ADMIN — METOPROLOL TARTRATE 12.5 MG: 25 TABLET, FILM COATED ORAL at 16:27

## 2020-08-14 RX ADMIN — SODIUM CHLORIDE 44.6 UNITS/HR: 9 INJECTION, SOLUTION INTRAVENOUS at 10:13

## 2020-08-14 RX ADMIN — INSULIN LISPRO 10 UNITS: 100 INJECTION, SOLUTION INTRAVENOUS; SUBCUTANEOUS at 08:21

## 2020-08-14 RX ADMIN — PANTOPRAZOLE SODIUM 40 MG: 40 TABLET, DELAYED RELEASE ORAL at 08:10

## 2020-08-14 RX ADMIN — METOPROLOL TARTRATE 25 MG: 25 TABLET, FILM COATED ORAL at 08:40

## 2020-08-14 RX ADMIN — ACETAMINOPHEN 1000 MG: 500 TABLET, FILM COATED ORAL at 20:25

## 2020-08-14 RX ADMIN — TRAMADOL HYDROCHLORIDE 100 MG: 50 TABLET, FILM COATED ORAL at 20:26

## 2020-08-14 RX ADMIN — MAGNESIUM GLUCONATE 500 MG ORAL TABLET 400 MG: 500 TABLET ORAL at 20:26

## 2020-08-14 RX ADMIN — METOPROLOL TARTRATE 50 MG: 50 TABLET, FILM COATED ORAL at 20:26

## 2020-08-14 RX ADMIN — MUPIROCIN: 20 OINTMENT TOPICAL at 08:20

## 2020-08-14 RX ADMIN — ACETAMINOPHEN 1000 MG: 500 TABLET, FILM COATED ORAL at 14:07

## 2020-08-14 RX ADMIN — MUPIROCIN: 20 OINTMENT TOPICAL at 20:30

## 2020-08-14 RX ADMIN — POTASSIUM CHLORIDE 10 MEQ: 750 TABLET, FILM COATED, EXTENDED RELEASE ORAL at 17:04

## 2020-08-14 ASSESSMENT — PAIN DESCRIPTION - ORIENTATION
ORIENTATION: MID

## 2020-08-14 ASSESSMENT — PAIN DESCRIPTION - ONSET
ONSET: ON-GOING

## 2020-08-14 ASSESSMENT — PAIN DESCRIPTION - FREQUENCY
FREQUENCY: CONTINUOUS

## 2020-08-14 ASSESSMENT — PAIN SCALES - GENERAL
PAINLEVEL_OUTOF10: 5
PAINLEVEL_OUTOF10: 0
PAINLEVEL_OUTOF10: 5
PAINLEVEL_OUTOF10: 4
PAINLEVEL_OUTOF10: 0
PAINLEVEL_OUTOF10: 3
PAINLEVEL_OUTOF10: 7
PAINLEVEL_OUTOF10: 4
PAINLEVEL_OUTOF10: 4
PAINLEVEL_OUTOF10: 2
PAINLEVEL_OUTOF10: 0

## 2020-08-14 ASSESSMENT — PAIN DESCRIPTION - PAIN TYPE
TYPE: SURGICAL PAIN

## 2020-08-14 ASSESSMENT — PAIN DESCRIPTION - PROGRESSION
CLINICAL_PROGRESSION: GRADUALLY IMPROVING
CLINICAL_PROGRESSION: NOT CHANGED

## 2020-08-14 ASSESSMENT — PAIN DESCRIPTION - LOCATION
LOCATION: STERNUM

## 2020-08-14 NOTE — PROGRESS NOTES
Daily    tamsulosin  0.8 mg Oral Nightly    gabapentin  600 mg Oral BID      nitroGLYCERIN Stopped (08/13/20 0816)    insulin 24.64 Units/hr (08/14/20 1101)     sodium chloride flush, traMADol **OR** traMADol, zolpidem, magnesium hydroxide, polyethylene glycol, ondansetron, metoclopramide, albuterol sulfate HFA, nitroGLYCERIN, glucose, glucagon (rDNA), tiZANidine    Lab Data:  CBC:   Recent Labs     08/12/20  1340 08/12/20 2015 08/12/20  2351 08/13/20  0521   WBC 11.4*  --   --  15.9*   HGB 12.1* 12.9* 12.1* 11.8*   HCT 36.7*  --   --  34.6*   MCV 90.6  --   --  91.4     --   --  157     BMP:   Recent Labs     08/12/20  1340 08/13/20  0521    135*   K 3.9 4.3    104   CO2 23 22   BUN 22* 16   CREATININE 1.1 0.9     LIVER PROFILE: No results for input(s): AST, ALT, LIPASE, BILIDIR, BILITOT, ALKPHOS in the last 72 hours. Invalid input(s): AMYLASE,  ALB  PT/INR: No results for input(s): PROTIME, INR in the last 72 hours. APTT: No results for input(s): APTT in the last 72 hours. BNP:  No results for input(s): BNP in the last 72 hours. IMAGING: none    Assessment/Plan:  Active Problems:    Essential hypertension, benign  Plan: home meds    Coronary artery disease involving native coronary artery of native heart without angina pectoris  Plan: severe 3VD s/p CABG. POD 1. Cont aspirin, statin    Primary cardiomyopathy (HCC)  Plan: lvef 45%, ischemic in origin, s/p Cardiac cath. CKD (chronic kidney disease) stage 3, GFR 30-59 ml/min ([facility])  Plan: monitor creatinine    Ventricular tachycardia (HCC)  Plan:         [facility]  Plan: s/p atriclip. Cont lopressor    S/P coronary artery bypass graft x 3  Plan: POD 1. SVG to LAD, SVG to OM1, SVG to PDA. Continue ambulation. Incentive spirometry. Lasix.                  James Drake MD 8/14/2020 11:16 AM

## 2020-08-14 NOTE — FLOWSHEET NOTE
08/14/20 0622   Incision Knee Anterior;Left;Medial   No Date First Assessed or Time First Assessed found. Primary Wound Type: Incision  Location: Knee  Wound Location Orientation: Anterior;Left;Medial   Wound Assessment Clean;Dry; Intact   Vasihali-wound Assessment Clean;Dry; Intact   Closure Approximated;Surgical glue   Drainage Amount None   Dressing/Treatment Transparent film dressing   Dressing Status Clean;Dry; Intact   Incision Sternum Anterior   No Date First Assessed or Time First Assessed found. Primary Wound Type: Incision  Location: Sternum  Wound Location Orientation: Anterior   Wound Assessment Clean;Dry; Intact   Vaishali-wound Assessment Dry;Clean; Intact   Closure Approximated;Surgical glue   Drainage Amount None   Dressing/Treatment Open to air

## 2020-08-14 NOTE — DISCHARGE INSTR - COC
Continuity of Care Form    Patient Name: Bharati Spivey   :  1955  MRN:  [id number]    Admit date:  2020  Discharge date: 2020    Code Status Order: Full Code   Advance Directives:   [facility] Documentation     Date/Time Healthcare Directive Type of Healthcare Directive Copy in [address] [place] [address] [address] Agent's Name Healthcare Agent's Phone Number    [phone]  No, patient does not have an advance directive for healthcare treatment -- -- -- -- --    20  No, patient does not have an advance directive for healthcare treatment -- -- -- -- --    20  No, patient does not have an advance directive for healthcare treatment -- -- -- -- --          Admitting Physician:  Joanna Bob MD  PCP: ETTA Art CNP    Discharging Nurse: Eduardo Walker Unit/Room#: CVU-2905/2905-01  Discharging Unit Phone Number: [phone]    Emergency Contact:   Extended Emergency Contact Information  Primary Emergency Contact: Catracho Villarreal  [facility] [address] [place] [address] Phone: [phone]  Mobile Phone: [phone]  Relation: Spouse  Secondary Emergency Contact: [address] Phone: [phone]  Mobile Phone: [phone]  Relation: Child    Past Surgical History:  Past Surgical History:   Procedure Laterality Date    BACK SURGERY      laminectomy & rods    CARDIAC SURGERY      stents    CORONARY ANGIOPLASTY WITH STENT PLACEMENT  10/2003    CORONARY ARTERY BYPASS GRAFT N/A 2020    CABG CORONARY ARTERY BYPASS X 3, LIGATION SARA WITH 45MM ATRICLIP, EVH LEFT LEG, TOTAL CPB, ERIC, DOPPLER VERIFICATION OF BYPASS GRAFT FLOW, BILATERAL INTERCOSTAL NERVE BLOCK performed by Declan Gannon MD at [address] [facility] Left 2019    LEFT LUMBAR THREE LUMBAR FOUR EPIDURAL STEROID INJECTION SITE CONFIRMED BY FLUOROSCOPY performed by Aristides Navarro MD at [address] STEROID INJECTION Left 9/10/2019    LEFT SACROILIAC JOINT INJECTION SITE CONFIRMED BY FLUOROSCOPY performed by Madisyn Sullivan MD at [facility]      Repaired torn MCL TOTAL RIGHT    SPINAL CORD STIMULATOR SURGERY N/A 5/18/2020    SPINAL CORD STIMULATOR TRIAL WITH FLUOROSCOPY (90988, 02895, 54525) - [place] performed by Sg Mario MD at [address] Right 2014       Immunization History: There is no immunization history for the selected administration types on file for this patient. Active Problems:  Patient Active Problem List   Diagnosis Code    Other hyperlipidemia E78.49    Essential hypertension, benign I10    Coronary artery disease involving native coronary artery of native heart without angina pectoris I25.10    Primary cardiomyopathy ([facility] I42.8    Chest pain R07.9    Dizziness and giddiness R42    Palpitations R00.2    GERD with esophagitis K21.0    Obesity (BMI 30-39. 9) E66.9    DM2 (diabetes mellitus, type 2) ([facility]) E11.9    BPH (benign prostatic hyperplasia) N40.0    CKD (chronic kidney disease) stage 3, GFR 30-59 ml/min ([facility]) N18.3    Chronic back pain M54.9, G89.29    Ventricular tachycardia ([facility]) I47.2    Tachycardia R00.0    PAT (paroxysmal atrial tachycardia) ([facility]) I47.1    S/P coronary artery bypass graft x 3 Z95.1    S/P left atrial appendage ligation Z98.890       Isolation/Infection:   Isolation          No Isolation        Patient Infection Status     Infection Onset Added Last Indicated Last Indicated By Review Planned Expiration Resolved Resolved By    None active    Resolved    COVID-19 Rule Out 08/06/20 08/06/20 08/06/20 COVID-19 (Ordered)   08/06/20 Rule-Out Test Resulted          Nurse Assessment:  Last Vital Signs: /80   Pulse 104   Temp 97.9 °F (36.6 °C) (Temporal)   Resp 19   Ht 6' 2\" (1.88 m)   Wt 276 lb 7.3 oz (125.4 kg)   SpO2 94%   BMI 35.49 kg/m²     Last documented pain score (0-10 scale): Pain Level: 3  Last Weight:   Wt Readings from Last 1 Encounters:   08/14/20 276 lb 7.3 oz (125.4 kg)     Mental Status:  oriented and alert    IV Access:  - None    Nursing Mobility/ADLs:  Walking   Independent  Transfer  Independent  Bathing  Independent  Dressing  Independent  Toileting  Independent  Feeding  Independent  Med Admin  Independent  Med Delivery   none    Wound Care Documentation and Therapy:  Wound 08/04/20 Brachial Anterior;Distal;Left Amiodarone infiltrate (Active)   Dressing Status Clean;Dry; Intact 08/12/20 1330   Dressing/Treatment Open to air 08/12/20 1330   Wound Assessment Other (Comment) 08/11/20 2200   Number of days: 9        Elimination:  Continence:   · Bowel: Yes  · Bladder: Yes  Urinary Catheter: None   Colostomy/Ileostomy/Ileal Conduit: No       Date of Last BM: 8/17/2020    Intake/Output Summary (Last 24 hours) at 8/14/2020 1327  Last data filed at 8/14/2020 1300  Gross per 24 hour   Intake 260 ml   Output 3730 ml   Net -3470 ml     I/O last 3 completed shifts: In: 360 [P.O.:360]  Out: 5655 [Urine:5375; Chest Tube:280]    Safety Concerns:     None    Impairments/Disabilities:      None    Nutrition Therapy:  Current Nutrition Therapy:   - Oral Diet:  Cardiac    Routes of Feeding: Oral  Liquids: No Restrictions  Daily Fluid Restriction: no  Last Modified Barium Swallow with Video (Video Swallowing Test): not done    Treatments at the Time of Hospital Discharge:   Respiratory Treatments: no  Oxygen Therapy:  is not on home oxygen therapy.   Ventilator:    - No ventilator support    Rehab Therapies: SKILLED NURSING  Weight Bearing Status/Restrictions: No weight bearing restirctions  Other Medical Equipment (for information only, NOT a DME order):  no      Patient's personal belongings (please select all that are sent with patient):  None    RN SIGNATURE:  Electronically signed by Clarence Gabriel RN on 8/17/20 at 1:40 PM EDT    CASE MANAGEMENT/SOCIAL WORK SECTION    Inpatient Status Date: 04 AUG 2020    Readmission Risk Assessment Score:  Readmission Risk              Risk of Unplanned Readmission:        16           Discharging to Facility/ Agency   Name: [facility] will call for Appointment  Phone: [phone]  Fax: [phone]        / signature: STEVE Acosta, .[phone]    PHYSICIAN SECTION    Prognosis: Good    Condition at Discharge: Stable    Rehab Potential (if transferring to Rehab): Good    Recommended Labs or Other Treatments After Discharge: no    Physician Certification: I certify the above information and transfer of Kiara Gaines  is necessary for the continuing treatment of the diagnosis listed and that he requires Home Care for less 30 days.      Update Admission H&P: No change in H&P    PHYSICIAN SIGNATURE:  Electronically signed by ETTA Balderas CNP / Arlyn Carrion MD on 8/17/20 at 10:32 AM EDT

## 2020-08-14 NOTE — FLOWSHEET NOTE
08/14/20 0533   Vital Signs   Pulse 107   SpO2 96 %   Chest tubes removed. VSS. No complications. Dressings applied. STAT CXray ordered. Will continue to monitor. Call light within reach.

## 2020-08-14 NOTE — PROGRESS NOTES
Shift assessment complete. Evening medications given, see MAR for details. Pt alert and oriented x4. Non-skid socks on. Pt resting in chair with no signs of distress. Pt denies any needs at this time. Will continue to monitor. Call light within reach.

## 2020-08-14 NOTE — DISCHARGE SUMMARY
Cardiac, Vascular & Thoracic Surgery  Discharge Summary    Patient:  Kaela White 1955 [id number]   Admission Date:  8/4/2020  9:45 PM  Discharge Date:  8/17/2020    Principle Diagnosis:  CAD   Secondary Diagnosis:  Active Problems:    Essential hypertension, benign    Coronary artery disease involving native coronary artery of native heart without angina pectoris    Primary cardiomyopathy ([address]    GERD with esophagitis    Obesity (BMI 30-39. 9)    BPH (benign prostatic hyperplasia)    CKD (chronic kidney disease) stage 3, GFR 30-59 ml/min ([facility])    Ventricular tachycardia (HCC)    Tachycardia    PAT (paroxysmal atrial tachycardia) ([facility])    S/P coronary artery bypass graft x 3    S/P left atrial appendage ligation    Ischemic cardiomyopathy    Class 1 obesity in adult    Suspected sleep apnea  Resolved Problems:     resolved hospital problems. Cardiac Cath:   Cardiac Cath IVUS, LVG:  Anatomy:   LM-distal 60%   LAD-prox stent patent, distal 50%  Cx-Normal  OM- normal  RCA-mid 80% diffuse  RPDA- Normal  LVEF- 60%  LVG- normal  LVEDP- 11     IVUS LM: MLA 6.3mm2    Procedure:  CABG CORONARY ARTERY BYPASS X 3 (SV-LAD, SV-OM1, SV-PDA), LIGATION SARA     History:  The patient is a 77 y.o. male with significant past medical history of CAD s/p PCI, DM, HLD, HTN, Obesity, CKD stage III, HFrEF who has been referred to hospital with tachycardia. Patient underwent a cardiac angiogram showed multi vessel disease. We were consulted to evaluate the patient for CABG. At the present the patient is in stable condition. Hospital Course: The patient underwent CABG on 8/12/2020 . The patient had uneventful admission. The patient has a schedule appoint ment with cardiology and discharge home with an event monitor. The patient was discharged on 8/17/2020.          Disposition:  home    Condition:  good    Medications:  ACE: Benicar 20 mg daily   Aspirin: 325 mg daily  Betablocker:Coreg 12.5 mg BID  Statin: Lipitor 40 mg daily Discharge Medications:   Roby Guerrero   Home Medication Instructions JESUSITA:[id number]    Printed on:08/17/20 1325   Medication Information                      amLODIPine (NORVASC) 5 MG tablet  Take 1 tablet by mouth daily             aspirin 325 MG EC tablet  Take 1 tablet by mouth daily             atorvastatin (LIPITOR) 40 MG tablet  take 1 tablet by mouth once daily             carvedilol (COREG) 12.5 MG tablet  Take 1 tablet by mouth 2 times daily (with meals)             cyclobenzaprine (FLEXERIL) 10 MG tablet  Take 10 mg by mouth 3 times daily as needed for Muscle spasms             gabapentin (NEURONTIN) 300 MG capsule  Take 600 mg by mouth 2 times daily. glipiZIDE (GLUCOTROL) 5 MG tablet  Take 1 tablet by mouth every morning (before breakfast)             metformin (GLUCOPHAGE) 1000 MG tablet  Take 1,000 mg by mouth 2 times daily. Multiple Vitamins-Minerals (CENTRUM SILVER) TABS  Take 1 tablet by mouth daily. olmesartan (BENICAR) 20 MG tablet  Take 1 tablet by mouth daily             tamsulosin (FLOMAX) 0.4 MG capsule  Take 0.8 mg by mouth nightly             traMADol (ULTRAM) 50 MG tablet  Take 1 tablet by mouth every 6 hours as needed (pain) for up to 7 days. traZODone (DESYREL) 50 MG tablet  Take 150 mg by mouth nightly                  Labs:  Renal: K+ 4.1, Creat. 0.9  Heme: WBC: 9.8, H/H: 12.3/ 36.8  HgA1C: 7.7    Admission WT: 123.4 kg, (272 lbs)  Pre-Op WT: 123.4 kg, ( 272 lbs)  Discharge WT: 120.3 kg, ( 265 lbs)      Patient Instructions:    Patient was instructed to adhere strictly to social distancing measures and avoid  close contact with individuals who are at risk for being asymptomatic carries and/or have had known contact with a diagnosed COVID-19 patient over the next few weeks. Patient Instructions:   Activity: DO NOT LIFT, PUSH, OR PULL ANYTHING OVER 5 POUNDS FOR 8 WEEK from the day of surgery  Diet:  cardiac diet  Wound Care:  8 [address] Labidi YOUR INCISIONS DAILY WITH A CLEAN WASHCLOTH AND ANTIBACTERIAL SOAP. Do not wash your incisions after you have cleansed other parts of your body      Follow up with Cardiothoracic Surgeon, Dr. Adriana Ashraf- 9/3/2020  Follow up with Cardiology, KASSI Calvo 9/9/2020

## 2020-08-14 NOTE — PROGRESS NOTES
[address]   Electrophysiology Progress Note     Date: 8/14/2020  Admit Date: 8/4/2020     Reason for consultation: Tachycardia    Chief Complaint: Tachycardia    History of Present Illness: History obtained from patient and medical record. Juarez Celaya is a 77 y.o. male with a past medical history of CAD, HTN, DM, CKD, HLD, obesity, and AYAZ. Pt presented to hospital from the urology office due to urinary retention issues. He was noted to have transient tachycardia with HR in 200s. He was sent to [facility]. On arrival to [facility], he was in sinus tachycardia without any symptoms. During the admission, he was noted to have WCT with HR up to 220 BPM. He was found to have MVD on cath. He underwent EP study on 8/10/20, but no arrhythmia was able to be induced despite aggressive pacing. S/p CABG x3 with SARA ligation (8/12/20). Interval Hx: Today, he is being seen for follow up. S/p CABG x3 with SARA ligation (8/12/20) with Dr. Josefina Davis. He is in sinus rhyth, rate 90-100s, on telemetry. His only complaint is that he is tired and not sleeping well, otherwise, he is progressing well. Patient seen and examined. Clinical notes reviewed. Telemetry reviewed. No new complaints today. No major events overnight. Denies having palpitations, shortness of breath, orthopnea/PND, cough, or dizziness at the time of this visit. Allergies: Allergies   Allergen Reactions    Penicillins     Percocet [Oxycodone-Acetaminophen]      Made him feel \"weird\"    Codeine Nausea And Vomiting     Home Meds:  Prior to Visit Medications    Medication Sig Taking? Authorizing Provider   cyclobenzaprine (FLEXERIL) 10 MG tablet Take 10 mg by mouth 3 times daily as needed for Muscle spasms Yes Historical Provider, MD   gabapentin (NEURONTIN) 300 MG capsule Take 600 mg by mouth 2 times daily.   Yes Historical Provider, MD   atorvastatin (LIPITOR) 40 MG tablet take 1 tablet by mouth once daily  Jamie Galarza MD carvedilol (COREG) 6.25 MG tablet take 1 tablet by mouth twice a day with meals  Patricia Guzmán MD   isosorbide mononitrate (IMDUR) 30 MG extended release tablet take 1 tablet by mouth once daily  Patricia Guzmán MD   glipiZIDE (GLUCOTROL) 5 MG tablet   Historical Provider, MD   traZODone (DESYREL) 50 MG tablet Take 150 mg by mouth nightly   Historical Provider, MD   amLODIPine (NORVASC) 10 MG tablet Take 1 tablet by mouth daily  Patricia Guzmán MD   olmesartan (BENICAR) 40 MG tablet Take 1 tablet by mouth daily  Patricia Guzmán MD   aspirin 325 MG tablet Take 325 mg by mouth daily  Historical Provider, MD   nitroGLYCERIN (NITROSTAT) 0.4 MG SL tablet Place 0.4 mg under the tongue every 5 minutes as needed for Chest pain up to max of 3 total doses. If no relief after 1 dose, call 911. Historical Provider, MD   tamsulosin (FLOMAX) 0.4 MG capsule Take 0.8 mg by mouth nightly  Historical Provider, MD   Multiple Vitamins-Minerals (CENTRUM SILVER) TABS Take 1 tablet by mouth daily. Historical Provider, MD   metformin (GLUCOPHAGE) 1000 MG tablet Take 1,000 mg by mouth 2 times daily.     Historical Provider, MD      Scheduled Meds:   sodium chloride flush  10 mL Intravenous 2 times per day    acetaminophen  1,000 mg Oral Q6H    sennosides-docusate sodium  1 tablet Oral BID    pantoprazole  40 mg Oral Daily    sodium chloride (PF)  10 mL Intravenous Daily    metoprolol tartrate  25 mg Oral BID    [START ON 8/15/2020] lisinopril  2.5 mg Oral Lunch    furosemide  20 mg Intravenous BID    magnesium oxide  400 mg Oral BID    mupirocin   Nasal BID    nitroGLYCERIN  1 patch Transdermal Daily    potassium chloride  10 mEq Oral TID WC    fondaparinux  2.5 mg Subcutaneous Daily    aspirin  325 mg Oral Daily    aspirin  300 mg Rectal Once    insulin lispro  0-12 Units Subcutaneous TID WC    insulin lispro  0-6 Units Subcutaneous Nightly    insulin glargine  0.15 Units/kg Subcutaneous Nightly    insulin lispro 0.08 Units/kg Subcutaneous TID     Vitamin D  2,000 Units Oral Daily    rosuvastatin  20 mg Oral Nightly    omega-3 acid ethyl esters  2 g Oral BID    therapeutic multivitamin-minerals  1 tablet Oral Daily    tamsulosin  0.8 mg Oral Nightly    gabapentin  600 mg Oral BID     Continuous Infusions:   nitroGLYCERIN Stopped (08/13/20 0816)    insulin 26.7 Units/hr (08/14/20 0905)     PRN Meds:sodium chloride flush, traMADol **OR** traMADol, diphenhydrAMINE, zolpidem, magnesium hydroxide, polyethylene glycol, ondansetron, metoclopramide, albuterol sulfate HFA, nitroGLYCERIN, glucose, glucagon (rDNA), fentanNYL, tiZANidine     Past Medical History:  Past Medical History:   Diagnosis Date    Back pain     Blood circulation, collateral     CAD (coronary artery disease)     Chronic kidney disease     Diabetes mellitus ([facility] [address]     Enlarged prostate     GERD (gastroesophageal reflux disease)     Hyperlipidemia     Hypertension     Medical history reviewed with no changes       Past Surgical History:    has a past surgical history that includes back surgery; Cardiac surgery; Tonsillectomy; Coronary angioplasty with stent (10/2003); Total knee arthroplasty (Right, 2014); epidural steroid injection (Left, 7/23/2019); epidural steroid injection (Left, 9/10/2019); joint replacement; Spinal Cord Stimulator Surgery (N/A, 5/18/2020); and Coronary artery bypass graft (N/A, 8/12/2020). Social History:  Reviewed. reports that he quit smoking about 36 years ago. He smoked 1.00 pack per day. He has never used smokeless tobacco. He reports that he does not drink alcohol or use drugs. Family History:  Reviewed. family history includes Birth Defects in his brother; Diabetes in his mother; Heart Attack in his father; Heart Disease in his father and sister; High Blood Pressure in his brother, brother, father, sister, sister, and sister; High Cholesterol in his father.      Review of Systems:  · Constitutional: Negative for fever, night sweats, chills, weight changes, or weakness  · Skin: Negative for rash, dry skin, pruritus, bruising, bleeding, blood clots, or changes in skin pigment  · HEENT: Negative for vision changes, ringing in the ears, sore throat, dysphagia, or swollen lymph nodes  · Respiratory: Reviewed in HPI  · Cardiovascular: Reviewed in HPI  · Gastrointestinal: Negative for abdominal pain, N/V/D, constipation, or black/tarry stools  · Genito-Urinary: Negative for dysuria, incontinence, urgency, or hematuria  · Musculoskeletal: Negative for joint swelling, muscle pain, or injuries  · Neurological/Psych: Negative for confusion, seizures, headaches, balance issues or TIA-like symptoms. No anxiety, depression, or insomnia    Physical Examination:  Vitals:    08/14/20 0913   BP:    Pulse:    Resp:    Temp:    SpO2: 95%      In: 260 [P.O.:240; I.V.:20]  Out: 2910    Wt Readings from Last 3 Encounters:   08/14/20 276 lb 7.3 oz (125.4 kg)   05/22/20 264 lb 15.9 oz (120.2 kg)   05/18/20 265 lb (120.2 kg)       Intake/Output Summary (Last 24 hours) at 8/14/2020 0206  Last data filed at 8/14/2020 0845  Gross per 24 hour   Intake 620 ml   Output 4900 ml   Net -4280 ml       Telemetry: Personally Reviewed  - Sinus rhythm, rate 90-100s  · Constitutional: Cooperative and in no apparent distress, and appears well nourished  · Skin: Warm and pink; no pallor, cyanosis, bruising, or clubbing. Right IJ central line. + OHS incisions, C/D/I  · HEENT: Symmetric and normocephalic. PERRL, EOM intact. Conjunctiva pink with clear sclera. Mucus membranes pink and moist. Teeth intact. Thyroid smooth without nodules or goiter. · Cardiovascular: Regular rate and rhythm. S1/S2 present without murmurs, rubs, or gallops. Peripheral pulses 2+, capillary refill < 3 seconds. No elevation of JVP. No peripheral edema  · Respiratory: Respirations symmetric and unlabored. Lungs clear to auscultation bilaterally, no wheezing, crackles, or rhonchi.  On 3L of oxygen  · Gastrointestinal/: Abdomen soft and round. Bowel sounds normoactive in all quadrants without tenderness or masses. · Musculoskeletal: Bilateral upper and lower extremity strength 5/5 with full ROM  · Neurologic/Psych: Awake and orientated to person, place and time. Calm affect, appropriate mood    Pertinent labs, diagnostic, device, and imaging results reviewed as a part of this visit    Labs:    BMP:   Recent Labs     20  1340 20  0521 20  0516    135*  --    K 3.9 4.3  --     104  --    CO2 23 22  --    BUN 22* 16  --    CREATININE 1.1 0.9  --    MG 2.80* 2.00 2.10     Estimated Creatinine Clearance: 112 mL/min (based on SCr of 0.9 mg/dL). CBC:   Recent Labs     20  1340 20  2351 20  0521   WBC 11.4*  --   --  15.9*   HGB 12.1* 12.9* 12.1* 11.8*   HCT 36.7*  --   --  34.6*   MCV 90.6  --   --  91.4     --   --  157     Thyroid:   Lab Results   Component Value Date    TSH 2.85 2020     Lipids:   Lab Results   Component Value Date    CHOL 119 2020    HDL 30 2020    HDL 29 2011    TRIG 132 2020     LFTS:   Lab Results   Component Value Date    ALT 12 2020    AST 13 2020    ALKPHOS 107 2020    PROT 6.3 2020    AGRATIO 1.5 2020    BILITOT 0.3 2020     Cardiac Enzymes:   Lab Results   Component Value Date    CKTOTAL 99 2011    CKMB 0.29 2011    CKMBINDEX 0.3 2011    TROPONINI <0.01 2020    TROPONINI <0.01 2020    TROPONINI <0.01 2020     Coags:   Lab Results   Component Value Date    PROTIME 11.7 2020    INR 1.01 2020       EC20  NSR, rate 80, QTc 442    ECHO: 20   -Normal left ventricle size, mild wall thickness and mildly reduced systolic   function with an estimated ejection fraction of 45%. -There is mild diffuse hypokinesis. -Grade I diastolic dysfunction with normal LV filling pressures. E/e\"=7.0   -The aortic valve is mildly thickened/calcified but opens well with normal   gradients.   -Trivial aortic regurgitation.   -The aortic root is mildly dilated. Cath: 8/5/20  Anatomy:   LM-distal 60%   LAD-prox stent patent, distal 50%  Cx-Normal  OM- normal  RCA-mid 80% diffuse  RPDA- Normal  LVEF- 60%  LVG- normal  LVEDP- 11     IVUS LM: MLA 6.3mm2     Contrast: 94  Flouro Time: 9.3  Access: R radial     Impression  ~Coronary Angiography w/ Severe LM, RCA stenosis  ~LVG with LVEF of 60 and no regional wall motion abnormalities     Recommendation  ~Aggressive medical treatment and risk factor modification  ~CABG consult. Problem List:   Patient Active Problem List    Diagnosis Date Noted    S/P coronary artery bypass graft x 3 08/12/2020    S/P left atrial appendage ligation 08/12/2020    Tachycardia     PAT (paroxysmal atrial tachycardia) (HCC)     Ventricular tachycardia ([facility] [address] 08/04/2020    Chronic back pain 03/12/2020    GERD with esophagitis 12/17/2018    Obesity (BMI 30-39.9) 12/17/2018    DM2 (diabetes mellitus, type 2) ([address] 12/17/2018    BPH (benign prostatic hyperplasia) 12/17/2018    CKD (chronic kidney disease) stage 3, GFR 30-59 ml/min ([address] 12/17/2018    Other hyperlipidemia 08/09/2011    Essential hypertension, benign 08/09/2011    Coronary artery disease involving native coronary artery of native heart without angina pectoris 08/09/2011    Primary cardiomyopathy ([address] 08/09/2011    Chest pain 08/09/2011    Dizziness and giddiness 08/09/2011    Palpitations 08/09/2011        Assessment and Plan:     1. Paroxysmal atrial tachycardia/flutter  - S/p EP study (8/10/20, Dr. Des Nava)   ~ No inducible ventricular arrhythmia. 1:1 Atrial tach, unable to be mapped    - Stable  - Continue BB; increase to 50 mg BID  - Will follow up in office and place event monitor at that time   ~ If recurrent arrhythmia, may consider ablation    2.  CAD  - S/p CABG x3 with SARA ligation (8/12/20)  - Stable  - No complaints of angina; mild sternal site pain  - Encourage IS and ambulation as tolerated  - Continue ASA, ACEI, BB, and statin  - CVTS managing    3. HTN  - Controlled: Goal <130/80  - Continue current medications    4. AYAZ  - High risk for AYAZ  - Discussed long term effects of untreated AYAZ  - Referral to sleep study center at d/c    5. Hyperlipidemia  - Controlled. Goal: LDL <70   ~ LDL 63 (8/20)  - On rosuvastatin 20 mg QD  - Discussed diet, weight loss, and exercise needs  - Followed per PCP    No further EP recommendations. Will follow up in office in 3 months. EP will sign off. Please call if there are any questions. Thank you for consult. All pertinent information and plan of care discussed with the EP physician. All questions and concerns were addressed to the patient. Alternatives to my treatment were discussed. I have discussed the above stated plan with patient and the nurse. The patient verbalized understanding and agreed with the plan. Thank you for allowing to us to participate in the care of Helio Hall.     ETTA Callejas-CNP  [address]   Office: [phone]

## 2020-08-14 NOTE — PROGRESS NOTES
Occupational Therapy   Occupational Therapy Initial Assessment  Date: 2020   Patient Name: Martin Taylor  MRN: [id number]     : 1955    Date of Service: 2020    Discharge Recommendations: Martin Taylor scored a 18/24 on the AM-PAC ADL Inpatient form. Current research shows that an AM-PAC score of 18 or greater is typically associated with a discharge to the patient's home setting. Based on the patient's AM-PAC score, and their current ADL deficits, it is recommended that the patient have 2-3 sessions per week of Occupational Therapy at d/c to increase the patient's independence. At this time, this patient demonstrates the endurance and safety to discharge home with [address] [place] [address] and a follow up treatment frequency of 2-3x/wk. Please see assessment section for further patient specific details. HOME HEALTH CARE: LEVEL 1 STANDARD    - Initial home health evaluation to occur within 24-48 hours, in patient home   - Therapy to evaluate with goal of regaining prior level of functioning   - Therapy to evaluate if patient has [address] [place] [address] needs for personal care    If patient discharges prior to next session this note will serve as a discharge summary. Please see below for the latest assessment towards goals. OT Equipment Recommendations  Equipment Needed: Yes  Mobility Devices: ADL Assistive Devices  ADL Assistive Devices: Sock-Aid Soft;Reacher;Long-handled [place];Long-handled Sponge    Assessment   Performance deficits / Impairments: Decreased functional mobility ; Decreased ADL status; Decreased endurance;Decreased high-level IADLs  Assessment: Pt is not at baseline level of function and will benefit from continued OT to address the above limitations. Treatment Diagnosis: Decreased functional mobility, ADL/IADL status, activity tolerance related to s/p CABG  Prognosis: Good  Decision Making: Low Complexity  OT Education: OT Role;Equipment;Transfer Training;Plan of Care;Precautions; ADL to 220 bpm. He spontaneously converted back to sinus tachycardia without any intervention. CABGx3 8/12/2020. Subjective   General  Chart Reviewed: Yes  Additional Pertinent Hx: HTN, DM, CAD, CKD, R TKA, spinal cord stimulator  Family / Caregiver Present: No  Diagnosis: ventricular tachycardia, s/p CABG  Subjective  Subjective: Pt seated in recliner upon arrival, agreeable to evaluation.   General Comment  Comments:  and O2 sats mid 90s% prior to ambulation, immediately following ambulation , O2 sats 94% - RN aware  Patient Currently in Pain: Yes  Pain Assessment  Pain Assessment: 0-10  Pain Level: 5  Pain Type: Surgical pain  Pain Location: Sternum  Pain Orientation: Mid  Non-Pharmaceutical Pain Intervention(s): Ambulation/Increased Activity;Repositioned  Pre Treatment Pain Screening  Intervention List: Patient able to continue with treatment;Nurse/Physician notified  Vital Signs  Patient Currently in Pain: Yes  Oxygen Therapy  SpO2: 95 %  Pulse Oximeter Device Mode: Continuous  Pulse Oximeter Device Location: Finger  O2 Flow Rate (L/min): (S) 3 L/min  Social/Functional History  Social/Functional History  Lives With: Spouse  Type of Home: House  Home Layout: Two level, Able to Live on Main level with bedroom/bathroom, Performs ADL's on one level  Home Access: Stairs to enter without rails  Entrance Stairs - Number of Steps: very small 1 WING + another larger step  Bathroom Shower/Tub: Walk-in shower, Shower chair without back  Bathroom Toilet: Handicap height  Bathroom Equipment: Grab bars in shower, Hand-held shower  Bathroom Accessibility: Walker accessible  Home Equipment: Cane, Standard walker  ADL Assistance: Needs assistance(pt requires assist for LB ADLs secondary to back pain)  Bath: Minimal assistance  Dressing: Minimal assistance  Grooming: Independent  Feeding: Independent  Toileting: Independent  Homemaking Assistance: (cooks on occasion)  Homemaking Responsibilities: No  Ambulation Assistance: Independent(with SPC)  Transfer Assistance: Independent(with increased time secondary to back pain)  Active : Yes  Occupation: Retired  Type of occupation: fire department  Leisure & Hobbies: use to fish and hunt, limited d/t back pain; puzzles  Additional Comments: pt reports no recent falls, prior stimulator pt experienced LEs \"giving out\"       Objective   Vision: Impaired  Vision Exceptions: Wears glasses at all times  Hearing: Within functional limits    Orientation  Overall Orientation Status: Within Functional Limits  Observation/Palpation  Posture: Good  Balance  Sitting Balance: Modified independent   Standing Balance: Stand by assistance  Standing Balance  Time: x~8 min  Activity: functional mobility in room/hallway  Comment: with rollator, no LOB  Functional Mobility  Functional - Mobility Device: 4-Wheeled Walker  Activity: Other  Assist Level: Stand by assistance  Functional Mobility Comments: 150', rest break x~30 sec, 120'; including completing x4 steps with SBA  ADL  Additional Comments: Pt declined need to complete ADLs during evaluation, discussed option of using sock aid/reacher to assist with LB ADLs  Tone RUE  RUE Tone: Normotonic  Tone LUE  LUE Tone: Normotonic  Coordination  Movements Are Fluid And Coordinated: Yes     Bed mobility  Comment: Not addressed, pt seated in chair at beginning and end of session  Transfers  Sit to stand: Stand by assistance  Stand to sit: Stand by assistance  Transfer Comments: pt adhered to sternal precautions without difficulty  Vision - Basic Assessment  Prior Vision: Wears glasses all the time  Patient Visual Report: No visual complaint reported.   Cognition  Overall Cognitive Status: WFL        Sensation  Overall Sensation Status: WFL        LUE AROM (degrees)  LUE AROM : WFL  LUE General AROM: distally WFL  RUE AROM (degrees)  RUE AROM : WFL  RUE General AROM: distally WFL  LUE Strength  LUE Strength Comment: not assessed bilaterally secondary to sternal precautions                   Plan   Plan  Times per week: 3-5x  Times per day: Daily  Current Treatment Recommendations: Functional Mobility Training, Safety Education & Training, Self-Care / ADL, Patient/Caregiver Education & Training, Equipment Evaluation, Education, & procurement      AM-PAC Score        AM-PAC Inpatient Daily Activity Raw Score: 18 (08/14/20 1046)  AM-PAC Inpatient ADL T-Scale Score : 38.66 (08/14/20 1046)  ADL Inpatient CMS 0-100% Score: 46.65 (08/14/20 1046)  ADL Inpatient CMS G-Code Modifier : CK (08/14/20 1046)    Goals  Short term goals  Time Frame for Short term goals: Discharge  Short term goal 1: Mod I for all UB ADLs. Short term goal 2: Supervision for all LB ADLs with AE as needed. Short term goal 3: Mod I for functional mobility. Short term goal 4: Mod I for functional transfers.   Long term goals  Time Frame for Long term goals : STG=LTG       Therapy Time   Individual Concurrent Group Co-treatment   Time In 0818         Time Out 0841         Minutes 23              Timed Code Treatment Minutes:   8    Total Treatment Minutes:  [address], OT   [place] ([place]), [address], [place] [id number]

## 2020-08-14 NOTE — PROGRESS NOTES
Physical Therapy    Facility/Department: [facility]  Initial Assessment    NAME: Juarez Celaya  : 1955  MRN: [id number]    Date of Service: 2020    Discharge Recommendations: Juarez Celaya scored a 17/24 on the AM-PAC short mobility form. Current research shows that an AM-PAC score of 18 or greater is typically associated with a discharge to the patient's home setting. Based on the patient's AM-PAC score and their current functional mobility deficits, it is recommended that the patient have 2-3 sessions per week of Physical Therapy at d/c to increase the patient's independence. At this time, this patient demonstrates the endurance and safety to discharge home with HHPT and a follow up treatment frequency of 2-3x/wk. Please see assessment section for further patient specific details. HOME HEALTH CARE: LEVEL 1 STANDARD  - Initial home health evaluation to occur within 24-48 hours, in patient home   - Therapy to evaluate with goal of regaining prior level of functioning   - Therapy to evaluate if patient has [address] needs for personal care    If patient discharges prior to next session this note will serve as a discharge summary. Please see below for the latest assessment towards goals. PT Equipment Recommendations  Equipment Needed: Yes(pending progression with therapy)  Mobility Devices: [place] Tristin: Rollator (4 Wheeled)    Assessment   Body structures, Functions, Activity limitations: Decreased strength;Decreased endurance;Decreased balance  Assessment: Pt presents slightly below baseline functional mobility with the above deficits s/p CABG. Pt with PLOF of MOD I with cane and currently requires SBA with use of rollator. Pt would benefit from acute PT services to address deficits and facilitate return to PLOF.   Treatment Diagnosis: decreased activity tolerance  Prognosis: Good  Decision Making: Low Complexity  Clinical Presentation: stable  PT Education: PT Role;General Safety;Goals;Plan of Care;Precautions;Transfer Training;Gait Training;Functional Mobility Training  Patient Education: pt educated on anticipated sternal precautions, d/c recommendations--pt verbalized understanding  Barriers to Learning: hearing  REQUIRES PT FOLLOW UP: No  Activity Tolerance  Activity Tolerance: Patient Tolerated treatment well       Patient Diagnosis(es): There were no encounter diagnoses. has a past medical history of Back pain, Blood circulation, collateral, CAD (coronary artery disease), Chronic kidney disease, Diabetes mellitus ([facility] [address] Enlarged prostate, GERD (gastroesophageal reflux disease), Hyperlipidemia, Hypertension, and Medical history reviewed with no changes. has a past surgical history that includes back surgery; Cardiac surgery; Tonsillectomy; Coronary angioplasty with stent (10/2003); Total knee arthroplasty (Right, 2014); epidural steroid injection (Left, 7/23/2019); epidural steroid injection (Left, 9/10/2019); joint replacement; Spinal Cord Stimulator Surgery (N/A, 5/18/2020); and Coronary artery bypass graft (N/A, 8/12/2020). Restrictions  Restrictions/Precautions  Restrictions/Precautions: Fall Risk(HIGH FALL RISK)  Required Braces or Orthoses?: No  Position Activity Restriction  Sternal Precautions: No Pushing, No Pulling, 5# Lifting Restrictions  Other position/activity restrictions: Patient was reportedly in urology office because he had to strain to urinate and urinary retention. He also had some flank pain and dysuria. He then had a transient tachycardia in urology office heart rates 200s. He was sent to the [facility]. On arrival to ER, patient was reportedly asymptomatic and had no chest pain and SOB. He was in sinus tachycardia. Later while on telemetry he noted to have wide complex tachycardia with heart rates up to 220 bpm. He spontaneously converted back to sinus tachycardia without any intervention. CABGx3 8/12/2020. Vision/Hearing  Vision: Impaired  Vision Exceptions: Wears glasses at all times  Hearing: Within functional limits       Subjective  General  Chart Reviewed: Yes  Response To Previous Treatment: Not applicable  Family / Caregiver Present: No  Diagnosis: ventricular tachycardia  Follows Commands: Within Functional Limits  General Comment  Comments: Pt seated in chair upon arrival. Agreeable to PT/OT eval.  Subjective  Subjective: Pt reporting 5/10 pain at incision site--RN aware.   Pain Screening  Patient Currently in Pain: Yes  Vital Signs  Patient Currently in Pain: Yes       Orientation  Orientation  Overall Orientation Status: Within Functional Limits     Social/Functional History  Social/Functional History  Lives With: Spouse  Type of Home: House  Home Layout: Two level, Able to Live on Main level with bedroom/bathroom, Performs ADL's on one level  Home Access: Stairs to enter without rails  Entrance Stairs - Number of Steps: very small 1 WING + another larger step  Bathroom Shower/Tub: Walk-in shower, Shower chair without back  Bathroom Toilet: Handicap height  Bathroom Equipment: Grab bars in shower, Hand-held shower  Bathroom Accessibility: Walker accessible  Home Equipment: Cane, Standard walker(pt reports wife is getting lift chair for home use)  ADL Assistance: Needs assistance(pt requires assist for LB ADLs secondary to back pain)  Bath: Minimal assistance  Dressing: Minimal assistance  Grooming: Independent  Feeding: Independent  Toileting: Independent  Homemaking Assistance: (cooks on occasion)  Homemaking Responsibilities: No  Ambulation Assistance: Independent(with SPC)  Transfer Assistance: Independent(with increased time secondary to back pain)  Active : Yes  Occupation: Retired  Type of occupation: fire department  Leisure & Hobbies: use to fish and hunt, limited d/t back pain; puzzles  Additional Comments: pt reports no recent falls, prior stimulator pt experienced LEs \"giving out\"    Objective  AROM RLE (degrees)  RLE AROM: WFL  AROM LLE (degrees)  LLE AROM : WFL  Strength RLE  Strength RLE: WFL  Strength LLE  Strength LLE: WFL  Tone RLE  RLE Tone: Normotonic  Tone LLE  LLE Tone: Normotonic  Motor Control  Gross Motor?: WFL  Sensation  Overall Sensation Status: WFL  Bed mobility  Comment: Not addressed, pt seated in chair at beginning and end of session. Transfers  Sit to Stand: Stand by assistance(x1 chair)  Stand to sit: Stand by assistance  Ambulation  Ambulation?: Yes  Ambulation 1  Surface: level tile  Device: Rollator  Other Apparatus: O2(3L)  Assistance: Stand by assistance  Quality of Gait: slightly widened Mg, slight sway noted  Distance: 150'+50'+100'  Comments: Pt required 2 standing rest breaks during ambulation. No LOB. SOB noted however SpO2 remaining above 93% throughout session. Stairs/Curb  Stairs?: Yes  Stairs  # Steps : 3  Rails: None  Device: No Device  Assistance: Stand by assistance  Comment: Negotiated with step-to pattern, no LOB. Balance  Posture: Good  Sitting - Static: Good  Sitting - Dynamic: Good  Standing - Static: Good;-(with rollator)  Standing - Dynamic: Good;-(with rollator)        Plan   Plan  Times per week: 3-5x  Times per day: Daily  Current Treatment Recommendations: Strengthening, Balance Training, Functional Mobility Training, Transfer Training, Endurance Training, Gait Training, Stair training, Neuromuscular Re-education, Safety Education & Training, Positioning, Modalities, Equipment Evaluation, Education, & procurement, Patient/Caregiver Education & Training  Safety Devices  Type of devices:  All fall risk precautions in place, Call light within reach, Gait belt, Nurse notified, Left in chair(chair alarm not in place - RN dc)  Restraints  Initially in place: No    G-Code       OutComes Score       AM-PAC Score  AM-PAC Inpatient Mobility Raw Score : 17 (08/14/20 1105)  AM-PAC Inpatient T-Scale Score : 42.13 (08/14/20 1105)  Mobility Inpatient CMS 0-100% Score: 50.57 (08/14/20 1105)  Mobility Inpatient CMS G-Code Modifier : CK (08/14/20 1105)          Goals  Short term goals  Time Frame for Short term goals: upon d/c  Short term goal 1: Pt will perform bed mobility MOD I  Short term goal 2: Pt will perform transfers with LRAD MOD I  Short term goal 3: Pt will ambulate 150' with LRAD MOD I  Short term goal 4: Pt will negotiate 2 steps without HR and supervision  Patient Goals   Patient goals : pt did not state       Therapy Time   Individual Concurrent Group Co-treatment   Time In 0818         Time Out 0841         Minutes 23              Timed Code Treatment Minutes:   8    Total Treatment Minutes:  [address], [address] [place] [address] [id number]

## 2020-08-14 NOTE — PROGRESS NOTES
[facility] Education Nurse  Consult Note       NAME:  Kaela White  MEDICAL RECORD NUMBER:  [id number]  AGE: 77 y.o. GENDER: male  : 1955  TODAY'S DATE:  2020    Subjective:     Reason for Educator consult ---  Evaluation and Assessment:    Kaela White is a 77 y.o. male referred by:   [] Physician  [] Nursing  [x] Other: NP     Patient states diagnosed with diabetes 13 years ago. The patient does have a glucometer at home but states testing rarely not even once every day. Pt is aware of S/S of hyper- and hypoglycemia and the proper treatment of hyper- and hypoglycemia. Explained A1C values and goals. Patient states not counting carbohydrates. Encouraged diet compliance to improve pt's health and deter long term complications of DM. Discussed exercise, sick day management, following dietary plan, following up with PCP. Discussed medications including insulin. Discussed health benefits of non smoking. Recommend maintaining a smoke-free lifestyle.        PAST MEDICAL HISTORY      Diagnosis Date    Blood circulation, collateral     CAD (coronary artery disease)     Chronic back pain     Chronic kidney disease     Diabetes mellitus (HCC)     Enlarged prostate     GERD (gastroesophageal reflux disease)     Hyperlipidemia     Hypertension     Medical history reviewed with no changes        PAST SURGICAL HISTORY  Past Surgical History:   Procedure Laterality Date    CARDIAC CATHETERIZATION  2020    Dr. Williamson Service - w/IVUS of L main   [address]  2018    Dr. Erfa Leach  10/2003    Da Sanchez N/A 2020    Dr. Sabnie Arzola - x3 (L SV-LAD, SV-OM1, SV-PDA); ligation of SARA w/45mm AtriClip    EPIDURAL STEROID INJECTION Left 2019    LEFT LUMBAR THREE LUMBAR FOUR EPIDURAL STEROID INJECTION SITE CONFIRMED BY FLUOROSCOPY performed by Maryalice Simmonds, MD at [facility] INJECTION Left 9/10/2019    LEFT SACROILIAC JOINT INJECTION SITE CONFIRMED BY FLUOROSCOPY performed by Gisselle Sánchez MD at [address]  2014   [address]    w/rods implanted   [address] SURGERY N/A 2020    ([address] [address]) - [employer] performed by Ligia Rosenbaum MD at [address] Right 2014    TRANSESOPHAGEAL ECHOCARDIOGRAM  2020    during CABG       FAMILY HISTORY  Family History   Problem Relation Age of Onset    Diabetes Mother     Heart Disease Father     High Blood Pressure Father     High Cholesterol Father     Heart Attack Father     High Blood Pressure Sister     High Blood Pressure Brother     Birth Defects Brother     High Blood Pressure Brother     Heart Disease Sister     High Blood Pressure Sister     High Blood Pressure Sister        SOCIAL HISTORY  Social History     Tobacco Use    Smoking status: Former Smoker     Packs/day: 1.00     Last attempt to quit: 1984     Years since quittin.5    Smokeless tobacco: Never Used    Tobacco comment: stopped 25 years ago   Substance Use Topics    Alcohol use: No    Drug use: No       ALLERGIES  Allergies   Allergen Reactions    Penicillins     Percocet [Oxycodone-Acetaminophen]      Made him feel \"weird\"    Codeine Nausea And Vomiting       MEDICATIONS  No current facility-administered medications on file prior to encounter. Current Outpatient Medications on File Prior to Encounter   Medication Sig Dispense Refill    cyclobenzaprine (FLEXERIL) 10 MG tablet Take 10 mg by mouth 3 times daily as needed for Muscle spasms      gabapentin (NEURONTIN) 300 MG capsule Take 600 mg by mouth 2 times daily.        atorvastatin (LIPITOR) 40 MG tablet take 1 tablet by mouth once daily 90 tablet 3    carvedilol (COREG) 6.25 MG tablet take 1 tablet by mouth twice a day with meals 180 tablet 1    isosorbide mononitrate (IMDUR) 30 MG extended release tablet take 1 tablet by mouth once daily 90 tablet 3    glipiZIDE (GLUCOTROL) 5 MG tablet       traZODone (DESYREL) 50 MG tablet Take 150 mg by mouth nightly       amLODIPine (NORVASC) 10 MG tablet Take 1 tablet by mouth daily 90 tablet 3    olmesartan (BENICAR) 40 MG tablet Take 1 tablet by mouth daily 90 tablet 3    aspirin 325 MG tablet Take 325 mg by mouth daily      nitroGLYCERIN (NITROSTAT) 0.4 MG SL tablet Place 0.4 mg under the tongue every 5 minutes as needed for Chest pain up to max of 3 total doses. If no relief after 1 dose, call 911.  tamsulosin (FLOMAX) 0.4 MG capsule Take 0.8 mg by mouth nightly      Multiple Vitamins-Minerals (CENTRUM SILVER) TABS Take 1 tablet by mouth daily.  metformin (GLUCOPHAGE) 1000 MG tablet Take 1,000 mg by mouth 2 times daily.            Objective:     LABS    CBC:   Lab Results   Component Value Date    WBC 15.9 08/13/2020    RBC 3.79 08/13/2020    HGB 11.8 08/13/2020    HCT 34.6 08/13/2020    MCV 91.4 08/13/2020    MCH 31.2 08/13/2020    MCHC 34.1 08/13/2020    RDW 14.3 08/13/2020     08/13/2020    MPV 7.4 08/13/2020     CMP:    Lab Results   Component Value Date     08/13/2020    K 4.3 08/13/2020    K 4.5 12/18/2018     08/13/2020    CO2 22 08/13/2020    BUN 16 08/13/2020    CREATININE 0.9 08/13/2020    GFRAA >60 08/13/2020    GFRAA >60 01/25/2011    AGRATIO 1.5 08/05/2020    LABGLOM >60 08/13/2020    GLUCOSE 136 08/13/2020    PROT 6.3 08/06/2020    LABALBU 3.8 08/06/2020    CALCIUM 8.7 08/13/2020    BILITOT 0.3 08/06/2020    ALKPHOS 107 08/06/2020    AST 13 08/06/2020    ALT 12 08/06/2020       HgBA1c:    Lab Results   Component Value Date    LABA1C 7.7 08/05/2020       This patient's last creatinine was   Recent Labs     08/13/20  0521   CREATININE 0.9        Recent Blood sugars have been   Lab Results   Component Value Date    POCGLU 104 08/14/2020    POCGLU 112 08/14/2020    POCGLU 113 08/14/2020    POCGLU 88 08/14/2020    POCGLU 137 08/14/2020    POCGLU 173 08/14/2020    POCGLU 204 08/14/2020    POCGLU 149 08/14/2020     Lab Results   Component Value Date    GLUCOSE 136 08/13/2020    GLUCOSE 222 08/12/2020    GLUCOSE 199 08/09/2020    GLUCOSE 194 08/06/2020    GLUCOSE 158 08/05/2020    GLUCOSE 162 08/04/2020            Assessment:     Patient Active Problem List   Diagnosis    Hyperlipidemia    Essential hypertension, benign    Coronary artery disease involving native coronary artery of native heart without angina pectoris    Primary cardiomyopathy ([facility] [address]    Palpitations    GERD with esophagitis    Obesity (BMI 30-39. 9)    DM2 (diabetes mellitus, type 2) ([facility])    BPH (benign prostatic hyperplasia)    CKD (chronic kidney disease) stage 3, GFR 30-59 ml/min ([facility])    Chronic back pain    Ventricular tachycardia ([facility])    Tachycardia    PAT (paroxysmal atrial tachycardia) ([facility])    S/P coronary artery bypass graft x 3    S/P left atrial appendage ligation    S/P angioplasty with stent    Sleep apnea    Ischemic cardiomyopathy    Acute myocardial infarction of inferolateral wall ([facility] [address]       Plan:     Plan of Care:   Now S/P CABG  Diabetes Type 2 fair control, would prefer better control now that he is post CABG  Lipids-- last HDL 30, LDL 63, trig 132, on Crestor  Renal- creatinine today 0.9, eGFR >60  ACE/ARB: yes, on Lisinopril  Last eye exam: unknown  Weight trend: increasing  DVT prophylaxis: SHELDON hose, on ASA  Current DM tx: insulin drip, metformin was restarted. Believe will require large dose of Lantus tonight based on current insulin drip requirements. Continue to monitor blood sugars to determine adequacy of current dosages  Recommend maintaining a smoke-free lifestyle. Based on drip rates would consider dosing insulin higher than weight based tonight. Consider 45 units.     Discharge Plan:  Would consider DPP4 inhibitor or SGLT-2 with cardiac benefits as outpatient. Patient to f/u with PCP. Instructed to log blood sugars and take blood sugar log to her f/u appointment. ABDI CEDILLO  [facility] MSN, BSN, RN, [facility] ( Certified Diabetes Care and ).

## 2020-08-14 NOTE — PROGRESS NOTES
[facility]    8/14/2020 9:32 AM        Name: Tabitha Olsen . Admitted: 8/4/2020  Primary Care Provider: ETTA Ayon CNP (Tel: [phone])                        Subjective:  . No acute events overnight. Resting well. Pain control. Diet ok. Labs reviewed  Denies any chest pain sob.      Reviewed interval ancillary notes    Current Medications  metoprolol tartrate (LOPRESSOR) tablet 50 mg, BID  sodium chloride flush 0.9 % injection 10 mL, 2 times per day  sodium chloride flush 0.9 % injection 10 mL, PRN  acetaminophen (TYLENOL) tablet 1,000 mg, Q6H  traMADol (ULTRAM) tablet 50 mg, Q6H PRN    Or  traMADol (ULTRAM) tablet 100 mg, Q6H PRN  diphenhydrAMINE (BENADRYL) tablet 25 mg, Nightly PRN  zolpidem (AMBIEN) tablet 5 mg, Nightly PRN  sennosides-docusate sodium (SENOKOT-S) 8.6-50 MG tablet 1 tablet, BID  magnesium hydroxide (MILK OF MAGNESIA) 400 MG/5ML suspension 30 mL, Daily PRN  polyethylene glycol (GLYCOLAX) packet 17 g, Daily PRN  ondansetron (ZOFRAN) injection 4 mg, Q6H PRN  metoclopramide (REGLAN) injection 10 mg, Q6H PRN  pantoprazole (PROTONIX) tablet 40 mg, Daily  sodium chloride (PF) 0.9 % injection 10 mL, Daily  [START ON 8/15/2020] lisinopril (PRINIVIL;ZESTRIL) tablet 2.5 mg, Lunch  furosemide (LASIX) injection 20 mg, BID  magnesium oxide (MAG-OX) tablet 400 mg, BID  mupirocin (BACTROBAN) 2 % ointment, BID  nitroGLYCERIN (NITRODUR) 0.2 MG/HR 1 patch, Daily  potassium chloride (KLOR-CON) extended release tablet 10 mEq, TID WC  fondaparinux (ARIXTRA) injection 2.5 mg, Daily  aspirin EC tablet 325 mg, Daily  aspirin suppository 300 mg, Once  albuterol sulfate  (90 Base) MCG/ACT inhaler 2 puff, Q6H PRN  nitroGLYCERIN 50 mg in dextrose 5% 250 mL infusion, Continuous PRN  insulin regular (HUMULIN R;NOVOLIN R) 100 Units in sodium chloride 0.9 % 100 mL infusion, Continuous  insulin lispro (1 Unit Dial) 0-12 Units, TID WC  insulin lispro (1 Unit Dial) 0-6 Units, Nightly  glucose (GLUTOSE) 40 % oral gel 15 g, PRN  glucagon (rDNA) injection 1 mg, PRN  fentaNYL (SUBLIMAZE) injection 25 mcg, Q1H PRN  insulin glargine (LANTUS;BASAGLAR) injection pen 18 Units, Nightly  insulin lispro (1 Unit Dial) 10 Units, TID WC  Vitamin D (CHOLECALCIFEROL) tablet 2,000 Units, Daily  tiZANidine (ZANAFLEX) tablet 4 mg, Q8H PRN  rosuvastatin (CRESTOR) tablet 20 mg, Nightly  omega-3 acid ethyl esters (LOVAZA) capsule 2 g, BID  therapeutic multivitamin-minerals 1 tablet, Daily  tamsulosin (FLOMAX) capsule 0.8 mg, Nightly  gabapentin (NEURONTIN) capsule 600 mg, BID        Objective:  /89   Pulse 102   Temp 98 °F (36.7 °C) (Temporal)   Resp 20   Ht 6' 2\" (1.88 m)   Wt 276 lb 7.3 oz (125.4 kg)   SpO2 95%   BMI 35.49 kg/m²     Intake/Output Summary (Last 24 hours) at 8/14/2020 0932  Last data filed at 8/14/2020 0845  Gross per 24 hour   Intake 500 ml   Output 4630 ml   Net -4130 ml      Wt Readings from Last 3 Encounters:   08/14/20 276 lb 7.3 oz (125.4 kg)   05/22/20 264 lb 15.9 oz (120.2 kg)   05/18/20 265 lb (120.2 kg)       General appearance:  Appears comfortable  Eyes: Sclera clear. Pupils equal.  ENT: Moist oral mucosa. Trachea midline, no adenopathy. Cardiovascular: Regular rhythm, normal S1, S2. No murmur. No edema in lower extremities  Respiratory: Not using accessory muscles. Good inspiratory effort. Clear to auscultation bilaterally, no wheeze or crackles. GI: Abdomen soft, no tenderness, not distended, normal bowel sounds  Musculoskeletal: No cyanosis in digits, neck supple  Neurology: CN 2-12 grossly intact. No speech or motor deficits  Psych: Normal affect.  Alert and oriented in time, place and person  Skin: Warm, dry, normal turgor    Labs and Tests:  CBC:   Recent Labs     08/12/20  1340 08/12/20 2015 08/12/20  2351 08/13/20  0521   WBC 11.4*  --   --  15.9*   HGB 12.1* 12.9* 12.1* 11.8*     --   --  157     BMP:    Recent Labs     08/12/20  1340 08/13/20  0521    135*   K 3.9 4.3    104   CO2 23 22   BUN 22* 16   CREATININE 1.1 0.9   GLUCOSE 222* 136*     Hepatic: No results for input(s): AST, ALT, ALB, BILITOT, ALKPHOS in the last 72 hours. Discussed care with family and patient             Spent 30  minutes with patient and family at bedside and on unit reviewing medical records and labs, spent greater than 50% time counseling patient and family on diagnosis and plan   Problem List  Active Problems:    Essential hypertension, benign    Coronary artery disease involving native coronary artery of native heart without angina pectoris    Primary cardiomyopathy ([address]    GERD with esophagitis    Obesity (BMI 30-39. 9)    BPH (benign prostatic hyperplasia)    CKD (chronic kidney disease) stage 3, GFR 30-59 ml/min (HCC)    Ventricular tachycardia (HCC)    Tachycardia    PAT (paroxysmal atrial tachycardia) (HCC)    S/P coronary artery bypass graft x 3    S/P left atrial appendage ligation  Resolved Problems:    * No resolved hospital problems. *       Assessment & Plan:   1. Diabetes  -Still remain on drip. Needing subcu insulin and high-dose sliding scale  -His blood glucose still in the 80s 100s which is acceptable  -1C was 7.5 otherwise controlled on p.o. regimen  -We will continue to try to get him off the drip today see how he does with insulin requirement      Tachycardia tachycardia  CAD  Management per CT surgery. Cardiology.         Diet: DIET CARDIAC; Carb Control: 4 carb choices (60 gms)/meal  Code:Full Code  DVT PPX lovenox       Jacqui Caal MD   8/14/2020 9:32 AM

## 2020-08-14 NOTE — CARE COORDINATION
CM spoke w/ patient again re: potential Home needs at d/c. Discussed need for [facility] and patient was amenable to [facility]. Referral placed, navin butt. Case management will continue to follow progress and update discharge plan as needed.     Romana Riis, BSN, .[phone]

## 2020-08-15 LAB
ANION GAP SERPL CALCULATED.3IONS-SCNC: 12 MMOL/L (ref 3–16)
BLOOD BANK DISPENSE STATUS: NORMAL
BLOOD BANK DISPENSE STATUS: NORMAL
BLOOD BANK PRODUCT CODE: NORMAL
BLOOD BANK PRODUCT CODE: NORMAL
BPU ID: NORMAL
BPU ID: NORMAL
BUN BLDV-MCNC: 28 MG/DL (ref 7–20)
CALCIUM SERPL-MCNC: 8.4 MG/DL (ref 8.3–10.6)
CHLORIDE BLD-SCNC: 102 MMOL/L (ref 99–110)
CO2: 24 MMOL/L (ref 21–32)
CREAT SERPL-MCNC: 1 MG/DL (ref 0.8–1.3)
DESCRIPTION BLOOD BANK: NORMAL
DESCRIPTION BLOOD BANK: NORMAL
GFR AFRICAN AMERICAN: >60
GFR NON-AFRICAN AMERICAN: >60
GLUCOSE BLD-MCNC: 118 MG/DL (ref 70–99)
GLUCOSE BLD-MCNC: 122 MG/DL (ref 70–99)
GLUCOSE BLD-MCNC: 124 MG/DL (ref 70–99)
GLUCOSE BLD-MCNC: 134 MG/DL (ref 70–99)
GLUCOSE BLD-MCNC: 143 MG/DL (ref 70–99)
GLUCOSE BLD-MCNC: 144 MG/DL (ref 70–99)
GLUCOSE BLD-MCNC: 153 MG/DL (ref 70–99)
GLUCOSE BLD-MCNC: 178 MG/DL (ref 70–99)
GLUCOSE BLD-MCNC: 226 MG/DL (ref 70–99)
GLUCOSE BLD-MCNC: 97 MG/DL (ref 70–99)
GLUCOSE BLD-MCNC: 99 MG/DL (ref 70–99)
HCT VFR BLD CALC: 35.4 % (ref 40.5–52.5)
HEMOGLOBIN: 11.6 G/DL (ref 13.5–17.5)
MAGNESIUM: 1.9 MG/DL (ref 1.8–2.4)
MCH RBC QN AUTO: 30 PG (ref 26–34)
MCHC RBC AUTO-ENTMCNC: 32.8 G/DL (ref 31–36)
MCV RBC AUTO: 91.6 FL (ref 80–100)
PDW BLD-RTO: 15 % (ref 12.4–15.4)
PERFORMED ON: ABNORMAL
PERFORMED ON: NORMAL
PERFORMED ON: NORMAL
PLATELET # BLD: 170 K/UL (ref 135–450)
PMV BLD AUTO: 8 FL (ref 5–10.5)
POTASSIUM SERPL-SCNC: 4.2 MMOL/L (ref 3.5–5.1)
RBC # BLD: 3.86 M/UL (ref 4.2–5.9)
SODIUM BLD-SCNC: 138 MMOL/L (ref 136–145)
WBC # BLD: 12.8 K/UL (ref 4–11)

## 2020-08-15 PROCEDURE — 6360000002 HC RX W HCPCS: Performed by: THORACIC SURGERY (CARDIOTHORACIC VASCULAR SURGERY)

## 2020-08-15 PROCEDURE — 6370000000 HC RX 637 (ALT 250 FOR IP): Performed by: NURSE PRACTITIONER

## 2020-08-15 PROCEDURE — 6370000000 HC RX 637 (ALT 250 FOR IP): Performed by: THORACIC SURGERY (CARDIOTHORACIC VASCULAR SURGERY)

## 2020-08-15 PROCEDURE — 99233 SBSQ HOSP IP/OBS HIGH 50: CPT | Performed by: INTERNAL MEDICINE

## 2020-08-15 PROCEDURE — 6370000000 HC RX 637 (ALT 250 FOR IP)

## 2020-08-15 PROCEDURE — 94761 N-INVAS EAR/PLS OXIMETRY MLT: CPT

## 2020-08-15 PROCEDURE — 94669 MECHANICAL CHEST WALL OSCILL: CPT

## 2020-08-15 PROCEDURE — 6370000000 HC RX 637 (ALT 250 FOR IP): Performed by: HOSPITALIST

## 2020-08-15 PROCEDURE — 80048 BASIC METABOLIC PNL TOTAL CA: CPT

## 2020-08-15 PROCEDURE — 99024 POSTOP FOLLOW-UP VISIT: CPT | Performed by: THORACIC SURGERY (CARDIOTHORACIC VASCULAR SURGERY)

## 2020-08-15 PROCEDURE — 85027 COMPLETE CBC AUTOMATED: CPT

## 2020-08-15 PROCEDURE — 2000000000 HC ICU R&B

## 2020-08-15 PROCEDURE — 2580000003 HC RX 258: Performed by: THORACIC SURGERY (CARDIOTHORACIC VASCULAR SURGERY)

## 2020-08-15 PROCEDURE — 83735 ASSAY OF MAGNESIUM: CPT

## 2020-08-15 RX ORDER — LOSARTAN POTASSIUM 25 MG/1
12.5 TABLET ORAL
Status: DISCONTINUED | OUTPATIENT
Start: 2020-08-15 | End: 2020-08-16

## 2020-08-15 RX ORDER — BISACODYL 10 MG
SUPPOSITORY, RECTAL RECTAL
Status: COMPLETED
Start: 2020-08-15 | End: 2020-08-15

## 2020-08-15 RX ORDER — GLIPIZIDE 5 MG/1
5 TABLET ORAL
Status: DISCONTINUED | OUTPATIENT
Start: 2020-08-15 | End: 2020-08-17 | Stop reason: HOSPADM

## 2020-08-15 RX ORDER — CARVEDILOL 6.25 MG/1
6.25 TABLET ORAL 2 TIMES DAILY WITH MEALS
Status: DISCONTINUED | OUTPATIENT
Start: 2020-08-15 | End: 2020-08-16

## 2020-08-15 RX ORDER — LOSARTAN POTASSIUM 25 MG/1
12.5 TABLET ORAL DAILY
Status: DISCONTINUED | OUTPATIENT
Start: 2020-08-15 | End: 2020-08-15

## 2020-08-15 RX ORDER — GLIPIZIDE 5 MG/1
5 TABLET ORAL
Status: DISCONTINUED | OUTPATIENT
Start: 2020-08-16 | End: 2020-08-15

## 2020-08-15 RX ADMIN — MULTIPLE VITAMINS W/ MINERALS TAB 1 TABLET: TAB at 08:01

## 2020-08-15 RX ADMIN — MUPIROCIN: 20 OINTMENT TOPICAL at 09:56

## 2020-08-15 RX ADMIN — POTASSIUM CHLORIDE 10 MEQ: 750 TABLET, FILM COATED, EXTENDED RELEASE ORAL at 16:53

## 2020-08-15 RX ADMIN — CARVEDILOL 6.25 MG: 6.25 TABLET, FILM COATED ORAL at 16:53

## 2020-08-15 RX ADMIN — TRAMADOL HYDROCHLORIDE 100 MG: 50 TABLET, FILM COATED ORAL at 23:43

## 2020-08-15 RX ADMIN — INSULIN LISPRO 2 UNITS: 100 INJECTION, SOLUTION INTRAVENOUS; SUBCUTANEOUS at 09:54

## 2020-08-15 RX ADMIN — LOSARTAN POTASSIUM 12.5 MG: 25 TABLET, FILM COATED ORAL at 12:10

## 2020-08-15 RX ADMIN — MUPIROCIN: 20 OINTMENT TOPICAL at 20:34

## 2020-08-15 RX ADMIN — INSULIN LISPRO 4 UNITS: 100 INJECTION, SOLUTION INTRAVENOUS; SUBCUTANEOUS at 12:10

## 2020-08-15 RX ADMIN — ACETAMINOPHEN 1000 MG: 500 TABLET, FILM COATED ORAL at 14:44

## 2020-08-15 RX ADMIN — Medication 10 ML: at 08:02

## 2020-08-15 RX ADMIN — ACETAMINOPHEN 1000 MG: 500 TABLET, FILM COATED ORAL at 20:32

## 2020-08-15 RX ADMIN — ACETAMINOPHEN 1000 MG: 500 TABLET, FILM COATED ORAL at 08:00

## 2020-08-15 RX ADMIN — INSULIN LISPRO 2 UNITS: 100 INJECTION, SOLUTION INTRAVENOUS; SUBCUTANEOUS at 16:58

## 2020-08-15 RX ADMIN — STANDARDIZED SENNA CONCENTRATE AND DOCUSATE SODIUM 1 TABLET: 8.6; 5 TABLET ORAL at 08:01

## 2020-08-15 RX ADMIN — PANTOPRAZOLE SODIUM 40 MG: 40 TABLET, DELAYED RELEASE ORAL at 08:01

## 2020-08-15 RX ADMIN — ASPIRIN 325 MG: 325 TABLET, COATED ORAL at 08:01

## 2020-08-15 RX ADMIN — ACETAMINOPHEN 1000 MG: 500 TABLET, FILM COATED ORAL at 02:20

## 2020-08-15 RX ADMIN — Medication 10 ML: at 20:39

## 2020-08-15 RX ADMIN — METFORMIN HYDROCHLORIDE 1000 MG: 500 TABLET ORAL at 16:52

## 2020-08-15 RX ADMIN — MAGNESIUM GLUCONATE 500 MG ORAL TABLET 400 MG: 500 TABLET ORAL at 08:01

## 2020-08-15 RX ADMIN — FUROSEMIDE 20 MG: 10 INJECTION, SOLUTION INTRAMUSCULAR; INTRAVENOUS at 08:00

## 2020-08-15 RX ADMIN — FUROSEMIDE 20 MG: 10 INJECTION, SOLUTION INTRAMUSCULAR; INTRAVENOUS at 16:52

## 2020-08-15 RX ADMIN — STANDARDIZED SENNA CONCENTRATE AND DOCUSATE SODIUM 1 TABLET: 8.6; 5 TABLET ORAL at 20:32

## 2020-08-15 RX ADMIN — FONDAPARINUX SODIUM 2.5 MG: 2.5 INJECTION SUBCUTANEOUS at 08:00

## 2020-08-15 RX ADMIN — METFORMIN HYDROCHLORIDE 1000 MG: 500 TABLET ORAL at 08:01

## 2020-08-15 RX ADMIN — GABAPENTIN 600 MG: 300 CAPSULE ORAL at 08:01

## 2020-08-15 RX ADMIN — Medication 2000 UNITS: at 08:01

## 2020-08-15 RX ADMIN — GLIPIZIDE 5 MG: 5 TABLET ORAL at 12:10

## 2020-08-15 RX ADMIN — Medication 10 ML: at 16:56

## 2020-08-15 RX ADMIN — INSULIN LISPRO 1 UNITS: 100 INJECTION, SOLUTION INTRAVENOUS; SUBCUTANEOUS at 20:36

## 2020-08-15 RX ADMIN — TAMSULOSIN HYDROCHLORIDE 0.8 MG: 0.4 CAPSULE ORAL at 20:32

## 2020-08-15 RX ADMIN — METOPROLOL TARTRATE 25 MG: 25 TABLET, FILM COATED ORAL at 08:01

## 2020-08-15 RX ADMIN — ROSUVASTATIN CALCIUM 20 MG: 20 TABLET, FILM COATED ORAL at 20:32

## 2020-08-15 RX ADMIN — BISACODYL: 10 SUPPOSITORY RECTAL at 19:01

## 2020-08-15 RX ADMIN — INSULIN LISPRO 10 UNITS: 100 INJECTION, SOLUTION INTRAVENOUS; SUBCUTANEOUS at 09:54

## 2020-08-15 RX ADMIN — MAGNESIUM GLUCONATE 500 MG ORAL TABLET 400 MG: 500 TABLET ORAL at 20:32

## 2020-08-15 RX ADMIN — GABAPENTIN 600 MG: 300 CAPSULE ORAL at 20:32

## 2020-08-15 RX ADMIN — TRAMADOL HYDROCHLORIDE 100 MG: 50 TABLET, FILM COATED ORAL at 06:14

## 2020-08-15 RX ADMIN — POTASSIUM CHLORIDE 10 MEQ: 750 TABLET, FILM COATED, EXTENDED RELEASE ORAL at 12:10

## 2020-08-15 RX ADMIN — POTASSIUM CHLORIDE 10 MEQ: 750 TABLET, FILM COATED, EXTENDED RELEASE ORAL at 08:01

## 2020-08-15 ASSESSMENT — PAIN DESCRIPTION - PAIN TYPE
TYPE: SURGICAL PAIN

## 2020-08-15 ASSESSMENT — PAIN SCALES - GENERAL
PAINLEVEL_OUTOF10: 5
PAINLEVEL_OUTOF10: 5
PAINLEVEL_OUTOF10: 4
PAINLEVEL_OUTOF10: 0
PAINLEVEL_OUTOF10: 4
PAINLEVEL_OUTOF10: 0
PAINLEVEL_OUTOF10: 5
PAINLEVEL_OUTOF10: 0
PAINLEVEL_OUTOF10: 3
PAINLEVEL_OUTOF10: 0
PAINLEVEL_OUTOF10: 7
PAINLEVEL_OUTOF10: 0
PAINLEVEL_OUTOF10: 0
PAINLEVEL_OUTOF10: 3
PAINLEVEL_OUTOF10: 0
PAINLEVEL_OUTOF10: 6

## 2020-08-15 ASSESSMENT — PAIN DESCRIPTION - LOCATION
LOCATION: STERNUM

## 2020-08-15 ASSESSMENT — PAIN DESCRIPTION - DIRECTION: RADIATING_TOWARDS: LE

## 2020-08-15 ASSESSMENT — PAIN DESCRIPTION - ORIENTATION
ORIENTATION: MID
ORIENTATION: MID

## 2020-08-15 NOTE — PROGRESS NOTES
[address] Daily Progress Note      Admit Date:  8/4/2020    No chief complaint on file. Subjective:  Mr. Ade Londono denies exertional chest pain, SOB/CARR, PND, palpitations, light-headedness, or edema. Has central chest tightness, reproducible. Some SOB. Able to ambulate without difficulty. Fatigued from sleep loss (q1h blood sugars. HR >120 last night but improved with lopressor.     Objective:   /69   Pulse 109   Temp 97.3 °F (36.3 °C) (Temporal)   Resp 16   Ht 6' 2\" (1.88 m)   Wt 269 lb 13.5 oz (122.4 kg)   SpO2 92%   BMI 34.65 kg/m²       Intake/Output Summary (Last 24 hours) at 8/15/2020 8837  Last data filed at 8/15/2020 1077  Gross per 24 hour   Intake 886.98 ml   Output 1525 ml   Net -638.02 ml       TELEMETRY: Sinus     Physical Exam:  General:  Awake, alert, oriented x 3, NAD  Skin:  Warm and dry  Neck:  JVD flat  Chest:  normal air entry  Cardiovascular:  RRR S1S2, no S3, no mrm  Abdomen:  Soft, ND, NT, No HSM  Extremities:  no edema    Medications:    metFORMIN  1,000 mg Oral BID WC    metoprolol tartrate  25 mg Oral BID    sodium chloride flush  10 mL Intravenous 2 times per day    acetaminophen  1,000 mg Oral Q6H    sennosides-docusate sodium  1 tablet Oral BID    pantoprazole  40 mg Oral Daily    sodium chloride (PF)  10 mL Intravenous Daily    lisinopril  2.5 mg Oral Lunch    furosemide  20 mg Intravenous BID    magnesium oxide  400 mg Oral BID    mupirocin   Nasal BID    nitroGLYCERIN  1 patch Transdermal Daily    potassium chloride  10 mEq Oral TID WC    fondaparinux  2.5 mg Subcutaneous Daily    aspirin  325 mg Oral Daily    insulin lispro  0-12 Units Subcutaneous TID WC    insulin lispro  0-6 Units Subcutaneous Nightly    insulin lispro  0.08 Units/kg Subcutaneous TID WC    Vitamin D  2,000 Units Oral Daily    rosuvastatin  20 mg Oral Nightly    omega-3 acid ethyl esters  2 g Oral BID    therapeutic multivitamin-minerals  1 tablet Oral Daily    tamsulosin  0.8 mg Oral Nightly    gabapentin  600 mg Oral BID      nitroGLYCERIN Stopped (08/13/20 0816)    insulin Stopped (08/15/20 0451)     diphenhydrAMINE, sodium chloride flush, traMADol **OR** traMADol, zolpidem, magnesium hydroxide, polyethylene glycol, ondansetron, metoclopramide, albuterol sulfate HFA, nitroGLYCERIN, glucose, glucagon (rDNA), tiZANidine    Lab Data:  CBC:   Recent Labs     08/12/20  1340  08/12/20  2351 08/13/20  0521 08/15/20  0451   WBC 11.4*  --   --  15.9* 12.8*   HGB 12.1*   < > 12.1* 11.8* 11.6*   HCT 36.7*  --   --  34.6* 35.4*   MCV 90.6  --   --  91.4 91.6     --   --  157 170    < > = values in this interval not displayed. BMP:   Recent Labs     08/12/20  1340 08/13/20  0521 08/15/20  0451    135* 138   K 3.9 4.3 4.2    104 102   CO2 23 22 24   BUN 22* 16 28*   CREATININE 1.1 0.9 1.0     LIVER PROFILE: No results for input(s): AST, ALT, LIPASE, BILIDIR, BILITOT, ALKPHOS in the last 72 hours. Invalid input(s): AMYLASE,  ALB  PT/INR: No results for input(s): PROTIME, INR in the last 72 hours. APTT: No results for input(s): APTT in the last 72 hours. BNP:  No results for input(s): BNP in the last 72 hours. IMAGING: none    Assessment/Plan:  Active Problems:    Essential hypertension, benign  Plan: home meds    Coronary artery disease involving native coronary artery of native heart without angina pectoris  Plan: severe 3VD s/p CABG. POD 1. Cont aspirin, statin    Primary cardiomyopathy (HCC)  Plan: lvef 45%, ischemic in origin, s/p Cardiac cath. CKD (chronic kidney disease) stage 3, GFR 30-59 ml/min (HCC)  Plan: monitor creatinine    Ventricular tachycardia (HCC)  Plan:         PAT (paroxysmal atrial tachycardia) ([facility] [address]  Plan: s/p atriclip. Cont lopressor would increase to 50mg bid with goal 100mg bid    S/P coronary artery bypass graft x 3  Plan: POD 1. SVG to LAD, SVG to OM1, SVG to PDA. Continue ambulation. Incentive spirometry. Lasix. Jaydon Haney MD 8/15/2020 8:21 AM

## 2020-08-15 NOTE — PROGRESS NOTES
Per hospitalist, hold prandial insulin. Treat highs with sliding scale, restarted home medications. Continue to monitor sugar AC/HS. Notify of sugars after dinner for lantus dosing.

## 2020-08-15 NOTE — PROGRESS NOTES
Shift assessment complete. Evening mediations given, see MAR for details. Pt alert and oriented x4. Pt complains of pain, PRN medications given. Nonskid socks on. Pt showered. Pt resting in chair with no signs of distress. Will continue to monitor. Call light within reach.

## 2020-08-15 NOTE — FLOWSHEET NOTE
08/14/20 2012   Vitals   Temp 97.9 °F (36.6 °C)   Temp Source Temporal   Pulse 123   Heart Rate Source Telemetry   Resp 16   /82   MAP (mmHg) 94   BP Location Left upper arm   BP Upper/Lower Upper   BP Method Automatic   Patient Position Sitting   Patient Currently in Pain Yes   Cardiac Rhythm ST   Message sent to Dr. Chantale Newman. Order received to give 50mg Metoprolol at this time. Verbal order to monitor VSS. If HR >110 and BP > 100 in 2 hours, give another 12.5 mg Metoprolol. Will continue to monitor. Call light within reach.

## 2020-08-15 NOTE — PROGRESS NOTES
[facility] PROGRESS NOTE    8/15/2020 3:13 PM        Name: Ezequiel Mckeon . Admitted: 8/4/2020  Primary Care Provider: ETTA Douglass CNP (Tel: [phone])                        Subjective:  . No acute events overnight. Resting well. Pain control. Diet ok. Labs reviewed  Denies any chest pain sob.      Reviewed interval ancillary notes    Current Medications  carvedilol (COREG) tablet 6.25 mg, BID WC  losartan (COZAAR) tablet 12.5 mg, Lunch  glipiZIDE (GLUCOTROL) tablet 5 mg, QAM AC  metFORMIN (GLUCOPHAGE) tablet 1,000 mg, BID WC  diphenhydrAMINE (BENADRYL) tablet 25 mg, Nightly PRN  sodium chloride flush 0.9 % injection 10 mL, 2 times per day  sodium chloride flush 0.9 % injection 10 mL, PRN  acetaminophen (TYLENOL) tablet 1,000 mg, Q6H  traMADol (ULTRAM) tablet 50 mg, Q6H PRN    Or  traMADol (ULTRAM) tablet 100 mg, Q6H PRN  zolpidem (AMBIEN) tablet 5 mg, Nightly PRN  sennosides-docusate sodium (SENOKOT-S) 8.6-50 MG tablet 1 tablet, BID  magnesium hydroxide (MILK OF MAGNESIA) 400 MG/5ML suspension 30 mL, Daily PRN  polyethylene glycol (GLYCOLAX) packet 17 g, Daily PRN  ondansetron (ZOFRAN) injection 4 mg, Q6H PRN  metoclopramide (REGLAN) injection 10 mg, Q6H PRN  pantoprazole (PROTONIX) tablet 40 mg, Daily  sodium chloride (PF) 0.9 % injection 10 mL, Daily  furosemide (LASIX) injection 20 mg, BID  magnesium oxide (MAG-OX) tablet 400 mg, BID  mupirocin (BACTROBAN) 2 % ointment, BID  nitroGLYCERIN (NITRODUR) 0.2 MG/HR 1 patch, Daily  potassium chloride (KLOR-CON) extended release tablet 10 mEq, TID WC  fondaparinux (ARIXTRA) injection 2.5 mg, Daily  aspirin EC tablet 325 mg, Daily  albuterol sulfate  (90 Base) MCG/ACT inhaler 2 puff, Q6H PRN  nitroGLYCERIN 50 mg in dextrose 5% 250 mL infusion, Continuous PRN  insulin regular (HUMULIN R;NOVOLIN R) 100 Units in sodium chloride 0.9 % 100 mL infusion, Continuous  insulin lispro (1 Unit Dial) 0-12 Units, TID WC  insulin lispro (1 Unit Dial) 0-6 Units, Nightly  glucose (GLUTOSE) 40 % oral gel 15 g, PRN  glucagon (rDNA) injection 1 mg, PRN  insulin lispro (1 Unit Dial) 10 Units, TID WC  Vitamin D (CHOLECALCIFEROL) tablet 2,000 Units, Daily  tiZANidine (ZANAFLEX) tablet 4 mg, Q8H PRN  rosuvastatin (CRESTOR) tablet 20 mg, Nightly  omega-3 acid ethyl esters (LOVAZA) capsule 2 g, BID  therapeutic multivitamin-minerals 1 tablet, Daily  tamsulosin (FLOMAX) capsule 0.8 mg, Nightly  gabapentin (NEURONTIN) capsule 600 mg, BID        Objective:  /86   Pulse 107   Temp 97.2 °F (36.2 °C) (Temporal)   Resp 16   Ht 6' 2\" (1.88 m)   Wt 269 lb 13.5 oz (122.4 kg)   SpO2 94%   BMI 34.65 kg/m²     Intake/Output Summary (Last 24 hours) at 8/15/2020 1513  Last data filed at 8/15/2020 1215  Gross per 24 hour   Intake 996.98 ml   Output 2175 ml   Net -1178.02 ml      Wt Readings from Last 3 Encounters:   08/15/20 269 lb 13.5 oz (122.4 kg)   05/22/20 264 lb 15.9 oz (120.2 kg)   05/18/20 265 lb (120.2 kg)       General appearance:  Appears comfortable  Eyes: Sclera clear. Pupils equal.  ENT: Moist oral mucosa. Trachea midline, no adenopathy. Cardiovascular: Regular rhythm, normal S1, S2. No murmur. No edema in lower extremities  Respiratory: Not using accessory muscles. Good inspiratory effort. Clear to auscultation bilaterally, no wheeze or crackles. GI: Abdomen soft, no tenderness, not distended, normal bowel sounds  Musculoskeletal: No cyanosis in digits, neck supple  Neurology: CN 2-12 grossly intact. No speech or motor deficits  Psych: Normal affect.  Alert and oriented in time, place and person  Skin: Warm, dry, normal turgor    Labs and Tests:  CBC:   Recent Labs     08/12/20  2351 08/13/20  0521 08/15/20  0451   WBC  --  15.9* 12.8*   HGB 12.1* 11.8* 11.6*   PLT  --  157 170     BMP:    Recent Labs     08/13/20  0521 08/15/20  0451   * 138   K 4.3 4.2    102   CO2 22 24   BUN 16 28*   CREATININE 0.9 1.0   GLUCOSE 136* 134*     Hepatic: No results for input(s): AST, ALT, ALB, BILITOT, ALKPHOS in the last 72 hours. Discussed care with family and patient             Spent 30  minutes with patient and family at bedside and on unit reviewing medical records and labs, spent greater than 50% time counseling patient and family on diagnosis and plan   Problem List  Active Problems:    Essential hypertension, benign    Coronary artery disease involving native coronary artery of native heart without angina pectoris    Primary cardiomyopathy ([facility] [address]    GERD with esophagitis    Obesity (BMI 30-39. 9)    BPH (benign prostatic hyperplasia)    CKD (chronic kidney disease) stage 3, GFR 30-59 ml/min (HCC)    Ventricular tachycardia (HCC)    Tachycardia    PAT (paroxysmal atrial tachycardia) (HCC)    S/P coronary artery bypass graft x 3    S/P left atrial appendage ligation  Resolved Problems:    * No resolved hospital problems. *       Assessment & Plan:   1. Diabetes  -Patient has been weaned off insulin drip.  -His Lantus was readjusted. He is also on scheduled prandial dose versus sliding scale  -We will discontinue disc scheduled prandial dose to see how he responds  -His A1c is 7.5 on p.o. regimen.  -We will resume metformin and glipizide  -Continue sliding scale to reassess need for any insulin on discharge      CAD  -  Management per CT surgery. Cardiology.         Diet: DIET CARDIAC; Carb Control: 4 carb choices (60 gms)/meal  Code:Full Code  DVT PPX lovenox       Leopold Levins, MD   8/15/2020 3:13 PM

## 2020-08-15 NOTE — PROGRESS NOTES
Progress Note    S/P cabgx3, SARA exclusion 8/12/20    Vital Signs:                                                 BP (!) 169/88   Pulse 113   Temp 98 °F (36.7 °C) (Temporal)   Resp 18   Ht 6' 2\" (1.88 m)   Wt 269 lb 13.5 oz (122.4 kg)   SpO2 94%   BMI 34.65 kg/m²  O2 Flow Rate (L/min): (S) 3 L/min(decreased to 2L NC)   NSR  Admission Weight: 264 lb (119.7 kg)  .3    I/O:      Intake/Output Summary (Last 24 hours) at 8/15/2020 0934  Last data filed at 8/15/2020 0840  Gross per 24 hour   Intake 636.98 ml   Output 1650 ml   Net -1013.02 ml     CV: reg, wound c/d/i  Pulm: decreased  Abd: soft  Ext: warm, trace edema    Data Review:  CBC:   Recent Labs     08/12/20  1340  08/12/20  2351 08/13/20  0521 08/15/20  0451   WBC 11.4*  --   --  15.9* 12.8*   HGB 12.1*   < > 12.1* 11.8* 11.6*   HCT 36.7*  --   --  34.6* 35.4*   MCV 90.6  --   --  91.4 91.6     --   --  157 170    < > = values in this interval not displayed. BMP:   Recent Labs     08/12/20  1340 08/13/20  0521 08/14/20  0516 08/15/20  0451    135*  --  138   K 3.9 4.3  --  4.2    104  --  102   CO2 23 22  --  24   BUN 22* 16  --  28*   CREATININE 1.1 0.9  --  1.0   CALCIUM 9.4 8.7  --  8.4   MG 2.80* 2.00 2.10 1.90     Cardiac Enzymes: No results for input(s): CKTOTAL, CKMB, CKMBINDEX, TROPONINI in the last 72 hours. PT/INR: No results for input(s): PROTIME, INR in the last 72 hours. APTT: No results for input(s): APTT in the last 72 hours. Assessment/Plan:  CV - stable in SR   - BB, ARB, ntg patch   - lovaza, statin  pulm - pulm toilet, off O2  Renal - Cr nl. Samie. Heme - acute blood loss anemia.    -    - arixtra  dispo - d/c planning    Jake Larose MD  8/15/2020  9:34 AM

## 2020-08-16 LAB
GLUCOSE BLD-MCNC: 107 MG/DL (ref 70–99)
GLUCOSE BLD-MCNC: 111 MG/DL (ref 70–99)
GLUCOSE BLD-MCNC: 128 MG/DL (ref 70–99)
GLUCOSE BLD-MCNC: 151 MG/DL (ref 70–99)
PERFORMED ON: ABNORMAL

## 2020-08-16 PROCEDURE — 6370000000 HC RX 637 (ALT 250 FOR IP): Performed by: THORACIC SURGERY (CARDIOTHORACIC VASCULAR SURGERY)

## 2020-08-16 PROCEDURE — 99024 POSTOP FOLLOW-UP VISIT: CPT | Performed by: THORACIC SURGERY (CARDIOTHORACIC VASCULAR SURGERY)

## 2020-08-16 PROCEDURE — 2000000000 HC ICU R&B

## 2020-08-16 PROCEDURE — 99233 SBSQ HOSP IP/OBS HIGH 50: CPT | Performed by: INTERNAL MEDICINE

## 2020-08-16 PROCEDURE — 6370000000 HC RX 637 (ALT 250 FOR IP): Performed by: NURSE PRACTITIONER

## 2020-08-16 PROCEDURE — 2580000003 HC RX 258: Performed by: THORACIC SURGERY (CARDIOTHORACIC VASCULAR SURGERY)

## 2020-08-16 PROCEDURE — 6370000000 HC RX 637 (ALT 250 FOR IP): Performed by: HOSPITALIST

## 2020-08-16 PROCEDURE — 94761 N-INVAS EAR/PLS OXIMETRY MLT: CPT

## 2020-08-16 PROCEDURE — 94669 MECHANICAL CHEST WALL OSCILL: CPT

## 2020-08-16 PROCEDURE — 6360000002 HC RX W HCPCS: Performed by: THORACIC SURGERY (CARDIOTHORACIC VASCULAR SURGERY)

## 2020-08-16 RX ORDER — CARVEDILOL 6.25 MG/1
12.5 TABLET ORAL 2 TIMES DAILY WITH MEALS
Status: DISCONTINUED | OUTPATIENT
Start: 2020-08-16 | End: 2020-08-17 | Stop reason: HOSPADM

## 2020-08-16 RX ORDER — LOSARTAN POTASSIUM 25 MG/1
25 TABLET ORAL
Status: DISCONTINUED | OUTPATIENT
Start: 2020-08-16 | End: 2020-08-17 | Stop reason: HOSPADM

## 2020-08-16 RX ADMIN — ROSUVASTATIN CALCIUM 20 MG: 20 TABLET, FILM COATED ORAL at 19:16

## 2020-08-16 RX ADMIN — TRAMADOL HYDROCHLORIDE 100 MG: 50 TABLET, FILM COATED ORAL at 16:04

## 2020-08-16 RX ADMIN — GLIPIZIDE 5 MG: 5 TABLET ORAL at 08:33

## 2020-08-16 RX ADMIN — MAGNESIUM GLUCONATE 500 MG ORAL TABLET 400 MG: 500 TABLET ORAL at 08:28

## 2020-08-16 RX ADMIN — ASPIRIN 325 MG: 325 TABLET, COATED ORAL at 08:28

## 2020-08-16 RX ADMIN — LOSARTAN POTASSIUM 25 MG: 25 TABLET, FILM COATED ORAL at 11:41

## 2020-08-16 RX ADMIN — CARVEDILOL 6.25 MG: 6.25 TABLET, FILM COATED ORAL at 08:27

## 2020-08-16 RX ADMIN — MUPIROCIN: 20 OINTMENT TOPICAL at 19:16

## 2020-08-16 RX ADMIN — ACETAMINOPHEN 1000 MG: 500 TABLET, FILM COATED ORAL at 13:29

## 2020-08-16 RX ADMIN — GABAPENTIN 600 MG: 300 CAPSULE ORAL at 19:13

## 2020-08-16 RX ADMIN — Medication 10 ML: at 19:14

## 2020-08-16 RX ADMIN — TRAMADOL HYDROCHLORIDE 100 MG: 50 TABLET, FILM COATED ORAL at 08:26

## 2020-08-16 RX ADMIN — ACETAMINOPHEN 1000 MG: 500 TABLET, FILM COATED ORAL at 08:28

## 2020-08-16 RX ADMIN — FUROSEMIDE 20 MG: 10 INJECTION, SOLUTION INTRAMUSCULAR; INTRAVENOUS at 08:28

## 2020-08-16 RX ADMIN — PANTOPRAZOLE SODIUM 40 MG: 40 TABLET, DELAYED RELEASE ORAL at 08:28

## 2020-08-16 RX ADMIN — POTASSIUM CHLORIDE 10 MEQ: 750 TABLET, FILM COATED, EXTENDED RELEASE ORAL at 08:27

## 2020-08-16 RX ADMIN — CARVEDILOL 12.5 MG: 6.25 TABLET, FILM COATED ORAL at 16:03

## 2020-08-16 RX ADMIN — MAGNESIUM GLUCONATE 500 MG ORAL TABLET 400 MG: 500 TABLET ORAL at 19:13

## 2020-08-16 RX ADMIN — INSULIN LISPRO 2 UNITS: 100 INJECTION, SOLUTION INTRAVENOUS; SUBCUTANEOUS at 08:28

## 2020-08-16 RX ADMIN — POLYETHYLENE GLYCOL 3350 17 G: 17 POWDER, FOR SOLUTION ORAL at 08:33

## 2020-08-16 RX ADMIN — METFORMIN HYDROCHLORIDE 1000 MG: 500 TABLET ORAL at 16:02

## 2020-08-16 RX ADMIN — METFORMIN HYDROCHLORIDE 1000 MG: 500 TABLET ORAL at 08:27

## 2020-08-16 RX ADMIN — MULTIPLE VITAMINS W/ MINERALS TAB 1 TABLET: TAB at 08:28

## 2020-08-16 RX ADMIN — ACETAMINOPHEN 1000 MG: 500 TABLET, FILM COATED ORAL at 19:13

## 2020-08-16 RX ADMIN — FONDAPARINUX SODIUM 2.5 MG: 2.5 INJECTION SUBCUTANEOUS at 08:30

## 2020-08-16 RX ADMIN — GABAPENTIN 600 MG: 300 CAPSULE ORAL at 08:30

## 2020-08-16 RX ADMIN — ACETAMINOPHEN 1000 MG: 500 TABLET, FILM COATED ORAL at 04:06

## 2020-08-16 RX ADMIN — STANDARDIZED SENNA CONCENTRATE AND DOCUSATE SODIUM 1 TABLET: 8.6; 5 TABLET ORAL at 19:13

## 2020-08-16 RX ADMIN — TAMSULOSIN HYDROCHLORIDE 0.8 MG: 0.4 CAPSULE ORAL at 19:13

## 2020-08-16 RX ADMIN — Medication 10 ML: at 08:37

## 2020-08-16 RX ADMIN — TRAMADOL HYDROCHLORIDE 100 MG: 50 TABLET, FILM COATED ORAL at 21:02

## 2020-08-16 RX ADMIN — STANDARDIZED SENNA CONCENTRATE AND DOCUSATE SODIUM 1 TABLET: 8.6; 5 TABLET ORAL at 08:27

## 2020-08-16 RX ADMIN — MUPIROCIN: 20 OINTMENT TOPICAL at 08:38

## 2020-08-16 RX ADMIN — Medication 2000 UNITS: at 08:27

## 2020-08-16 ASSESSMENT — PAIN SCALES - GENERAL
PAINLEVEL_OUTOF10: 7
PAINLEVEL_OUTOF10: 3
PAINLEVEL_OUTOF10: 0
PAINLEVEL_OUTOF10: 2
PAINLEVEL_OUTOF10: 4
PAINLEVEL_OUTOF10: 4
PAINLEVEL_OUTOF10: 3
PAINLEVEL_OUTOF10: 5

## 2020-08-16 ASSESSMENT — PAIN DESCRIPTION - ORIENTATION: ORIENTATION: MID

## 2020-08-16 ASSESSMENT — PAIN DESCRIPTION - LOCATION: LOCATION: STERNUM

## 2020-08-16 ASSESSMENT — PAIN DESCRIPTION - PAIN TYPE: TYPE: SURGICAL PAIN

## 2020-08-16 NOTE — PROGRESS NOTES
[facility] PROGRESS NOTE    8/16/2020 1:06 PM        Name: Home Mariee . Admitted: 8/4/2020  Primary Care Provider: ETTA Dennison CNP (Tel: [phone])                        Subjective:  . No acute events overnight. Resting well. Pain control. Diet ok. Labs reviewed  Denies any chest pain sob.      Reviewed interval ancillary notes    Current Medications  losartan (COZAAR) tablet 25 mg, Lunch  carvedilol (COREG) tablet 12.5 mg, BID WC  glipiZIDE (GLUCOTROL) tablet 5 mg, QAM AC  metFORMIN (GLUCOPHAGE) tablet 1,000 mg, BID WC  diphenhydrAMINE (BENADRYL) tablet 25 mg, Nightly PRN  sodium chloride flush 0.9 % injection 10 mL, 2 times per day  sodium chloride flush 0.9 % injection 10 mL, PRN  acetaminophen (TYLENOL) tablet 1,000 mg, Q6H  traMADol (ULTRAM) tablet 50 mg, Q6H PRN    Or  traMADol (ULTRAM) tablet 100 mg, Q6H PRN  zolpidem (AMBIEN) tablet 5 mg, Nightly PRN  sennosides-docusate sodium (SENOKOT-S) 8.6-50 MG tablet 1 tablet, BID  magnesium hydroxide (MILK OF MAGNESIA) 400 MG/5ML suspension 30 mL, Daily PRN  polyethylene glycol (GLYCOLAX) packet 17 g, Daily PRN  ondansetron (ZOFRAN) injection 4 mg, Q6H PRN  metoclopramide (REGLAN) injection 10 mg, Q6H PRN  pantoprazole (PROTONIX) tablet 40 mg, Daily  sodium chloride (PF) 0.9 % injection 10 mL, Daily  magnesium oxide (MAG-OX) tablet 400 mg, BID  mupirocin (BACTROBAN) 2 % ointment, BID  fondaparinux (ARIXTRA) injection 2.5 mg, Daily  aspirin EC tablet 325 mg, Daily  albuterol sulfate  (90 Base) MCG/ACT inhaler 2 puff, Q6H PRN  nitroGLYCERIN 50 mg in dextrose 5% 250 mL infusion, Continuous PRN  insulin regular (HUMULIN R;NOVOLIN R) 100 Units in sodium chloride 0.9 % 100 mL infusion, Continuous  insulin lispro (1 Unit Dial) 0-12 Units, TID WC  insulin lispro (1 Unit Dial) 0-6 Units, Nightly  glucose (GLUTOSE) 40 % oral gel 15 g, PRN  glucagon (rDNA) injection 1 mg, PRN  Vitamin D (CHOLECALCIFEROL) tablet 2,000 Units, Daily  tiZANidine (ZANAFLEX) tablet 4 mg, Q8H PRN  rosuvastatin (CRESTOR) tablet 20 mg, Nightly  omega-3 acid ethyl esters (LOVAZA) capsule 2 g (PATIENT SUPPLIED), BID  therapeutic multivitamin-minerals 1 tablet, Daily  tamsulosin (FLOMAX) capsule 0.8 mg, Nightly  gabapentin (NEURONTIN) capsule 600 mg, BID        Objective:  BP (!) 135/97   Pulse 110   Temp 97.3 °F (36.3 °C) (Temporal)   Resp 16   Ht 6' 2\" (1.88 m)   Wt 267 lb 6.7 oz (121.3 kg)   SpO2 95%   BMI 34.33 kg/m²     Intake/Output Summary (Last 24 hours) at 8/16/2020 1306  Last data filed at 8/16/2020 0940  Gross per 24 hour   Intake 530 ml   Output 850 ml   Net -320 ml      Wt Readings from Last 3 Encounters:   08/16/20 267 lb 6.7 oz (121.3 kg)   05/22/20 264 lb 15.9 oz (120.2 kg)   05/18/20 265 lb (120.2 kg)       General appearance:  Appears comfortable  Eyes: Sclera clear. Pupils equal.  ENT: Moist oral mucosa. Trachea midline, no adenopathy. Cardiovascular: Regular rhythm, normal S1, S2. No murmur. No edema in lower extremities  Respiratory: Not using accessory muscles. Good inspiratory effort. Clear to auscultation bilaterally, no wheeze or crackles. GI: Abdomen soft, no tenderness, not distended, normal bowel sounds  Musculoskeletal: No cyanosis in digits, neck supple  Neurology: CN 2-12 grossly intact. No speech or motor deficits  Psych: Normal affect. Alert and oriented in time, place and person  Skin: Warm, dry, normal turgor    Labs and Tests:  CBC:   Recent Labs     08/15/20  0451   WBC 12.8*   HGB 11.6*        BMP:    Recent Labs     08/15/20  0451      K 4.2      CO2 24   BUN 28*   CREATININE 1.0   GLUCOSE 134*     Hepatic: No results for input(s): AST, ALT, ALB, BILITOT, ALKPHOS in the last 72 hours.     Discussed care with family and patient             Spent 30  minutes with patient and family at bedside and on unit reviewing medical records and labs, spent greater than 50% time counseling patient and family on diagnosis and plan   Problem List  Active Problems:    Essential hypertension, benign    Coronary artery disease involving native coronary artery of native heart without angina pectoris    Primary cardiomyopathy ([facility] [address]    GERD with esophagitis    Obesity (BMI 30-39. 9)    BPH (benign prostatic hyperplasia)    CKD (chronic kidney disease) stage 3, GFR 30-59 ml/min (HCC)    Ventricular tachycardia (HCC)    Tachycardia    PAT (paroxysmal atrial tachycardia) (HCC)    S/P coronary artery bypass graft x 3    S/P left atrial appendage ligation  Resolved Problems:    * No resolved hospital problems. *       Assessment & Plan:   1. Diabetes  -Currently only on metformin glipizide and sliding scale  -Lantus discontinued   -His A1c is 7.5 on p.o. regimen.  -Continue metformin and glipizide  -      CAD  -  Management per CT surgery. Cardiology.         Diet: DIET CARDIAC; Carb Control: 4 carb choices (60 gms)/meal  Code:Full Code  DVT PPX lovenox       Michael German MD   8/16/2020 1:06 PM

## 2020-08-16 NOTE — PROGRESS NOTES
Pt alert and oriented. Resting in chair. VS stable. Assessment complete. Patient denies any pain or further needs at this time. Will continue to monitor patient.  Zaina Bustillos

## 2020-08-16 NOTE — PROGRESS NOTES
[facility] [place] Daily Progress Note      Admit Date:  8/4/2020    No chief complaint on file. VT    Subjective:  Mr. Edouard Springer denies exertional chest pain, SOB/CARR, PND, palpitations, light-headedness, or edema. Patient feels better today after large BM yesterday.     Objective:   /80   Pulse 110   Temp 97.6 °F (36.4 °C) (Temporal)   Resp 16   Ht 6' 2\" (1.88 m)   Wt 267 lb 6.7 oz (121.3 kg)   SpO2 95%   BMI 34.33 kg/m²       Intake/Output Summary (Last 24 hours) at 8/16/2020 0956  Last data filed at 8/16/2020 0940  Gross per 24 hour   Intake 890 ml   Output 1300 ml   Net -410 ml       TELEMETRY: Sinus     Physical Exam:  General:  Awake, alert, oriented x 3, NAD  Skin:  Warm and dry  Neck:  JVD flat  Chest:  normal air entry  Cardiovascular:  RRR S1S2, no S3, no mrm  Abdomen:  Soft, ND, NT, No HSM  Extremities:  no edema    Medications:    carvedilol  6.25 mg Oral BID WC    losartan  12.5 mg Oral Lunch    glipiZIDE  5 mg Oral QAM AC    metFORMIN  1,000 mg Oral BID WC    sodium chloride flush  10 mL Intravenous 2 times per day    acetaminophen  1,000 mg Oral Q6H    sennosides-docusate sodium  1 tablet Oral BID    pantoprazole  40 mg Oral Daily    sodium chloride (PF)  10 mL Intravenous Daily    furosemide  20 mg Intravenous BID    magnesium oxide  400 mg Oral BID    mupirocin   Nasal BID    potassium chloride  10 mEq Oral TID WC    fondaparinux  2.5 mg Subcutaneous Daily    aspirin  325 mg Oral Daily    insulin lispro  0-12 Units Subcutaneous TID WC    insulin lispro  0-6 Units Subcutaneous Nightly    insulin lispro  0.08 Units/kg Subcutaneous TID WC    Vitamin D  2,000 Units Oral Daily    rosuvastatin  20 mg Oral Nightly    omega-3 acid ethyl esters  2 g Oral BID    therapeutic multivitamin-minerals  1 tablet Oral Daily    tamsulosin  0.8 mg Oral Nightly    gabapentin  600 mg Oral BID      nitroGLYCERIN Stopped (08/13/20 0816)    insulin Stopped (08/15/20 1968) diphenhydrAMINE, sodium chloride flush, traMADol **OR** traMADol, zolpidem, magnesium hydroxide, polyethylene glycol, ondansetron, metoclopramide, albuterol sulfate HFA, nitroGLYCERIN, glucose, glucagon (rDNA), tiZANidine    Lab Data:  CBC:   Recent Labs     08/15/20  0451   WBC 12.8*   HGB 11.6*   HCT 35.4*   MCV 91.6        BMP:   Recent Labs     08/15/20  0451      K 4.2      CO2 24   BUN 28*   CREATININE 1.0     LIVER PROFILE: No results for input(s): AST, ALT, LIPASE, BILIDIR, BILITOT, ALKPHOS in the last 72 hours. Invalid input(s): AMYLASE,  ALB  PT/INR: No results for input(s): PROTIME, INR in the last 72 hours. APTT: No results for input(s): APTT in the last 72 hours. BNP:  No results for input(s): BNP in the last 72 hours. IMAGING: none    Assessment/Plan:  Active Problems:    Essential hypertension, benign  Plan: home meds    Coronary artery disease involving native coronary artery of native heart without angina pectoris  Plan: severe 3VD s/p CABG. POD 1. Cont aspirin, statin    Primary cardiomyopathy (HCC)  Plan: lvef 45%, ischemic in origin, s/p Cardiac cath. CKD (chronic kidney disease) stage 3, GFR 30-59 ml/min ([facility])  Plan: monitor creatinine    Ventricular tachycardia (HCC)  Plan:         PAT (paroxysmal atrial tachycardia) ([facility] [address]  Plan: s/p atriclip. On coreg, still has resting sinus tachycardia. Recommend toprol or lopressor for better rate control. S/P coronary artery bypass graft x 3  Plan: POD 1. SVG to LAD, SVG to OM1, SVG to PDA. Continue ambulation. Incentive spirometry. LasixZunilda Long MD 8/16/2020 9:56 AM

## 2020-08-16 NOTE — PROGRESS NOTES
Progress Note    S/P cabgx3, SARA exclusion 8/12/20    Vital Signs:                                                 /80   Pulse 110   Temp 97.6 °F (36.4 °C) (Temporal)   Resp 16   Ht 6' 2\" (1.88 m)   Wt 267 lb 6.7 oz (121.3 kg)   SpO2 95%   BMI 34.33 kg/m²  O2 Flow Rate (L/min): (S) 3 L/min(decreased to 2L NC)   NSR  Admission Weight: 264 lb (119.7 kg)  .3    I/O:      Intake/Output Summary (Last 24 hours) at 8/16/2020 1043  Last data filed at 8/16/2020 0940  Gross per 24 hour   Intake 650 ml   Output 1100 ml   Net -450 ml     CV: reg, wound c/d/i  Pulm: decreased  Abd: soft  Ext: warm, trace edema    Data Review:  CBC:   Recent Labs     08/15/20  0451   WBC 12.8*   HGB 11.6*   HCT 35.4*   MCV 91.6        BMP:   Recent Labs     08/14/20  0516 08/15/20  0451   NA  --  138   K  --  4.2   CL  --  102   CO2  --  24   BUN  --  28*   CREATININE  --  1.0   CALCIUM  --  8.4   MG 2.10 1.90     Cardiac Enzymes: No results for input(s): CKTOTAL, CKMB, CKMBINDEX, TROPONINI in the last 72 hours. PT/INR: No results for input(s): PROTIME, INR in the last 72 hours. APTT: No results for input(s): APTT in the last 72 hours. Assessment/Plan:  CV - stable in SR   - BB-increase above home dose, ARB-increase to half   - statin  pulm - pulm toilet, off O2  Renal - Cr nl. Below preop weight  Heme - acute blood loss anemia.    -    - arixtra  dispo - d/c planning, should be ready 8/17      Griselda Whitley MD  8/16/2020  10:43 AM

## 2020-08-16 NOTE — PROGRESS NOTES
Pt remains tachycardic, low 100s at rest and 120-130s with activity. Coreg given as scheduled. Will monitor.     Jolly Ward  8/16/2020

## 2020-08-17 ENCOUNTER — TELEPHONE (OUTPATIENT)
Dept: CARDIOLOGY CLINIC | Age: 65
End: 2020-08-17

## 2020-08-17 VITALS
HEIGHT: 74 IN | RESPIRATION RATE: 18 BRPM | TEMPERATURE: 97.3 F | DIASTOLIC BLOOD PRESSURE: 89 MMHG | BODY MASS INDEX: 34.04 KG/M2 | WEIGHT: 265.21 LBS | SYSTOLIC BLOOD PRESSURE: 156 MMHG | OXYGEN SATURATION: 97 % | HEART RATE: 116 BPM

## 2020-08-17 PROBLEM — E66.811 CLASS 1 OBESITY IN ADULT: Status: ACTIVE | Noted: 2020-08-17

## 2020-08-17 PROBLEM — E66.9 CLASS 1 OBESITY IN ADULT: Status: ACTIVE | Noted: 2020-08-17

## 2020-08-17 PROBLEM — R29.818 SUSPECTED SLEEP APNEA: Status: ACTIVE | Noted: 2020-08-17

## 2020-08-17 LAB
ANION GAP SERPL CALCULATED.3IONS-SCNC: 11 MMOL/L (ref 3–16)
BUN BLDV-MCNC: 21 MG/DL (ref 7–20)
CALCIUM SERPL-MCNC: 8.9 MG/DL (ref 8.3–10.6)
CHLORIDE BLD-SCNC: 98 MMOL/L (ref 99–110)
CO2: 25 MMOL/L (ref 21–32)
CREAT SERPL-MCNC: 0.9 MG/DL (ref 0.8–1.3)
GFR AFRICAN AMERICAN: >60
GFR NON-AFRICAN AMERICAN: >60
GLUCOSE BLD-MCNC: 125 MG/DL (ref 70–99)
GLUCOSE BLD-MCNC: 140 MG/DL (ref 70–99)
GLUCOSE BLD-MCNC: 80 MG/DL (ref 70–99)
HCT VFR BLD CALC: 36.8 % (ref 40.5–52.5)
HEMOGLOBIN: 12.3 G/DL (ref 13.5–17.5)
MCH RBC QN AUTO: 30.9 PG (ref 26–34)
MCHC RBC AUTO-ENTMCNC: 33.5 G/DL (ref 31–36)
MCV RBC AUTO: 92.1 FL (ref 80–100)
PDW BLD-RTO: 14.5 % (ref 12.4–15.4)
PERFORMED ON: ABNORMAL
PERFORMED ON: NORMAL
PLATELET # BLD: 205 K/UL (ref 135–450)
PMV BLD AUTO: 7.4 FL (ref 5–10.5)
POTASSIUM SERPL-SCNC: 4.1 MMOL/L (ref 3.5–5.1)
RBC # BLD: 3.99 M/UL (ref 4.2–5.9)
SODIUM BLD-SCNC: 134 MMOL/L (ref 136–145)
WBC # BLD: 9.8 K/UL (ref 4–11)

## 2020-08-17 PROCEDURE — 6360000002 HC RX W HCPCS: Performed by: THORACIC SURGERY (CARDIOTHORACIC VASCULAR SURGERY)

## 2020-08-17 PROCEDURE — 6370000000 HC RX 637 (ALT 250 FOR IP): Performed by: THORACIC SURGERY (CARDIOTHORACIC VASCULAR SURGERY)

## 2020-08-17 PROCEDURE — 6370000000 HC RX 637 (ALT 250 FOR IP): Performed by: INTERNAL MEDICINE

## 2020-08-17 PROCEDURE — 99233 SBSQ HOSP IP/OBS HIGH 50: CPT | Performed by: INTERNAL MEDICINE

## 2020-08-17 PROCEDURE — 80048 BASIC METABOLIC PNL TOTAL CA: CPT

## 2020-08-17 PROCEDURE — 85027 COMPLETE CBC AUTOMATED: CPT

## 2020-08-17 PROCEDURE — 2580000003 HC RX 258: Performed by: THORACIC SURGERY (CARDIOTHORACIC VASCULAR SURGERY)

## 2020-08-17 PROCEDURE — 6370000000 HC RX 637 (ALT 250 FOR IP): Performed by: NURSE PRACTITIONER

## 2020-08-17 PROCEDURE — 6370000000 HC RX 637 (ALT 250 FOR IP): Performed by: HOSPITALIST

## 2020-08-17 PROCEDURE — 93228 REMOTE 30 DAY ECG REV/REPORT: CPT | Performed by: INTERNAL MEDICINE

## 2020-08-17 PROCEDURE — 99232 SBSQ HOSP IP/OBS MODERATE 35: CPT | Performed by: INTERNAL MEDICINE

## 2020-08-17 RX ORDER — OLMESARTAN MEDOXOMIL 20 MG/1
20 TABLET ORAL DAILY
Status: DISCONTINUED | OUTPATIENT
Start: 2020-08-17 | End: 2020-08-17 | Stop reason: HOSPADM

## 2020-08-17 RX ORDER — CARVEDILOL 12.5 MG/1
12.5 TABLET ORAL 2 TIMES DAILY WITH MEALS
Qty: 60 TABLET | Refills: 3 | Status: SHIPPED | OUTPATIENT
Start: 2020-08-17 | End: 2020-11-12 | Stop reason: SDUPTHER

## 2020-08-17 RX ORDER — OLMESARTAN MEDOXOMIL 20 MG/1
20 TABLET ORAL DAILY
Qty: 30 TABLET | Refills: 3 | Status: SHIPPED | OUTPATIENT
Start: 2020-08-17 | End: 2020-11-12 | Stop reason: SDUPTHER

## 2020-08-17 RX ORDER — GLIPIZIDE 5 MG/1
5 TABLET ORAL
Qty: 60 TABLET | Refills: 3 | Status: SHIPPED | OUTPATIENT
Start: 2020-08-18

## 2020-08-17 RX ORDER — AMLODIPINE BESYLATE 5 MG/1
5 TABLET ORAL DAILY
Status: DISCONTINUED | OUTPATIENT
Start: 2020-08-17 | End: 2020-08-17 | Stop reason: HOSPADM

## 2020-08-17 RX ORDER — AMLODIPINE BESYLATE 5 MG/1
5 TABLET ORAL DAILY
Qty: 30 TABLET | Refills: 3 | Status: SHIPPED | OUTPATIENT
Start: 2020-08-17 | End: 2020-11-12 | Stop reason: SDUPTHER

## 2020-08-17 RX ADMIN — ASPIRIN 325 MG: 325 TABLET, COATED ORAL at 08:22

## 2020-08-17 RX ADMIN — AMLODIPINE BESYLATE 5 MG: 5 TABLET ORAL at 12:08

## 2020-08-17 RX ADMIN — GLIPIZIDE 5 MG: 5 TABLET ORAL at 08:29

## 2020-08-17 RX ADMIN — LOSARTAN POTASSIUM 25 MG: 25 TABLET, FILM COATED ORAL at 12:09

## 2020-08-17 RX ADMIN — Medication 10 ML: at 09:32

## 2020-08-17 RX ADMIN — FONDAPARINUX SODIUM 2.5 MG: 2.5 INJECTION SUBCUTANEOUS at 08:21

## 2020-08-17 RX ADMIN — METFORMIN HYDROCHLORIDE 1000 MG: 500 TABLET ORAL at 08:21

## 2020-08-17 RX ADMIN — CARVEDILOL 12.5 MG: 6.25 TABLET, FILM COATED ORAL at 05:21

## 2020-08-17 RX ADMIN — ACETAMINOPHEN 1000 MG: 500 TABLET, FILM COATED ORAL at 08:22

## 2020-08-17 RX ADMIN — PANTOPRAZOLE SODIUM 40 MG: 40 TABLET, DELAYED RELEASE ORAL at 08:22

## 2020-08-17 RX ADMIN — GABAPENTIN 600 MG: 300 CAPSULE ORAL at 08:22

## 2020-08-17 RX ADMIN — ACETAMINOPHEN 1000 MG: 500 TABLET, FILM COATED ORAL at 00:30

## 2020-08-17 RX ADMIN — MAGNESIUM GLUCONATE 500 MG ORAL TABLET 400 MG: 500 TABLET ORAL at 08:22

## 2020-08-17 RX ADMIN — STANDARDIZED SENNA CONCENTRATE AND DOCUSATE SODIUM 1 TABLET: 8.6; 5 TABLET ORAL at 08:21

## 2020-08-17 RX ADMIN — Medication 2000 UNITS: at 08:21

## 2020-08-17 RX ADMIN — MULTIPLE VITAMINS W/ MINERALS TAB 1 TABLET: TAB at 08:21

## 2020-08-17 ASSESSMENT — PAIN DESCRIPTION - ORIENTATION
ORIENTATION: MID
ORIENTATION: MID

## 2020-08-17 ASSESSMENT — PAIN DESCRIPTION - LOCATION
LOCATION: STERNUM
LOCATION: STERNUM

## 2020-08-17 ASSESSMENT — PAIN SCALES - GENERAL
PAINLEVEL_OUTOF10: 4
PAINLEVEL_OUTOF10: 4
PAINLEVEL_OUTOF10: 3
PAINLEVEL_OUTOF10: 4
PAINLEVEL_OUTOF10: 0
PAINLEVEL_OUTOF10: 2
PAINLEVEL_OUTOF10: 4
PAINLEVEL_OUTOF10: 0

## 2020-08-17 ASSESSMENT — PAIN SCALES - WONG BAKER
WONGBAKER_NUMERICALRESPONSE: 0

## 2020-08-17 ASSESSMENT — PAIN DESCRIPTION - PAIN TYPE
TYPE: SURGICAL PAIN
TYPE: SURGICAL PAIN

## 2020-08-17 NOTE — PROGRESS NOTES
[facility] home care referral. Spoke with pt and re: home care plan of care/services. Agreeable. Demographic's verified. Will follow for home care.

## 2020-08-17 NOTE — PROGRESS NOTES
CC: 8/12/2020 s/p CABG x 3 and ligation SARA     Patient denies any acute events overnight       CV: sinus rhythm - 100's  ( sbp 120 -130's/ 60's)  ( Coreg 12.5 mg BID, Benicar 20 mg daily)  Pulm:  clear ,O2 Sats 95-97% on RA, afebrile  Renal:K+ 4.1, Creat 0.9,   Heme:WBC 9.8 H/H 12.3/ 36.8  WT:  120.3 kg/121.3 kg /pre-op 123.4 kg - Lasix IV 20 BID  Incision:C/D/I, sternnum stable        As per CC:   Plan: D/C home today with  MCOT event monitor in place per cardiology,  Encourage IS, Encourage activity - PT/ OT      Disp:Home with HomeCare

## 2020-08-17 NOTE — FLOWSHEET NOTE
Discharge instructions reviewed with pt and pt's wife. Questions and concerns addressed. Prescriptions sent home with pt to fill at his home pharmacy. Pt belongings placed in bags and sent home with pt along with heart binder. Discharge lounge RN called to  pt in wheelchair for d/c.

## 2020-08-17 NOTE — PROGRESS NOTES
INPATIENT PULMONARY CRITICAL CARE PROGRESS NOTE      Reason for visit    Sleep apnea     SUBJECTIVE: Patient when seen this morning states that he is feeling better, patient has some occasional cough with scant mucus which is not of concern, patient does not have any increasing shortness of breath on rest or wheezing, patient does not have any fever or chills or palpitations, patient has slight pain/discomfort at the surgical site, patient does not have any significant abdominal discomfort nausea vomiting, patient has some leg edema, patient has slight exertional dyspnea, patient does not have any confusion lethargy, patient does give history of sleep fragmentation, patient has had good urine output overnight with cumulative fluid balance of -10 L, patient's glycemic control is acceptable, no other pertinent review of system of concern          Physical Exam:  Blood pressure (!) 158/87, pulse 114, temperature 97 °F (36.1 °C), temperature source Temporal, resp. rate 21, height 6' 2\" (1.88 m), weight 265 lb 3.4 oz (120.3 kg), SpO2 100 %.'     Constitutional:  No acute distress. HENT:  Oropharynx is clear and moist. No thyromegaly. Eyes:  Conjunctivae are normal. Pupils equal, round, and reactive to light. No scleral icterus. Neck: . No tracheal deviation present. No obvious thyroid mass. Short enlarged neck  Cardiovascular: Normal rate, regular rhythm, normal heart sounds. No right ventricular heave. 1+ lower extremity edema. Pulmonary/Chest: No wheezes. No rales. Chest wall is not dull to percussion. No accessory muscle usage or stridor. Surgical scar present, decreased breath sound density  Abdominal: Soft. Bowel sounds present. No distension or hernia. No tenderness. Obese  Musculoskeletal: No cyanosis. No clubbing. No obvious joint deformity. Lymphadenopathy: No cervical or supraclavicular adenopathy. Skin: Skin is warm and dry. No rash or nodules on the exposed extremities.   Psychiatric: Normal mood and affect. Behavior is normal.  No anxiety. Neurologic: Alert, awake and oriented. PERRL. Speech fluent        Results:  CBC:   Recent Labs     08/15/20  0451 08/17/20  0455   WBC 12.8* 9.8   HGB 11.6* 12.3*   HCT 35.4* 36.8*   MCV 91.6 92.1    205     BMP:   Recent Labs     08/15/20  0451 08/17/20  0455    134*   K 4.2 4.1    98*   CO2 24 25   BUN 28* 21*   CREATININE 1.0 0.9       Imaging:  I have reviewed radiology images personally. XR CHEST PORTABLE   Final Result   Improving pulmonary edema. Small left pleural effusion. XR CHEST PORTABLE   Final Result   Findings as above likely related to mild edema. XR CHEST PORTABLE   Final Result   Severe cardiomegaly with no acute finding in the chest.         CT CHEST ABDOMEN PELVIS WO CONTRAST   Final Result   1. Constipation. 2. Diverticulosis. 3. Nonobstructing bilateral nephrolithiasis. VL DUP CAROTID BILATERAL   Final Result      VL PRE OP VEIN MAPPING   Final Result          Results for Marina Mcneal (MRN [id number]) as of 8/17/2020 11:09   Ref. Range 5/13/2020 11:26 8/6/2020 09:55   SARS-CoV-2 Latest Ref Range: NOT DETECTED  NOT DETECTED    COVID-19 Unknown Rpt Rpt   SARS-CoV-2, NAAT Latest Ref Range: Not Detected   Not Detected     ONE XRAY VIEW OF THE CHEST         8/14/2020 5:58 am         COMPARISON:    08/12/2020         HISTORY:    ORDERING SYSTEM PROVIDED HISTORY: chest tube removal    TECHNOLOGIST PROVIDED HISTORY:    Reason for exam:->chest tube removal    Reason for Exam: chest tube removal    Acuity: Unknown    Type of Exam: Unknown         FINDINGS:    Status post median sternotomy.  Right internal jugular Cordis is seen. Fithian-Taj catheter, endotracheal tube, nasogastric tube have been removed. Left atrial clip.  Hazy opacity is seen at the left base laterally.     Decreased heterogeneous opacity diffusely as compared to prior.  No gross    pneumothorax.  Cardiomegaly.           Impression    Improving pulmonary edema.  Small left pleural effusion. ECHO- Summary   -Normal left ventricle size, mild wall thickness and mildly reduced systolic   function with an estimated ejection fraction of 45%. -There is mild diffuse hypokinesis. -Grade I diastolic dysfunction with normal LV filling pressures. E/e\"=7.0   -The aortic valve is mildly thickened/calcified but opens well with normal   gradients.   -Trivial aortic regurgitation.   -The aortic root is mildly dilated. Assessment:  Active Problems:    Essential hypertension, benign    Coronary artery disease involving native coronary artery of native heart without angina pectoris    Primary cardiomyopathy ([facility])    GERD with esophagitis    Obesity (BMI 30-39. 9)    BPH (benign prostatic hyperplasia)    CKD (chronic kidney disease) stage 3, GFR 30-59 ml/min ([facility])    Ventricular tachycardia ([facility])    Tachycardia    PAT (paroxysmal atrial tachycardia) ([facility])    S/P coronary artery bypass graft x 3    S/P left atrial appendage ligation    Ischemic cardiomyopathy    Class 1 obesity in adult    Suspected sleep apnea  Resolved Problems:    * No resolved hospital problems.  *          Plan:   · Oxygen supplementation, if required, to keep saturation between 90 and 94% only  · Patient is on room air when seen  · Pulmonary toilet  · No need for any antibiotics or bronchodilators or steroids from pulmonary standpoint of view  · Status post CABG  · Patient is on beta-blockers and ARB as per cardiology/CT surgery  · Clinically patient appears to have suspected AYAZ and needs PSG as an outpatient  · Patient has history of exposures to chemicals and fumes in the past and may require PFT as an outpatient  · Internal medicine team/cardiology to decide if patient needs any diuretics  · Patient's last x-ray chest done on 14th of this month shows patient to have improving pulmonary edema  · Keep negative fluid balance  · Monitor input output and BMP  · Correct electrolytes on whenever necessary basis  · Management of diabetes mellitus as per internal medicine team-IM to decide if Glucotrol needs to be continued as it can cause salt and water retention and weight gain  · Diet and lifestyle modifications  · Patient needs to lose weight  · PUD and DVT prophylaxis      No other recommendations from pulmonary/critical care medicine standpoint of view-will sign off-patient can follow-up in the [facility] pulmonology office in 3 to 4 weeks time after discharge            Electronically signed by:  Ilir Etienne MD    8/17/2020    11:11 AM.

## 2020-08-17 NOTE — FLOWSHEET NOTE
08/17/20 0518 08/17/20 0519   Vitals   Temp 97.4 °F (36.3 °C)  --    Temp Source Temporal  --    Pulse 114 114   Heart Rate Source Monitor  --    Resp 16  --    BP (!) 163/96 (!) 168/97   MAP (mmHg) 114 114   BP Location Left upper arm Right upper arm   BP Method Automatic  --    Patient Position Sitting  --    Level of Consciousness 0 0   MEWS Score 3  --    Cardiac Rhythm  --  ST     Coreg given early d/t hypertension and tachycardia

## 2020-08-17 NOTE — PROGRESS NOTES
[address]   Progress Note  Cardiology      Tabitha Olsen   Admission date:  8/4/2020  CC-f/up CAD,post bypass  Subjective:  He feels much better    Objective:  Medications/Labs all Reviewed     olmesartan  20 mg Oral Daily    amLODIPine  5 mg Oral Daily    losartan  25 mg Oral Lunch    carvedilol  12.5 mg Oral BID WC    glipiZIDE  5 mg Oral QAM AC    metFORMIN  1,000 mg Oral BID WC    sodium chloride flush  10 mL Intravenous 2 times per day    acetaminophen  1,000 mg Oral Q6H    sennosides-docusate sodium  1 tablet Oral BID    pantoprazole  40 mg Oral Daily    sodium chloride (PF)  10 mL Intravenous Daily    magnesium oxide  400 mg Oral BID    fondaparinux  2.5 mg Subcutaneous Daily    aspirin  325 mg Oral Daily    insulin lispro  0-12 Units Subcutaneous TID WC    insulin lispro  0-6 Units Subcutaneous Nightly    Vitamin D  2,000 Units Oral Daily    rosuvastatin  20 mg Oral Nightly    omega-3 acid ethyl esters  2 g Oral BID    therapeutic multivitamin-minerals  1 tablet Oral Daily    tamsulosin  0.8 mg Oral Nightly    gabapentin  600 mg Oral BID       BMP:   Lab Results   Component Value Date     08/17/2020    K 4.1 08/17/2020    K 4.5 12/18/2018    CL 98 08/17/2020    CO2 25 08/17/2020    BUN 21 08/17/2020    CREATININE 0.9 08/17/2020    MG 1.90 08/15/2020     CBC:    Lab Results   Component Value Date    WBC 9.8 08/17/2020    RBC 3.99 08/17/2020    HGB 12.3 08/17/2020    HCT 36.8 08/17/2020    MCV 92.1 08/17/2020    RDW 14.5 08/17/2020     08/17/2020      PT/INR:    Lab Results   Component Value Date    INR 1.01 08/06/2020    PROTIME 11.7 08/06/2020     Cardiac Enzymes:    Lab Results   Component Value Date    CKTOTAL 99 01/25/2011    CKMB 0.29 01/25/2011    CKMBINDEX 0.3 01/25/2011     Lab Results   Component Value Date    TROPONINI <0.01 08/05/2020    TROPONINI <0.01 08/05/2020    TROPONINI <0.01 08/04/2020     BNP:  No results found for: BNP  FASTING LIPID PANEL:    Lab Results   Component Value Date    CHOL 119 08/05/2020    HDL 30 08/05/2020    HDL 29 01/25/2011    TRIG 132 08/05/2020       Physical Examination:    BP (!) 158/87   Pulse 114   Temp 97 °F (36.1 °C) (Temporal)   Resp 21   Ht 6' 2\" (1.88 m)   Wt 265 lb 3.4 oz (120.3 kg)   SpO2 100%   BMI 34.05 kg/m²      Respiratory:s/p sternotomy-well healed,triple lumen in place  · Resp Assessment: Normal respiratory effort  · Resp Auscultation: Clear to auscultation bilaterally   Cardiovascular:  · Auscultation: regular rate and rhythm, normal S1S2, no murmur, rub or gallop  · Palpation:  Nl PMI  · JVP:  normal  · Extremities: No Edema  Abdomen:  · Soft, non-tender  · Normal bowel sounds  Extremities:  ·  No Cyanosis or Clubbing  Neurological/Psychiatric:  · Oriented to time, place, and person  · Non-anxious  Skin Warm and dry    Assessment:    Active Problems:    Essential hypertension, benign      Coronary artery disease involving native coronary artery of native heart without angina pectoris  Plan: s/p bypass        GERD with esophagitis      Obesity (BMI 30-39. 9)      BPH (benign prostatic hyperplasia)      CKD (chronic kidney disease) stage 3, GFR 30-59 ml/min ([facility])      Ventricular tachycardia (HCC)  Plan: none post bypass      PAT (paroxysmal atrial tachycardia) ([facility])      S/P coronary artery bypass graft x 3      S/P left atrial appendage ligation      Ischemic cardiomyopathy  Plan: EF 35-40 with inf MI in past       Suspected sleep apnea        Plan:  1.  Ok for [employer] instead of cozaar  2. 30 day event recorder at discharge  Will arrange f/up  I have spent  35  minutes of face to face time with the patient with more than 50%  spent  counseling and coordinating care for Tabitha Olsen

## 2020-08-27 ENCOUNTER — VIRTUAL VISIT (OUTPATIENT)
Dept: PULMONOLOGY | Age: 65
End: 2020-08-27
Payer: MEDICARE

## 2020-08-27 PROCEDURE — 1111F DSCHRG MED/CURRENT MED MERGE: CPT | Performed by: NURSE PRACTITIONER

## 2020-08-27 PROCEDURE — G8427 DOCREV CUR MEDS BY ELIG CLIN: HCPCS | Performed by: NURSE PRACTITIONER

## 2020-08-27 PROCEDURE — 99214 OFFICE O/P EST MOD 30 MIN: CPT | Performed by: NURSE PRACTITIONER

## 2020-08-27 PROCEDURE — 1123F ACP DISCUSS/DSCN MKR DOCD: CPT | Performed by: NURSE PRACTITIONER

## 2020-08-27 PROCEDURE — G8417 CALC BMI ABV UP PARAM F/U: HCPCS | Performed by: NURSE PRACTITIONER

## 2020-08-27 PROCEDURE — 4040F PNEUMOC VAC/ADMIN/RCVD: CPT | Performed by: NURSE PRACTITIONER

## 2020-08-27 PROCEDURE — 3017F COLORECTAL CA SCREEN DOC REV: CPT | Performed by: NURSE PRACTITIONER

## 2020-08-27 PROCEDURE — 1036F TOBACCO NON-USER: CPT | Performed by: NURSE PRACTITIONER

## 2020-08-27 ASSESSMENT — SLEEP AND FATIGUE QUESTIONNAIRES
HOW LIKELY ARE YOU TO NOD OFF OR FALL ASLEEP WHILE SITTING AND READING: 2
HOW LIKELY ARE YOU TO NOD OFF OR FALL ASLEEP WHILE WATCHING TV: 3
HOW LIKELY ARE YOU TO NOD OFF OR FALL ASLEEP WHEN YOU ARE A PASSENGER IN A CAR FOR AN HOUR WITHOUT A BREAK: 0
HOW LIKELY ARE YOU TO NOD OFF OR FALL ASLEEP WHILE LYING DOWN TO REST IN THE AFTERNOON WHEN CIRCUMSTANCES PERMIT: 3
HOW LIKELY ARE YOU TO NOD OFF OR FALL ASLEEP WHILE SITTING AND TALKING TO SOMEONE: 0
HOW LIKELY ARE YOU TO NOD OFF OR FALL ASLEEP IN A CAR, WHILE STOPPED FOR A FEW MINUTES IN TRAFFIC: 0
ESS TOTAL SCORE: 10
HOW LIKELY ARE YOU TO NOD OFF OR FALL ASLEEP WHILE SITTING QUIETLY AFTER LUNCH WITHOUT ALCOHOL: 2
NECK CIRCUMFERENCE (INCHES): 17
HOW LIKELY ARE YOU TO NOD OFF OR FALL ASLEEP WHILE SITTING INACTIVE IN A PUBLIC PLACE: 0

## 2020-08-27 ASSESSMENT — ENCOUNTER SYMPTOMS
COUGH: 0
CONSTIPATION: 0
COLOR CHANGE: 0
SHORTNESS OF BREATH: 1
ABDOMINAL PAIN: 0

## 2020-08-27 NOTE — PROGRESS NOTES
[facility] Follow Up Note  Pulmonology Video Visit    Pursuant to the emergency declaration under the [facility] waiver authority and the Coronavirus Preparedness and Response Supplemental Appropriations Act this Video Visit was insisted, with patient's consent, to reduce the patient's risk of exposure to COVID-19 and provide continuity of care for an established patient. The patient was at home, while the provider was at the clinic. Services were provided through a synchronous discussion through a Video Visit to substitute for in-person clinic visit, and coded as such. Subjective:   CHIEF COMPLAINT / HPI: Recent Hospitalization VT with CABG   The patient is 77 y.o. male who presents today for a routine follow up visit related to the above mentioned issues. There is a PMH significant for CAD s/p recent CABG, CKD, DM, GERD. The patient presented from an outside facility after experiencing nonsustained VT at a urology appointment. He was evaluated by EP and interventional cardiology. LHC showed severe LM, RCA stenosis. He was evaluated by pulmonary during his hospitalization with recommendations for OP PFT / PSG. He ultimately underwent CABG. He was discharged on RA. He reports some fatigue and breathlessness after walking a few hundred yards. This is not typical for him. He denies cough or chest pain. There are no fevers or chills. Generally he feels like he is getting a little better each day. He does report daytime sleepiness which has been ongoing for a number of years.         Past Medical History:   Diagnosis Date    Blood circulation, collateral     CAD (coronary artery disease)     Chronic back pain     Chronic kidney disease     Diabetes mellitus (HCC)     Enlarged prostate     GERD (gastroesophageal reflux disease)     Hyperlipidemia     Hypertension     Medical history reviewed with no changes      Social History:    Social History     Tobacco Use   Smoking Status Former Smoker    Packs/day: 1.00    Years: 17.00    Pack years: 17.00    Types: Cigarettes    Last attempt to quit: 1984    Years since quittin.6   Smokeless Tobacco Never Used   Tobacco Comment    smoked for 17 yrs / smoked 1 ppd / stopped 25 years ago     Current Medications:     Current Outpatient Medications on File Prior to Visit   Medication Sig Dispense Refill    olmesartan (BENICAR) 20 MG tablet Take 1 tablet by mouth daily 30 tablet 3    carvedilol (COREG) 12.5 MG tablet Take 1 tablet by mouth 2 times daily (with meals) 60 tablet 3    amLODIPine (NORVASC) 5 MG tablet Take 1 tablet by mouth daily 30 tablet 3    glipiZIDE (GLUCOTROL) 5 MG tablet Take 1 tablet by mouth every morning (before breakfast) 60 tablet 3    aspirin 325 MG EC tablet Take 1 tablet by mouth daily 30 tablet 3    cyclobenzaprine (FLEXERIL) 10 MG tablet Take 10 mg by mouth 3 times daily as needed for Muscle spasms      atorvastatin (LIPITOR) 40 MG tablet take 1 tablet by mouth once daily 90 tablet 3    gabapentin (NEURONTIN) 300 MG capsule Take 600 mg by mouth 2 times daily.  traZODone (DESYREL) 50 MG tablet Take 150 mg by mouth nightly       tamsulosin (FLOMAX) 0.4 MG capsule Take 0.8 mg by mouth nightly      Multiple Vitamins-Minerals (CENTRUM SILVER) TABS Take 1 tablet by mouth daily.  metformin (GLUCOPHAGE) 1000 MG tablet Take 1,000 mg by mouth 2 times daily. No current facility-administered medications on file prior to visit. Review of Systems   Constitutional: Positive for fatigue. Negative for chills and fever. HENT: Negative for congestion and postnasal drip. Respiratory: Positive for shortness of breath. Negative for cough. Cardiovascular: Negative for chest pain and leg swelling. Gastrointestinal: Negative for abdominal pain and constipation. Musculoskeletal: Negative for arthralgias and joint swelling.    Skin: Negative for color change and pallor. Allergic/Immunologic: Negative for environmental allergies and food allergies. Psychiatric/Behavioral: Negative for agitation and confusion. Objective:       VITALS:  Non recorded    DATA:      Physical exam:  No in-personal physical exam was conducted as visit was done via video. The patient was alert and oriented throughout our conversation. He had good coloration. He was not SOB with speaking or otherwise distressed. He had no obvious edema. Radiology Review:  Pertinent images / reports were reviewed as a part of this visit. CXR done 8/14/20 reveals the following:  Improving pulmonary edema. Small left pleural effusion. Last PFTs done Aug 2020:  Pt could have restriction based on spirometry. Would need lung volume studies to confirm if indicated. Assessment / Plan:   1. SOB (shortness of breath)  - This is improving over time but is not typical for him  - Had possible restriction on spirometry during the hospital, ? Pleural effusion / obesity contributes  - Will plan for full PFT after he has completely healed   - No cough or pain  - He reports good saturations with walking despite CARR    2. CAD in native artery  - Had small effusion on last imaging   - If SOB persists, consider f/u imaging     3. Smoking history  - He quit smoking a number of years ago  - No baseline SOB with possible restriction on spirometry, doubt OLD at this stage    4. Daytime sleepiness / AYAZ  - He has experienced daytime sleepiness for a number of years   - Based on this plus recent arrhthymias, recommend PSG to further investigate     Return in about 4 weeks (around 9/24/2020) for short term follow up of symptoms. RTC sooner if symptoms worsen.     Ez Montaño MSN APRN-ACNP CCRN

## 2020-09-03 ENCOUNTER — OFFICE VISIT (OUTPATIENT)
Dept: CARDIOTHORACIC SURGERY | Age: 65
End: 2020-09-03

## 2020-09-03 VITALS
HEART RATE: 91 BPM | TEMPERATURE: 97.8 F | DIASTOLIC BLOOD PRESSURE: 70 MMHG | HEIGHT: 74 IN | BODY MASS INDEX: 32.76 KG/M2 | WEIGHT: 255.3 LBS | OXYGEN SATURATION: 97 % | SYSTOLIC BLOOD PRESSURE: 120 MMHG

## 2020-09-03 PROCEDURE — 99024 POSTOP FOLLOW-UP VISIT: CPT | Performed by: THORACIC SURGERY (CARDIOTHORACIC VASCULAR SURGERY)

## 2020-09-03 RX ORDER — TRAMADOL HYDROCHLORIDE 50 MG/1
50 TABLET ORAL EVERY 6 HOURS PRN
COMMUNITY

## 2020-09-03 NOTE — PROGRESS NOTES
Progress Note    CC:    Chief Complaint   Patient presents with    Post-Op Check     1st post op visit s/p CABG x 3, L leg vh, ligation of SARA on 812/20       Subjective: Mr. Layne Thorpe is here for his first post op visit with his wife. He has been doing better at home and has gotten stronger. Vital Signs:                                                 /70 (Site: Left Upper Arm)   Pulse 91   Temp 97.8 °F (36.6 °C) (Infrared)   Ht 6' 2\" (1.88 m)   Wt 255 lb 4.8 oz (115.8 kg)   SpO2 97%   BMI 32.78 kg/m²        CV:   RRR  Pulm: CTAB and symmetric at bases  Incisions:   Sternal incision, CT site and L leg incision all healing well. Sternum stable to deep palpation. Ext: minimal lower extremity edema        Assessment/Plan:    Progressing well. I went over risk factor reduction, scar care and recovery expectations with the patient and his wife and answered all their questions. From a surgical standpoint, the patient may start using arms and lift without restriction on 10/12/2020. As patient is healing well and has excellent medical follow-up, I will see them back on a PRN basis. I encouraged them to call with any questions, or new concerns.     Thank you,    Rigo Severino MD

## 2020-09-08 NOTE — PROGRESS NOTES
[facility]   Cardiology Note              Date:  September 9, 2020  Patientname: Sheree Thompson  YOB: 1955    Primary Care physician: ETTA Park CNP   Chief Complaint   Patient presents with    Follow-Up from Hospital     S/P CABG 8/04/20        HISTORY OF PRESENT ILLNESS: Sheree Thompson is a 77 y.o. male that presented to hospital from the urology office due to urinary retention issues. He was noted to have transient tachycardia with HR in 200s. He was sent to [facility]. On arrival to [facility], he was in sinus tachycardia without any symptoms. During the admission, he was noted to have wide complex tachycardia with HR up to 220 BPM. He was found to have MVD on cath. He underwent EP study on 8/10/20, but no arrhythmia was able to be induced despite aggressive pacing. S/p CABG x3 with SARA ligation (8/12/20). He was discharged on 30 day event monitor and to follow up with EP and Dr. Mik Hernandez. Today he presents for follow up as mentioned above. Overall he stated he is doing well. He is getting better each day. He has been increasing his activity. He stated when he walks outside in the humidity- he does have some shortness of breath. He was been walking inside and stated that shortness of breath is minimal.  He feels like increasing his activity is really helping. Some mid sternal chest pain- improving overall. He denies any dizziness, palpitations, orthopnea, or PND. He stated he gets up multiple times during the night to urinate. He states slept better last night than he has since he came home He is wearing a monitor at this time- for 30 days  Blood pressures at home have been 117/77-as high one day as 140/92 - these readings are prior to am medications HR 84-90 at home. Blood sugars have dylan running 110-157 in the am.  He stated he has been trying to change his diet and changing his portion sizes.     Taking all medications as prescribed, no refills needed at this time. Mitul De La Cruz describes symptoms including dyspnea- overall improved, fatigue-improved but denies chest pain, palpitations, orthopnea, PND, exertional chest pressure/discomfort, early saiety, edema, syncope. Past Medical History:   has a past medical history of Blood circulation, collateral, CAD (coronary artery disease), Chronic back pain, Chronic kidney disease, Diabetes mellitus ([address] Enlarged prostate, GERD (gastroesophageal reflux disease), Hyperlipidemia, Hypertension, and Medical history reviewed with no changes. Past Surgical History:   has a past surgical history that includes Tonsillectomy; Coronary angioplasty with stent (10/2003); Total knee arthroplasty (Right, 2014); epidural steroid injection (Left, 7/23/2019); epidural steroid injection (Left, 9/10/2019); Spinal Cord Stimulator Surgery (N/A, 5/18/2020); Coronary artery bypass graft (N/A, 8/12/2020); transesophageal echocardiogram (08/12/2020); Cardiac catheterization (08/05/2020); Cardiac catheterization (12/18/2018); lumbar laminectomy (1996); and hernia repair (2014). Home Medications:    Prior to Admission medications    Medication Sig Start Date End Date Taking? Authorizing Provider   traMADol (ULTRAM) 50 MG tablet Take 50 mg by mouth every 6 hours as needed for Pain.    Yes Historical Provider, MD   olmesartan (BENICAR) 20 MG tablet Take 1 tablet by mouth daily 8/17/20  Yes ETTA Choudhary CNP   carvedilol (COREG) 12.5 MG tablet Take 1 tablet by mouth 2 times daily (with meals) 8/17/20  Yes ETTA Choudhary CNP   amLODIPine (NORVASC) 5 MG tablet Take 1 tablet by mouth daily 8/17/20  Yes ETTA Choudhary CNP   glipiZIDE (GLUCOTROL) 5 MG tablet Take 1 tablet by mouth every morning (before breakfast) 8/18/20  Yes ETTA Choudhary CNP   aspirin 325 MG EC tablet Take 1 tablet by mouth daily 8/15/20  Yes ETTA Choudhary - CNP   cyclobenzaprine (FLEXERIL) 10 MG tablet Take 10 mg by mouth 3 times daily as needed for Muscle spasms   Yes Historical Provider, MD   atorvastatin (LIPITOR) 40 MG tablet take 1 tablet by mouth once daily 7/7/20  Yes Inderjit Thomas MD   gabapentin (NEURONTIN) 300 MG capsule Take 600 mg by mouth 2 times daily. Yes Historical Provider, MD   traZODone (DESYREL) 50 MG tablet Take 150 mg by mouth nightly    Yes Historical Provider, MD   tamsulosin (FLOMAX) 0.4 MG capsule Take 0.8 mg by mouth nightly   Yes Historical Provider, MD   Multiple Vitamins-Minerals (CENTRUM SILVER) TABS Take 1 tablet by mouth daily. Yes Historical Provider, MD   metformin (GLUCOPHAGE) 1000 MG tablet Take 1,000 mg by mouth 2 times daily. Yes Historical Provider, MD       Allergies:  Penicillins; Percocet [oxycodone-acetaminophen]; and Codeine    Social History:   reports that he quit smoking about 36 years ago. His smoking use included cigarettes. He has a 17.00 pack-year smoking history. He has never used smokeless tobacco. He reports that he does not drink alcohol or use drugs. Family History: family history includes Birth Defects in his brother; Diabetes in his mother; Heart Attack in his father; Heart Disease in his father and sister; High Blood Pressure in his brother, brother, father, sister, sister, and sister; High Cholesterol in his father. Review of Systems   Review of Systems   Constitutional: Positive for fatigue (improving). Negative for activity change. Respiratory: Positive for shortness of breath (with exertion ). Cardiovascular: Negative for chest pain, palpitations and leg swelling. Neurological: Negative for dizziness and weakness. Psychiatric/Behavioral: Positive for sleep disturbance.        OBJECTIVE:    Vital signs:    /84   Pulse 88   Temp 96.8 °F (36 °C)   Ht 6' 2\" (1.88 m)   Wt 255 lb (115.7 kg)   SpO2 97%   BMI 32.74 kg/m²      Physical Exam:  Constitutional:  Comfortable and alert, NAD, appears stated age  Eyes: PERRL, sclera nonicteric  Neck: LVG:  Anatomy:   LM-distal 60%   LAD-prox stent patent, distal 50%  Cx-Normal  OM- normal  RCA-mid 80% diffuse  RPDA- Normal  LVEF- 60%  LVG- normal  LVEDP- 11     IVUS LM: MLA 6.3mm2     Contrast: 94  Flouro Time: 9.3  Access: R radial     Impression  ~Coronary Angiography w/ Severe LM, RCA stenosis  ~LVG with LVEF of 60 and no regional wall motion abnormalities     Recommendation  ~Aggressive medical treatment and risk factor modification  ~CABG consult.          Procedure 8/12/2020:  CABG CORONARY ARTERY BYPASS X 3 (SV-LAD, SV-OM1, SV-PDA), LIGATION SARA     Cardiology Labs Reviewed:     Lab Data:  Most recent lab results below reviewed in office    CBC:   Lab Results   Component Value Date    WBC 9.8 08/17/2020    WBC 12.8 08/15/2020    WBC 15.9 08/13/2020    RBC 3.99 08/17/2020    RBC 3.86 08/15/2020    RBC 3.79 08/13/2020    HGB 12.3 08/17/2020    HGB 11.6 08/15/2020    HGB 11.8 08/13/2020    HCT 36.8 08/17/2020    HCT 35.4 08/15/2020    HCT 34.6 08/13/2020    MCV 92.1 08/17/2020    MCV 91.6 08/15/2020    MCV 91.4 08/13/2020    RDW 14.5 08/17/2020    RDW 15.0 08/15/2020    RDW 14.3 08/13/2020     08/17/2020     08/15/2020     08/13/2020     BMP:  Lab Results   Component Value Date     08/17/2020     08/15/2020     08/13/2020    K 4.1 08/17/2020    K 4.2 08/15/2020    K 4.3 08/13/2020    K 4.5 12/18/2018    K 4.3 12/17/2018    CL 98 08/17/2020     08/15/2020     08/13/2020    CO2 25 08/17/2020    CO2 24 08/15/2020    CO2 22 08/13/2020    PHOS 4.9 08/04/2020    BUN 21 08/17/2020    BUN 28 08/15/2020    BUN 16 08/13/2020    CREATININE 0.9 08/17/2020    CREATININE 1.0 08/15/2020    CREATININE 0.9 08/13/2020     BNP: No results found for: PROBNP  FASTING LIPID PANEL:  Lab Results   Component Value Date    HDL 30 08/05/2020    HDL 29 01/25/2011    LDLCALC 63 08/05/2020    TRIG 132 08/05/2020     LIVER PROFILE:No results for input(s): AST, ALT, ALB in the last 72 hours.    Reviewed all labs and imaging today    Assessment:   1. CAD: denies any chest pain   -s/p CABG x3 with SARA ligation-8/12/20   -s/p remote PCI LAD and Cx in setting of AMI (2003) PCI to RCA 2009   2. Ischemic cardiomyopathy: EF 45%   -on evidenced based BB and arb   3. Paroxsymal atrial tachycardia/flutter: follows with EP   -s/p EP study (8/10/52950 with Dr. Meenakshi Goldstein)   -no inducible ventricular arrhythmia 1:1 atrial tachycardia, unable to be mapped   -30 day monitor, if recurrent arrhythmia, may consider ablation  4. VT: prior to CABG  5. HTN: overall controled  6. HLD: LDL 63  7. Suspected AYAZ: followed up with pulmonary- plan is for outpatient sleep study    Plan:   1. Cardiac rehab- referral placed  2. Continue to monitor blood pressure- if consistently over 140/90, please call  3. Diet and exercise as discussed  4. Follow up with Dr. Bonni Bosworth 11/18, or sooner if needed  5. Follow up with Michael Kuhn CNP- EDSON 11/11, or sooner if needed  6. 30- day monitor, the office will call you with results  7. Call pulmonary regarding scheduling the sleep study [facility] [address]  [phone]      QUALITY MEASURES  1. Tobacco Cessation Counseling: NA  2. Retake of BP if >140/90:   NA  3. Documentation to PCP/referring for new patient:  Sent to PCP at close of office visit  4. CAD patient on anti-platelet: Yes  5. CAD patient on STATIN therapy:  Yes  6.  Patient with CHF and aFib on anticoagulation:  NA

## 2020-09-09 ENCOUNTER — OFFICE VISIT (OUTPATIENT)
Dept: CARDIOLOGY CLINIC | Age: 65
End: 2020-09-09
Payer: COMMERCIAL

## 2020-09-09 VITALS
WEIGHT: 255 LBS | SYSTOLIC BLOOD PRESSURE: 132 MMHG | DIASTOLIC BLOOD PRESSURE: 84 MMHG | HEART RATE: 88 BPM | BODY MASS INDEX: 32.73 KG/M2 | HEIGHT: 74 IN | TEMPERATURE: 96.8 F | OXYGEN SATURATION: 97 %

## 2020-09-09 PROCEDURE — 3017F COLORECTAL CA SCREEN DOC REV: CPT | Performed by: NURSE PRACTITIONER

## 2020-09-09 PROCEDURE — 4040F PNEUMOC VAC/ADMIN/RCVD: CPT | Performed by: NURSE PRACTITIONER

## 2020-09-09 PROCEDURE — 1111F DSCHRG MED/CURRENT MED MERGE: CPT | Performed by: NURSE PRACTITIONER

## 2020-09-09 PROCEDURE — G8427 DOCREV CUR MEDS BY ELIG CLIN: HCPCS | Performed by: NURSE PRACTITIONER

## 2020-09-09 PROCEDURE — 1123F ACP DISCUSS/DSCN MKR DOCD: CPT | Performed by: NURSE PRACTITIONER

## 2020-09-09 PROCEDURE — G8417 CALC BMI ABV UP PARAM F/U: HCPCS | Performed by: NURSE PRACTITIONER

## 2020-09-09 PROCEDURE — 99214 OFFICE O/P EST MOD 30 MIN: CPT | Performed by: NURSE PRACTITIONER

## 2020-09-09 PROCEDURE — 1036F TOBACCO NON-USER: CPT | Performed by: NURSE PRACTITIONER

## 2020-09-09 ASSESSMENT — ENCOUNTER SYMPTOMS: SHORTNESS OF BREATH: 1

## 2020-09-09 NOTE — PATIENT INSTRUCTIONS
Plan:   1. Cardiac rehab- referral placed  2. Continue to monitor blood pressure- if consistently over 140/90, please call  3. Diet and exercise as discussed  4. Follow up with Dr. Morena Mendoza 11/18, or sooner if needed  5. Follow up with ANTHONY Ramos 11/11, or sooner if needed  6. 30- day monitor, the office will call you with results  7.  Call pulmonary regarding scheduling the sleep study [phone]

## 2020-09-10 ENCOUNTER — TELEPHONE (OUTPATIENT)
Dept: CARDIOTHORACIC SURGERY | Age: 65
End: 2020-09-10

## 2020-09-10 NOTE — TELEPHONE ENCOUNTER
Received call from patient requesting RF for tramadol 50 mg tabs taken q6h prn for post op pain S/P CABG x3 w/SARA ligation done 8/12/2020 by . Currently down to using one tab at HS to help w/incisional pain from constant re-positioning. RF called in as requested to [address] [place] [address] designated in [address] [place] [address] Rd.

## 2020-09-11 ENCOUNTER — TELEPHONE (OUTPATIENT)
Dept: PULMONOLOGY | Age: 65
End: 2020-09-11

## 2020-09-14 ENCOUNTER — HOSPITAL ENCOUNTER (OUTPATIENT)
Dept: CARDIAC REHAB | Age: 65
Setting detail: THERAPIES SERIES
Discharge: HOME | End: 2020-09-14
Payer: COMMERCIAL

## 2020-09-18 ENCOUNTER — APPOINTMENT (OUTPATIENT)
Dept: CARDIAC REHAB | Age: 65
End: 2020-09-18
Payer: COMMERCIAL

## 2020-09-21 ENCOUNTER — HOSPITAL ENCOUNTER (OUTPATIENT)
Dept: CARDIAC REHAB | Age: 65
Setting detail: THERAPIES SERIES
Discharge: HOME | End: 2020-09-21
Payer: COMMERCIAL

## 2020-09-21 LAB
GLUCOSE BLD-MCNC: 126 MG/DL (ref 70–99)
PERFORMED ON: ABNORMAL

## 2020-09-21 PROCEDURE — 93798 PHYS/QHP OP CAR RHAB W/ECG: CPT

## 2020-09-23 ENCOUNTER — HOSPITAL ENCOUNTER (OUTPATIENT)
Dept: CARDIAC REHAB | Age: 65
Setting detail: THERAPIES SERIES
Discharge: HOME | End: 2020-09-23
Payer: COMMERCIAL

## 2020-09-23 PROCEDURE — 93798 PHYS/QHP OP CAR RHAB W/ECG: CPT

## 2020-09-25 ENCOUNTER — TELEPHONE (OUTPATIENT)
Dept: CARDIOLOGY CLINIC | Age: 65
End: 2020-09-25

## 2020-09-25 ENCOUNTER — HOSPITAL ENCOUNTER (OUTPATIENT)
Dept: CARDIAC REHAB | Age: 65
Setting detail: THERAPIES SERIES
Discharge: HOME | End: 2020-09-25
Payer: COMMERCIAL

## 2020-09-25 PROCEDURE — 93798 PHYS/QHP OP CAR RHAB W/ECG: CPT

## 2020-09-28 ENCOUNTER — HOSPITAL ENCOUNTER (OUTPATIENT)
Dept: CARDIAC REHAB | Age: 65
Setting detail: THERAPIES SERIES
Discharge: HOME | End: 2020-09-28
Payer: COMMERCIAL

## 2020-09-28 PROCEDURE — 93798 PHYS/QHP OP CAR RHAB W/ECG: CPT

## 2020-09-30 ENCOUNTER — HOSPITAL ENCOUNTER (OUTPATIENT)
Dept: CARDIAC REHAB | Age: 65
Setting detail: THERAPIES SERIES
Discharge: HOME | End: 2020-09-30
Payer: COMMERCIAL

## 2020-09-30 PROCEDURE — 93798 PHYS/QHP OP CAR RHAB W/ECG: CPT

## 2020-10-02 ENCOUNTER — HOSPITAL ENCOUNTER (OUTPATIENT)
Dept: CARDIAC REHAB | Age: 65
Setting detail: THERAPIES SERIES
Discharge: HOME | End: 2020-10-02
Payer: MEDICARE

## 2020-10-02 PROCEDURE — 93798 PHYS/QHP OP CAR RHAB W/ECG: CPT

## 2020-10-05 ENCOUNTER — HOSPITAL ENCOUNTER (OUTPATIENT)
Dept: CARDIAC REHAB | Age: 65
Setting detail: THERAPIES SERIES
Discharge: HOME | End: 2020-10-05
Payer: MEDICARE

## 2020-10-05 PROCEDURE — 93798 PHYS/QHP OP CAR RHAB W/ECG: CPT

## 2020-10-07 ENCOUNTER — HOSPITAL ENCOUNTER (OUTPATIENT)
Dept: CARDIAC REHAB | Age: 65
Setting detail: THERAPIES SERIES
Discharge: HOME | End: 2020-10-07
Payer: MEDICARE

## 2020-10-07 PROCEDURE — 93798 PHYS/QHP OP CAR RHAB W/ECG: CPT

## 2020-10-09 ENCOUNTER — HOSPITAL ENCOUNTER (OUTPATIENT)
Dept: CARDIAC REHAB | Age: 65
Setting detail: THERAPIES SERIES
Discharge: HOME | End: 2020-10-09
Payer: MEDICARE

## 2020-10-09 PROCEDURE — 93798 PHYS/QHP OP CAR RHAB W/ECG: CPT

## 2020-10-12 ENCOUNTER — HOSPITAL ENCOUNTER (OUTPATIENT)
Dept: CARDIAC REHAB | Age: 65
Setting detail: THERAPIES SERIES
Discharge: HOME | End: 2020-10-12
Payer: MEDICARE

## 2020-10-12 PROCEDURE — 93798 PHYS/QHP OP CAR RHAB W/ECG: CPT

## 2020-10-13 PROBLEM — E66.811 CLASS 1 OBESITY IN ADULT: Status: RESOLVED | Noted: 2020-08-17 | Resolved: 2020-10-13

## 2020-10-13 PROBLEM — E66.9 CLASS 1 OBESITY IN ADULT: Status: RESOLVED | Noted: 2020-08-17 | Resolved: 2020-10-13

## 2020-10-13 PROBLEM — R29.818 SUSPECTED SLEEP APNEA: Status: RESOLVED | Noted: 2020-08-17 | Resolved: 2020-10-13

## 2020-10-13 NOTE — PROGRESS NOTES
[facility], operated by [facility]   Electrophysiology  June Celestin, ETTA-CNP  Attending EP: Dr. Rip Rock    Date: 11/12/2020  I had the privilege of visiting Brittni Adan in the office. Chief Complaint:   Chief Complaint   Patient presents with    Tachycardia     History of Present Illness: History obtained from patient and medical record. Brittni Adan is 77 y.o. male with a past medical history of CAD, HTN, DM, CKD, HLD, obesity, and AYAZ.     In August of 2020, pt presented to hospital from the urology office due to urinary retention issues. He was noted to have transient tachycardia with HR in 200s. He was sent to [facility]. On arrival to [facility], he was in sinus tachycardia without any symptoms. During the admission, he was noted to have WCT with HR up to 220 BPM. He was found to have MVD on cath. He underwent EP study on 8/10/20, but no arrhythmia was able to be induced despite aggressive pacing. S/p CABG x3 with SARA ligation (8/12/20). -Interval history: Today, Brittni Adan is being seen for follow up. He is doing well. He is in sinus rhythm/ectopic rhythm on EKG. His HR is slightly elevated as he had a long walk into the office. His rate is down to the 90s on exam. Pt has been completed cardiac rehab without issue. His vital signs have been stable. His event monitor was unremarkable. He had one episode of NSVT that was asymptomatic. Denies having chest pain, palpitations, shortness of breath, orthopnea/PND, cough, or dizziness at the time of this visit. With regard to medication therapy the patient has been compliant with prescribed regimen. They have tolerated therapy to date. Allergies: Allergies   Allergen Reactions    Penicillins     Percocet [Oxycodone-Acetaminophen]      Made him feel \"weird\"    Codeine Nausea And Vomiting     Home Medications:  Prior to Visit Medications    Medication Sig Taking?  Authorizing Provider   zolpidem (AMBIEN) 5 MG tablet Take 5 mg by mouth nightly as needed for Sleep. Yes Historical Provider, MD   traMADol (ULTRAM) 50 MG tablet Take 50 mg by mouth every 6 hours as needed for Pain. Yes Historical Provider, MD   glipiZIDE (GLUCOTROL) 5 MG tablet Take 1 tablet by mouth every morning (before breakfast) Yes Sugar Baker, APRN - CNP   cyclobenzaprine (FLEXERIL) 10 MG tablet Take 10 mg by mouth 3 times daily as needed for Muscle spasms Yes Historical Provider, MD   gabapentin (NEURONTIN) 300 MG capsule Take 600 mg by mouth 2 times daily. Yes Historical Provider, MD   traZODone (DESYREL) 50 MG tablet Take 150 mg by mouth nightly  Yes Historical Provider, MD   tamsulosin (FLOMAX) 0.4 MG capsule Take 0.8 mg by mouth nightly Yes Historical Provider, MD   Multiple Vitamins-Minerals (CENTRUM SILVER) TABS Take 1 tablet by mouth daily. Yes Historical Provider, MD   metformin (GLUCOPHAGE) 1000 MG tablet Take 1,000 mg by mouth 2 times daily. Yes Historical Provider, MD   carvedilol (COREG) 12.5 MG tablet Take 1 tablet by mouth 2 times daily (with meals)  Mili Kwok MD   amLODIPine (NORVASC) 5 MG tablet Take 1 tablet by mouth daily  Mili Kwok MD   atorvastatin (LIPITOR) 40 MG tablet Take 1 tablet by mouth daily  Mili Kwok MD   olmesartan (BENICAR) 20 MG tablet Take 1 tablet by mouth daily  Mili Kwok MD   aspirin 81 MG EC tablet Take 1 tablet by mouth daily  Mili Kwok MD      Past Medical History:  Past Medical History:   Diagnosis Date    Blood circulation, collateral     CAD (coronary artery disease)     Chronic back pain     Chronic kidney disease     Diabetes mellitus ([facility] [facility] [address]     Enlarged prostate     GERD (gastroesophageal reflux disease)     Hyperlipidemia     Hypertension     Medical history reviewed with no changes      Past Surgical History:    has a past surgical history that includes Tonsillectomy; Coronary angioplasty with stent (10/2003);  Total knee arthroplasty (Right, 2014); epidural steroid injection (Left, 7/23/2019); epidural steroid injection (Left, 9/10/2019); Spinal Cord Stimulator Surgery (N/A, 5/18/2020); Coronary artery bypass graft (N/A, 8/12/2020); transesophageal echocardiogram (08/12/2020); Cardiac catheterization (08/05/2020); Cardiac catheterization (12/18/2018); lumbar laminectomy (1996); and hernia repair (2014). Social History:  Reviewed. reports that he quit smoking about 36 years ago. His smoking use included cigarettes. He has a 17.00 pack-year smoking history. He has never used smokeless tobacco. He reports that he does not drink alcohol or use drugs. Family History:  Reviewed. family history includes Birth Defects in his brother; Diabetes in his mother; Heart Attack in his father; Heart Disease in his father and sister; High Blood Pressure in his brother, brother, father, sister, sister, and sister; High Cholesterol in his father. Review of System:  · Constitutional: Negative for fever, night sweats, chills, weight changes, or weakness  · Skin: Negative for rash, dry skin, pruritus, bruising, bleeding, blood clots, or changes in skin pigment  · HEENT: Negative for vision changes, ringing in the ears, sore throat, dysphagia, or swollen lymph nodes  · Respiratory: Reviewed in HPI  · Cardiovascular: Reviewed in HPI  · Gastrointestinal: Negative for abdominal pain, N/V/D, constipation, or black/tarry stools  · Genito-Urinary: Negative for dysuria, incontinence, urgency, or hematuria  · Musculoskeletal: Negative for joint swelling, muscle pain, or injuries  · Neurological/Psych: Negative for confusion, seizures, dizziness, headaches, balance issues or TIA-like symptoms.  No anxiety, depression, or insomnia    Physical Examination:  Vitals:    11/12/20 1448   BP: 126/78   Pulse: 108   SpO2: 98%      Wt Readings from Last 3 Encounters:   11/12/20 258 lb (117 kg)   11/12/20 258 lb 11.2 oz (117.3 kg)   09/09/20 255 lb (115.7 kg)     Constitutional: Cooperative and in no apparent distress, and appears well nourished  Skin: Warm and pink; no pallor, cyanosis, bruising, or clubbing  HEENT: Symmetric and normocephalic. PERRL, EOM intact. Conjunctiva pink with clear sclera. Mucus membranes pink and moist. Teeth intact. Thyroid smooth without nodules or goiter  Respiratory: Respirations symmetric and unlabored. Lungs clear to auscultation bilaterally, no wheezing, rhonchi, or crackles  Cardiovascular:  Regular rate and rhythm. S1/S2 present without murmurs, rubs, or gallops. Peripheral pulses 2+, capillary refill < 3 seconds. No elevation of JVP. No peripheral edema  Gastrointestinal: Abdomen soft and round. Bowel sounds normoactive in all quadrants without tenderness or masses. + Obese  Musculoskeletal: Bilateral upper and lower extremity strength 5/5 with full ROM. Neurological/Psych: Awake and orientated to person, place and time. Calm affect, appropriate mood. Pertinent labs, diagnostic, device, and imaging results reviewed as a part of this visit    LABS    CBC:   Lab Results   Component Value Date    WBC 9.8 2020    HGB 12.3 (L) 2020    HCT 36.8 (L) 2020    MCV 92.1 2020     2020     BMP:   Lab Results   Component Value Date    CREATININE 0.9 2020    BUN 21 (H) 2020     (L) 2020    K 4.1 2020    CL 98 (L) 2020    CO2 25 2020     Estimated Creatinine Clearance: 108 mL/min (based on SCr of 0.9 mg/dL).      Thyroid:   Lab Results   Component Value Date    TSH 2.85 2020     Lipid Panel:   Lab Results   Component Value Date    CHOL 119 2020    HDL 30 2020    HDL 29 2011    TRIG 132 2020     LFTs:  Lab Results   Component Value Date    ALT 12 2020    AST 13 (L) 2020    ALKPHOS 107 2020    BILITOT 0.3 2020     Coags:   Lab Results   Component Value Date    PROTIME 11.7 2020    INR 1.01 2020    APTT 37.2 (H) 2020       EC2020  - Sinus tachycardia (ectopic rhythm), rate 108 (Poor tracing, artifact)    Echo: 8/20   -Normal left ventricle size, mild wall thickness and mildly reduced systolic   function with an estimated ejection fraction of 45%. -There is mild diffuse hypokinesis. -Grade I diastolic dysfunction with normal LV filling pressures. E/e\"=7.0   -The aortic valve is mildly thickened/calcified but opens well with normal gradients.   -Trivial aortic regurgitation.   -The aortic root is mildly dilated. Cath: 8/5/20  Anatomy:   LM-distal 60%   LAD-prox stent patent, distal 50%  Cx-Normal  OM- normal  RCA-mid 80% diffuse  RPDA- Normal  LVEF- 60%  LVG- normal  LVEDP- 11     IVUS LM: MLA 6.3mm2     Contrast: 94  Flouro Time: 9.3  Access: R radial     Impression  ~Coronary Angiography w/ Severe LM, RCA stenosis  ~LVG with LVEF of 60 and no regional wall motion abnormalities    Event Monitor: 9/20  NSR. Average HR 92, low 64, high 194  Episodes of NSVT    Assessment:    1. Paroxysmal atrial tachycardia/flutter   - S/p EP study (8/10/20, Dr. Juan Grewal)               ~ No inducible ventricular arrhythmia. 1:1 Atrial tach, unable to be mapped      - Stable    ~ No recurrence on event monitor (9/20)   - Continue BB     2. CAD   - S/p CABG x3 with SARA ligation (8/12/20)   - Stable   - No complaints of angina   - Continue ASA, ACEI, BB, and statin     3. NSVT   - Episodes noted on event monitor   - Continue BB    4. HTN   - Controlled: Goal <130/80   - Continue current medications     5. AYAZ   - High risk for AYAZ   - Discussed long term effects of untreated AYAZ   - Needs follow up with sleep medicine     6. Hyperlipidemia   - Controlled. Goal: LDL <70               ~ LDL 63 (8/20)   - On rosuvastatin 20 mg QD   - Discussed diet, weight loss, and exercise needs   - Followed per PCP    Plan:  1. Continue current medications  2. Call office if blood pressure consistently >140  3. Follow up with sleep medicine  4.  Exercise as tolerated    F/U: Follow-up with EP in 6 months  -Call [facility], [place] at [phone] with any questions    Diet & Exercise:   The patient is counseled to follow a low salt diet to assure blood pressure remains controlled for cardiovascular risk factor modification   The patient is counseled to avoid excess caffeine, and energy drinks as this may exacerbated ectopy and arrhythmia   The patient is counseled to lose weight to control cardiovascular risk factors   Exercise program discussed: To improve overall cardiovascular health, the patient is instructed to increase cardiovascular related activities with a goal of 150 min/week of moderate level activity or 10,000 steps per day. Encouraged to perform as much activity as tolerated    Quality Metrics  1. Tobacco Cessation Counseling: N/A  2. Retake of BP if >140/90: N/A  3. Documentation to PCP: Note sent to PCP office visit  4. CAD patient on anti-platelet: Yes   5. CAD patient on STATIN therapy: Yes   6. Patient with history of CHF and atrial fibrillation on anticoagulation: N/A      I have addressed the patient's cardiac risk factors and adjusted pharmacologic treatment as needed. In addition, I have reinforced the need for patient directed risk factor modification. I independently reviewed the MCOT and ECG    All questions and concerns were addressed with the patient. Alternatives to treatment were discussed. Thank you for allowing to us to participate in the care of Yovanny Elizabeth.     ETTA Barrera-CNP  [facility], [place]   Office: [phone]

## 2020-10-14 ENCOUNTER — HOSPITAL ENCOUNTER (OUTPATIENT)
Dept: CARDIAC REHAB | Age: 65
Setting detail: THERAPIES SERIES
Discharge: HOME | End: 2020-10-14
Payer: MEDICARE

## 2020-10-14 PROCEDURE — 93798 PHYS/QHP OP CAR RHAB W/ECG: CPT

## 2020-10-16 ENCOUNTER — HOSPITAL ENCOUNTER (OUTPATIENT)
Dept: CARDIAC REHAB | Age: 65
Setting detail: THERAPIES SERIES
Discharge: HOME | End: 2020-10-16
Payer: MEDICARE

## 2020-10-16 PROCEDURE — 93798 PHYS/QHP OP CAR RHAB W/ECG: CPT

## 2020-10-19 ENCOUNTER — HOSPITAL ENCOUNTER (OUTPATIENT)
Dept: CARDIAC REHAB | Age: 65
Setting detail: THERAPIES SERIES
Discharge: HOME | End: 2020-10-19
Payer: MEDICARE

## 2020-10-19 PROCEDURE — 93798 PHYS/QHP OP CAR RHAB W/ECG: CPT

## 2020-10-21 ENCOUNTER — HOSPITAL ENCOUNTER (OUTPATIENT)
Dept: CARDIAC REHAB | Age: 65
Setting detail: THERAPIES SERIES
Discharge: HOME | End: 2020-10-21
Payer: MEDICARE

## 2020-10-21 PROCEDURE — 93798 PHYS/QHP OP CAR RHAB W/ECG: CPT

## 2020-10-23 ENCOUNTER — HOSPITAL ENCOUNTER (OUTPATIENT)
Dept: CARDIAC REHAB | Age: 65
Setting detail: THERAPIES SERIES
Discharge: HOME | End: 2020-10-23
Payer: MEDICARE

## 2020-10-23 PROCEDURE — 93798 PHYS/QHP OP CAR RHAB W/ECG: CPT

## 2020-10-26 ENCOUNTER — HOSPITAL ENCOUNTER (OUTPATIENT)
Dept: CARDIAC REHAB | Age: 65
Setting detail: THERAPIES SERIES
Discharge: HOME | End: 2020-10-26
Payer: MEDICARE

## 2020-10-26 PROCEDURE — 93798 PHYS/QHP OP CAR RHAB W/ECG: CPT

## 2020-10-28 ENCOUNTER — HOSPITAL ENCOUNTER (OUTPATIENT)
Dept: CARDIAC REHAB | Age: 65
Setting detail: THERAPIES SERIES
Discharge: HOME | End: 2020-10-28
Payer: MEDICARE

## 2020-10-28 PROCEDURE — 93798 PHYS/QHP OP CAR RHAB W/ECG: CPT

## 2020-10-30 ENCOUNTER — HOSPITAL ENCOUNTER (OUTPATIENT)
Dept: CARDIAC REHAB | Age: 65
Setting detail: THERAPIES SERIES
Discharge: HOME | End: 2020-10-30
Payer: MEDICARE

## 2020-10-30 PROCEDURE — 93798 PHYS/QHP OP CAR RHAB W/ECG: CPT

## 2020-11-02 ENCOUNTER — HOSPITAL ENCOUNTER (OUTPATIENT)
Dept: CARDIAC REHAB | Age: 65
Setting detail: THERAPIES SERIES
Discharge: HOME | End: 2020-11-02
Payer: MEDICARE

## 2020-11-02 PROCEDURE — 93798 PHYS/QHP OP CAR RHAB W/ECG: CPT

## 2020-11-04 ENCOUNTER — HOSPITAL ENCOUNTER (OUTPATIENT)
Dept: CARDIAC REHAB | Age: 65
Setting detail: THERAPIES SERIES
Discharge: HOME | End: 2020-11-04
Payer: MEDICARE

## 2020-11-04 PROCEDURE — 93798 PHYS/QHP OP CAR RHAB W/ECG: CPT

## 2020-11-06 ENCOUNTER — HOSPITAL ENCOUNTER (OUTPATIENT)
Dept: CARDIAC REHAB | Age: 65
Setting detail: THERAPIES SERIES
Discharge: HOME | End: 2020-11-06
Payer: MEDICARE

## 2020-11-06 PROCEDURE — 93798 PHYS/QHP OP CAR RHAB W/ECG: CPT

## 2020-11-09 ENCOUNTER — HOSPITAL ENCOUNTER (OUTPATIENT)
Dept: CARDIAC REHAB | Age: 65
Setting detail: THERAPIES SERIES
Discharge: HOME | End: 2020-11-09
Payer: MEDICARE

## 2020-11-09 PROCEDURE — 93798 PHYS/QHP OP CAR RHAB W/ECG: CPT

## 2020-11-11 ENCOUNTER — HOSPITAL ENCOUNTER (OUTPATIENT)
Dept: CARDIAC REHAB | Age: 65
Setting detail: THERAPIES SERIES
Discharge: HOME | End: 2020-11-11
Payer: MEDICARE

## 2020-11-11 PROCEDURE — 93798 PHYS/QHP OP CAR RHAB W/ECG: CPT

## 2020-11-12 ENCOUNTER — OFFICE VISIT (OUTPATIENT)
Dept: CARDIOLOGY CLINIC | Age: 65
End: 2020-11-12
Payer: MEDICARE

## 2020-11-12 ENCOUNTER — TELEPHONE (OUTPATIENT)
Dept: PULMONOLOGY | Age: 65
End: 2020-11-12

## 2020-11-12 VITALS
SYSTOLIC BLOOD PRESSURE: 126 MMHG | HEIGHT: 74 IN | WEIGHT: 258 LBS | BODY MASS INDEX: 33.11 KG/M2 | DIASTOLIC BLOOD PRESSURE: 78 MMHG | HEART RATE: 108 BPM

## 2020-11-12 VITALS
SYSTOLIC BLOOD PRESSURE: 126 MMHG | DIASTOLIC BLOOD PRESSURE: 78 MMHG | BODY MASS INDEX: 33.2 KG/M2 | HEART RATE: 108 BPM | HEIGHT: 74 IN | WEIGHT: 258.7 LBS | OXYGEN SATURATION: 98 %

## 2020-11-12 PROCEDURE — 3017F COLORECTAL CA SCREEN DOC REV: CPT | Performed by: NURSE PRACTITIONER

## 2020-11-12 PROCEDURE — 93000 ELECTROCARDIOGRAM COMPLETE: CPT | Performed by: NURSE PRACTITIONER

## 2020-11-12 PROCEDURE — 1123F ACP DISCUSS/DSCN MKR DOCD: CPT | Performed by: NURSE PRACTITIONER

## 2020-11-12 PROCEDURE — 99214 OFFICE O/P EST MOD 30 MIN: CPT | Performed by: NURSE PRACTITIONER

## 2020-11-12 PROCEDURE — G8417 CALC BMI ABV UP PARAM F/U: HCPCS | Performed by: NURSE PRACTITIONER

## 2020-11-12 PROCEDURE — 99214 OFFICE O/P EST MOD 30 MIN: CPT | Performed by: INTERNAL MEDICINE

## 2020-11-12 PROCEDURE — G8484 FLU IMMUNIZE NO ADMIN: HCPCS | Performed by: NURSE PRACTITIONER

## 2020-11-12 PROCEDURE — 4040F PNEUMOC VAC/ADMIN/RCVD: CPT | Performed by: NURSE PRACTITIONER

## 2020-11-12 PROCEDURE — G8427 DOCREV CUR MEDS BY ELIG CLIN: HCPCS | Performed by: NURSE PRACTITIONER

## 2020-11-12 PROCEDURE — 1036F TOBACCO NON-USER: CPT | Performed by: NURSE PRACTITIONER

## 2020-11-12 RX ORDER — ASPIRIN 81 MG/1
81 TABLET ORAL DAILY
Qty: 90 TABLET | Refills: 3
Start: 2020-11-12

## 2020-11-12 RX ORDER — AMLODIPINE BESYLATE 5 MG/1
5 TABLET ORAL DAILY
Qty: 90 TABLET | Refills: 3 | Status: SHIPPED | OUTPATIENT
Start: 2020-11-12 | End: 2021-11-19 | Stop reason: SDUPTHER

## 2020-11-12 RX ORDER — CARVEDILOL 12.5 MG/1
12.5 TABLET ORAL 2 TIMES DAILY WITH MEALS
Qty: 180 TABLET | Refills: 3 | Status: SHIPPED | OUTPATIENT
Start: 2020-11-12 | End: 2021-11-19 | Stop reason: SDUPTHER

## 2020-11-12 RX ORDER — ZOLPIDEM TARTRATE 5 MG/1
5 TABLET ORAL NIGHTLY PRN
COMMUNITY

## 2020-11-12 RX ORDER — ATORVASTATIN CALCIUM 40 MG/1
40 TABLET, FILM COATED ORAL DAILY
Qty: 90 TABLET | Refills: 3 | Status: SHIPPED | OUTPATIENT
Start: 2020-11-12 | End: 2021-07-06

## 2020-11-12 RX ORDER — OLMESARTAN MEDOXOMIL 20 MG/1
20 TABLET ORAL DAILY
Qty: 90 TABLET | Refills: 3 | Status: SHIPPED | OUTPATIENT
Start: 2020-11-12 | End: 2021-11-17

## 2020-11-12 NOTE — TELEPHONE ENCOUNTER
----- Message from ETTA Dubois CNP sent at 11/12/2020  4:39 PM EST -----  Sure thing. Dee Alberts, can you help with this? Flaquita Vasquez  ----- Message -----  From: ETTA Christopher CNP  Sent: 11/12/2020   4:06 PM EST  To: Delbra Galeazzi Rumpke, APRN - CNP    He saw you in the summer and needed AYAZ eval. Can you help get this arranged for him? I am concerned that he won't call to set it up.  Thanks

## 2020-11-12 NOTE — PROGRESS NOTES
[facility]   Cardiac Follow up    Referring Provider:  ETTA Chu CNP     Chief Complaint   Patient presents with    Coronary Artery Disease    Hypertension      History of Present Illness:  Mr. Yee Zamora is a 77 y.o. male here today for ongoing follow up. He was a direct admit on 12/17/18 from [facility] with chest pain and fatigue. He is a retired  who is on medical disability for his coronary artery disease. He first presented to our care in 2003 with an acute myocardial infarction. At that time, he had PCI to LAD and left circumflex with Cypher drug-eluting stents. He has had a subsequent PCI of his right coronary artery with a Xience drug-eluting stent in 2009. Last cardiac catheterization in 2011, he had ejection fraction of 40%, stents were patent. Stress echo was normal in 10/2017 with mild left ventricular dysfunction, EF 50%. LHC done 12/18/18 showed patent LAD,CFX stent,RCA stent. 40% prox CFX in stent 50% RCA before and after stent, Normal EF 60%, LVEDP at 20. In 8/2020 presented to hospital from the urology office due to urinary retention issues. He was noted to have transient tachycardia with HR in 200s. He was sent to [facility]. On arrival to [facility], he was in sinus tachycardia without any symptoms. During the admission, he was noted to have wide complex tachycardia with HR up to 220 BPM. He was found to have MVD on cath. He underwent EP study on 8/10/20, but no arrhythmia was able to be induced despite aggressive pacing. S/p CABG x3 with SARA ligation (8/12/20). Today, he states he is working on his diet, reduced salt intake, cut back on snacking and fat intake. He has been attending cardiac rehab. He denies exertional chest pain, CARR/PND, palpitations, light-headedness, edema. His wife is with him for the visit.      Past Medical History:   has a past medical history of Blood circulation, collateral, CAD (coronary artery disease), Chronic back pain, Chronic kidney disease, Diabetes mellitus ([facility] [address] Enlarged prostate, GERD (gastroesophageal reflux disease), Hyperlipidemia, Hypertension, and Medical history reviewed with no changes. Surgical History:   has a past surgical history that includes Tonsillectomy; Coronary angioplasty with stent (10/2003); Total knee arthroplasty (Right, 2014); epidural steroid injection (Left, 7/23/2019); epidural steroid injection (Left, 9/10/2019); Spinal Cord Stimulator Surgery (N/A, 5/18/2020); Coronary artery bypass graft (N/A, 8/12/2020); transesophageal echocardiogram (08/12/2020); Cardiac catheterization (08/05/2020); Cardiac catheterization (12/18/2018); lumbar laminectomy (1996); and hernia repair (2014). Social History:   reports that he quit smoking about 36 years ago. His smoking use included cigarettes. He has a 17.00 pack-year smoking history. He has never used smokeless tobacco. He reports that he does not drink alcohol or use drugs. Family History:  family history includes Birth Defects in his brother; Diabetes in his mother; Heart Attack in his father; Heart Disease in his father and sister; High Blood Pressure in his brother, brother, father, sister, sister, and sister; High Cholesterol in his father. Home Medications:  Prior to Admission medications    Medication Sig Start Date End Date Taking? Authorizing Provider   Multiple Vitamins-Minerals (CENTRUM SILVER) TABS Take 1 tablet by mouth daily. Yes Historical Provider, MD   zolpidem (AMBIEN) 5 MG tablet Take 5 mg by mouth nightly as needed. Yes Historical Provider, MD   meloxicam (MOBIC) 15 MG tablet Take 15 mg by mouth daily. Yes Historical Provider, MD   olmesartan (BENICAR) 40 MG tablet Take 40 mg by mouth daily. Yes Historical Provider, MD   metformin (GLUCOPHAGE) 1000 MG tablet Take 1,000 mg by mouth 2 times daily. Yes Historical Provider, MD   amlodipine (NORVASC) 10 MG tablet Take 1 tablet by mouth daily. 7/28/11 7/27/12 Yes Nigel Reid MD   atorvastatin (LIPITOR) 40 MG tablet Take 1 tablet by mouth nightly. 5/27/11 5/26/12 Yes Nigel Reid MD   aspirin 325 MG EC tablet Take 1 tablet by mouth daily. 1/25/11 1/25/12 Yes Nigel Reid MD   carvedilol (COREG) 6.25 MG tablet Take 1 tablet by mouth 2 times daily (with meals). 1/25/11 1/25/12 Yes Nigel Reid MD   nitroGLYCERIN (NITROSTAT) 0.4 MG SL tablet Place 1 tablet under the tongue every 5 minutes as needed for Chest pain. 1/25/11 1/25/12 Yes Nigel Reid MD   tamsulosin (FLOMAX) 0.4 MG capsule Take 1 capsule by mouth daily. 1/25/11 1/25/12 Yes Nigel Reid MD   lansoprazole (PREVACID) 30 MG capsule Take 1 capsule by mouth daily. 1/25/11 1/25/12 Yes Nigel Reid MD      Allergies:  Penicillins; Percocet [oxycodone-acetaminophen]; and Codeine     Review of Systems:   · Constitutional: there has been no unanticipated weight loss. There's been no change in energy level, sleep pattern, or activity level. · Eyes: No visual changes or diplopia. No scleral icterus. · ENT: No Headaches, hearing loss or vertigo. No mouth sores or sore throat. · Cardiovascular: Reviewed in HPI  · Respiratory: No cough or wheezing, no sputum production. No hematemesis. · Gastrointestinal: No abdominal pain, appetite loss, blood in stools. No change in bowel or bladder habits. · Genitourinary: No dysuria, trouble voiding, or hematuria. · Musculoskeletal:  No gait disturbance, weakness or joint complaints. · Integumentary: No rash or pruritis. · Neurological: No headache, diplopia, change in muscle strength, numbness or tingling. No change in gait, balance, coordination, mood, affect, memory, mentation, behavior. · Psychiatric: No anxiety, no depression. · Endocrine: No malaise, fatigue or temperature intolerance. No excessive thirst, fluid intake, or urination. No tremor.   · Hematologic/Lymphatic: No abnormal bruising or bleeding, blood clots or swollen lymph nodes.  · Allergic/Immunologic: No nasal congestion or hives. Physical Examination:    Blood pressure 126/78, pulse 108, height 6' 2\" (1.88 m), weight 258 lb (117 kg). Constitutional and General Appearance: NAD  Skin:good turgor,intact without lesions  HEENT: EOMI ,normal  Neck:no JVD  Respiratory:  · Normal excursion and expansion without use of accessory muscles  · Resp Auscultation: Normal breath sounds without dullness  Cardiovascular:  · The apical impulses not displaced  · Heart tones are crisp and normal  · Cervical veins are not engorged  · The carotid upstroke is normal in amplitude and contour without delay or bruit  · Peripheral pulses are symmetrical and full  · There is no clubbing, cyanosis of the extremities. · No edema  · Femoral Arteries: 2+ and equal  · Pedal Pulses: 2+ and equal   Abdomen:  · No masses or tenderness  · Liver/Spleen: No Abnormalities Noted  Neurological/Psychiatric:  · Alert and oriented in all spheres  · Moves all extremities well  · +back pain, wheelchair due to distance   · No abnormalities of mood, affect, memory, mentation, or behavior are noted       Assessment:    1. Coronary artery Disease:No anginal symptoms. S/p CABG x3 with SARA ligation (8/12/20)  -[facility] 12/18/18 showed mild disease, normal EF.   --10/28/2003 Cath/PCI ([facility])> 85% LAD, 70% LCX, 30% RCA-PCI to LAD with 3.5x18mm Cypher TEJAS.   --1/02/2009 Cath/PCI> Stents in LAD and LCX patent. 85% pRCA-PCI 3.0x12mm Xience TEJAS.   --1/25/2011 Cath> Stents in LAD, LCX, and RCA are all patent. LVEF 40%. 2. Essential hypertension, benign: Stable   /78 (Site: Right Upper Arm, Position: Sitting, Cuff Size: Medium Adult)   Pulse 108   Ht 6' 2\" (1.88 m)   Wt 258 lb (117 kg)   BMI 33.13 kg/m²     3. Other and unspecified hyperlipidemia: Managed per PCP. Takes Lipitor 40mg     4.  Other primary cardiomyopathies: Stable  02/12/2015 Echo ([facility])> EF 45%, mild global hypokinesis, mild MR  EF 55% per [facility] 12/18/18 5.  Diabetes mellitus: PCP follows. 6.   Chronic back pain- follows with Dr. Veronica Jeter   Plan:  Ambar Delgado has a stable cardiac status. Cardiac test and lab results personally reviewed by me during this office visit and discussed. Reduce aspirin to 81 MG EC tablet daily   Continue risk factor modifications. Call for any change in symptoms. Return for regular follow up in 6 months. He states his meds are covered through worker's comp. I appreciate the opportunity of cooperating in the care of this individual.    Jerri Escalera M.D., [facility]    Patient's problem list, medications, allergies, past medical, surgical, social and family histories were reviewed and updated as appropriate. Scribe's attestation: This note was scribed in the presence of Dr. Franci Escalera MD, by Sunny Wright RN. The scribe's documentation has been prepared under my direction and personally reviewed by me in its entirety. I confirm that the note above accurately reflects all work, treatment, procedures, and medical decision making performed by me.

## 2020-11-12 NOTE — PATIENT INSTRUCTIONS
1. Continue current medications  2. Call office if blood pressure consistently >140  3.  Follow up with sleep medicine

## 2020-11-13 ENCOUNTER — HOSPITAL ENCOUNTER (OUTPATIENT)
Dept: CARDIAC REHAB | Age: 65
Setting detail: THERAPIES SERIES
Discharge: HOME | End: 2020-11-13
Payer: MEDICARE

## 2020-11-13 PROCEDURE — 93798 PHYS/QHP OP CAR RHAB W/ECG: CPT

## 2020-11-16 ENCOUNTER — HOSPITAL ENCOUNTER (OUTPATIENT)
Dept: CARDIAC REHAB | Age: 65
Setting detail: THERAPIES SERIES
Discharge: HOME | End: 2020-11-16
Payer: MEDICARE

## 2020-11-16 PROCEDURE — 93798 PHYS/QHP OP CAR RHAB W/ECG: CPT

## 2020-11-18 ENCOUNTER — HOSPITAL ENCOUNTER (OUTPATIENT)
Dept: CARDIAC REHAB | Age: 65
Setting detail: THERAPIES SERIES
Discharge: HOME | End: 2020-11-18
Payer: MEDICARE

## 2020-11-18 PROCEDURE — 93798 PHYS/QHP OP CAR RHAB W/ECG: CPT

## 2020-11-20 ENCOUNTER — HOSPITAL ENCOUNTER (OUTPATIENT)
Dept: CARDIAC REHAB | Age: 65
Setting detail: THERAPIES SERIES
Discharge: HOME | End: 2020-11-20
Payer: MEDICARE

## 2020-11-20 PROCEDURE — 93798 PHYS/QHP OP CAR RHAB W/ECG: CPT

## 2020-11-23 ENCOUNTER — HOSPITAL ENCOUNTER (OUTPATIENT)
Dept: CARDIAC REHAB | Age: 65
Setting detail: THERAPIES SERIES
Discharge: HOME | End: 2020-11-23
Payer: MEDICARE

## 2020-11-23 PROCEDURE — 93798 PHYS/QHP OP CAR RHAB W/ECG: CPT

## 2020-11-25 ENCOUNTER — HOSPITAL ENCOUNTER (OUTPATIENT)
Dept: CARDIAC REHAB | Age: 65
Setting detail: THERAPIES SERIES
Discharge: HOME | End: 2020-11-25
Payer: MEDICARE

## 2020-11-25 PROCEDURE — 93798 PHYS/QHP OP CAR RHAB W/ECG: CPT

## 2020-11-27 ENCOUNTER — APPOINTMENT (OUTPATIENT)
Dept: CARDIAC REHAB | Age: 65
End: 2020-11-27
Payer: MEDICARE

## 2020-11-30 ENCOUNTER — HOSPITAL ENCOUNTER (OUTPATIENT)
Dept: CARDIAC REHAB | Age: 65
Setting detail: THERAPIES SERIES
Discharge: HOME | End: 2020-11-30
Payer: MEDICARE

## 2020-11-30 PROCEDURE — 93798 PHYS/QHP OP CAR RHAB W/ECG: CPT

## 2020-12-02 ENCOUNTER — HOSPITAL ENCOUNTER (OUTPATIENT)
Dept: CARDIAC REHAB | Age: 65
Setting detail: THERAPIES SERIES
Discharge: HOME | End: 2020-12-02
Payer: MEDICARE

## 2020-12-02 PROCEDURE — 93798 PHYS/QHP OP CAR RHAB W/ECG: CPT

## 2020-12-04 ENCOUNTER — HOSPITAL ENCOUNTER (OUTPATIENT)
Dept: CARDIAC REHAB | Age: 65
Setting detail: THERAPIES SERIES
Discharge: HOME | End: 2020-12-04
Payer: MEDICARE

## 2020-12-04 PROCEDURE — 93798 PHYS/QHP OP CAR RHAB W/ECG: CPT

## 2020-12-07 ENCOUNTER — HOSPITAL ENCOUNTER (OUTPATIENT)
Dept: CARDIAC REHAB | Age: 65
Setting detail: THERAPIES SERIES
Discharge: HOME | End: 2020-12-07
Payer: MEDICARE

## 2020-12-07 PROCEDURE — 93798 PHYS/QHP OP CAR RHAB W/ECG: CPT

## 2020-12-09 ENCOUNTER — HOSPITAL ENCOUNTER (OUTPATIENT)
Dept: CARDIAC REHAB | Age: 65
Setting detail: THERAPIES SERIES
Discharge: HOME | End: 2020-12-09
Payer: MEDICARE

## 2020-12-09 PROCEDURE — 93798 PHYS/QHP OP CAR RHAB W/ECG: CPT

## 2020-12-11 ENCOUNTER — HOSPITAL ENCOUNTER (OUTPATIENT)
Dept: CARDIAC REHAB | Age: 65
Setting detail: THERAPIES SERIES
Discharge: HOME | End: 2020-12-11
Payer: MEDICARE

## 2020-12-11 PROCEDURE — 93798 PHYS/QHP OP CAR RHAB W/ECG: CPT

## 2020-12-14 ENCOUNTER — HOSPITAL ENCOUNTER (OUTPATIENT)
Dept: CARDIAC REHAB | Age: 65
Setting detail: THERAPIES SERIES
Discharge: HOME | End: 2020-12-14
Payer: MEDICARE

## 2020-12-14 PROCEDURE — 93798 PHYS/QHP OP CAR RHAB W/ECG: CPT

## 2020-12-16 ENCOUNTER — APPOINTMENT (OUTPATIENT)
Dept: CARDIAC REHAB | Age: 65
End: 2020-12-16
Payer: MEDICARE

## 2020-12-18 ENCOUNTER — APPOINTMENT (OUTPATIENT)
Dept: CARDIAC REHAB | Age: 65
End: 2020-12-18
Payer: MEDICARE

## 2020-12-21 ENCOUNTER — APPOINTMENT (OUTPATIENT)
Dept: CARDIAC REHAB | Age: 65
End: 2020-12-21
Payer: MEDICARE

## 2020-12-23 ENCOUNTER — APPOINTMENT (OUTPATIENT)
Dept: CARDIAC REHAB | Age: 65
End: 2020-12-23
Payer: MEDICARE

## 2020-12-24 NOTE — PROGRESS NOTES
me 146/78  BP (!) 146/90 (Site: Right Upper Arm, Position: Sitting, Cuff Size: Large Adult)   Pulse 92   Ht 6' 2\" (1.88 m)   Wt 252 lb 9.6 oz (114.6 kg)   BMI 32.43 kg/m²     3. Other and unspecified hyperlipidemia: Managed per PCP. Takes Lipitor 40mg  1/3/19> , , HDL 41, LDL 69.   4. Other primary cardiomyopathies: Stable  02/12/2015 Echo ([facility])> EF 45%, mild global hypokinesis, mild MR  EF 55% per [facility] 12/18/18   5. Diabetes mellitus: PCP follows. Recent A1c 7.5. Plan:  Joey Ferguson has a stable cardiac status. Recent labs reviewed. 1. No med changes. 2. Will continue with risk factor modifications. 3. Return for regular follow up in 6 months. He states his meds are covered through worker's comp. I appreciate the opportunity of cooperating in the care of this individual.    Mateusz Frederick M.D., [facility]      Patient's problem list, medications, allergies, past medical, surgical, social and family histories were reviewed and updated as appropriate. Scribe's attestation: This note was scribed in the presence of Dr. Kj Frederick MD, by Yodit Craft RN. The scribe's documentation has been prepared under my direction and personally reviewed by me in its entirety. I confirm that the note above accurately reflects all work, treatment, procedures, and medical decision making performed by me.
Not applicable

## 2020-12-25 ENCOUNTER — APPOINTMENT (OUTPATIENT)
Dept: CARDIAC REHAB | Age: 65
End: 2020-12-25
Payer: MEDICARE

## 2020-12-28 ENCOUNTER — APPOINTMENT (OUTPATIENT)
Dept: CARDIAC REHAB | Age: 65
End: 2020-12-28
Payer: MEDICARE

## 2020-12-30 ENCOUNTER — APPOINTMENT (OUTPATIENT)
Dept: CARDIAC REHAB | Age: 65
End: 2020-12-30
Payer: MEDICARE

## 2021-02-01 ENCOUNTER — TELEPHONE (OUTPATIENT)
Dept: CARDIOLOGY CLINIC | Age: 66
End: 2021-02-01

## 2021-02-01 NOTE — TELEPHONE ENCOUNTER
Pt calling to let LES know that Worker's [employer] will be faxing over a PA form for his Amlodipine so they can get it refilled.  Pls call to advise Thank you

## 2021-02-03 NOTE — TELEPHONE ENCOUNTER
Spoke to patient, I received a request for PA from cover my meds through [facility] but that is all I have received for Amlodipine. Called patient, he states we should receive paperwork that needs filled out as well. Gave patient MA room fax # and asked that he call workers comp and have them fax it to [employer].  Patient verbalized understanding

## 2021-02-04 NOTE — TELEPHONE ENCOUNTER
Received form from workers comp. Form has been completed and signed by Dr. Adeline Aguilar. Faxed form to [phone]. Notified patient.

## 2021-02-08 NOTE — TELEPHONE ENCOUNTER
Received letter from [employer] stating that Amlodipine has been approved from 2/2/21 to 2/2/24. LM notifying patient.

## 2021-05-18 NOTE — PROGRESS NOTES
[facility], [place]   Cardiac Follow up    Referring Provider:  ETTA Duckworth CNP     Chief Complaint   Patient presents with    6 Month Follow-Up    Coronary Artery Disease    Hypertension    Hyperlipidemia      History of Present Illness:  Mr. Talon Perez is a 77 y.o. male here today for ongoing follow up. He was a direct admit on 12/17/18 from [facility] with chest pain and fatigue. He is a retired  who is on medical disability for his coronary artery disease. He first presented to our care in 2003 with an acute myocardial infarction. At that time, he had PCI to LAD and left circumflex with Cypher drug-eluting stents. He has had a subsequent PCI of his right coronary artery with a Xience drug-eluting stent in 2009. Last cardiac catheterization in 2011, he had ejection fraction of 40%, stents were patent. Stress echo was normal in 10/2017 with mild left ventricular dysfunction, EF 50%. LHC done 12/18/18 showed patent LAD,CFX stent,RCA stent. 40% prox CFX in stent 50% RCA before and after stent, Normal EF 60%, LVEDP at 20. In 8/2020 presented to hospital from the urology office due to urinary retention issues. He was noted to have transient tachycardia with HR in 200s. He was sent to [facility]. On arrival to [facility], he was in sinus tachycardia without any symptoms. During the admission, he was noted to have wide complex tachycardia with HR up to 220 BPM. He was found to have MVD on cath. He underwent EP study on 8/10/20, but no arrhythmia was able to be induced despite aggressive pacing. S/p CABG x3 with SARA ligation (8/12/20). He had been attending cardiac rehab, now works out at [employer]. He is short of breath at times, especially with inclines. He denies exertional chest pain, palpitations, light-headedness, edema. His wife is with him for the visit. Discussed recent lab results, need to watch Creatine, slightly elevated.  He has appt with urologist next month.     Past Medical History:   has a past medical history of Blood circulation, collateral, CAD (coronary artery disease), Chronic back pain, Chronic kidney disease, Diabetes mellitus ([address] Enlarged prostate, GERD (gastroesophageal reflux disease), Hyperlipidemia, Hypertension, and Medical history reviewed with no changes. Surgical History:   has a past surgical history that includes Tonsillectomy; Coronary angioplasty with stent (10/2003); Total knee arthroplasty (Right, 2014); epidural steroid injection (Left, 7/23/2019); epidural steroid injection (Left, 9/10/2019); Spinal Cord Stimulator Surgery (N/A, 5/18/2020); Coronary artery bypass graft (N/A, 8/12/2020); transesophageal echocardiogram (08/12/2020); Cardiac catheterization (08/05/2020); Cardiac catheterization (12/18/2018); lumbar laminectomy (1996); and hernia repair (2014). Social History:   reports that he quit smoking about 37 years ago. His smoking use included cigarettes. He has a 17.00 pack-year smoking history. He has never used smokeless tobacco. He reports that he does not drink alcohol and does not use drugs. Family History:  family history includes Birth Defects in his brother; Diabetes in his mother; Heart Attack in his father; Heart Disease in his father and sister; High Blood Pressure in his brother, brother, father, sister, sister, and sister; High Cholesterol in his father. Home Medications:  Prior to Admission medications    Medication Sig Start Date End Date Taking? Authorizing Provider   Multiple Vitamins-Minerals (CENTRUM SILVER) TABS Take 1 tablet by mouth daily. Yes Historical Provider, MD   zolpidem (AMBIEN) 5 MG tablet Take 5 mg by mouth nightly as needed. Yes Historical Provider, MD   meloxicam (MOBIC) 15 MG tablet Take 15 mg by mouth daily. Yes Historical Provider, MD   olmesartan (BENICAR) 40 MG tablet Take 40 mg by mouth daily.      Yes Historical Provider, MD   metformin (GLUCOPHAGE) 1000 MG tablet Take 1,000 mg by mouth 2 times daily. Yes Jose Alfredo Smith MD   amlodipine (NORVASC) 10 MG tablet Take 1 tablet by mouth daily. 7/28/11 7/27/12 Yes Sulaiman Auguste MD   atorvastatin (LIPITOR) 40 MG tablet Take 1 tablet by mouth nightly. 5/27/11 5/26/12 Yes Sulaiman Auguste MD   aspirin 325 MG EC tablet Take 1 tablet by mouth daily. 1/25/11 1/25/12 Yes Sulaiman Auguste MD   carvedilol (COREG) 6.25 MG tablet Take 1 tablet by mouth 2 times daily (with meals). 1/25/11 1/25/12 Yes Sulaiman Auguste MD   nitroGLYCERIN (NITROSTAT) 0.4 MG SL tablet Place 1 tablet under the tongue every 5 minutes as needed for Chest pain. 1/25/11 1/25/12 Yes Sulaiman Auguste MD   tamsulosin (FLOMAX) 0.4 MG capsule Take 1 capsule by mouth daily. 1/25/11 1/25/12 Yes Sualiman Auguste MD   lansoprazole (PREVACID) 30 MG capsule Take 1 capsule by mouth daily. 1/25/11 1/25/12 Yes Sulaiman Auguste MD      Allergies:  Penicillins, Percocet [oxycodone-acetaminophen], and Codeine     Review of Systems:   · Constitutional: there has been no unanticipated weight loss. There's been no change in energy level, sleep pattern, or activity level. · Eyes: No visual changes or diplopia. No scleral icterus. · ENT: No Headaches, hearing loss or vertigo. No mouth sores or sore throat. · Cardiovascular: Reviewed in HPI  · Respiratory: No cough or wheezing, no sputum production. No hematemesis. · Gastrointestinal: No abdominal pain, appetite loss, blood in stools. No change in bowel or bladder habits. · Genitourinary: No dysuria, trouble voiding, or hematuria. · Musculoskeletal:  No gait disturbance, weakness or joint complaints. · Integumentary: No rash or pruritis. · Neurological: No headache, diplopia, change in muscle strength, numbness or tingling. No change in gait, balance, coordination, mood, affect, memory, mentation, behavior. · Psychiatric: No anxiety, no depression. · Endocrine: No malaise, fatigue or temperature intolerance.  No excessive thirst,

## 2021-05-26 ENCOUNTER — OFFICE VISIT (OUTPATIENT)
Dept: CARDIOLOGY CLINIC | Age: 66
End: 2021-05-26
Payer: COMMERCIAL

## 2021-05-26 VITALS
HEART RATE: 82 BPM | SYSTOLIC BLOOD PRESSURE: 134 MMHG | DIASTOLIC BLOOD PRESSURE: 86 MMHG | HEIGHT: 74 IN | WEIGHT: 254 LBS | BODY MASS INDEX: 32.6 KG/M2 | RESPIRATION RATE: 18 BRPM | OXYGEN SATURATION: 95 %

## 2021-05-26 DIAGNOSIS — I42.9 PRIMARY CARDIOMYOPATHY (HCC): ICD-10-CM

## 2021-05-26 DIAGNOSIS — M54.9 CHRONIC BACK PAIN, UNSPECIFIED BACK LOCATION, UNSPECIFIED BACK PAIN LATERALITY: ICD-10-CM

## 2021-05-26 DIAGNOSIS — I25.10 CORONARY ARTERY DISEASE INVOLVING NATIVE CORONARY ARTERY OF NATIVE HEART WITHOUT ANGINA PECTORIS: Primary | ICD-10-CM

## 2021-05-26 DIAGNOSIS — Z95.1 S/P CABG X 3: ICD-10-CM

## 2021-05-26 DIAGNOSIS — E78.2 MIXED HYPERLIPIDEMIA: ICD-10-CM

## 2021-05-26 DIAGNOSIS — E11.9 TYPE 2 DIABETES MELLITUS WITHOUT COMPLICATION, WITHOUT LONG-TERM CURRENT USE OF INSULIN (HCC): ICD-10-CM

## 2021-05-26 DIAGNOSIS — I10 ESSENTIAL HYPERTENSION, BENIGN: ICD-10-CM

## 2021-05-26 DIAGNOSIS — G89.29 CHRONIC BACK PAIN, UNSPECIFIED BACK LOCATION, UNSPECIFIED BACK PAIN LATERALITY: ICD-10-CM

## 2021-05-26 PROCEDURE — 99214 OFFICE O/P EST MOD 30 MIN: CPT | Performed by: INTERNAL MEDICINE

## 2021-05-26 PROCEDURE — 1123F ACP DISCUSS/DSCN MKR DOCD: CPT | Performed by: INTERNAL MEDICINE

## 2021-05-26 PROCEDURE — 3017F COLORECTAL CA SCREEN DOC REV: CPT | Performed by: INTERNAL MEDICINE

## 2021-05-26 PROCEDURE — 2022F DILAT RTA XM EVC RTNOPTHY: CPT | Performed by: INTERNAL MEDICINE

## 2021-05-26 PROCEDURE — G8427 DOCREV CUR MEDS BY ELIG CLIN: HCPCS | Performed by: INTERNAL MEDICINE

## 2021-05-26 PROCEDURE — 4040F PNEUMOC VAC/ADMIN/RCVD: CPT | Performed by: INTERNAL MEDICINE

## 2021-05-26 PROCEDURE — 3046F HEMOGLOBIN A1C LEVEL >9.0%: CPT | Performed by: INTERNAL MEDICINE

## 2021-05-26 PROCEDURE — G8417 CALC BMI ABV UP PARAM F/U: HCPCS | Performed by: INTERNAL MEDICINE

## 2021-05-26 PROCEDURE — 1036F TOBACCO NON-USER: CPT | Performed by: INTERNAL MEDICINE

## 2021-05-26 RX ORDER — MULTIVIT-MIN/IRON/FOLIC ACID/K 18-600-40
CAPSULE ORAL
COMMUNITY

## 2021-05-26 RX ORDER — ACETAMINOPHEN 160 MG
TABLET,DISINTEGRATING ORAL
COMMUNITY

## 2021-06-15 ENCOUNTER — TELEPHONE (OUTPATIENT)
Dept: CARDIOLOGY CLINIC | Age: 66
End: 2021-06-15

## 2021-07-06 RX ORDER — ATORVASTATIN CALCIUM 40 MG/1
TABLET, FILM COATED ORAL
Qty: 90 TABLET | Refills: 3 | Status: SHIPPED | OUTPATIENT
Start: 2021-07-06 | End: 2022-06-27

## 2021-08-11 ENCOUNTER — TELEPHONE (OUTPATIENT)
Dept: CARDIOLOGY CLINIC | Age: 66
End: 2021-08-11

## 2021-08-11 NOTE — TELEPHONE ENCOUNTER
I don't do the auth requests for Stress testing done in the hospital, this is handled by the hospital pre-cert team.  However, it looks like they are doing this under Medicare, for the patient to be done under his workers comp, a C9 needs to be completed, and these typically take a couple of weeks to get authorized

## 2021-08-11 NOTE — TELEPHONE ENCOUNTER
Pt states he has an appt for stress test on Monday 8/16/21, but states this should be covered under Workers comp, but they have not received any paper work requesting this. Please call pt today to  advise.

## 2021-08-19 ENCOUNTER — TELEPHONE (OUTPATIENT)
Dept: CARDIOLOGY CLINIC | Age: 66
End: 2021-08-19

## 2021-08-19 NOTE — TELEPHONE ENCOUNTER
Pt called to joey appt in nov with LES there are no openings to joey the pt     pls advise when and where the pt can get joey

## 2021-08-19 NOTE — TELEPHONE ENCOUNTER
Patient scheduled for GXT and OV with LES on 11/19/21. Patient states the GXT MUST get prior auth from WORK[employer]. He had to cxl his last test because PA wasn't done .

## 2021-10-26 NOTE — PROGRESS NOTES
[address]   Cardiac Follow up    Referring Provider:  ETTA Dang - FREDY     Chief Complaint   Patient presents with    Coronary Artery Disease    Hypertension    Hyperlipidemia      History of Present Illness:  Mr. Kenny Boyd is a 74 y.o. male here today for ongoing follow up. He was a direct admit on 12/17/18 from [facility] with chest pain and fatigue. He is a retired  who is on medical disability for his coronary artery disease. He first presented to our care in 2003 with an acute myocardial infarction. At that time, he had PCI to LAD and left circumflex with Cypher drug-eluting stents. He has had a subsequent PCI of his right coronary artery with a Xience drug-eluting stent in 2009. Last cardiac catheterization in 2011, he had ejection fraction of 40%, stents were patent. Stress echo was normal in 10/2017 with mild left ventricular dysfunction, EF 50%. LHC done 12/18/18 showed patent LAD,CFX stent,RCA stent. 40% prox CFX in stent 50% RCA before and after stent, Normal EF 60%, LVEDP at 20. In 8/2020 presented to hospital from the urology office due to urinary retention issues. He was noted to have transient tachycardia with HR in 200s. He was sent to [facility]. On arrival to [facility], he was in sinus tachycardia without any symptoms. During the admission, he was noted to have wide complex tachycardia with HR up to 220 BPM. He was found to have MVD on cath. He underwent EP study on 8/10/20, but no arrhythmia was able to be induced despite aggressive pacing. S/p CABG x3 with SARA ligation (8/12/20). He had a stress test today. Discussed recent lab results, need to watch Creatine, slightly elevated. He has been having swelling in the ankles.      Past Medical History:   has a past medical history of Blood circulation, collateral, CAD (coronary artery disease), Chronic back pain, Chronic kidney disease, Diabetes mellitus ([address] Enlarged prostate, GERD (gastroesophageal reflux disease), Hyperlipidemia, Hypertension, and Medical history reviewed with no changes. Surgical History:   has a past surgical history that includes Tonsillectomy; Coronary angioplasty with stent (10/2003); Total knee arthroplasty (Right, 2014); epidural steroid injection (Left, 7/23/2019); epidural steroid injection (Left, 9/10/2019); Spinal Cord Stimulator Surgery (N/A, 5/18/2020); Coronary artery bypass graft (N/A, 8/12/2020); transesophageal echocardiogram (08/12/2020); Cardiac catheterization (08/05/2020); Cardiac catheterization (12/18/2018); lumbar laminectomy (1996); and hernia repair (2014). Social History:   reports that he quit smoking about 37 years ago. His smoking use included cigarettes. He has a 17.00 pack-year smoking history. He has never used smokeless tobacco. He reports that he does not drink alcohol and does not use drugs. Family History:  family history includes Birth Defects in his brother; Diabetes in his mother; Heart Attack in his father; Heart Disease in his father and sister; High Blood Pressure in his brother, brother, father, sister, sister, and sister; High Cholesterol in his father. Home Medications:  Prior to Admission medications    Medication Sig Start Date End Date Taking? Authorizing Provider   Multiple Vitamins-Minerals (CENTRUM SILVER) TABS Take 1 tablet by mouth daily. Yes Historical Provider, MD   zolpidem (AMBIEN) 5 MG tablet Take 5 mg by mouth nightly as needed. Yes Historical Provider, MD   meloxicam (MOBIC) 15 MG tablet Take 15 mg by mouth daily. Yes Historical Provider, MD   olmesartan (BENICAR) 40 MG tablet Take 40 mg by mouth daily. Yes Historical Provider, MD   metformin (GLUCOPHAGE) 1000 MG tablet Take 1,000 mg by mouth 2 times daily. Yes Historical Provider, MD   amlodipine (NORVASC) 10 MG tablet Take 1 tablet by mouth daily.  7/28/11 7/27/12 Yes Chanel Charter, MD   atorvastatin (LIPITOR) 40 MG tablet Take 1 tablet by mouth nightly. 5/27/11 5/26/12 Yes Derik Coello MD   aspirin 325 MG EC tablet Take 1 tablet by mouth daily. 1/25/11 1/25/12 Yes Derik Coello MD   carvedilol (COREG) 6.25 MG tablet Take 1 tablet by mouth 2 times daily (with meals). 1/25/11 1/25/12 Yes Derik Coello MD   nitroGLYCERIN (NITROSTAT) 0.4 MG SL tablet Place 1 tablet under the tongue every 5 minutes as needed for Chest pain. 1/25/11 1/25/12 Yes Derik Coello MD   tamsulosin (FLOMAX) 0.4 MG capsule Take 1 capsule by mouth daily. 1/25/11 1/25/12 Yes Derik Coello MD   lansoprazole (PREVACID) 30 MG capsule Take 1 capsule by mouth daily. 1/25/11 1/25/12 Yes Derik Coello MD      Allergies:  Penicillins, Percocet [oxycodone-acetaminophen], and Codeine     Review of Systems:   · Constitutional: there has been no unanticipated weight loss. There's been no change in energy level, sleep pattern, or activity level. · Eyes: No visual changes or diplopia. No scleral icterus. · ENT: No Headaches, hearing loss or vertigo. No mouth sores or sore throat. · Cardiovascular: Reviewed in HPI  · Respiratory: No cough or wheezing, no sputum production. No hematemesis. · Gastrointestinal: No abdominal pain, appetite loss, blood in stools. No change in bowel or bladder habits. · Genitourinary: No dysuria, trouble voiding, or hematuria. · Musculoskeletal:  No gait disturbance, weakness or joint complaints. · Integumentary: No rash or pruritis. · Neurological: No headache, diplopia, change in muscle strength, numbness or tingling. No change in gait, balance, coordination, mood, affect, memory, mentation, behavior. · Psychiatric: No anxiety, no depression. · Endocrine: No malaise, fatigue or temperature intolerance. No excessive thirst, fluid intake, or urination. No tremor. · Hematologic/Lymphatic: No abnormal bruising or bleeding, blood clots or swollen lymph nodes. · Allergic/Immunologic: No nasal congestion or hives.     Physical Examination: Blood pressure 122/82, pulse 105, height 6' 2\" (1.88 m), weight 270 lb 3.2 oz (122.6 kg), SpO2 96 %. Constitutional and General Appearance: NAD  Skin:good turgor,intact without lesions  HEENT: EOMI ,normal  Neck:no JVD  Respiratory:  · Normal excursion and expansion without use of accessory muscles  · Resp Auscultation: Normal breath sounds without dullness  Cardiovascular:  · The apical impulses not displaced  · Heart tones are crisp and normal  · Cervical veins are not engorged  · The carotid upstroke is normal in amplitude and contour without delay or bruit  · Peripheral pulses are symmetrical and full  · There is no clubbing, cyanosis of the extremities. · No edema  · Femoral Arteries: 2+ and equal  · Pedal Pulses: 2+ and equal   Abdomen:  · No masses or tenderness  · Liver/Spleen: No Abnormalities Noted  Neurological/Psychiatric:  · Alert and oriented in all spheres  · Moves all extremities well  · +back pain, wheelchair due to distance   · No abnormalities of mood, affect, memory, mentation, or behavior are noted       Assessment:    1. Coronary artery Disease:No anginal symptoms. Plan for stress 1 yr post bypass. S/p CABG x3 with SARA ligation (8/12/20)  -[facility] 12/18/18 showed mild disease, normal EF.   --10/28/2003 Cath/PCI ([facility])> 85% LAD, 70% LCX, 30% RCA-PCI to LAD with 3.5x18mm Cypher TEJAS.   --1/02/2009 Cath/PCI> Stents in LAD and LCX patent. 85% pRCA-PCI 3.0x12mm Xience TEJAS.   --1/25/2011 Cath> Stents in LAD, LCX, and RCA are all patent. LVEF 40%. -11/19/21 Myoview- stable    2. Essential hypertension, benign: Stable   /82 (Site: Left Upper Arm, Position: Sitting, Cuff Size: Large Adult)   Pulse 105   Ht 6' 2\" (1.88 m)   Wt 270 lb 3.2 oz (122.6 kg)   SpO2 96%   BMI 34.69 kg/m²     3. Other and unspecified hyperlipidemia: Managed per PCP. Takes Lipitor 40mg  3/29/21> , , HDL 40, LDL 83    4.  Other primary cardiomyopathies: Stable  02/12/2015 Echo ([facility])> EF 45%, mild global hypokinesis, mild MR  EF 55% per [facility] 12/18/18   5. Diabetes mellitus: PCP follows. 3/29/21> 6.5   6. Chronic back pain- follows with Dr. Negrita Bravo: Stress testing 11/19/21 read by me and results discussed with the patient. Ricky Mack has a stable cardiac status. Cardiac test and lab results personally reviewed by me during this office visit and discussed. STOP olmesartan (BENICAR) 20 MG tablet  START olmesartan- HCTZ   Have blood work in 2 weeks   Return for regular follow up in 6 months  . He states his meds are covered through worker's comp. I appreciate the opportunity of cooperating in the care of this individual.    Raghu Rodriguez. Surendra Ordonez M.D., [facility]    Patient's problem list, medications, allergies, past medical, surgical, social and family histories were reviewed and updated as appropriate. Scribe's attestation: This note was scribed in the presence of Dr. Surendra Ordonez MD, by Cortland Schlatter RN. The scribe's documentation has been prepared under my direction and personally reviewed by me in its entirety. I confirm that the note above accurately reflects all work, treatment, procedures, and medical decision making performed by me.

## 2021-11-17 RX ORDER — OLMESARTAN MEDOXOMIL 20 MG/1
TABLET ORAL
Qty: 90 TABLET | Refills: 3 | Status: SHIPPED | OUTPATIENT
Start: 2021-11-17 | End: 2021-11-19 | Stop reason: ALTCHOICE

## 2021-11-17 NOTE — TELEPHONE ENCOUNTER
Received refill request for Benicar 20 mg from [employer].     Last OV: 5/26/21 LES    Last Labs: 3/29/21     Last Refill: 11/12/20 #90 with 3 refills    Next Appt: 11/19/21 LES

## 2021-11-19 ENCOUNTER — OFFICE VISIT (OUTPATIENT)
Dept: CARDIOLOGY CLINIC | Age: 66
End: 2021-11-19
Payer: COMMERCIAL

## 2021-11-19 ENCOUNTER — HOSPITAL ENCOUNTER (OUTPATIENT)
Dept: NON INVASIVE DIAGNOSTICS | Age: 66
Discharge: HOME | End: 2021-11-19
Payer: COMMERCIAL

## 2021-11-19 VITALS
SYSTOLIC BLOOD PRESSURE: 122 MMHG | DIASTOLIC BLOOD PRESSURE: 82 MMHG | WEIGHT: 270.2 LBS | HEIGHT: 74 IN | BODY MASS INDEX: 34.68 KG/M2 | HEART RATE: 105 BPM | OXYGEN SATURATION: 96 %

## 2021-11-19 DIAGNOSIS — E11.9 TYPE 2 DIABETES MELLITUS WITHOUT COMPLICATION, WITHOUT LONG-TERM CURRENT USE OF INSULIN (HCC): ICD-10-CM

## 2021-11-19 DIAGNOSIS — I10 ESSENTIAL HYPERTENSION, BENIGN: ICD-10-CM

## 2021-11-19 DIAGNOSIS — I25.10 CORONARY ARTERY DISEASE INVOLVING NATIVE CORONARY ARTERY OF NATIVE HEART WITHOUT ANGINA PECTORIS: ICD-10-CM

## 2021-11-19 DIAGNOSIS — I25.10 CORONARY ARTERY DISEASE INVOLVING NATIVE CORONARY ARTERY OF NATIVE HEART WITHOUT ANGINA PECTORIS: Primary | ICD-10-CM

## 2021-11-19 DIAGNOSIS — E78.2 MIXED HYPERLIPIDEMIA: ICD-10-CM

## 2021-11-19 DIAGNOSIS — M54.9 CHRONIC BACK PAIN, UNSPECIFIED BACK LOCATION, UNSPECIFIED BACK PAIN LATERALITY: ICD-10-CM

## 2021-11-19 DIAGNOSIS — G89.29 CHRONIC BACK PAIN, UNSPECIFIED BACK LOCATION, UNSPECIFIED BACK PAIN LATERALITY: ICD-10-CM

## 2021-11-19 DIAGNOSIS — I42.9 PRIMARY CARDIOMYOPATHY (HCC): ICD-10-CM

## 2021-11-19 DIAGNOSIS — Z95.1 S/P CABG X 3: ICD-10-CM

## 2021-11-19 LAB
LV EF: 44 %
LVEF MODALITY: NORMAL

## 2021-11-19 PROCEDURE — G8484 FLU IMMUNIZE NO ADMIN: HCPCS | Performed by: INTERNAL MEDICINE

## 2021-11-19 PROCEDURE — 3046F HEMOGLOBIN A1C LEVEL >9.0%: CPT | Performed by: INTERNAL MEDICINE

## 2021-11-19 PROCEDURE — 4040F PNEUMOC VAC/ADMIN/RCVD: CPT | Performed by: INTERNAL MEDICINE

## 2021-11-19 PROCEDURE — A9502 TC99M TETROFOSMIN: HCPCS | Performed by: INTERNAL MEDICINE

## 2021-11-19 PROCEDURE — 3017F COLORECTAL CA SCREEN DOC REV: CPT | Performed by: INTERNAL MEDICINE

## 2021-11-19 PROCEDURE — 3430000000 HC RX DIAGNOSTIC RADIOPHARMACEUTICAL: Performed by: INTERNAL MEDICINE

## 2021-11-19 PROCEDURE — G8417 CALC BMI ABV UP PARAM F/U: HCPCS | Performed by: INTERNAL MEDICINE

## 2021-11-19 PROCEDURE — 1036F TOBACCO NON-USER: CPT | Performed by: INTERNAL MEDICINE

## 2021-11-19 PROCEDURE — G8427 DOCREV CUR MEDS BY ELIG CLIN: HCPCS | Performed by: INTERNAL MEDICINE

## 2021-11-19 PROCEDURE — 93017 CV STRESS TEST TRACING ONLY: CPT | Performed by: INTERNAL MEDICINE

## 2021-11-19 PROCEDURE — 78452 HT MUSCLE IMAGE SPECT MULT: CPT | Performed by: INTERNAL MEDICINE

## 2021-11-19 PROCEDURE — 99214 OFFICE O/P EST MOD 30 MIN: CPT | Performed by: INTERNAL MEDICINE

## 2021-11-19 PROCEDURE — 2022F DILAT RTA XM EVC RTNOPTHY: CPT | Performed by: INTERNAL MEDICINE

## 2021-11-19 PROCEDURE — 1123F ACP DISCUSS/DSCN MKR DOCD: CPT | Performed by: INTERNAL MEDICINE

## 2021-11-19 RX ORDER — AMLODIPINE BESYLATE 5 MG/1
5 TABLET ORAL DAILY
Qty: 90 TABLET | Refills: 3 | Status: SHIPPED | OUTPATIENT
Start: 2021-11-19 | End: 2022-11-01

## 2021-11-19 RX ORDER — CARVEDILOL 12.5 MG/1
12.5 TABLET ORAL 2 TIMES DAILY WITH MEALS
Qty: 180 TABLET | Refills: 3 | Status: SHIPPED | OUTPATIENT
Start: 2021-11-19 | End: 2022-11-01

## 2021-11-19 RX ORDER — OLMESARTAN MEDOXOMIL AND HYDROCHLOROTHIAZIDE 20/12.5 20; 12.5 MG/1; MG/1
1 TABLET ORAL DAILY
Qty: 90 TABLET | Refills: 3 | Status: SHIPPED | OUTPATIENT
Start: 2021-11-19 | End: 2022-11-02 | Stop reason: SDUPTHER

## 2021-11-19 RX ADMIN — TETROFOSMIN 30 MILLICURIE: 1.38 INJECTION, POWDER, LYOPHILIZED, FOR SOLUTION INTRAVENOUS at 09:30

## 2021-11-19 RX ADMIN — TETROFOSMIN 10 MILLICURIE: 1.38 INJECTION, POWDER, LYOPHILIZED, FOR SOLUTION INTRAVENOUS at 08:10

## 2021-11-19 NOTE — PROGRESS NOTES
Patient instructed on Deonte Protocol Stress Test Procedure including possible side effects and adverse reactions. Verbalizes knowledge and understanding and denies having any questions.

## 2022-05-09 NOTE — PROGRESS NOTES
[facility]   Cardiac Follow up    Referring Provider:  ETTA Dawkins CNP     Chief Complaint   Patient presents with    Hyperlipidemia    Coronary Artery Disease    Hypertension    Cardiomyopathy    6 Month Follow-Up      History of Present Illness:  Mr. Donta Flores is a 74 y.o. male . He was a direct admit on 12/17/18 from [facility] with chest pain and fatigue. He is a retired  who is on medical disability for his coronary artery disease. He first presented to our care in 2003 with an acute myocardial infarction. At that time, he had PCI to LAD and left circumflex with Cypher drug-eluting stents. He has had a subsequent PCI of his right coronary artery with a Xience drug-eluting stent in 2009. Last cardiac catheterization in 2011, he had ejection fraction of 40%, stents were patent. Stress echo was normal in 10/2017 with mild left ventricular dysfunction, EF 50%. LHC done 12/18/18 showed patent LAD,CFX stent,RCA stent. 40% prox CFX in stent 50% RCA before and after stent, Normal EF 60%, LVEDP at 20. In 8/2020 presented to hospital from the urology office due to urinary retention issues. He was noted to have transient tachycardia with HR in 200s. He was sent to [facility]. On arrival to [facility], he was in sinus tachycardia without any symptoms. During the admission, he was noted to have wide complex tachycardia with HR up to 220 BPM. He was found to have MVD on cath. He underwent EP study on 8/10/20, but no arrhythmia was able to be induced despite aggressive pacing. S/p CABG x3 with SARA ligation (8/12/20). Today, Jodi Moreno is here for 6 mos office visit. Jodi Moreno is here with his wife. He states his family is doing well. Pt denies exertional chest pain, CARR/PND, palpitations, light-headedness, edema. [facility]  A couple mos ago, he went to [facility] due to chest pain, Neeraj Bhatia states the stress test was normal and he has had no chest pain symptoms since then. He states they were concerned about his gallbladder and they did further testing on it. It is possible that symptoms could be r/t colon inflammation. Past Medical History:   has a past medical history of Blood circulation, collateral, CAD (coronary artery disease), Chronic back pain, Chronic kidney disease, Diabetes mellitus ([address] Enlarged prostate, GERD (gastroesophageal reflux disease), Hyperlipidemia, Hypertension, and Medical history reviewed with no changes. Surgical History:   has a past surgical history that includes Tonsillectomy; Coronary angioplasty with stent (10/2003); Total knee arthroplasty (Right, 2014); epidural steroid injection (Left, 7/23/2019); epidural steroid injection (Left, 9/10/2019); Spinal Cord Stimulator Surgery (N/A, 5/18/2020); Coronary artery bypass graft (N/A, 8/12/2020); transesophageal echocardiogram (08/12/2020); Cardiac catheterization (08/05/2020); Cardiac catheterization (12/18/2018); lumbar laminectomy (1996); and hernia repair (2014). Social History:   reports that he quit smoking about 38 years ago. His smoking use included cigarettes. He has a 17.00 pack-year smoking history. He has never used smokeless tobacco. He reports that he does not drink alcohol and does not use drugs. Family History:  family history includes Birth Defects in his brother; Diabetes in his mother; Heart Attack in his father; Heart Disease in his father and sister; High Blood Pressure in his brother, brother, father, sister, sister, and sister; High Cholesterol in his father. Home Medications:  Prior to Admission medications    Medication Sig Start Date End Date Taking? Authorizing Provider   Multiple Vitamins-Minerals (CENTRUM SILVER) TABS Take 1 tablet by mouth daily. Yes Historical Provider, MD   zolpidem (AMBIEN) 5 MG tablet Take 5 mg by mouth nightly as needed.      Yes Historical Provider, MD   meloxicam (MOBIC) 15 MG tablet Take 15 mg by mouth daily. Yes Historical Provider, MD   olmesartan (BENICAR) 40 MG tablet Take 40 mg by mouth daily. Yes Historical Provider, MD   metformin (GLUCOPHAGE) 1000 MG tablet Take 1,000 mg by mouth 2 times daily. Yes Historical Provider, MD   amlodipine (NORVASC) 10 MG tablet Take 1 tablet by mouth daily. 7/28/11 7/27/12 Yes Bunny Childress MD   atorvastatin (LIPITOR) 40 MG tablet Take 1 tablet by mouth nightly. 5/27/11 5/26/12 Yes Bunny Childress MD   aspirin 325 MG EC tablet Take 1 tablet by mouth daily. 1/25/11 1/25/12 Yes Bunny Childress MD   carvedilol (COREG) 6.25 MG tablet Take 1 tablet by mouth 2 times daily (with meals). 1/25/11 1/25/12 Yes Bunny Childress MD   nitroGLYCERIN (NITROSTAT) 0.4 MG SL tablet Place 1 tablet under the tongue every 5 minutes as needed for Chest pain. 1/25/11 1/25/12 Yes Bunny Childress MD   tamsulosin (FLOMAX) 0.4 MG capsule Take 1 capsule by mouth daily. 1/25/11 1/25/12 Yes Bunny Childress MD   lansoprazole (PREVACID) 30 MG capsule Take 1 capsule by mouth daily. 1/25/11 1/25/12 Yes Bunny Childress MD      Allergies:  Penicillins, Percocet [oxycodone-acetaminophen], and Codeine     Review of Systems:   · Constitutional: there has been no unanticipated weight loss. There's been no change in energy level, sleep pattern, or activity level. · Eyes: No visual changes or diplopia. No scleral icterus. · ENT: No Headaches, hearing loss or vertigo. No mouth sores or sore throat. · Cardiovascular: Reviewed in HPI  · Respiratory: No cough or wheezing, no sputum production. No hematemesis. · Gastrointestinal: No abdominal pain, appetite loss, blood in stools. No change in bowel or bladder habits. · Genitourinary: No dysuria, trouble voiding, or hematuria. · Musculoskeletal:  No gait disturbance, weakness or joint complaints. · Integumentary: No rash or pruritis.   · Neurological: No headache, diplopia, change in muscle strength, numbness or tingling. No change in gait, balance, coordination, mood, affect, memory, mentation, behavior. · Psychiatric: No anxiety, no depression. · Endocrine: No malaise, fatigue or temperature intolerance. No excessive thirst, fluid intake, or urination. No tremor. · Hematologic/Lymphatic: No abnormal bruising or bleeding, blood clots or swollen lymph nodes. · Allergic/Immunologic: No nasal congestion or hives. Physical Examination:    Blood pressure 136/88, pulse 92, height 6' 2\" (1.88 m), weight 269 lb 12.8 oz (122.4 kg), SpO2 98 %. Constitutional and General Appearance: NAD  Skin:good turgor,intact without lesions  HEENT: EOMI ,normal  Neck:no JVD  Respiratory:  · Normal excursion and expansion without use of accessory muscles  · Resp Auscultation: Normal breath sounds without dullness  Cardiovascular:  · The apical impulses not displaced  · Heart tones are crisp and normal  · Cervical veins are not engorged  · The carotid upstroke is normal in amplitude and contour without delay or bruit  · Peripheral pulses are symmetrical and full  · There is no clubbing, cyanosis of the extremities. · No edema  · Femoral Arteries: 2+ and equal  · Pedal Pulses: 2+ and equal   Abdomen:  · No masses or tenderness  · Liver/Spleen: No Abnormalities Noted  Neurological/Psychiatric:  · Alert and oriented in all spheres  · Moves all extremities well  · +back pain, wheelchair due to distance   · No abnormalities of mood, affect, memory, mentation, or behavior are noted       Assessment:    1. Coronary artery Disease:  Denies anginal symptoms. stress 1 yr post bypass. S/p CABG x3 with SARA ligation (8/12/20)  -[facility] 12/18/18 showed mild disease, normal EF.   --10/28/2003 Cath/PCI ([facility])> 85% LAD, 70% LCX, 30% RCA-PCI to LAD with 3.5x18mm Cypher TEJAS.   --1/02/2009 Cath/PCI> Stents in LAD and LCX patent. 85% pRCA-PCI 3.0x12mm Xience TEJAS.   --1/25/2011 Cath> Stents in LAD, LCX, and RCA are all patent. LVEF 40%. -11/19/21 Myoview- stable      2. Essential hypertension, benign:   stable  /88 (Site: Right Upper Arm, Position: Sitting, Cuff Size: Large Adult)   Pulse 92   Ht 6' 2\" (1.88 m)   Wt 269 lb 12.8 oz (122.4 kg)   SpO2 98%   BMI 34.64 kg/m²    BP rechecked by me 134/72     3. Other and unspecified hyperlipidemia:   Managed per PCP. Continue to take Lipitor 40mg  3/29/21> , , HDL 40, LDL 83      4. Other primary cardiomyopathies:   stable  02/12/2015 Echo ([facility])> EF 45%, mild global hypokinesis, mild MR  EF 55% per [facility] 12/18/18     5. Diabetes mellitus: PCP follows. 3/29/21> 6.5     6. Chronic back pain- follows with Dr. Mcfarland See:     Cardiac test and lab results personally reviewed by me during this office visit and discussed. If another colon flare up presents, consider colonoscopy  No medication changes  Call if any new or worsening symptoms present  Return for regular follow up in 6 mos        I appreciate the opportunity of cooperating in the care of this individual.    Katrin Erwin M.D., [address] [facility]    Patient's problem list, medications, allergies, past medical, surgical, social and family histories were reviewed and updated as appropriate. Scribe's attestation: This note was scribed in the presence of Dr Liliya Erwin by Arian Clemons RN. The scribe's documentation has been prepared under my direction and personally reviewed by me in its entirety. I confirm that the note above accurately reflects all work, treatment, procedures, and medical decision making performed by me.

## 2022-05-24 ENCOUNTER — OFFICE VISIT (OUTPATIENT)
Dept: CARDIOLOGY CLINIC | Age: 67
End: 2022-05-24
Payer: COMMERCIAL

## 2022-05-24 VITALS
HEIGHT: 74 IN | BODY MASS INDEX: 34.63 KG/M2 | WEIGHT: 269.8 LBS | SYSTOLIC BLOOD PRESSURE: 134 MMHG | DIASTOLIC BLOOD PRESSURE: 72 MMHG | HEART RATE: 92 BPM | OXYGEN SATURATION: 98 %

## 2022-05-24 DIAGNOSIS — I42.9 PRIMARY CARDIOMYOPATHY (HCC): ICD-10-CM

## 2022-05-24 DIAGNOSIS — E78.2 MIXED HYPERLIPIDEMIA: ICD-10-CM

## 2022-05-24 DIAGNOSIS — I10 ESSENTIAL HYPERTENSION, BENIGN: ICD-10-CM

## 2022-05-24 DIAGNOSIS — I25.10 CORONARY ARTERY DISEASE INVOLVING NATIVE CORONARY ARTERY OF NATIVE HEART WITHOUT ANGINA PECTORIS: Primary | ICD-10-CM

## 2022-05-24 PROCEDURE — 99214 OFFICE O/P EST MOD 30 MIN: CPT | Performed by: INTERNAL MEDICINE

## 2022-05-24 PROCEDURE — 1123F ACP DISCUSS/DSCN MKR DOCD: CPT | Performed by: INTERNAL MEDICINE

## 2022-05-24 NOTE — PATIENT INSTRUCTIONS
If you experience another flare up, consider colonoscopy  Call if any new or worsening symptoms present  Return for regular follow up in 6 mos

## 2022-06-22 NOTE — TELEPHONE ENCOUNTER
Received refill request for Atorvastatin from [facility].     Last ov: 05/24/2022 LES    Last labs: 03/29/2021    Last Refill: 07/06/2021 #90 w/ 3 refills    Next appointment: 11/02/2022 LES

## 2022-06-27 RX ORDER — ATORVASTATIN CALCIUM 40 MG/1
TABLET, FILM COATED ORAL
Qty: 90 TABLET | Refills: 3 | Status: SHIPPED | OUTPATIENT
Start: 2022-06-27

## 2022-07-25 ENCOUNTER — TELEPHONE (OUTPATIENT)
Dept: CARDIOLOGY CLINIC | Age: 67
End: 2022-07-25

## 2022-07-25 NOTE — TELEPHONE ENCOUNTER
Tried to call [address] [place] [address] PCP and was placed on hold then the call dropped.  Tried to call back and got a busy signal.

## 2022-07-25 NOTE — TELEPHONE ENCOUNTER
Elizabet Deluca PCP office called in requesting to know what diuretic LES wanted the patient to be on .  States that it wasn't covered so we didn't prescribe it and they are going to prescribe it

## 2022-08-05 NOTE — TELEPHONE ENCOUNTER
LMOM for PCP to return call in regards to message below      Gisselle Crow from [place] [address] PCP office called in requesting to know what diuretic LES wanted the patient to be on .     Benicar-HCT 20-12.5 mg daily

## 2022-08-08 NOTE — TELEPHONE ENCOUNTER
I spoke with [facility] and relayed message per LES. She noted in pt chart and will let PCP know.

## 2022-10-18 NOTE — PROGRESS NOTES
[address]   Cardiac Follow up    Referring Provider:  ETTA Flowers CNP     Chief Complaint   Patient presents with    Coronary Artery Disease     No complaints     6 Month Follow-Up        History of Present Illness:  Mr. Narinder De Guzman is a 69 y.o. male . He was a direct admit on 12/17/18 from [facility] with chest pain and fatigue. He is a retired  who is on medical disability for his coronary artery disease. He first presented to our care in 2003 with an acute myocardial infarction. At that time, he had PCI to LAD and left circumflex with Cypher drug-eluting stents. He has had a subsequent PCI of his right coronary artery with a Xience drug-eluting stent in 2009. Cardiac catheterization in 2011, he had ejection fraction of 40%, stents were patent. Stress echo was normal in 10/2017 with mild left ventricular dysfunction, EF 50%. LHC done 12/18/18 showed patent LAD,CFX stent,RCA stent. 40% prox CFX in stent 50% RCA before and after stent, Normal EF 60%, LVEDP at 20. In 8/2020 presented to hospital from the urology office due to urinary retention issues. He was noted to have transient tachycardia with HR in 200s. He was sent to [facility]. On arrival to [facility], he was in sinus tachycardia without any symptoms. During the admission, he was noted to have wide complex tachycardia with HR up to 220 BPM. He was found to have MVD on cath. He underwent EP study on 8/10/20, but no arrhythmia was able to be induced despite aggressive pacing. S/p CABG x3 with SARA ligation (8/12/20). Today, Ysabel Huang is here for 6 mos office visit. He is with his wife. He is not looking forward to the winter time. Pt denies exertional chest pain, CARR/PND, palpitations, light-headedness, edema.     Past Medical History:   has a past medical history of Blood circulation, collateral, CAD (coronary artery disease), Chronic back pain, Chronic kidney disease, Diabetes mellitus ([address] Enlarged prostate, GERD (gastroesophageal reflux disease), Hyperlipidemia, Hypertension, and Medical history reviewed with no changes. Surgical History:   has a past surgical history that includes Tonsillectomy; Coronary angioplasty with stent (10/2003); Total knee arthroplasty (Right, 2014); epidural steroid injection (Left, 7/23/2019); epidural steroid injection (Left, 9/10/2019); Spinal Cord Stimulator Surgery (N/A, 5/18/2020); Coronary artery bypass graft (N/A, 8/12/2020); transesophageal echocardiogram (08/12/2020); Cardiac catheterization (08/05/2020); Cardiac catheterization (12/18/2018); lumbar laminectomy (1996); and hernia repair (2014). Social History:   reports that he quit smoking about 38 years ago. His smoking use included cigarettes. He has a 17.00 pack-year smoking history. He has never used smokeless tobacco. He reports that he does not drink alcohol and does not use drugs. Family History:  family history includes Birth Defects in his brother; Diabetes in his mother; Heart Attack in his father; Heart Disease in his father and sister; High Blood Pressure in his brother, brother, father, sister, sister, and sister; High Cholesterol in his father. Home Medications:  Prior to Admission medications    Medication Sig Start Date End Date Taking? Authorizing Provider   Multiple Vitamins-Minerals (CENTRUM SILVER) TABS Take 1 tablet by mouth daily. Yes Historical Provider, MD   zolpidem (AMBIEN) 5 MG tablet Take 5 mg by mouth nightly as needed. Yes Historical Provider, MD   meloxicam (MOBIC) 15 MG tablet Take 15 mg by mouth daily. Yes Historical Provider, MD   olmesartan (BENICAR) 40 MG tablet Take 40 mg by mouth daily. Yes Historical Provider, MD   metformin (GLUCOPHAGE) 1000 MG tablet Take 1,000 mg by mouth 2 times daily. Yes Historical Provider, MD   amlodipine (NORVASC) 10 MG tablet Take 1 tablet by mouth daily.  7/28/11 7/27/12 Yes Mary Grace Payne MD   atorvastatin (LIPITOR) 40 MG tablet Take 1 tablet by mouth nightly. 5/27/11 5/26/12 Yes Samir Horton MD   aspirin 325 MG EC tablet Take 1 tablet by mouth daily. 1/25/11 1/25/12 Yes Samir Horton MD   carvedilol (COREG) 6.25 MG tablet Take 1 tablet by mouth 2 times daily (with meals). 1/25/11 1/25/12 Yes Samir Horton MD   nitroGLYCERIN (NITROSTAT) 0.4 MG SL tablet Place 1 tablet under the tongue every 5 minutes as needed for Chest pain. 1/25/11 1/25/12 Yes Samir Horton MD   tamsulosin (FLOMAX) 0.4 MG capsule Take 1 capsule by mouth daily. 1/25/11 1/25/12 Yes Samir Horton MD   lansoprazole (PREVACID) 30 MG capsule Take 1 capsule by mouth daily. 1/25/11 1/25/12 Yes Samir Horton MD      Allergies:  Penicillins, Percocet [oxycodone-acetaminophen], and Codeine     Review of Systems:   Constitutional: there has been no unanticipated weight loss. There's been no change in energy level, sleep pattern, or activity level. Eyes: No visual changes or diplopia. No scleral icterus. ENT: No Headaches, hearing loss or vertigo. No mouth sores or sore throat. Cardiovascular: Reviewed in HPI  Respiratory: No cough or wheezing, no sputum production. No hematemesis. Gastrointestinal: No abdominal pain, appetite loss, blood in stools. No change in bowel or bladder habits. Genitourinary: No dysuria, trouble voiding, or hematuria. Musculoskeletal:  No gait disturbance, weakness or joint complaints. Integumentary: No rash or pruritis. Neurological: No headache, diplopia, change in muscle strength, numbness or tingling. No change in gait, balance, coordination, mood, affect, memory, mentation, behavior. Psychiatric: No anxiety, no depression. Endocrine: No malaise, fatigue or temperature intolerance. No excessive thirst, fluid intake, or urination. No tremor. Hematologic/Lymphatic: No abnormal bruising or bleeding, blood clots or swollen lymph nodes. Allergic/Immunologic: No nasal congestion or hives.     Physical Examination:    Blood pressure 138/80, pulse 88, height 6' 2\" (1.88 m), weight 267 lb 1.6 oz (121.2 kg), SpO2 96 %. Constitutional and General Appearance: NAD  Skin:good turgor,intact without lesions  HEENT: EOMI ,normal  Neck:no JVD  Respiratory:  Normal excursion and expansion without use of accessory muscles  Resp Auscultation: Normal breath sounds without dullness  Cardiovascular: The apical impulses not displaced  Heart tones are crisp and normal  Cervical veins are not engorged  The carotid upstroke is normal in amplitude and contour without delay or bruit  Peripheral pulses are symmetrical and full  There is no clubbing, cyanosis of the extremities. No edema  Femoral Arteries: 2+ and equal  Pedal Pulses: 2+ and equal   Abdomen:  No masses or tenderness  Liver/Spleen: No Abnormalities Noted  Neurological/Psychiatric:  Alert and oriented in all spheres  Moves all extremities well  +back pain, wheelchair due to distance   No abnormalities of mood, affect, memory, mentation, or behavior are noted       Assessment:    1. Coronary artery Disease:  denies anginal symptoms   stress 1 yr post bypass. S/p CABG x3 with SARA ligation (8/12/20)  -[facility] 12/18/18 showed mild disease, normal EF.   --10/28/2003 Cath/PCI ([facility])> 85% LAD, 70% LCX, 30% RCA-PCI to LAD with 3.5x18mm Cypher TEJAS.   --1/02/2009 Cath/PCI> Stents in LAD and LCX patent. 85% pRCA-PCI 3.0x12mm Xience TEJAS.   --1/25/2011 Cath> Stents in LAD, LCX, and RCA are all patent. LVEF 40%. -11/19/21 Myoview- stable  Continue ASA/statin       2. Essential hypertension, benign:   stable  /80 (Site: Right Upper Arm, Position: Sitting, Cuff Size: Medium Adult)   Pulse 88   Ht 6' 2\" (1.88 m)   Wt 267 lb 1.6 oz (121.2 kg)   SpO2 96%   BMI 34.29 kg/m²    Continue current medication regimen    BP rechecked by me (standing, right arm) = 124/78     3. Other and unspecified hyperlipidemia:   Managed per PCP.    Continue Lipitor 40mg  3/29/21> , , HDL 40, LDL 83 4. Other primary cardiomyopathies:   stable  02/12/2015 Echo ([facility])> EF 45%, mild global hypokinesis, mild MR  EF 55% per [facility] 12/18/18  Continue benicar-hct 20-12.5     5. Diabetes mellitus: PCP follows. 3/29/21> 6.5     6. Chronic back pain- follows with Dr. Clifford Shafer:   Cardiac test and lab results personally reviewed by me during this office visit and discussed. No medication changes  Continue risk factor modifications. Call for any change in symptoms, call to report any changes in shortness of breath or development of chest pain with activity. Follow up in 6 mos        I appreciate the opportunity of cooperating in the care of this individual.    Tiara Blankenship M.D., [facility]    Patient's problem list, medications, allergies, past medical, surgical, social and family histories were reviewed and updated as appropriate. Scribe's attestation: This note was scribed in the presence of Dr Mickey Blankenship by Rosalinda Blizzard, RN. The scribe's documentation has been prepared under my direction and personally reviewed by me in its entirety. I confirm that the note above accurately reflects all work, treatment, procedures, and medical decision making performed by me.

## 2022-11-01 RX ORDER — CARVEDILOL 12.5 MG/1
TABLET ORAL
Qty: 180 TABLET | Refills: 3 | Status: SHIPPED | OUTPATIENT
Start: 2022-11-01

## 2022-11-01 RX ORDER — AMLODIPINE BESYLATE 5 MG/1
TABLET ORAL
Qty: 90 TABLET | Refills: 3 | Status: SHIPPED | OUTPATIENT
Start: 2022-11-01

## 2022-11-01 NOTE — TELEPHONE ENCOUNTER
Received refill request for Amlodipine,Carvedilol from [facility] [facility].     Last ov: 05/24/2022 LES    Last Refill: 11/19/2021     Next appointment: 11/01/2022 LES

## 2022-11-02 ENCOUNTER — OFFICE VISIT (OUTPATIENT)
Dept: CARDIOLOGY CLINIC | Age: 67
End: 2022-11-02
Payer: COMMERCIAL

## 2022-11-02 VITALS
WEIGHT: 267.1 LBS | DIASTOLIC BLOOD PRESSURE: 80 MMHG | OXYGEN SATURATION: 96 % | BODY MASS INDEX: 34.28 KG/M2 | HEART RATE: 88 BPM | SYSTOLIC BLOOD PRESSURE: 138 MMHG | HEIGHT: 74 IN

## 2022-11-02 DIAGNOSIS — M54.9 CHRONIC BACK PAIN, UNSPECIFIED BACK LOCATION, UNSPECIFIED BACK PAIN LATERALITY: ICD-10-CM

## 2022-11-02 DIAGNOSIS — I25.10 CORONARY ARTERY DISEASE INVOLVING NATIVE CORONARY ARTERY OF NATIVE HEART WITHOUT ANGINA PECTORIS: Primary | ICD-10-CM

## 2022-11-02 DIAGNOSIS — E11.9 TYPE 2 DIABETES MELLITUS WITHOUT COMPLICATION, WITHOUT LONG-TERM CURRENT USE OF INSULIN (HCC): ICD-10-CM

## 2022-11-02 DIAGNOSIS — G89.29 CHRONIC BACK PAIN, UNSPECIFIED BACK LOCATION, UNSPECIFIED BACK PAIN LATERALITY: ICD-10-CM

## 2022-11-02 DIAGNOSIS — E78.2 MIXED HYPERLIPIDEMIA: ICD-10-CM

## 2022-11-02 DIAGNOSIS — I42.9 PRIMARY CARDIOMYOPATHY (HCC): ICD-10-CM

## 2022-11-02 DIAGNOSIS — I10 ESSENTIAL HYPERTENSION, BENIGN: ICD-10-CM

## 2022-11-02 PROCEDURE — 3078F DIAST BP <80 MM HG: CPT | Performed by: INTERNAL MEDICINE

## 2022-11-02 PROCEDURE — 99214 OFFICE O/P EST MOD 30 MIN: CPT | Performed by: INTERNAL MEDICINE

## 2022-11-02 PROCEDURE — 3074F SYST BP LT 130 MM HG: CPT | Performed by: INTERNAL MEDICINE

## 2022-11-02 PROCEDURE — 1123F ACP DISCUSS/DSCN MKR DOCD: CPT | Performed by: INTERNAL MEDICINE

## 2022-11-02 RX ORDER — OLMESARTAN MEDOXOMIL AND HYDROCHLOROTHIAZIDE 20/12.5 20; 12.5 MG/1; MG/1
1 TABLET ORAL DAILY
Qty: 90 TABLET | Refills: 3 | Status: SHIPPED | OUTPATIENT
Start: 2022-11-02

## 2022-11-02 NOTE — PATIENT INSTRUCTIONS
Continue risk factor modifications. Call for any change in symptoms, call to report any changes in shortness of breath or development of chest pain with activity.     Follow up in 6 mos

## 2022-11-21 RX ORDER — OLMESARTAN MEDOXOMIL 20 MG/1
TABLET ORAL
Qty: 90 TABLET | Refills: 3 | OUTPATIENT
Start: 2022-11-21

## 2022-11-21 NOTE — TELEPHONE ENCOUNTER
The original prescription was discontinued on 11/19/2021 by Tari Cortes RN for the following reason: Therapy completed.

## 2022-12-05 ENCOUNTER — TELEPHONE (OUTPATIENT)
Dept: CARDIOLOGY CLINIC | Age: 67
End: 2022-12-05

## 2022-12-05 NOTE — TELEPHONE ENCOUNTER
Medication Refill    Medication needing refilled:  olmesartan-hydroCHLOROthiazide (BENICAR HCT)    Dosage of the medication: 20-12.5mg    How are you taking this medication (QD, BID, TID, QID, PRN):  Take 1 tablet by mouth daily    30 or 90 day supply called in: 30    When will you run out of your medication:    Which Pharmacy are we sending the medication to?:    [address] [place] [address] #[id number]

## 2022-12-06 NOTE — TELEPHONE ENCOUNTER
Pt called stating the Pharmacy will not fill the RX for the olmesartan-hydroCHLOROthiazide (BENICAR HCT)    Pt  it needs a PA through the workers comp.     Pls advise thank you

## 2022-12-06 NOTE — TELEPHONE ENCOUNTER
Called patient notified him we received a fax approval conformation for his PA   Patient verbalized understanding

## 2022-12-06 NOTE — TELEPHONE ENCOUNTER
Called patient spoke with patients wife notified her that a PA was started for patients medication patients wife verbalized understanding.

## 2023-02-22 ENCOUNTER — TELEPHONE (OUTPATIENT)
Dept: CARDIOLOGY CLINIC | Age: 68
End: 2023-02-22

## 2023-02-24 NOTE — TELEPHONE ENCOUNTER
Jim Cox spoke with pt letting him know we had the paperwork, then printed copy of approval from [facility] for Olmesartan Medoxomil/HCTZ 20-12.5mg, faxed to [employer] at [phone] successfully.

## 2023-05-15 ENCOUNTER — OFFICE VISIT (OUTPATIENT)
Dept: CARDIOLOGY CLINIC | Age: 68
End: 2023-05-15

## 2023-05-15 ENCOUNTER — TELEPHONE (OUTPATIENT)
Dept: CARDIOLOGY CLINIC | Age: 68
End: 2023-05-15

## 2023-05-15 VITALS
DIASTOLIC BLOOD PRESSURE: 82 MMHG | HEIGHT: 74 IN | OXYGEN SATURATION: 98 % | SYSTOLIC BLOOD PRESSURE: 136 MMHG | WEIGHT: 277.5 LBS | HEART RATE: 87 BPM | BODY MASS INDEX: 35.61 KG/M2

## 2023-05-15 DIAGNOSIS — E11.9 TYPE 2 DIABETES MELLITUS WITHOUT COMPLICATION, WITHOUT LONG-TERM CURRENT USE OF INSULIN (HCC): ICD-10-CM

## 2023-05-15 DIAGNOSIS — I10 ESSENTIAL HYPERTENSION, BENIGN: ICD-10-CM

## 2023-05-15 DIAGNOSIS — I25.10 CORONARY ARTERY DISEASE INVOLVING NATIVE CORONARY ARTERY OF NATIVE HEART WITHOUT ANGINA PECTORIS: Primary | ICD-10-CM

## 2023-05-15 DIAGNOSIS — G89.29 CHRONIC BACK PAIN, UNSPECIFIED BACK LOCATION, UNSPECIFIED BACK PAIN LATERALITY: ICD-10-CM

## 2023-05-15 DIAGNOSIS — M54.9 CHRONIC BACK PAIN, UNSPECIFIED BACK LOCATION, UNSPECIFIED BACK PAIN LATERALITY: ICD-10-CM

## 2023-05-15 DIAGNOSIS — I42.9 PRIMARY CARDIOMYOPATHY (HCC): ICD-10-CM

## 2023-05-15 DIAGNOSIS — E78.2 MIXED HYPERLIPIDEMIA: ICD-10-CM

## 2023-05-15 NOTE — PATIENT INSTRUCTIONS
No medication changes   Continue risk factor modifications. Call for any change in symptoms, call to report any changes in shortness of breath or development of chest pain with activity.     Follow up in 6 mos

## 2023-10-03 RX ORDER — CARVEDILOL 12.5 MG/1
TABLET ORAL
Qty: 180 TABLET | Refills: 3 | Status: SHIPPED | OUTPATIENT
Start: 2023-10-03

## 2023-10-03 RX ORDER — AMLODIPINE BESYLATE 5 MG/1
TABLET ORAL
Qty: 90 TABLET | Refills: 3 | Status: SHIPPED | OUTPATIENT
Start: 2023-10-03

## 2023-10-03 NOTE — TELEPHONE ENCOUNTER
Received refill request for amlodipine and carvedilol from [facility].     Last ov:05/15/2023        Last EK2020    Last Refill:2022    Next appointment:10/23/2023

## 2023-10-23 ENCOUNTER — OFFICE VISIT (OUTPATIENT)
Dept: CARDIOLOGY CLINIC | Age: 68
End: 2023-10-23

## 2023-10-23 ENCOUNTER — TELEPHONE (OUTPATIENT)
Dept: CARDIOLOGY CLINIC | Age: 68
End: 2023-10-23

## 2023-10-23 VITALS
HEART RATE: 83 BPM | HEIGHT: 74 IN | DIASTOLIC BLOOD PRESSURE: 74 MMHG | WEIGHT: 268.5 LBS | BODY MASS INDEX: 34.46 KG/M2 | OXYGEN SATURATION: 98 % | SYSTOLIC BLOOD PRESSURE: 130 MMHG

## 2023-10-23 DIAGNOSIS — E11.9 TYPE 2 DIABETES MELLITUS WITHOUT COMPLICATION, WITHOUT LONG-TERM CURRENT USE OF INSULIN (HCC): ICD-10-CM

## 2023-10-23 DIAGNOSIS — I10 ESSENTIAL HYPERTENSION, BENIGN: ICD-10-CM

## 2023-10-23 DIAGNOSIS — E78.2 MIXED HYPERLIPIDEMIA: Primary | ICD-10-CM

## 2023-10-23 DIAGNOSIS — I42.9 PRIMARY CARDIOMYOPATHY (HCC): ICD-10-CM

## 2023-10-23 DIAGNOSIS — I25.10 CORONARY ARTERY DISEASE INVOLVING NATIVE CORONARY ARTERY OF NATIVE HEART WITHOUT ANGINA PECTORIS: ICD-10-CM

## 2023-10-23 RX ORDER — MAGNESIUM OXIDE 400 MG/1
TABLET ORAL
COMMUNITY
Start: 2023-10-11

## 2023-10-23 RX ORDER — OLMESARTAN MEDOXOMIL AND HYDROCHLOROTHIAZIDE 20/12.5 20; 12.5 MG/1; MG/1
1 TABLET ORAL DAILY
Qty: 90 TABLET | Refills: 3 | Status: SHIPPED | OUTPATIENT
Start: 2023-10-23

## 2023-10-23 NOTE — PATIENT INSTRUCTIONS
lipids  Please have labs results sent to our office  Continue risk factor modifications. Call for any change in symptoms, call to report any changes in shortness of breath or development of chest pain with activity.     Follow up in 6 mos

## 2023-10-23 NOTE — TELEPHONE ENCOUNTER
Called Jana Arvizu to inquire about the C9 form as mentioned in below message, however no answer. Left  for a return phone call.

## 2023-10-23 NOTE — TELEPHONE ENCOUNTER
Pt states LES has to fill out C9 form in order for lab work to be paid for. Please call Pt with any questions.

## 2023-10-24 NOTE — TELEPHONE ENCOUNTER
Spoke to James. James states he believes the form comes from [facility], [phone].

## 2023-10-25 NOTE — TELEPHONE ENCOUNTER
C9 form completed, signed per LES, faxed to [facility] ([phone]) with successful confirmation. Patient called and made aware of above information.

## 2023-10-31 ENCOUNTER — TELEPHONE (OUTPATIENT)
Dept: CARDIOLOGY CLINIC | Age: 68
End: 2023-10-31

## 2023-10-31 NOTE — TELEPHONE ENCOUNTER
I spoke with an associate at [facility]. She said that there was a non billable CPT code and requested that it be changed to a billable code. I advised that it was ok to change.

## 2023-11-06 LAB
CHOLESTEROL: 156 MG/DL (ref 0–200)
HDLC SERPL-MCNC: 39 MG/DL (ref 40–100)
LDL CHOLESTEROL: 67 MG/DL (ref 0–160)
LIPID PANEL: ABNORMAL
TRIGL SERPL-MCNC: 250 MG/DL (ref 0–150)
VLDLC SERPL CALC-MCNC: 50 MG/DL

## 2023-11-09 NOTE — TELEPHONE ENCOUNTER
Needs his yearly prior auth for his amlodipine and carvedilol  sent to Workers [employer] .  Please call pt to let him know this is done , les has done this before Price (Use Numbers Only, No Special Characters Or $): 154 Total Units: 11 Post-Care Instructions: Patient instructed to not lie down for 4 hours and limit physical activity for 24 hours. Map Statement: Please see attached map for locations and injection amounts. Detail Level: Detailed Consent: Written consent obtained. Risks include but not limited to lid/brow ptosis, bruising, swelling, diplopia, temporary effect, incomplete chemical denervation.

## 2023-12-06 ENCOUNTER — TELEPHONE (OUTPATIENT)
Dept: CARDIOLOGY CLINIC | Age: 68
End: 2023-12-06

## 2023-12-06 NOTE — TELEPHONE ENCOUNTER
Medication Refill    Medication needing refilled:  olmesartan-hydroCHLOROthiazide (BENICAR HCT) 20-12.5 MG per tablet     Dosage of the medication:    How are you taking this medication (QD, BID, TID, QID, PRN):  Take 1 tablet by mouth daily     30 or 90 day supply called in:  90 days    When will you run out of your medication:    Which Pharmacy are we sending the medication to?:  [id number] [id number] - [address]

## 2023-12-06 NOTE — TELEPHONE ENCOUNTER
Called spoke with pharmacy to verify patient has refills available. Pharmacist stated that there is refills but medication is in need of a PA. [facility] is faxing over information to start a PA on this medication.          Prior Authorization:    Olmesartan-HCTZ 20-12.5 mg tab

## 2024-03-15 NOTE — PROGRESS NOTES
aortic regurgitation.   Mild concentric left ventricular hypertrophy.   Contrast injection was performed.     Assessment:    1. Coronary artery Disease:  denies anginal symptoms   stress 1 yr post bypass.    S/p CABG x3 with SARA ligation (8/12/20)  -[facility] 12/18/18 showed mild disease, normal EF.   --10/28/2003 Cath/PCI ([facility])> 85% LAD, 70% LCX, 30% RCA-PCI to LAD with 3.5x18mm Cypher TEJAS.   --1/02/2009 Cath/PCI> Stents in LAD and LCX patent. 85% pRCA-PCI 3.0x12mm Xience TEJAS.   --1/25/2011 Cath> Stents in LAD, LCX, and RCA are all patent. LVEF 40%.  -11/19/21 Myoview- stable  Remain on ASA/statin       2. Essential hypertension, benign:   stable  /64 (Site: Right Upper Arm, Position: Sitting, Cuff Size: Large Adult)   Pulse 83   Ht 1.88 m (6' 2.02\")   Wt 119.3 kg (263 lb)   SpO2 96%   BMI 33.75 kg/m²    Remain on current medication regimen       3. Other and unspecified hyperlipidemia:   Managed per PCP.  He gets blood drawn through doctor in [place].   3/29/21> , , HDL 40, LDL 83  1/9/23   HDL 34  LDL 76   9/11/23 >    HDL 39  LDL 84    Remain on Lipitor 40mg     4. Other primary cardiomyopathies:   stable  02/12/2015 Echo ([facility])> EF 45%, mild global hypokinesis, mild MR  EF 55% per [facility] 12/18/18  Remain on benicar-hct 20-12.5     5.  Diabetes mellitus:        PCP follows.        3/29/21> 6.5        1/9/23 > 8.1    6.  Chronic back pain-        follows with Dr. Montalvo     7  Aortic insufficiency      soft heart murmur +             Plan:     Cardiac test and lab results personally reviewed by me during this office visit and discussed.     No medication changes  Continue risk factor modifications.   Call for any change in symptoms, call to report any changes in shortness of breath or development of chest pain with activity.    Follow up in 6 mos           I appreciate the opportunity of cooperating in the care of this individual.    Chevy Mitchell M.D.,

## 2024-03-19 ENCOUNTER — TELEPHONE (OUTPATIENT)
Dept: CARDIOLOGY CLINIC | Age: 69
End: 2024-03-19

## 2024-04-08 ENCOUNTER — OFFICE VISIT (OUTPATIENT)
Dept: CARDIOLOGY CLINIC | Age: 69
End: 2024-04-08

## 2024-04-08 VITALS
BODY MASS INDEX: 33.75 KG/M2 | WEIGHT: 263 LBS | HEART RATE: 83 BPM | DIASTOLIC BLOOD PRESSURE: 64 MMHG | SYSTOLIC BLOOD PRESSURE: 110 MMHG | HEIGHT: 74 IN | OXYGEN SATURATION: 96 %

## 2024-04-08 DIAGNOSIS — I25.10 CORONARY ARTERY DISEASE INVOLVING NATIVE CORONARY ARTERY OF NATIVE HEART WITHOUT ANGINA PECTORIS: Primary | ICD-10-CM

## 2024-04-08 DIAGNOSIS — E78.2 MIXED HYPERLIPIDEMIA: ICD-10-CM

## 2024-04-08 DIAGNOSIS — I10 ESSENTIAL HYPERTENSION, BENIGN: ICD-10-CM

## 2024-04-08 RX ORDER — ATORVASTATIN CALCIUM 40 MG/1
40 TABLET, FILM COATED ORAL DAILY
Qty: 90 TABLET | Refills: 3 | Status: SHIPPED | OUTPATIENT
Start: 2024-04-08

## 2024-04-08 RX ORDER — AMLODIPINE BESYLATE 5 MG/1
5 TABLET ORAL DAILY
Qty: 90 TABLET | Refills: 3 | Status: SHIPPED | OUTPATIENT
Start: 2024-04-08

## 2024-04-08 RX ORDER — CARVEDILOL 12.5 MG/1
12.5 TABLET ORAL 2 TIMES DAILY WITH MEALS
Qty: 180 TABLET | Refills: 3 | Status: SHIPPED | OUTPATIENT
Start: 2024-04-08

## 2024-04-08 RX ORDER — OLMESARTAN MEDOXOMIL AND HYDROCHLOROTHIAZIDE 20/12.5 20; 12.5 MG/1; MG/1
1 TABLET ORAL DAILY
Qty: 90 TABLET | Refills: 3 | Status: SHIPPED | OUTPATIENT
Start: 2024-04-08

## 2024-11-04 PROBLEM — I35.1 AORTIC INSUFFICIENCY: Status: ACTIVE | Noted: 2024-11-04

## 2024-11-04 NOTE — PROGRESS NOTES
[facility]   Cardiac Follow up    Referring Provider:  Paulina Duran APRN - CNP     Chief Complaint   Patient presents with    Coronary Artery Disease     No cardiac symptoms present.     Follow-up        History of Present Illness:  Mr. Jiang is a 69 y.o. male . He was a direct admit on 12/17/18 from [facility] with chest pain and fatigue. He is a retired  who is on medical disability for his coronary artery disease.  He first presented to our care in 2003 with an acute myocardial infarction.  At that time, he had PCI to LAD and left circumflex with Cypher drug-eluting stents. He has had a subsequent PCI of his right coronary artery with a Xience drug-eluting stent in 2009.  Cardiac catheterization in 2011, he had ejection fraction of 40%, stents were patent.  Stress echo was normal in 10/2017 with mild left ventricular dysfunction, EF 50%. LHC done 12/18/18 showed patent LAD,CFX stent,RCA stent. 40% prox CFX in stent 50% RCA before and after stent, Normal EF 60%, LVEDP at 20.    In 8/2020 presented to hospital from the urology office due to urinary retention issues. He was noted to have transient tachycardia with HR in 200s. He was sent to [facility]. On arrival to [facility], he was in sinus tachycardia without any symptoms. During the admission, he was noted to have wide complex tachycardia with HR up to 220 BPM. He was found to have MVD on cath. He underwent EP study on 8/10/20, but no arrhythmia was able to be induced despite aggressive pacing. S/p CABG x3 with SARA ligation (8/12/20).      Today, Davis is here for 6 mos office visit. He is with his wife, Erika.  He goes to chiropractor for back spasms and sciatic nerve pain.  Due to back pain, he has not been going to gym. Pt denies exertional chest pain, CARR/PND, palpitations, light-headedness, edema. He states he has a \"bad habit\" of eating.    Past Medical History:   has a past medical history of Blood

## 2024-11-19 ENCOUNTER — TELEPHONE (OUTPATIENT)
Dept: CARDIOLOGY CLINIC | Age: 69
End: 2024-11-19

## 2024-11-19 ENCOUNTER — OFFICE VISIT (OUTPATIENT)
Dept: CARDIOLOGY CLINIC | Age: 69
End: 2024-11-19

## 2024-11-19 VITALS
DIASTOLIC BLOOD PRESSURE: 66 MMHG | HEART RATE: 86 BPM | SYSTOLIC BLOOD PRESSURE: 148 MMHG | HEIGHT: 74 IN | BODY MASS INDEX: 34.74 KG/M2 | WEIGHT: 270.7 LBS | OXYGEN SATURATION: 95 %

## 2024-11-19 DIAGNOSIS — I10 ESSENTIAL HYPERTENSION, BENIGN: ICD-10-CM

## 2024-11-19 DIAGNOSIS — M54.9 CHRONIC BACK PAIN, UNSPECIFIED BACK LOCATION, UNSPECIFIED BACK PAIN LATERALITY: ICD-10-CM

## 2024-11-19 DIAGNOSIS — Z95.1 S/P CORONARY ARTERY BYPASS GRAFT X 3: ICD-10-CM

## 2024-11-19 DIAGNOSIS — Z98.61 S/P CORONARY ANGIOPLASTY: ICD-10-CM

## 2024-11-19 DIAGNOSIS — E11.9 TYPE 2 DIABETES MELLITUS WITHOUT COMPLICATION, WITHOUT LONG-TERM CURRENT USE OF INSULIN (HCC): ICD-10-CM

## 2024-11-19 DIAGNOSIS — E78.2 MIXED HYPERLIPIDEMIA: Primary | ICD-10-CM

## 2024-11-19 DIAGNOSIS — G89.29 CHRONIC BACK PAIN, UNSPECIFIED BACK LOCATION, UNSPECIFIED BACK PAIN LATERALITY: ICD-10-CM

## 2024-11-19 DIAGNOSIS — I42.9 PRIMARY CARDIOMYOPATHY (HCC): ICD-10-CM

## 2024-11-19 DIAGNOSIS — I25.10 CORONARY ARTERY DISEASE INVOLVING NATIVE CORONARY ARTERY OF NATIVE HEART WITHOUT ANGINA PECTORIS: ICD-10-CM

## 2024-11-19 DIAGNOSIS — I35.1 AORTIC VALVE INSUFFICIENCY, ETIOLOGY OF CARDIAC VALVE DISEASE UNSPECIFIED: ICD-10-CM

## 2024-11-19 RX ORDER — AMLODIPINE BESYLATE 5 MG/1
5 TABLET ORAL DAILY
Qty: 90 TABLET | Refills: 3 | Status: SHIPPED | OUTPATIENT
Start: 2024-11-19

## 2024-11-19 RX ORDER — CARVEDILOL 12.5 MG/1
12.5 TABLET ORAL 2 TIMES DAILY WITH MEALS
Qty: 180 TABLET | Refills: 3 | Status: SHIPPED | OUTPATIENT
Start: 2024-11-19

## 2024-11-19 RX ORDER — ATORVASTATIN CALCIUM 40 MG/1
40 TABLET, FILM COATED ORAL DAILY
Qty: 90 TABLET | Refills: 3 | Status: SHIPPED | OUTPATIENT
Start: 2024-11-19

## 2024-11-19 RX ORDER — OLMESARTAN MEDOXOMIL AND HYDROCHLOROTHIAZIDE 20/12.5 20; 12.5 MG/1; MG/1
1 TABLET ORAL DAILY
Qty: 90 TABLET | Refills: 3 | Status: SHIPPED | OUTPATIENT
Start: 2024-11-19

## 2024-11-19 NOTE — TELEPHONE ENCOUNTER
Pt seen in office today and needed PA's for the medications that were prescribed. Called [employer] prior authorizations department at [phone] and spoke with Ghazal. She states that all the medications refilled today already have an approved PA for them.      Left message to inform pt that Pas are already completed.

## 2025-03-24 RX ORDER — OLMESARTAN MEDOXOMIL AND HYDROCHLOROTHIAZIDE 20/12.5 20; 12.5 MG/1; MG/1
1 TABLET ORAL DAILY
Qty: 90 TABLET | Refills: 3 | OUTPATIENT
Start: 2025-03-24

## 2025-03-24 RX ORDER — CARVEDILOL 12.5 MG/1
12.5 TABLET ORAL 2 TIMES DAILY WITH MEALS
Qty: 180 TABLET | Refills: 3 | Status: SHIPPED | OUTPATIENT
Start: 2025-03-24

## 2025-03-24 NOTE — TELEPHONE ENCOUNTER
Received refill request for olmesartan-hydrochlorothiazide from [facility] pharmacy.    Last ov: 11/19/2024 LES    Last Refill: 11/19/2024 #90 w/ 3    Next appointment: 05/27/2025 LES

## 2025-03-24 NOTE — TELEPHONE ENCOUNTER
Last ov:24 LES  Next ov:25 LES  Last EK24  Last labs:24  Last filled:   Disp Refills Start End    carvedilol (COREG) 12.5 MG tablet 180 tablet 3 2024 --    Sig - Route: Take 1 tablet by mouth 2 times daily (with meals) - Oral    Sent to pharmacy as: Carvedilol 12.5 MG Oral Tablet (COREG)    Cosign for Ordering: Accepted by Chevy Mitchell MD on 2024  5:05 PM    E-Prescribing Status: Receipt confirmed by pharmacy (2024  1:48 PM EST)

## 2025-05-20 NOTE — PROGRESS NOTES
[facility]   Cardiac Follow up    Referring Provider:  Paulina Duran APRN - CNP     Chief Complaint   Patient presents with    6 Month Follow-Up    Hyperlipidemia    Coronary Artery Disease    Cardiomyopathy    Hypertension      History of Present Illness:  Mr. Jiang is a 69 y.o. male with a history of CAD, HTN, HLD. He was a direct admit on 12/17/18 from [facility] with chest pain and fatigue. He is a retired  who is on medical disability for his coronary artery disease.  He first presented to our care in 2003 with an acute myocardial infarction.  At that time, he had PCI to LAD and left circumflex with Cypher drug-eluting stents. He has had a subsequent PCI of his right coronary artery with a Xience drug-eluting stent in 2009.  Cardiac catheterization in 2011, he had ejection fraction of 40%, stents were patent.  Stress echo was normal in 10/2017 with mild left ventricular dysfunction, EF 50%. LHC done 12/18/18 showed patent LAD,CFX stent,RCA stent. 40% prox CFX in stent 50% RCA before and after stent, Normal EF 60%, LVEDP at 20.    In 8/2020 presented to hospital from the urology office due to urinary retention issues. He was noted to have transient tachycardia with HR in 200s. He was sent to [facility]. On arrival to [facility], he was in sinus tachycardia without any symptoms. During the admission, he was noted to have wide complex tachycardia with HR up to 220 BPM. He was found to have MVD on cath. He underwent EP study on 8/10/20, but no arrhythmia was able to be induced despite aggressive pacing. S/p CABG x3 with SARA ligation (8/12/20).      Today, Davis is here for regular follow up. Overall, he feels fairly well. He denies exertional chest pain, CARR/PND, palpitations, light-headedness, edema. He goes to chiropractor for back spasms and sciatic nerve pain which limites exercise capacity. His wife is with him for the visit.    Past Medical History:   has a past

## 2025-05-27 ENCOUNTER — TELEPHONE (OUTPATIENT)
Dept: CARDIOLOGY CLINIC | Age: 70
End: 2025-05-27

## 2025-05-27 ENCOUNTER — OFFICE VISIT (OUTPATIENT)
Dept: CARDIOLOGY CLINIC | Age: 70
End: 2025-05-27

## 2025-05-27 VITALS
OXYGEN SATURATION: 97 % | SYSTOLIC BLOOD PRESSURE: 138 MMHG | BODY MASS INDEX: 36.29 KG/M2 | HEART RATE: 84 BPM | HEIGHT: 72 IN | WEIGHT: 267.9 LBS | DIASTOLIC BLOOD PRESSURE: 80 MMHG

## 2025-05-27 DIAGNOSIS — I25.10 CORONARY ARTERY DISEASE INVOLVING NATIVE CORONARY ARTERY OF NATIVE HEART WITHOUT ANGINA PECTORIS: Primary | ICD-10-CM

## 2025-05-27 NOTE — TELEPHONE ENCOUNTER
Spoke to Paulina Duran office staff requested most recent lab results. Provided fax number office will be faxed requested results.

## (undated) DEVICE — 12 FOOT DISPOSABLE EXTENSION CABLE WITH SAFE CONNECT / SCREW-DOWN

## (undated) DEVICE — SUTURE ETHBND SZ 3-0 L30IN NONABSORBABLE GRN SH L26MM 1/2 X562H

## (undated) DEVICE — STERILE POLYISOPRENE POWDER-FREE SURGICAL GLOVES: Brand: PROTEXIS

## (undated) DEVICE — SPONGE GZ L3FTXW2IN COT VAG XR DTECT 4 PLY MESH PK RL

## (undated) DEVICE — PAD SURG INSUL AD L CLS CELL FOAM SLT W  TIED RADPQ MRK FOR

## (undated) DEVICE — PRESSURE MONITORING SET: Brand: TRUWAVE, VAMP PLUS

## (undated) DEVICE — PAD THRM M SPL TORSO

## (undated) DEVICE — DECANTER FLD 9IN ST BG FOR ASEP TRNSF OF FLD

## (undated) DEVICE — TTL1LYR 16FR10ML 100%SIL TMPST TR: Brand: MEDLINE

## (undated) DEVICE — 3M™ TEGADERM™ TRANSPARENT FILM DRESSING FRAME STYLE, 1626W, 4 IN X 4-3/4 IN (10 CM X 12 CM), 50/CT 4CT/CASE: Brand: 3M™ TEGADERM™

## (undated) DEVICE — DRAPE,LAP,CHOLE,W/TROUGHS,STERILE: Brand: MEDLINE

## (undated) DEVICE — EVERGRIP INSERT SET 61MM: Brand: FOGARTY EVERGRIP

## (undated) DEVICE — INTENDED FOR TISSUE SEPARATION, AND OTHER PROCEDURES THAT REQUIRE A SHARP SURGICAL BLADE TO PUNCTURE OR CUT.: Brand: BARD-PARKER ® DISPOSABLE SCALPELS

## (undated) DEVICE — CHLORAPREP 26ML ORANGE

## (undated) DEVICE — KIT NDL INSRT 14GA L4IN CRV TIP

## (undated) DEVICE — GAUZE,SPONGE,4"X4",16PLY,STRL,LF,10/TRAY: Brand: MEDLINE

## (undated) DEVICE — MEDI-VAC NON-CONDUCTIVE SUCTION TUBING: Brand: CARDINAL HEALTH

## (undated) DEVICE — DRESSING TRNSPAR W FAB BORD SORBAVIEW ULT 475X425IN

## (undated) DEVICE — PEN: MARKING STD 100/CS: Brand: MEDICAL ACTION INDUSTRIES

## (undated) DEVICE — SUTURE VCRL SZ 3-0 L27IN ABSRB UD L26MM SH 1/2 CIR J416H

## (undated) DEVICE — 20 ML SYRINGE LUER-LOCK TIP: Brand: MONOJECT

## (undated) DEVICE — SYSTEM ENDOSCP VES HARV W/ TOOL CANN SEAL SHT PRT BLNT TIP

## (undated) DEVICE — GOWN SIRUS NONREIN XL W/TWL: Brand: MEDLINE INDUSTRIES, INC.

## (undated) DEVICE — UNIVERSAL BLOCK TRAY: Brand: MEDLINE INDUSTRIES, INC.

## (undated) DEVICE — 3M™ STERI-STRIP™ REINFORCED ADHESIVE SKIN CLOSURES, R1547, 1/2 IN X 4 IN (12 MM X 100 MM), 6 STRIPS/ENVELOPE: Brand: 3M™ STERI-STRIP™

## (undated) DEVICE — SURGICAL PROCEDURE PACK PROC SMARTPREP 2

## (undated) DEVICE — DRESSING WND SM W2.5XL4IN BRTH W/ CATH SECUREMENT AND

## (undated) DEVICE — GLOVE SURG SZ 85 L12IN FNGR THK13MIL BRN LTX SYN POLYMER W

## (undated) DEVICE — GAUZE,SPONGE,4"X4",8PLY,STRL,LF,10/TRAY: Brand: MEDLINE

## (undated) DEVICE — UNIVERSAL BLOCK TRAY: Brand: AVANOS*

## (undated) DEVICE — EZE-BAND LF ST 4X5.5 36/CS: Brand: EZE-BAND LF ST 4X5.5 36/CS

## (undated) DEVICE — SET ADMIN PRIMING 67ML L105IN NVENT 180UM FLTR 3 RLER CLMP

## (undated) DEVICE — Device: Brand: JELCO

## (undated) DEVICE — ELECTRODE PT RET AD L9FT HI MOIST COND ADH HYDRGEL CORDED

## (undated) DEVICE — BANDAGE ADH W4XL13 3 4IN POSTOP DSG PRIMAPORE

## (undated) DEVICE — Z INACTIVE USE 2540311 LEAD PACE L475MM CHN A OR V MYOCARDIAL STEROID ELUT SIL

## (undated) DEVICE — SET ADMIN PRIMING 7ML L30IN 7.35LB 20 GTT 2ND RLER CLMP

## (undated) DEVICE — SUTURE VCRL SZ 4-0 L18IN ABSRB UD L19MM PS-2 3/8 CIR PRIM J496H

## (undated) DEVICE — TOWEL OR BLUEE 16X26IN ST 8 PACK ORB08 16X26ORTWL

## (undated) DEVICE — O.R. CABLE 1X16, 61CM AND EXTENSION

## (undated) DEVICE — GLOVE SURG SZ 8 L11.77IN FNGR THK9.8MIL STRW LTX POLYMER

## (undated) DEVICE — FAIRFIELD CABG ACCESSARY PK

## (undated) DEVICE — SUTURE ETHBND EXCEL SZ 0 L18IN NONABSORBABLE GRN L36MM CT-1 CX21D

## (undated) DEVICE — SHEET,DRAPE,53X77,STERILE: Brand: MEDLINE

## (undated) DEVICE — LAPAROTOMY DRAPE WITH POUCHES: Brand: CONVERTORS

## (undated) DEVICE — DRAPE THER FLUID WARMING 66X44 IN FLAT SLUSH DBL DISC ORS

## (undated) DEVICE — BLANKET WRM CARD FOR MISTRAL AIR WRM SYS

## (undated) DEVICE — DRAIN SURG SGL COLL PT TB FOR ATS BG OASIS

## (undated) DEVICE — TOWEL,OR,DSP,ST,BLUE,STD,4/PK,20PK/CS: Brand: MEDLINE

## (undated) DEVICE — 35 ML SYRINGE LUER-LOCK TIP: Brand: MONOJECT

## (undated) DEVICE — KIT OR ROOM TURNOVER W/STRAP

## (undated) DEVICE — SUTURE NONABSORBABLE MONOFILAMENT 7-0 BV-1 1X24 IN PROLENE 8702H

## (undated) DEVICE — BANDAGE COMPR W6INXL10YD ST M E WHITE/BEIGE

## (undated) DEVICE — CATH CTR VEN 3LUM INTRLNK INJ 9FRX11CM

## (undated) DEVICE — SWAN-GANZ THERMODILUTION CATHETER: Brand: SWAN-GANZ

## (undated) DEVICE — CONNECTOR PERF W0.25XH3/8IN BASE Y SHP REDUC W/O LUERLOCK

## (undated) DEVICE — STANDARD HYPODERMIC NEEDLE,POLYPROPYLENE HUB: Brand: MONOJECT

## (undated) DEVICE — NEEDLE HYPO 18GA L1.5IN THN WALL PIVOTING SHLD BVL ORIENTED

## (undated) DEVICE — WAX SURG 2.5GM HEMSTAT BNE BEESWAX PARAFFIN ISO PALMITATE

## (undated) DEVICE — SUTURE PERMA-HAND SZ 4-0 L144IN NONABSORBABLE BLK LIGAPAK LA53G

## (undated) DEVICE — CONNECTOR PERF W3/8XH0.25XL0.25IN BASE UNEQUAL Y SHP W/O

## (undated) DEVICE — GLOVE SURG SZ 75 L12IN FNGR THK94MIL STD WHT LTX FREE

## (undated) DEVICE — 3 ML SYRINGE LUER-LOCK TIP: Brand: MONOJECT

## (undated) DEVICE — SUTURE VCRL SZ 2-0 L36IN ABSRB UD L36MM CT-1 1/2 CIR J945H

## (undated) DEVICE — AORTIC PNCH 4.0MM 8IN BX 10 DISP

## (undated) DEVICE — FASTENER CATH SM 5-14 FR STRL

## (undated) DEVICE — 3M™ TEGADERM™ TRANSPARENT FILM DRESSING FRAME STYLE, 1628, 6 IN X 8 IN (15 CM X 20 CM), 10/CT 8CT/CASE: Brand: 3M™ TEGADERM™

## (undated) DEVICE — SUTURE ETHBND EXCEL SZ 2-0 L36IN NONABSORBABLE GRN L26MM SH X523H

## (undated) DEVICE — OPEN HRT BASIC PK

## (undated) DEVICE — NEEDLE EPI 14GA L15CM FOR SPNL CRD STIM

## (undated) DEVICE — DRESSING GERM DIA1IN CNTR H DIA7MM BLU CHG ANTIMIC PROTCT

## (undated) DEVICE — SET GRAV VENT NVENT CK VLV 3 NDL FREE PRT 10 GTT

## (undated) DEVICE — SUTURE PROL SZ 7 0 L24IN NONABSORBABLE BLU BV175 6 L8MM 3 8 EP8735H

## (undated) DEVICE — CATHETER IV 18GA L1.25N GREEN FEP SFTY STRGHT HUB RDPQUE DSP

## (undated) DEVICE — SUTURE NONABSORBABLE MONOFILAMENT 4-0 RB-1 36 IN BLU PROLENE 8557H

## (undated) DEVICE — IV START KIT: Brand: MEDLINE INDUSTRIES, INC.

## (undated) DEVICE — ADHESIVE SKIN CLSR 0.7ML TOP DERMBND ADV

## (undated) DEVICE — OPTIFOAM GENTLE LIQUITRAP, SACRUM, 7"X7": Brand: MEDLINE

## (undated) DEVICE — Z DISCONTINUED USE 2516375 APPLICATOR MEDICATED 3 CC CLR STRL CHLORAPREP

## (undated) DEVICE — SUTURE NONABSORBABLE MONOFILAMENT 6-0 C-1 1X30 IN PROLENE 8706H

## (undated) DEVICE — SYRINGE MED 5ML STD CLR PLAS LUERLOCK TIP N CTRL DISP

## (undated) DEVICE — BASIC SINGLE BASIN 1-LF: Brand: MEDLINE INDUSTRIES, INC.

## (undated) DEVICE — SUTURE PERMAHAND SZ 3-0 L30IN NONABSORBABLE BLK SH L26MM K832H

## (undated) DEVICE — APPLICATOR PREP 26ML 0.7% IOD POVACRYLEX 74% ISO ALC ST

## (undated) DEVICE — EXTERNAL TRIAL STIMULATOR (REFURBISHED): Brand: SPECTRA WAVEWRITER™

## (undated) DEVICE — KIT ART LN 20GA L12CM FEP RADPQ 0.025X13.75IN SPR GWIRE

## (undated) DEVICE — CONTAINER STOR SURG SLUSH

## (undated) DEVICE — NEEDLE HYPO 20GA L1.5IN YEL POLYPR HUB S STL REG BVL STR

## (undated) DEVICE — STERILE DISPOSABLE EQUIPMENT COVER - DOME COVER 22" DEPTH: Brand: PREFERRED MEDICAL PRODUCTS, LLC

## (undated) DEVICE — BOWL MED L 32OZ PLAS W/ MOLD GRAD EZ OPN PEEL PCH

## (undated) DEVICE — TOWEL 26X17IN 2 PER PACK COTTON DELINTED

## (undated) DEVICE — GOWN SIRUS NONREIN LG W/TWL: Brand: MEDLINE INDUSTRIES, INC.

## (undated) DEVICE — DRAIN SURG 24FR BLAK SIL HUBLESS 4 CHANNELED RADPQ EXTN TB

## (undated) DEVICE — STERNUM BLADE, OFFSET (31.7 X 0.64 X 6.3MM)

## (undated) DEVICE — KIT PT TRL HANDHELD COMB THER WAVEFORM AUTOMATION FOR SPNL

## (undated) DEVICE — HYPODERMIC SAFETY NEEDLE: Brand: MAGELLAN

## (undated) DEVICE — ALCOHOL RUBBING 16OZ 70% ISO

## (undated) DEVICE — PORT VLV 2 W NDL FREE SMRTSITE

## (undated) DEVICE — STOPCOCKS CLEAR FOUR WAY STOPCOCK W/ MALE LUER LOCK: Brand: STOPCOCKS

## (undated) DEVICE — LABEL MED CUST CVR

## (undated) DEVICE — SUTURE SZ 7 L18IN NONABSORBABLE SIL CCS L48MM 1/2 CIR STRNM M655G

## (undated) DEVICE — Z CONVERTED USE 2275871 SPONGE GZ W4XL4IN WHT 8 PLY CURITY

## (undated) DEVICE — SUTURE PERMAHAND SZ 2-0 L30IN NONABSORBABLE BLK SILK W/O A305H

## (undated) DEVICE — [HIGH FLOW INSUFFLATOR,  DO NOT USE IF PACKAGE IS DAMAGED,  KEEP DRY,  KEEP AWAY FROM SUNLIGHT,  PROTECT FROM HEAT AND RADIOACTIVE SOURCES.]: Brand: PNEUMOSURE

## (undated) DEVICE — SUTURE PDS II SZ 0 L27IN ABSRB VLT L36MM CT-1 1/2 CIR Z340H